# Patient Record
Sex: MALE | Race: WHITE | Employment: OTHER | URBAN - METROPOLITAN AREA
[De-identification: names, ages, dates, MRNs, and addresses within clinical notes are randomized per-mention and may not be internally consistent; named-entity substitution may affect disease eponyms.]

---

## 2017-01-06 ENCOUNTER — HOSPITAL ENCOUNTER (OUTPATIENT)
Dept: LAB | Age: 77
Discharge: HOME | End: 2017-01-06
Payer: MEDICARE

## 2017-01-06 LAB
BASOPHILS # BLD AUTO: 0 K/UL (ref 0–0.2)
BASOPHILS # BLD: 0 % (ref 0–2)
CRP SERPL-MCNC: 2.5 MG/DL (ref 0–0.9)
DIFFERENTIAL METHOD BLD: ABNORMAL
EOSINOPHIL # BLD: 0.3 K/UL (ref 0–0.8)
EOSINOPHIL NFR BLD: 2 % (ref 0.5–7.8)
ERYTHROCYTE [DISTWIDTH] IN BLOOD BY AUTOMATED COUNT: 12.9 % (ref 11.9–14.6)
ERYTHROCYTE [SEDIMENTATION RATE] IN BLOOD: 41 MM/HR (ref 0–30)
HCT VFR BLD AUTO: 41.9 % (ref 41.1–50.3)
HGB BLD-MCNC: 14 G/DL (ref 13.6–17.2)
LYMPHOCYTES # BLD AUTO: 18 % (ref 13–44)
LYMPHOCYTES # BLD: 2.6 K/UL (ref 0.5–4.6)
MCH RBC QN AUTO: 33.2 PG (ref 26.1–32.9)
MCHC RBC AUTO-ENTMCNC: 33.4 G/DL (ref 31.4–35)
MCV RBC AUTO: 99.3 FL (ref 79.6–97.8)
MONOCYTES # BLD: 1.9 K/UL (ref 0.1–1.3)
MONOCYTES NFR BLD AUTO: 13 % (ref 4–12)
NEUTS SEG # BLD: 9.6 K/UL (ref 1.7–8.2)
NEUTS SEG NFR BLD AUTO: 67 % (ref 43–78)
PLATELET # BLD AUTO: 256 K/UL (ref 150–450)
PLATELET COMMENTS,PCOM: ADEQUATE
PMV BLD AUTO: 10.1 FL (ref 10.8–14.1)
RBC # BLD AUTO: 4.22 M/UL (ref 4.23–5.67)
RBC MORPH BLD: ABNORMAL
WBC # BLD AUTO: 14.4 K/UL (ref 4.3–11.1)
WBC MORPH BLD: ABNORMAL

## 2017-01-06 PROCEDURE — 36415 COLL VENOUS BLD VENIPUNCTURE: CPT | Performed by: PHYSICIAN ASSISTANT

## 2017-01-06 PROCEDURE — 85652 RBC SED RATE AUTOMATED: CPT | Performed by: PHYSICIAN ASSISTANT

## 2017-01-06 PROCEDURE — 86140 C-REACTIVE PROTEIN: CPT | Performed by: PHYSICIAN ASSISTANT

## 2017-01-06 PROCEDURE — 85025 COMPLETE CBC W/AUTO DIFF WBC: CPT | Performed by: PHYSICIAN ASSISTANT

## 2017-01-10 ENCOUNTER — HOSPITAL ENCOUNTER (OUTPATIENT)
Dept: LAB | Age: 77
Discharge: HOME | End: 2017-01-10
Payer: MEDICARE

## 2017-01-10 LAB
ANION GAP BLD CALC-SCNC: 11 MMOL/L (ref 7–16)
BUN SERPL-MCNC: 16 MG/DL (ref 8–23)
CALCIUM SERPL-MCNC: 9.3 MG/DL (ref 8.3–10.4)
CHLORIDE SERPL-SCNC: 108 MMOL/L (ref 98–107)
CO2 SERPL-SCNC: 23 MMOL/L (ref 21–32)
CREAT SERPL-MCNC: 1.51 MG/DL (ref 0.8–1.5)
GLUCOSE SERPL-MCNC: 114 MG/DL (ref 65–100)
POTASSIUM SERPL-SCNC: 4.1 MMOL/L (ref 3.5–5.1)
SODIUM SERPL-SCNC: 142 MMOL/L (ref 136–145)

## 2017-01-10 PROCEDURE — 80048 BASIC METABOLIC PNL TOTAL CA: CPT | Performed by: PHYSICIAN ASSISTANT

## 2017-01-10 PROCEDURE — 36415 COLL VENOUS BLD VENIPUNCTURE: CPT | Performed by: PHYSICIAN ASSISTANT

## 2017-01-13 ENCOUNTER — HOSPITAL ENCOUNTER (OUTPATIENT)
Dept: INTERVENTIONAL RADIOLOGY/VASCULAR | Age: 77
Discharge: HOME | End: 2017-01-13
Payer: MEDICARE

## 2017-01-13 VITALS
TEMPERATURE: 97.7 F | RESPIRATION RATE: 18 BRPM | HEART RATE: 84 BPM | OXYGEN SATURATION: 97 % | HEIGHT: 64 IN | DIASTOLIC BLOOD PRESSURE: 76 MMHG | SYSTOLIC BLOOD PRESSURE: 165 MMHG | BODY MASS INDEX: 25.61 KG/M2 | WEIGHT: 150 LBS

## 2017-01-13 DIAGNOSIS — M46.46 DISCITIS OF LUMBAR REGION: ICD-10-CM

## 2017-01-13 DIAGNOSIS — M46.96 INFLAMMATORY SPONDYLOPATHY OF LUMBAR REGION (HCC): ICD-10-CM

## 2017-01-13 PROCEDURE — 74011000250 HC RX REV CODE- 250: Performed by: RADIOLOGY

## 2017-01-13 PROCEDURE — 87205 SMEAR GRAM STAIN: CPT | Performed by: PHYSICIAN ASSISTANT

## 2017-01-13 PROCEDURE — 77030010507 HC ADH SKN DERMBND J&J -B

## 2017-01-13 PROCEDURE — 62267 INTERDISCAL PERQ ASPIR DX: CPT

## 2017-01-13 PROCEDURE — 87176 TISSUE HOMOGENIZATION CULTR: CPT | Performed by: PHYSICIAN ASSISTANT

## 2017-01-13 PROCEDURE — 77030003669 HC NDL SPN COOK -B

## 2017-01-13 RX ORDER — LIDOCAINE HYDROCHLORIDE 20 MG/ML
20-400 INJECTION, SOLUTION INFILTRATION; PERINEURAL ONCE
Status: COMPLETED | OUTPATIENT
Start: 2017-01-13 | End: 2017-01-13

## 2017-01-13 RX ADMIN — LIDOCAINE HYDROCHLORIDE 200 MG: 20 INJECTION, SOLUTION INFILTRATION; PERINEURAL at 10:49

## 2017-01-13 RX ADMIN — SODIUM BICARBONATE 2 ML: 0.2 INJECTION, SOLUTION INTRAVENOUS at 10:49

## 2017-01-13 NOTE — DISCHARGE INSTRUCTIONS
[address]  Department of Interventional Radiology  [facility]  [phone] Office  [phone] Fax    BIOPSY DISCHARGE INSTRUCTIONS    General Instructions:     A biopsy is the removal of a small piece of tissue for microscopic examination or testing. Healthy tissue can be obtained for the purpose of tissue-type matching for transplants. Unhealthy tissues are more commonly biopsied to diagnose disease. Lung Biopsy:     A needle lung biopsy is performed when there is a mass discovered in the lung area. The most serious risk is infection, bleeding, and pneumothorax (a collapsed lung). Signs of pneumothorax include shortness of breath, rapid heart rate, and blueness of the skin. If any of these occur, call 911. Liver Biopsy: This test helps detect cancer, infections, and the cause of an enlargement of the liver or elevated liver enzymes. It also helps to diagnose a number of liver diseases. The pain associated with the procedure may be felt in the shoulder. The risks include internal bleeding, pneumothorax, and injury to the surrounding organs. Renal Biopsy: This procedure is sometimes done to evaluate a transplanted kidney. It is also used to evaluate unexplained decrease in kidney function, blood, or protein in the urine. You may see bright red blood in the urine the first 24 hours after the test. If the bleeding lasts longer, you need to call your doctor. There is a risk of infection and bleeding into the muscle, which may cause soreness. Spinal Biopsy: This test is sometimes done in conjunction with other procedures. Your back will be sore, as we are taking a small sample of bone, which is slightly more difficult to sample than tissue. General Biopsy:     A mass can grow in any area of the body, and we are taking a specimen as ordered by your doctor. The risks are the same.  They include bleeding, pain, and infection. Home Care Instructions: You may resume your regular diet and medication regimen. Do not drink alcohol, drive, or make any important legal decisions in the next 24 hours. Do not lift anything heavier than a gallon of milk until the soreness goes away. You may use over the counter acetaminophen or ibuprofen for the soreness. You may apply an ice pack to the affected area for 20-30 minutes at time for the first 24 hours. After that, you may apply a heat pack. Call If: You should call your Physician and/or the Radiology Nurse if you have any questions or concerns about the biopsy site. Call if you should have increased pain, fever, redness, drainage, or bleeding more than a small spot on the bandage. Follow-Up Instructions: Please see your ordering doctor as he/she has requested. To Reach Us: If you have any questions about your procedure, please call the Interventional Radiology department at [phone]. After business hours (5pm) and weekends, call the answering service at [phone] and ask for the Radiologist on call to be paged. Interventional Radiology General Nurse Discharge    After general anesthesia or intravenous sedation, for 24 hours or while taking prescription Narcotics:  · Limit your activities  · Do not drive and operate hazardous machinery  · Do not make important personal or business decisions  · Do  not drink alcoholic beverages  · If you have not urinated within 8 hours after discharge, please contact your surgeon on call. * Please give a list of your current medications to your Primary Care Provider. * Please update this list whenever your medications are discontinued, doses are     changed, or new medications (including over-the-counter products) are added. * Please carry medication information at all times in case of emergency situations.     These are general instructions for a healthy lifestyle:    No smoking/ No tobacco products/ Avoid exposure to second hand smoke  Surgeon General's Warning:  Quitting smoking now greatly reduces serious risk to your health. Obesity, smoking, and sedentary lifestyle greatly increases your risk for illness  A healthy diet, regular physical exercise & weight monitoring are important for maintaining a healthy lifestyle    You may be retaining fluid if you have a history of heart failure or if you experience any of the following symptoms:  Weight gain of 3 pounds or more overnight or 5 pounds in a week, increased swelling in our hands or feet or shortness of breath while lying flat in bed. Please call your doctor as soon as you notice any of these symptoms; do not wait until your next office visit. Recognize signs and symptoms of STROKE:  F-face looks uneven    A-arms unable to move or move unevenly    S-speech slurred or non-existent    T-time-call 911 as soon as signs and symptoms begin-DO NOT go       Back to bed or wait to see if you get better-TIME IS BRAIN.             Patient Signature:  Date: 1/13/2017  Discharging Nurse: Tad Enrique RN

## 2017-01-13 NOTE — PROGRESS NOTES
I have reviewed discharge instructions with the patient. The patient verbalized understanding. Patient ambulatory to waiting room. NAD upon discharge.

## 2017-01-13 NOTE — IP AVS SNAPSHOT
[address] 
[phone] Patient: Charley Suazo MRN: [id number] :1940 You are allergic to the following Allergen Reactions Morphine Nausea and Vomiting Other Medication Nausea and Vomiting Dust and pollen causes runny nose. Mollusks/clams- N/V Recent Documentation Height Weight BMI Smoking Status 1.626 m 68 kg 25.75 kg/m2 Former Smoker Emergency Contacts Name Discharge Info Relation Home Work Mobile Olegario Slaughter  Spouse [3] [phone] About your hospitalization You were admitted on:  2017 You last received care in the:  [facility] You were discharged on:  2017 Unit phone number:  [phone] Why you were hospitalized Your primary diagnosis was:  Not on File Your Primary Care Physician (PCP) Primary Care Physician Office Phone Office Fax Lester Martinez [phone] Follow-up Information None Current Discharge Medication List  
  
ASK your doctor about these medications Dose & Instructions Dispensing Information Comments Morning Noon Evening Bedtime AMBIEN 10 mg tablet Generic drug:  zolpidem Your next dose is: Today, Tomorrow Other:  _________ Dose:  10 mg Take 10 mg by mouth nightly as needed for Sleep. Indications: SLEEP-ONSET INSOMNIA Refills:  0  
     
   
   
   
  
 benazepril 10 mg tablet Commonly known as:  LOTENSIN Your next dose is: Today, Tomorrow Other:  _________ Dose:  10 mg Take 10 mg by mouth every morning. Indications: HYPERTENSION Refills:  0  
     
   
   
   
  
 CARAFATE 100 mg/mL suspension Generic drug:  sucralfate Your next dose is: Today, Tomorrow Other:  _________ Dose:  2 tsp Take 2 tsp by mouth Before breakfast, lunch, and dinner. Refills:  0  
     
   
   
   
  
 cetirizine 10 mg tablet Commonly known as:  ZYRTEC Your next dose is: Today, Tomorrow Other:  _________ Dose:  10 mg Take 10 mg by mouth nightly. Indications: ALLERGIC RHINITIS Refills:  0  
     
   
   
   
  
 FLONASE 50 mcg/actuation nasal spray Generic drug:  fluticasone Your next dose is: Today, Tomorrow Other:  _________ Dose:  2 Spray 2 Sprays by Nasal route nightly. Indications: ALLERGIC RHINITIS Refills:  0  
     
   
   
   
  
 megestrol 400 mg/10 mL (40 mg/mL) suspension Commonly known as:  MEGACE Your next dose is: Today, Tomorrow Other:  _________ Dose:  400 mg Take 400 mg by mouth as needed. Refills:  0  
     
   
   
   
  
 meloxicam 7.5 mg tablet Commonly known as:  MOBIC Your next dose is: Today, Tomorrow Other:  _________ Dose:  7.5 mg Take 7.5 mg by mouth daily. Stop 5 days prior to DOS   Indications: OSTEOARTHRITIS Refills:  0  
     
   
   
   
  
 metoclopramide HCl 5 mg tablet Commonly known as:  REGLAN Your next dose is: Today, Tomorrow Other:  _________ Dose:  5 mg Take 5 mg by mouth Before breakfast, lunch, and dinner. Take DOS per anesthesia protocol. Indications: GASTROESOPHAGEAL REFLUX Refills:  0  
     
   
   
   
  
 omeprazole 20 mg capsule Commonly known as:  PRILOSEC Your next dose is: Today, Tomorrow Other:  _________ Dose:  20 mg Take 20 mg by mouth daily. Take DOS per anesthesia protocol. Indications: GASTROESOPHAGEAL REFLUX Refills:  0  
     
   
   
   
  
 oxyCODONE-acetaminophen 5-325 mg per tablet Commonly known as:  PERCOCET Your next dose is: Today, Tomorrow Other:  _________ Dose:  1 Tab Take 1 Tab by mouth every four (4) hours as needed. Max Daily Amount: 6 Tabs. Quantity:  40 Tab Refills:  0 PRESERVISION LUTEIN PO Your next dose is: Today, Tomorrow Other:  _________ Take  by mouth daily. Refills:  0  
     
   
   
   
  
 promethazine 25 mg tablet Commonly known as:  PHENERGAN Your next dose is: Today, Tomorrow Other:  _________ Dose:  25 mg Take 1 Tab by mouth every six (6) hours as needed. Quantity:  20 Tab Refills:  0  
     
   
   
   
  
 simvastatin 40 mg tablet Commonly known as:  ZOCOR Your next dose is: Today, Tomorrow Other:  _________ Dose:  40 mg Take 40 mg by mouth nightly. Refills:  0  
     
   
   
   
  
 VITAMIN B-12 1,000 mcg/mL injection Generic drug:  cyanocobalamin Your next dose is: Today, Tomorrow Other:  _________ Dose:  1000 mcg  
1,000 mcg by IntraMUSCular route every twenty-eight (28) days. Refills:  0 Discharge Instructions [address] Department of Interventional Radiology [place] [facility] 
[phone] Office 
[phone] Fax BIOPSY DISCHARGE INSTRUCTIONS General Instructions: A biopsy is the removal of a small piece of tissue for microscopic examination or testing. Healthy tissue can be obtained for the purpose of tissue-type matching for transplants. Unhealthy tissues are more commonly biopsied to diagnose disease. Lung Biopsy: A needle lung biopsy is performed when there is a mass discovered in the lung area. The most serious risk is infection, bleeding, and pneumothorax (a collapsed lung). Signs of pneumothorax include shortness of breath, rapid heart rate, and blueness of the skin. If any of these occur, call 911. Liver Biopsy: This test helps detect cancer, infections, and the cause of an enlargement of the liver or elevated liver enzymes. It also helps to diagnose a number of liver diseases. The pain associated with the procedure may be felt in the shoulder. The risks include internal bleeding, pneumothorax, and injury to the surrounding organs. Renal Biopsy: This procedure is sometimes done to evaluate a transplanted kidney. It is also used to evaluate unexplained decrease in kidney function, blood, or protein in the urine. You may see bright red blood in the urine the first 24 hours after the test. If the bleeding lasts longer, you need to call your doctor. There is a risk of infection and bleeding into the muscle, which may cause soreness. Spinal Biopsy: This test is sometimes done in conjunction with other procedures. Your back will be sore, as we are taking a small sample of bone, which is slightly more difficult to sample than tissue. General Biopsy: 
   A mass can grow in any area of the body, and we are taking a specimen as ordered by your doctor. The risks are the same. They include bleeding, pain, and infection. Home Care Instructions: You may resume your regular diet and medication regimen. Do not drink alcohol, drive, or make any important legal decisions in the next 24 hours. Do not lift anything heavier than a gallon of milk until the soreness goes away. You may use over the counter acetaminophen or ibuprofen for the soreness. You may apply an ice pack to the affected area for 20-30 minutes at time for the first 24 hours. After that, you may apply a heat pack. Call If: You should call your Physician and/or the Radiology Nurse if you have any questions or concerns about the biopsy site. Call if you should have increased pain, fever, redness, drainage, or bleeding more than a small spot on the bandage. Follow-Up Instructions: Please see your ordering doctor as he/she has requested. To Reach Us: If you have any questions about your procedure, please call the Interventional Radiology department at [phone]. After business hours (5pm) and weekends, call the answering service at [phone] and ask for the Radiologist on call to be paged. Interventional Radiology General Nurse Discharge After general anesthesia or intravenous sedation, for 24 hours or while taking prescription Narcotics: · Limit your activities · Do not drive and operate hazardous machinery · Do not make important personal or business decisions · Do  not drink alcoholic beverages · If you have not urinated within 8 hours after discharge, please contact your surgeon on call. * Please give a list of your current medications to your Primary Care Provider. * Please update this list whenever your medications are discontinued, doses are 
   changed, or new medications (including over-the-counter products) are added. * Please carry medication information at all times in case of emergency situations. These are general instructions for a healthy lifestyle: No smoking/ No tobacco products/ Avoid exposure to second hand smoke Surgeon General's Warning:  Quitting smoking now greatly reduces serious risk to your health. Obesity, smoking, and sedentary lifestyle greatly increases your risk for illness A healthy diet, regular physical exercise & weight monitoring are important for maintaining a healthy lifestyle You may be retaining fluid if you have a history of heart failure or if you experience any of the following symptoms:  Weight gain of 3 pounds or more overnight or 5 pounds in a week, increased swelling in our hands or feet or shortness of breath while lying flat in bed. Please call your doctor as soon as you notice any of these symptoms; do not wait until your next office visit. Recognize signs and symptoms of STROKE: 
F-face looks uneven A-arms unable to move or move unevenly S-speech slurred or non-existent T-time-call 911 as soon as signs and symptoms begin-DO NOT go Back to bed or wait to see if you get better-TIME IS BRAIN. Patient Signature: 
Date: 1/13/2017 Discharging Nurse: Garry Sher RN Discharge Orders None Introducing [facility]! Mook Patel introduces [employer] patient portal. Now you can access parts of your medical record, email your doctor's office, and request medication refills online. 1. In your internet browser, go to [email or web address] 2. Click on the First Time User? Click Here link in the Sign In box. You will see the New Member Sign Up page. 3. Enter your [employer] Access Code exactly as it appears below. You will not need to use this code after youve completed the sign-up process. If you do not sign up before the expiration date, you must request a new code. · [employer] Access Code: [email or web address] Expires: 4/6/2017  1:45 PM 
 
4. Enter the last four digits of your Social Security Number (xxxx) and Date of Birth (mm/dd/yyyy) as indicated and click Submit. You will be taken to the next sign-up page. 5. Create a [employer] ID. This will be your [employer] login ID and cannot be changed, so think of one that is secure and easy to remember. 6. Create a [employer] password. You can change your password at any time. 7. Enter your Password Reset Question and Answer. This can be used at a later time if you forget your password. 8. Enter your e-mail address. You will receive e-mail notification when new information is available in [address] 9. Click Sign Up. You can now view and download portions of your medical record. 10. Click the Download Summary menu link to download a portable copy of your medical information. If you have questions, please visit the Frequently Asked Questions section of the [employer] website.  Remember, [employer] is NOT to be used for urgent needs. For medical emergencies, dial 911. Now available from your iPhone and Android! General Information Please provide this summary of care documentation to your next provider. Patient Signature:  ____________________________________________________________ Date:  ____________________________________________________________  
  
[facility] Provider Signature:  ____________________________________________________________ Date:  ____________________________________________________________

## 2017-01-13 NOTE — PROGRESS NOTES
Interventional Radiology Prep Area Handoff      Allergies   Allergen Reactions    Morphine Nausea and Vomiting    Other Medication Nausea and Vomiting     Dust and pollen causes runny nose. Mollusks/clams- N/V       IRSummary reviewed. Pt identified with two identifiers. Verified procedure with Patient and IR Provider as Disc Aspiration. Consent obtained: Yes H&P complete/updated: No MD airway complete: No    Sedation:No   NPO: Yes   Anticoagulation: No     Pt shaved: No   Antibiotics: No  Anesthesia notified: NA    Additional Notes: NA      Lines:  Peripherally Inserted Central Catheter:     Central Venous Catheter:     Venous Access Device:     Peripheral Intravenous Line:  Peripheral IV 07/23/15 Left Antecubital (Active)     Hemodialysis Catheter:     Drain(s):       Labs verified: Yes  No results found for this or any previous visit (from the past 24 hour(s)).   Patient Vitals for the past 8 hrs:   Temp Pulse Resp BP SpO2   01/13/17 0850 97.7 °F (36.5 °C) 84 18 165/76 97 %

## 2017-01-13 NOTE — IP AVS SNAPSHOT
Current Discharge Medication List  
  
ASK your doctor about these medications Dose & Instructions Dispensing Information Comments Morning Noon Evening Bedtime AMBIEN 10 mg tablet Generic drug:  zolpidem Your next dose is: Today, Tomorrow Other:  ____________ Dose:  10 mg Take 10 mg by mouth nightly as needed for Sleep. Indications: SLEEP-ONSET INSOMNIA Refills:  0  
     
   
   
   
  
 benazepril 10 mg tablet Commonly known as:  LOTENSIN Your next dose is: Today, Tomorrow Other:  ____________ Dose:  10 mg Take 10 mg by mouth every morning. Indications: HYPERTENSION Refills:  0  
     
   
   
   
  
 CARAFATE 100 mg/mL suspension Generic drug:  sucralfate Your next dose is: Today, Tomorrow Other:  ____________ Dose:  2 tsp Take 2 tsp by mouth Before breakfast, lunch, and dinner. Refills:  0  
     
   
   
   
  
 cetirizine 10 mg tablet Commonly known as:  ZYRTEC Your next dose is: Today, Tomorrow Other:  ____________ Dose:  10 mg Take 10 mg by mouth nightly. Indications: ALLERGIC RHINITIS Refills:  0  
     
   
   
   
  
 FLONASE 50 mcg/actuation nasal spray Generic drug:  fluticasone Your next dose is: Today, Tomorrow Other:  ____________ Dose:  2 Spray 2 Sprays by Nasal route nightly. Indications: ALLERGIC RHINITIS Refills:  0  
     
   
   
   
  
 megestrol 400 mg/10 mL (40 mg/mL) suspension Commonly known as:  MEGACE Your next dose is: Today, Tomorrow Other:  ____________ Dose:  400 mg Take 400 mg by mouth as needed. Refills:  0  
     
   
   
   
  
 meloxicam 7.5 mg tablet Commonly known as:  MOBIC Your next dose is: Today, Tomorrow Other:  ____________ Dose:  7.5 mg Take 7.5 mg by mouth daily. Stop 5 days prior to DOS   Indications: OSTEOARTHRITIS Refills:  0 metoclopramide HCl 5 mg tablet Commonly known as:  REGLAN Your next dose is: Today, Tomorrow Other:  ____________ Dose:  5 mg Take 5 mg by mouth Before breakfast, lunch, and dinner. Take DOS per anesthesia protocol. Indications: GASTROESOPHAGEAL REFLUX Refills:  0  
     
   
   
   
  
 omeprazole 20 mg capsule Commonly known as:  PRILOSEC Your next dose is: Today, Tomorrow Other:  ____________ Dose:  20 mg Take 20 mg by mouth daily. Take DOS per anesthesia protocol. Indications: GASTROESOPHAGEAL REFLUX Refills:  0  
     
   
   
   
  
 oxyCODONE-acetaminophen 5-325 mg per tablet Commonly known as:  PERCOCET Your next dose is: Today, Tomorrow Other:  ____________ Dose:  1 Tab Take 1 Tab by mouth every four (4) hours as needed. Max Daily Amount: 6 Tabs. Quantity:  40 Tab Refills:  0 PRESERVISION LUTEIN PO Your next dose is: Today, Tomorrow Other:  ____________ Take  by mouth daily. Refills:  0  
     
   
   
   
  
 promethazine 25 mg tablet Commonly known as:  PHENERGAN Your next dose is: Today, Tomorrow Other:  ____________ Dose:  25 mg Take 1 Tab by mouth every six (6) hours as needed. Quantity:  20 Tab Refills:  0  
     
   
   
   
  
 simvastatin 40 mg tablet Commonly known as:  ZOCOR Your next dose is: Today, Tomorrow Other:  ____________ Dose:  40 mg Take 40 mg by mouth nightly. Refills:  0  
     
   
   
   
  
 VITAMIN B-12 1,000 mcg/mL injection Generic drug:  cyanocobalamin Your next dose is: Today, Tomorrow Other:  ____________ Dose:  1000 mcg  
1,000 mcg by IntraMUSCular route every twenty-eight (28) days. Refills:  0

## 2017-01-15 LAB
BACTERIA SPEC CULT: NORMAL
GRAM STN SPEC: NORMAL
GRAM STN SPEC: NORMAL
SERVICE CMNT-IMP: NORMAL

## 2017-01-25 ENCOUNTER — HOSPITAL ENCOUNTER (OUTPATIENT)
Dept: MRI IMAGING | Age: 77
Discharge: HOME | End: 2017-01-25
Attending: FAMILY MEDICINE
Payer: MEDICARE

## 2017-01-25 DIAGNOSIS — M51.36 OTHER INTERVERTEBRAL DISC DEGENERATION, LUMBAR REGION: ICD-10-CM

## 2017-01-25 DIAGNOSIS — M46.46 DISCITIS OF LUMBAR REGION: ICD-10-CM

## 2017-01-25 DIAGNOSIS — M48.062 LUMBAR STENOSIS WITH NEUROGENIC CLAUDICATION: ICD-10-CM

## 2017-01-25 PROCEDURE — 72158 MRI LUMBAR SPINE W/O & W/DYE: CPT

## 2017-01-25 PROCEDURE — 74011250636 HC RX REV CODE- 250/636: Performed by: FAMILY MEDICINE

## 2017-01-25 PROCEDURE — A9577 INJ MULTIHANCE: HCPCS | Performed by: FAMILY MEDICINE

## 2017-01-25 RX ORDER — SODIUM CHLORIDE 0.9 % (FLUSH) 0.9 %
10 SYRINGE (ML) INJECTION
Status: COMPLETED | OUTPATIENT
Start: 2017-01-25 | End: 2017-01-25

## 2017-01-25 RX ADMIN — Medication 10 ML: at 17:34

## 2017-01-25 RX ADMIN — GADOBENATE DIMEGLUMINE 7 ML: 529 INJECTION, SOLUTION INTRAVENOUS at 17:34

## 2018-08-01 ENCOUNTER — HOSPITAL ENCOUNTER (OUTPATIENT)
Dept: NUCLEAR MEDICINE | Age: 78
Discharge: HOME | End: 2018-08-01
Attending: ORTHOPAEDIC SURGERY
Payer: MEDICARE

## 2018-08-01 DIAGNOSIS — M25.561 PAIN IN RIGHT KNEE: ICD-10-CM

## 2018-08-01 DIAGNOSIS — M16.11 UNILATERAL PRIMARY OSTEOARTHRITIS, RIGHT HIP: ICD-10-CM

## 2018-08-01 DIAGNOSIS — Z96.651 HISTORY OF TOTAL RIGHT KNEE REPLACEMENT: ICD-10-CM

## 2018-08-01 PROCEDURE — 78306 BONE IMAGING WHOLE BODY: CPT

## 2018-08-20 ENCOUNTER — HOSPITAL ENCOUNTER (OUTPATIENT)
Dept: SURGERY | Age: 78
Discharge: HOME | End: 2018-08-20
Payer: MEDICARE

## 2018-08-20 ENCOUNTER — HOSPITAL ENCOUNTER (OUTPATIENT)
Dept: PHYSICAL THERAPY | Age: 78
Discharge: HOME | End: 2018-08-20
Payer: MEDICARE

## 2018-08-20 LAB
ANION GAP SERPL CALC-SCNC: 13 MMOL/L (ref 7–16)
APPEARANCE UR: CLEAR
APTT PPP: 28.3 SEC (ref 23.2–35.3)
BACTERIA SPEC CULT: NORMAL
BACTERIA URNS QL MICRO: 0 /HPF
BASOPHILS # BLD: 0.1 K/UL
BASOPHILS NFR BLD: 1 % (ref 0–2)
BILIRUB UR QL: NEGATIVE
BUN SERPL-MCNC: 30 MG/DL (ref 8–23)
CALCIUM SERPL-MCNC: 9.2 MG/DL (ref 8.3–10.4)
CASTS URNS QL MICRO: ABNORMAL /LPF
CHLORIDE SERPL-SCNC: 106 MMOL/L (ref 98–107)
CO2 SERPL-SCNC: 21 MMOL/L (ref 21–32)
COLOR UR: YELLOW
CREAT SERPL-MCNC: 1.92 MG/DL (ref 0.8–1.5)
DIFFERENTIAL METHOD BLD: ABNORMAL
EOSINOPHIL # BLD: 0.1 K/UL
EOSINOPHIL NFR BLD: 1 % (ref 0.5–7.8)
EPI CELLS #/AREA URNS HPF: 0 /HPF
ERYTHROCYTE [DISTWIDTH] IN BLOOD BY AUTOMATED COUNT: 12.4 %
GLUCOSE SERPL-MCNC: 89 MG/DL (ref 65–100)
GLUCOSE UR STRIP.AUTO-MCNC: NEGATIVE MG/DL
HCT VFR BLD AUTO: 37.3 % (ref 41.1–50.3)
HGB BLD-MCNC: 12.1 G/DL (ref 13.6–17.2)
HGB UR QL STRIP: NEGATIVE
IMM GRANULOCYTES # BLD: 0.2 K/UL
IMM GRANULOCYTES NFR BLD AUTO: 2 % (ref 0–5)
INR PPP: 1
KETONES UR QL STRIP.AUTO: NEGATIVE MG/DL
LEUKOCYTE ESTERASE UR QL STRIP.AUTO: ABNORMAL
LYMPHOCYTES # BLD: 3.2 K/UL
LYMPHOCYTES NFR BLD: 30 % (ref 13–44)
MCH RBC QN AUTO: 33.1 PG (ref 26.1–32.9)
MCHC RBC AUTO-ENTMCNC: 32.4 G/DL (ref 31.4–35)
MCV RBC AUTO: 101.9 FL (ref 79.6–97.8)
MONOCYTES # BLD: 1.4 K/UL
MONOCYTES NFR BLD: 13 % (ref 4–12)
NEUTS SEG # BLD: 5.8 K/UL
NEUTS SEG NFR BLD: 53 % (ref 43–78)
NITRITE UR QL STRIP.AUTO: NEGATIVE
NRBC # BLD: 0 K/UL (ref 0–0.2)
PH UR STRIP: 5.5 [PH] (ref 5–9)
PLATELET # BLD AUTO: 224 K/UL (ref 150–450)
PMV BLD AUTO: 9.7 FL (ref 9.4–12.3)
POTASSIUM SERPL-SCNC: 4.3 MMOL/L (ref 3.5–5.1)
PROT UR STRIP-MCNC: NEGATIVE MG/DL
PROTHROMBIN TIME: 13.4 SEC (ref 11.5–14.5)
RBC # BLD AUTO: 3.66 M/UL (ref 4.23–5.6)
RBC #/AREA URNS HPF: 0 /HPF
SERVICE CMNT-IMP: NORMAL
SODIUM SERPL-SCNC: 140 MMOL/L (ref 136–145)
SP GR UR REFRACTOMETRY: 1.01 (ref 1–1.02)
UROBILINOGEN UR QL STRIP.AUTO: 0.2 EU/DL (ref 0.2–1)
WBC # BLD AUTO: 10.8 K/UL (ref 4.3–11.1)
WBC URNS QL MICRO: 0 /HPF

## 2018-08-20 PROCEDURE — 80048 BASIC METABOLIC PNL TOTAL CA: CPT

## 2018-08-20 PROCEDURE — 85730 THROMBOPLASTIN TIME PARTIAL: CPT

## 2018-08-20 PROCEDURE — G8980 MOBILITY D/C STATUS: HCPCS

## 2018-08-20 PROCEDURE — 36415 COLL VENOUS BLD VENIPUNCTURE: CPT

## 2018-08-20 PROCEDURE — 97161 PT EVAL LOW COMPLEX 20 MIN: CPT

## 2018-08-20 PROCEDURE — 87641 MR-STAPH DNA AMP PROBE: CPT

## 2018-08-20 PROCEDURE — 85025 COMPLETE CBC W/AUTO DIFF WBC: CPT

## 2018-08-20 PROCEDURE — 81001 URINALYSIS AUTO W/SCOPE: CPT

## 2018-08-20 PROCEDURE — 85610 PROTHROMBIN TIME: CPT

## 2018-08-20 PROCEDURE — 77030027138 HC INCENT SPIROMETER -A

## 2018-08-20 PROCEDURE — G8979 MOBILITY GOAL STATUS: HCPCS

## 2018-08-20 PROCEDURE — G8978 MOBILITY CURRENT STATUS: HCPCS

## 2018-08-20 RX ORDER — FLUCONAZOLE 100 MG/1
200 TABLET ORAL
COMMUNITY
End: 2018-09-19

## 2018-08-20 RX ORDER — SUCRALFATE 1 G/1
1 TABLET ORAL 3 TIMES DAILY
COMMUNITY
End: 2021-03-31 | Stop reason: CLARIF

## 2018-08-20 NOTE — PERIOP NOTES
Aaron Dodson MD    Your patient recently had labs drawn during a hospital appointment due to an upcoming surgery. The results are attached. If you have any questions or concerns please reach out to your patient for a follow-up in your office. Please do not respond to this message as my mailbox is not monitored. You may call [phone] with questions or concerns. Recent Results (from the past 12 hour(s))   CBC WITH AUTOMATED DIFF    Collection Time: 08/20/18 11:19 AM   Result Value Ref Range    WBC 10.8 4.3 - 11.1 K/uL    RBC 3.66 (L) 4.23 - 5.6 M/uL    HGB 12.1 (L) 13.6 - 17.2 g/dL    HCT 37.3 (L) 41.1 - 50.3 %    .9 (H) 79.6 - 97.8 FL    MCH 33.1 (H) 26.1 - 32.9 PG    MCHC 32.4 31.4 - 35.0 g/dL    RDW 12.4 %    PLATELET 360 875 - 064 K/uL    MPV 9.7 9.4 - 12.3 FL    ABSOLUTE NRBC 0.00 0.0 - 0.2 K/uL    DF AUTOMATED      NEUTROPHILS 53 43 - 78 %    LYMPHOCYTES 30 13 - 44 %    MONOCYTES 13 (H) 4.0 - 12.0 %    EOSINOPHILS 1 0.5 - 7.8 %    BASOPHILS 1 0.0 - 2.0 %    IMMATURE GRANULOCYTES 2 0.0 - 5.0 %    ABS. NEUTROPHILS 5.8 K/UL    ABS. LYMPHOCYTES 3.2 K/UL    ABS. MONOCYTES 1.4 K/UL    ABS. EOSINOPHILS 0.1 K/UL    ABS. BASOPHILS 0.1 K/UL    ABS. IMM.  GRANS. 0.2 K/UL   PROTHROMBIN TIME + INR    Collection Time: 08/20/18 11:19 AM   Result Value Ref Range    Prothrombin time 13.4 11.5 - 14.5 sec    INR 1.0     PTT    Collection Time: 08/20/18 11:19 AM   Result Value Ref Range    aPTT 28.3 23.2 - 34.6 SEC   METABOLIC PANEL, BASIC    Collection Time: 08/20/18 11:19 AM   Result Value Ref Range    Sodium 140 136 - 145 mmol/L    Potassium 4.3 3.5 - 5.1 mmol/L    Chloride 106 98 - 107 mmol/L    CO2 21 21 - 32 mmol/L    Anion gap 13 7 - 16 mmol/L    Glucose 89 65 - 100 mg/dL    BUN 30 (H) 8 - 23 MG/DL    Creatinine 1.92 (H) 0.8 - 1.5 MG/DL    GFR est AA 44 (L) >60 ml/min/1.73m2    GFR est non-AA 36 (L) >60 ml/min/1.73m2    Calcium 9.2 8.3 - 10.4 MG/DL   URINALYSIS W/ RFLX MICROSCOPIC    Collection Time: 08/20/18 11:19 AM   Result Value Ref Range    Color YELLOW      Appearance CLEAR      Specific gravity 1.012 1.001 - 1.023      pH (UA) 5.5 5.0 - 9.0      Protein NEGATIVE  NEG mg/dL    Glucose NEGATIVE  mg/dL    Ketone NEGATIVE  NEG mg/dL    Bilirubin NEGATIVE  NEG      Blood NEGATIVE  NEG      Urobilinogen 0.2 0.2 - 1.0 EU/dL    Nitrites NEGATIVE  NEG      Leukocyte Esterase TRACE (A) NEG      WBC 0 0 /hpf    RBC 0 0 /hpf    Epithelial cells 0 0 /hpf    Bacteria 0 0 /hpf    Casts 0-3 0 /lpf

## 2018-08-20 NOTE — PERIOP NOTES
Patient verified name, , and surgery as listed in [facility]. Type 3 surgery, walk in assessment complete. Labs per surgeon: CBC, BMP, U/A, PT/PTT ; results within [facility] protocols except elevated Creatinine of 1.92; [facility] to review. MRSA nasal swab pending. Labs per anesthesia protocol: included in surgeons orders. EKG: last done at pcp office. Called and received ekg dated 18. [facility] to review. EKG dated 07/23/15 printed from EMR for comparison. Received labs dated 18 from pcp office for comparison. Also printed CMP dated 17 for comparison. Called Jessica Fontenot NP due to elevated Creatinine and bilateral lower extremity edema. She came to see patient in person today. Instructed patient to increase water intake and to stop Megestrol for now due to increased weight gain. Hibiclens and instructions to return bottle on DOS given per hospital policy. Nasal Swab collected per MD order and instructions for Mupirocin nasal ointment if required. Patient provided with handouts including Guide to Surgery, Pain Management, Hand Hygiene, Blood Transfusion Education, and [place] [facility] Brochure. Patient answered medical/surgical history questions at their best of ability. All prior to admission medications documented in [facility]. Original medication prescription bottles not visualized during patient appointment. Patient instructed to hold all vitamins 7 days prior to surgery and NSAIDS 5 days prior to surgery. Medications to be held: meloxicam, vitamins. Patient instructed to continue previous medications as prescribed prior to surgery and to take the following medications the day of surgery according to anesthesia guidelines with a small sip of water: omeprazole, sucralfate. Instructed to bring: zyrtec, nasal spray, and omeprazole dos. Patient teach back successful and patient demonstrates knowledge of instruction.

## 2018-08-20 NOTE — ADVANCED PRACTICE NURSE
Total Joint Surgery Preoperative Chart Review      Patient ID:  Silas Cifuentes  [id number]  24 y.o.  1940  Surgeon: Dr. Mary Quick  Date of Surgery: 9/4/2018  Procedure: Total Right Hip Arthroplasty  Primary Care Physician: Sunshine Huynh -[phone]  Specialty Physician(s):      Subjective:   Silas Cifuentes is a 76 y.o. WHITE OR  male who presents for preoperative evaluation for Total Right Hip arthroplasty. This is a preoperative chart review note based on data collected by the nurse at the surgical Pre-Assessment visit. Past Medical History:   Diagnosis Date    Arthritis     osteo    Chronic pain     right shoulder    Former smoker     GERD (gastroesophageal reflux disease)     controlled with medication    High cholesterol     Hypertension     controlled with medication    Nausea & vomiting     post-op nausea  states from morphine    Stomach cancer ([address] 2010    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    Unspecified adverse effect of anesthesia     wife states slow to wake      Past Surgical History:   Procedure Laterality Date    HX APPENDECTOMY      HX CHOLECYSTECTOMY  2015    HX GI  2010    gastrectomy 2/8/10-has 30% stomach left    HX KNEE ARTHROSCOPY Right     HX OTHER SURGICAL      attempted tracheal dilation- stopped due to inflammation    HX SHOULDER ARTHROSCOPY Right 2015    TOTAL HIP ARTHROPLASTY Left 2004    TOTAL KNEE ARTHROPLASTY Right 2012     Family History   Problem Relation Age of Onset    Kidney Disease Mother     Dementia Father     Alcohol abuse Brother     Arthritis-osteo Brother       Social History   Substance Use Topics    Smoking status: Former Smoker     Packs/day: 0.50     Years: 5.00    Smokeless tobacco: Never Used      Comment: quit age 20    Alcohol use Yes      Comment: 3 drinks per night; wine and liquor       Prior to Admission medications    Medication Sig Start Date End Date Taking?  Authorizing Provider   sucralfate (CARAFATE) 1 gram tablet Take 1 g by mouth three (3) times daily. Take / use AM day of surgery  per anesthesia protocols. Yes Historical Provider   fluconazole (DIFLUCAN) 100 mg tablet Take 200 mg by mouth daily as needed. FDA advises cautious prescribing of oral fluconazole in pregnancy. Yes Historical Provider   meloxicam (MOBIC) 7.5 mg tablet Take 7.5 mg by mouth daily. Stop 5 days prior to DOS   Indications: OSTEOARTHRITIS   Yes Historical Provider   omeprazole (PRILOSEC) 20 mg capsule Take 20 mg by mouth Daily (before breakfast). Take DOS per anesthesia protocol. Indications: gastroesophageal reflux disease   Yes Historical Provider   zolpidem (AMBIEN) 10 mg tablet Take 10 mg by mouth nightly as needed for Sleep. Indications: SLEEP-ONSET INSOMNIA   Yes Historical Provider   cyanocobalamin (VITAMIN B-12) 1,000 mcg/mL injection 1,000 mcg by IntraMUSCular route every twenty-eight (28) days. Yes Historical Provider   VIT C/MIKHAIL AC/LUT/COPPER/ZNOX (PRESERVISION LUTEIN PO) Take 1 Cap by mouth daily. Yes Historical Provider   megestrol (MEGACE ORAL) 400 mg/10 mL (40 mg/mL) suspension Take 400 mg by mouth daily. Yes Historical Provider   simvastatin (ZOCOR) 40 mg tablet Take 40 mg by mouth nightly. 3/10/10  Yes Historical Provider   benazepril (LOTENSIN) 10 mg tablet Take 10 mg by mouth daily. Indications: hypertension 2/2/10  Yes Historical Provider   cetirizine (ZYRTEC) 10 mg tablet Take 10 mg by mouth nightly. Indications: ALLERGIC RHINITIS 2/2/10  Yes Historical Provider   fluticasone (FLONASE) 50 mcg/Actuation nasal spray 2 Sprays by Nasal route nightly. Indications: ALLERGIC RHINITIS   Yes Historical Provider     Allergies   Allergen Reactions    Morphine Nausea and Vomiting    Other Medication Nausea and Vomiting     Dust and pollen causes runny nose.   Mollusks/clams- N/V    Shellfish Derived Nausea and Vomiting          Objective:     Physical Exam:   Patient Vitals for the past 24 hrs:   Temp Pulse BP SpO2   08/20/18 1223 96.8 °F (36 °C) 65 (!) 148/93 98 %       ECG:    EKG Results     Procedure 720 Value Units Date/Time    EKG, 12 LEAD, INITIAL [[id number]]     Order Status:  Canceled           Data Review:   Labs:   Recent Results (from the past 24 hour(s))   CBC WITH AUTOMATED DIFF    Collection Time: 08/20/18 11:19 AM   Result Value Ref Range    WBC 10.8 4.3 - 11.1 K/uL    RBC 3.66 (L) 4.23 - 5.6 M/uL    HGB 12.1 (L) 13.6 - 17.2 g/dL    HCT 37.3 (L) 41.1 - 50.3 %    .9 (H) 79.6 - 97.8 FL    MCH 33.1 (H) 26.1 - 32.9 PG    MCHC 32.4 31.4 - 35.0 g/dL    RDW 12.4 %    PLATELET 517 725 - 596 K/uL    MPV 9.7 9.4 - 12.3 FL    ABSOLUTE NRBC 0.00 0.0 - 0.2 K/uL    DF AUTOMATED      NEUTROPHILS 53 43 - 78 %    LYMPHOCYTES 30 13 - 44 %    MONOCYTES 13 (H) 4.0 - 12.0 %    EOSINOPHILS 1 0.5 - 7.8 %    BASOPHILS 1 0.0 - 2.0 %    IMMATURE GRANULOCYTES 2 0.0 - 5.0 %    ABS. NEUTROPHILS 5.8 K/UL    ABS. LYMPHOCYTES 3.2 K/UL    ABS. MONOCYTES 1.4 K/UL    ABS. EOSINOPHILS 0.1 K/UL    ABS. BASOPHILS 0.1 K/UL    ABS. IMM.  GRANS. 0.2 K/UL   PROTHROMBIN TIME + INR    Collection Time: 08/20/18 11:19 AM   Result Value Ref Range    Prothrombin time 13.4 11.5 - 14.5 sec    INR 1.0     PTT    Collection Time: 08/20/18 11:19 AM   Result Value Ref Range    aPTT 28.3 23.2 - 55.3 SEC   METABOLIC PANEL, BASIC    Collection Time: 08/20/18 11:19 AM   Result Value Ref Range    Sodium 140 136 - 145 mmol/L    Potassium 4.3 3.5 - 5.1 mmol/L    Chloride 106 98 - 107 mmol/L    CO2 21 21 - 32 mmol/L    Anion gap 13 7 - 16 mmol/L    Glucose 89 65 - 100 mg/dL    BUN 30 (H) 8 - 23 MG/DL    Creatinine 1.92 (H) 0.8 - 1.5 MG/DL    GFR est AA 44 (L) >60 ml/min/1.73m2    GFR est non-AA 36 (L) >60 ml/min/1.73m2    Calcium 9.2 8.3 - 10.4 MG/DL   URINALYSIS W/ RFLX MICROSCOPIC    Collection Time: 08/20/18 11:19 AM   Result Value Ref Range    Color YELLOW      Appearance CLEAR      Specific gravity 1.012 1.001 - 1.023      pH (UA) 5.5 5.0 - 9.0 Protein NEGATIVE  NEG mg/dL    Glucose NEGATIVE  mg/dL    Ketone NEGATIVE  NEG mg/dL    Bilirubin NEGATIVE  NEG      Blood NEGATIVE  NEG      Urobilinogen 0.2 0.2 - 1.0 EU/dL    Nitrites NEGATIVE  NEG      Leukocyte Esterase TRACE (A) NEG      WBC 0 0 /hpf    RBC 0 0 /hpf    Epithelial cells 0 0 /hpf    Bacteria 0 0 /hpf    Casts 0-3 0 /lpf   MSSA/MRSA SC BY PCR, NASAL SWAB    Collection Time: 08/20/18  1:05 PM   Result Value Ref Range    Special Requests: NARES      Culture result:        SA target not detected. A MRSA NEGATIVE, SA NEGATIVE test result does not preclude MRSA or SA nasal colonization. Problem List:  )  Patient Active Problem List   Diagnosis Code    Osteoarthritis of right knee M17.11    Total knee replacement status Z96.659    Arthritis of right shoulder region M19.011    Status post shoulder replacement Z96.619    Cholecystitis, acute K81.0    S/P laparoscopic cholecystectomy Z90.49    CKD (chronic kidney disease) stage 3, GFR 30-59 ml/min N18.3       Total Joint Surgery Pre-Assessment Recommendations:        Patient currently on Megace for several years due to poor appetite related to stomach cancer. Patients weight is more than adequate at 176. He would like to get back down to 150. Patient with increased creatinine 1.9 and generalized swelling legs. This is most likely due to long term use of megace. Patient was instructed to dc megace and monitor his weight. If poor appetite then he is to restart megace. He will follow with oncology in November. Creatinine 1.9 Patient has been less mobil and drinking a lot more tea. Instructed to decrease caffeine intake or switch to water to increase kidney function. Recommend continuous saturation monitoring hours of sleep, during hospitalization. Renal protocol initiated. The patient's chart will be flagged renal risk. Renal RisK Alerts:  1.  Caution with use of muscle relaxants and sedatives with reduced renal function  2. Caution with total amount of narcotics used   3. Avoid morphine if patient has reduced renal function due to accumulations of the highly active metabolite, morphine-6-glucuronide, which can lead to sedation and respiratory depression  4. Avoid nephrotoxic drugs such as ASH inhibitors  5. Consider volume status monitoring in addition to [place]  6.  Ensure hand-off to hospitalist for appropriate perioperative IV fluid management        Signed By: Roger Giraldo NP-C    August 21, 2018

## 2018-08-20 NOTE — PROGRESS NOTES
Liana Tripp  : (51 y.o.) [facility] at [address], [address] [place] [address]  [phone]       Physical Therapy Prehab Plan of Treatment and Evaluation Summary:2018    ICD-10: Treatment Diagnosis:   · Pain in Right Hip (M25.551)  · Stiffness of Right Hip, Not elsewhere classified (M25.651)  · Difficulty in walking, Not elsewhere classified (R26.2)  Precautions/Allergies:   Morphine; Other medication; and Shellfish derived  MEDICAL/REFERRING DIAGNOSIS:  Unilateral primary osteoarthritis, right hip [M16.11]  REFERRING PHYSICIAN: Juan Daniel Parish,*  DATE OF SURGERY: 18   Assessment:   Comments:  Pt. Has had a right tka, right tsa, and left tara in the past. He plans to go home with his wife. He borrowed our RW today during prehab. He was using his wife's arm to get in the building. PROBLEM LIST (Impacting functional limitations):  Mr. Althea Henriquez presents with the following right lower extremity(s) problems:  1. Strength  2. Range of Motion  3. Home Exercise Program  4. Pain   INTERVENTIONS PLANNED:  1. Home Exercise Program  2. Educational Discussion     TREATMENT PLAN: Effective Dates: 2018 TO 2018. Frequency/Duration: Patient to continue to perform home exercise program at least twice per day up until his surgery. GOALS: (Goals have been discussed and agreed upon with patient.)  Discharge Goals: Time Frame: 1 Day  1. Patient will demonstrate independence with a home exercise program designed to increase strength, range of motion and pain control to minimize functional deficits and optimize patient for total joint replacement. Rehabilitation Potential For Stated Goals: Good  Regarding Vin Granger's therapy, I certify that the treatment plan above will be carried out by a therapist or under their direction.   Thank you for this referral,  Alex Farnsworth, PT               HISTORY:   Present Symptoms:  Pain Intensity 1:  (10)  Pain Location 1: Hip   History of Present Injury/Illness (Reason for Referral):  Medical/Referring Diagnosis: Unilateral primary osteoarthritis, right hip [M16.11]   Past Medical History/Comorbidities:   Mr. Lauri Beaver  has a past medical history of Arthritis; Chronic pain; Former smoker; GERD (gastroesophageal reflux disease); High cholesterol; Hypertension; Nausea & vomiting; Stomach cancer ([address] (2010); Total knee replacement status (2/21/2012); and Unspecified adverse effect of anesthesia. He also has no past medical history of Adverse effect of anesthesia; Aneurysm ([address] Arrhythmia; Asthma; Autoimmune disease ([address] CAD (coronary artery disease); Chronic kidney disease; Chronic obstructive pulmonary disease ([address] Coagulation defects; Coagulation disorder ([address] COPD; Diabetes ([address] Difficult intubation; Endocarditis; Heart failure ([address] Ill-defined condition; Liver disease; Malignant hyperthermia due to anesthesia; Morbid obesity ([address] Nicotine vapor product user; Non-nicotine vapor product user; Pseudocholinesterase deficiency; Psychiatric disorder; PUD (peptic ulcer disease); Rheumatic fever; Seizures ([address] Stroke [facility]); Thromboembolus ([address] Thyroid disease; or Unspecified sleep apnea. Mr. Lauri Beaver  has a past surgical history that includes hx gi (2010); pr total hip arthroplasty (Left, 2004); hx shoulder arthroscopy (Right, 2015); hx knee arthroscopy (Right); pr total knee arthroplasty (Right, 2012); hx other surgical; hx cholecystectomy (2015); and hx appendectomy.   Social History/Living Environment:   Home Environment: Private residence  # Steps to Enter: 1  Rails to Enter: No  One/Two Story Residence: One story  Living Alone: No  Support Systems: Spouse/Significant Other/Partner  Patient Expects to be Discharged to[de-identified] Private residence  Current DME Used/Available at Home: Walker, rolling, Commode, bedside  Tub or Shower Type: Shower  Work/Activity:  retired  Dominant Side:  RIGHT  Current Medications:  See Pre-assessment nursing note   Number of Personal Factors/Comorbidities that affect the Plan of Care: 3+: HIGH COMPLEXITY   EXAMINATION:   ADLs (Current Functional Status):   Ambulation:  [] Independent  [] Walk Indoors Only  [] Walk Outdoors  [x] Use Assistive Device  [] Use Wheelchair Only Dressing:  [x] Independent  Requires Assistance from Someone for:  [] Sock/Shoes  [] Pants  [] Everything   Bathing/Showering:   [] Independent  [x] Requires Assistance from Someone  [] Sponge Bath Only Household Activities:  [] Routine house and yard work  [] Light Housework Only  [x] None   Observation/Orthostatic Postural Assessment:   Exceptions to [place] shoulders  ROM/Flexibility:   Gross Assessment: Yes  AROM: Generally decreased, functional (left LE - hip limited)                       RLE Assessment  RLE Assessment (WDL): Exceptions to WDL (right hip <3/5)  RLE AROM  R Hip Flexion: 90  R Hip ABduction: 10  R Knee Extension: 5   Strength:   Gross Assessment: Yes  Strength: Generally decreased, functional (left LE)                  Functional Mobility:    Gross Assessment: Yes  Sensation: Impaired (swollen feet)    Gait Description (WDL): Exceptions to WDL  Stand to Sit: Additional time, Modified independent  Sit to Stand: Modified independent, Additional time  Distance (ft): 250 Feet (ft)  Ambulation - Level of Assistance: Modified independent; Additional time  Assistive Device: Walker, rolling  Speed/Jaz: Delayed  Step Length: Left shortened;Right shortened  Stance: Right decreased  Gait Abnormalities: Antalgic          Balance:    Sitting: Intact  Standing: With support   Body Structures Involved:  1. Bones  2. Joints  3. Muscles  4. Ligaments Body Functions Affected:  1. Movement Related Activities and Participation Affected:  1.  Mobility   Number of elements that affect the Plan of Care: 3: MODERATE COMPLEXITY   CLINICAL PRESENTATION:   Presentation: Stable and uncomplicated: LOW COMPLEXITY CLINICAL DECISION MAKING:   Outcome Measure: Tool Used: Lower Extremity Functional Scale (LEFS)  Score:  Initial: 1/80 Most Recent: X/80 (Date: -- )   Interpretation of Score: 20 questions each scored on a 5 point scale with 0 representing \"extreme difficulty or unable to perform\" and 4 representing \"no difficulty\". The lower the score, the greater the functional disability. 80/80 represents no disability. Minimal detectable change is 9 points. Score 80 79-65 64-49 48-33 32-17 16-1 0   Modifier CH CI CJ CK CL CM CN     ? Mobility - Walking and Moving Around:     - CURRENT STATUS: CM - 80%-99% impaired, limited or restricted    - GOAL STATUS: CM - 80%-99% impaired, limited or restricted    - D/C STATUS:  CM - 80%-99% impaired, limited or restricted  Medical Necessity:   · Mr. Anuel Manzo is expected to optimize his lower extremity strength and ROM in preparation for joint replacement surgery. Reason for Services/Other Comments:  · Achieve baseline assesment of musculoskeletal system, functional mobility and home environment. , educate in PT HEP in preparation for surgery, educate in hospital plan of care. Use of outcome tool(s) and clinical judgement create a POC that gives a: Clear prediction of patient's progress: LOW COMPLEXITY   TREATMENT:   Treatment/Session Assessment:  Patient was instructed in PT- HEP to increase strength and ROM in LEs. Answered all questions. · Post session pain:  Hip pain  · Compliance with Program/Exercises: Will assess as treatment progresses.   Total Treatment Duration:  PT Patient Time In/Time Out  Time In: 1045  Time Out: Dat Edward 89.

## 2018-08-21 VITALS
OXYGEN SATURATION: 98 % | HEIGHT: 66 IN | HEART RATE: 65 BPM | DIASTOLIC BLOOD PRESSURE: 93 MMHG | BODY MASS INDEX: 28.45 KG/M2 | TEMPERATURE: 96.8 F | SYSTOLIC BLOOD PRESSURE: 148 MMHG | WEIGHT: 177 LBS

## 2018-08-21 PROBLEM — N18.30 CKD (CHRONIC KIDNEY DISEASE) STAGE 3, GFR 30-59 ML/MIN (HCC): Status: ACTIVE | Noted: 2018-08-21

## 2018-08-21 NOTE — PERIOP NOTES
8/20/2018  4:52 PM - Jeevan, Lab In [employer]   Component Results   Component Value Flag Ref Range Units Status   Special Requests: NARES     Final   Culture result:      Final   SA target not detected.                                 A MRSA NEGATIVE, SA NEGATIVE test result does not preclude MRSA or SA nasal colonization.

## 2018-08-21 NOTE — PERIOP NOTES
, anesthesia, reviewed and ok'd for surgery ekg's (7/26/18, 7/23/15) and creat (8/20/19, 7/26/18, 11/1/17).

## 2018-08-21 NOTE — PROGRESS NOTES
18 1030   Oxygen Therapy   O2 Sat (%) 98 %   Pulse via Oximetry 71 beats per minute   O2 Device Room air   Pre-Treatment   Breath Sounds Bilateral Clear   Pre FEV1 (liters) 2 liters   % Predicted 82   Incentive Spirometry Treatment   Actual Volume (ml) 2500 ml     Initial respiratory Assessment completed with pt. Pt was interviewed and evaluated in [facility] prior to surgery. Patient ID:  Gio Sanchez  [id number]  89 y.o.  1940  Surgeon: Dr. Petros Moya  Date of Surgery: 2018  Procedure: Total Right Hip Arthroplasty  Primary Care Physician: Yessenia Mc -[phone]  Specialists:                                  Pt instructed in the use of Incentive Spirometry. Pt instructed to bring Incentive Spirometer back on date of surgery & to start using Is upon return to pt room.     Pt taught proper cough technique    History of smokin PACKS A WEEK FOR 3 YEARS                                                      Quit date:       48 YEARS AGO    Secondhand smoke:MOTHER      Past procedures with Oxygen desaturation:NONE    Past Medical History:   Diagnosis Date    Arthritis     osteo    Chronic pain     right shoulder    Former smoker     GERD (gastroesophageal reflux disease)     controlled with medication    High cholesterol     Hypertension     controlled with medication    Nausea & vomiting     post-op nausea  states from morphine    Stomach [facility] ([facility] 2010    Total knee replacement status 2012    Unspecified adverse effect of anesthesia     wife states slow to wake                                                                                                                                                     Respiratory history:                                 SOB  ON EXERTION                                    Respiratory meds:  NA                                       FAMILY PRESENT:            SPOUSE, PAST SLEEP STUDY:                   NO  HX OF MAHENDRA:                                       NO                                     MAHENDRA assessment:                                               SLEEPS ON              BACK                                                                PHYSICAL EXAM   Body mass index is 28.57 kg/(m^2).    Visit Vitals    BP (!) 148/93 (BP 1 Location: Right arm, BP Patient Position: At rest;Sitting)    Pulse 65    Temp 96.8 °F (36 °C)    Ht 5' 6\" (1.676 m)    Wt 80.3 kg (177 lb)    SpO2 98%    BMI 28.57 kg/m2     Neck circumference:    42.5  cm    Loud snoring:                                   DENIES    Witnessed apnea or wakening gasping or choking:,             DENIES,                                                                                                  Awakens with headaches:                                                  DENIES    Morning or daytime tiredness/ sleepiness:                    DENIES                                                                                  Dry mouth or sore throat in morning:                                                                                        DENIES    Sprague stage:  2    SACS score:5    STOP/BANG:3                              CPAP:                       NONE                                              NONE  Referrals:    Pt. Phone Number:

## 2018-08-31 NOTE — H&P
[facility]  History and Physical Exam    Patient ID:  Diana Spears  [id number]    1 y.o.  1940    Today: August 31, 2018    Vitals Signs: Reviewed as noted in medical record. Allergies: Allergies   Allergen Reactions    Morphine Nausea and Vomiting    Other Medication Nausea and Vomiting     Dust and pollen causes runny nose. Mollusks/clams- N/V    Shellfish Derived Nausea and Vomiting       CC: right hip pain    HPI:  Pt complains of right hip pain and with difficulty ambulating. Relevant PMH:   Past Medical History:   Diagnosis Date    Arthritis     osteo    Chronic pain     right shoulder    Former smoker     GERD (gastroesophageal reflux disease)     controlled with medication    High cholesterol     Hypertension     controlled with medication    Nausea & vomiting     post-op nausea  states from morphine    Stomach cancer ([facility] [address] [facility] 2010    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    Unspecified adverse effect of anesthesia     wife states slow to wake       Objective:                    HEENT: NC/AT                   Lungs:  clear                   Heart:   rrr                   Abdomen: soft                   Extremities:  Pain with rom of the right hip joint    Radiographs: reveal osteoarthritis with loss of joint space and bone spurs. Assessment: Unilateral osteoarthritis of hip, right [M16.11]    Plan:  Proceed with scheduled Procedure(s) (LRB):  RIGHT HIP ARTHROPLASTY TOTAL/DEPUY (Right) . The patient has failed conservative treatment including NSAIDS, and injections. Due to the amount of pain the patient is experiencing we will proceed with scheduled procedure.       Signed By: NANCY Vann  August 31, 2018

## 2018-09-03 ENCOUNTER — ANESTHESIA EVENT (OUTPATIENT)
Dept: SURGERY | Age: 78
DRG: 470 | End: 2018-09-03
Payer: MEDICARE

## 2018-09-04 ENCOUNTER — APPOINTMENT (OUTPATIENT)
Dept: GENERAL RADIOLOGY | Age: 78
DRG: 470 | End: 2018-09-04
Attending: PHYSICIAN ASSISTANT
Payer: MEDICARE

## 2018-09-04 ENCOUNTER — HOSPITAL ENCOUNTER (INPATIENT)
Age: 78
LOS: 2 days | Discharge: HOME HEALTH | DRG: 470 | End: 2018-09-06
Attending: ORTHOPAEDIC SURGERY | Admitting: ORTHOPAEDIC SURGERY
Payer: MEDICARE

## 2018-09-04 ENCOUNTER — ANESTHESIA (OUTPATIENT)
Dept: SURGERY | Age: 78
DRG: 470 | End: 2018-09-04
Payer: MEDICARE

## 2018-09-04 ENCOUNTER — HOME HEALTH ADMISSION (OUTPATIENT)
Dept: HOME HEALTH SERVICES | Facility: HOME HEALTH | Age: 78
End: 2018-09-04
Payer: MEDICARE

## 2018-09-04 DIAGNOSIS — M16.11 ARTHRITIS OF RIGHT HIP: Primary | ICD-10-CM

## 2018-09-04 LAB
ABO + RH BLD: NORMAL
BLOOD GROUP ANTIBODIES SERPL: NORMAL
GLUCOSE BLD STRIP.AUTO-MCNC: 108 MG/DL (ref 65–100)
HGB BLD-MCNC: 10.3 G/DL (ref 13.6–17.2)
SPECIMEN EXP DATE BLD: NORMAL

## 2018-09-04 PROCEDURE — 74011000302 HC RX REV CODE- 302: Performed by: ORTHOPAEDIC SURGERY

## 2018-09-04 PROCEDURE — 74011250636 HC RX REV CODE- 250/636: Performed by: ANESTHESIOLOGY

## 2018-09-04 PROCEDURE — 77030032490 HC SLV COMPR SCD KNE COVD -B

## 2018-09-04 PROCEDURE — 77030018836 HC SOL IRR NACL ICUM -A: Performed by: ORTHOPAEDIC SURGERY

## 2018-09-04 PROCEDURE — 74011000258 HC RX REV CODE- 258: Performed by: ORTHOPAEDIC SURGERY

## 2018-09-04 PROCEDURE — 65270000029 HC RM PRIVATE

## 2018-09-04 PROCEDURE — 74011250637 HC RX REV CODE- 250/637: Performed by: PHYSICIAN ASSISTANT

## 2018-09-04 PROCEDURE — 74011250636 HC RX REV CODE- 250/636: Performed by: PHYSICIAN ASSISTANT

## 2018-09-04 PROCEDURE — 77030002966 HC SUT PDS J&J -A: Performed by: ORTHOPAEDIC SURGERY

## 2018-09-04 PROCEDURE — 74011250637 HC RX REV CODE- 250/637: Performed by: ANESTHESIOLOGY

## 2018-09-04 PROCEDURE — 77030020782 HC GWN BAIR PAWS FLX 3M -B: Performed by: ANESTHESIOLOGY

## 2018-09-04 PROCEDURE — 0SR904A REPLACEMENT OF RIGHT HIP JOINT WITH CERAMIC ON POLYETHYLENE SYNTHETIC SUBSTITUTE, UNCEMENTED, OPEN APPROACH: ICD-10-PCS | Performed by: ORTHOPAEDIC SURGERY

## 2018-09-04 PROCEDURE — 97110 THERAPEUTIC EXERCISES: CPT

## 2018-09-04 PROCEDURE — 97165 OT EVAL LOW COMPLEX 30 MIN: CPT

## 2018-09-04 PROCEDURE — 77030020263 HC SOL INJ SOD CL0.9% LFCR 1000ML

## 2018-09-04 PROCEDURE — 77030034849: Performed by: ORTHOPAEDIC SURGERY

## 2018-09-04 PROCEDURE — 86900 BLOOD TYPING SEROLOGIC ABO: CPT

## 2018-09-04 PROCEDURE — 77030018074 HC RTVR SUT ARTH4 S&N -B: Performed by: ORTHOPAEDIC SURGERY

## 2018-09-04 PROCEDURE — 74011000250 HC RX REV CODE- 250

## 2018-09-04 PROCEDURE — 77030020269 HC MISC IMPL: Performed by: ORTHOPAEDIC SURGERY

## 2018-09-04 PROCEDURE — C1776 JOINT DEVICE (IMPLANTABLE): HCPCS | Performed by: ORTHOPAEDIC SURGERY

## 2018-09-04 PROCEDURE — 77030018547 HC SUT ETHBND1 J&J -B: Performed by: ORTHOPAEDIC SURGERY

## 2018-09-04 PROCEDURE — 74011250637 HC RX REV CODE- 250/637: Performed by: ORTHOPAEDIC SURGERY

## 2018-09-04 PROCEDURE — 76060000035 HC ANESTHESIA 2 TO 2.5 HR: Performed by: ORTHOPAEDIC SURGERY

## 2018-09-04 PROCEDURE — 77030034479 HC ADH SKN CLSR PRINEO J&J -B: Performed by: ORTHOPAEDIC SURGERY

## 2018-09-04 PROCEDURE — 36415 COLL VENOUS BLD VENIPUNCTURE: CPT

## 2018-09-04 PROCEDURE — 77030002933 HC SUT MCRYL J&J -A: Performed by: ORTHOPAEDIC SURGERY

## 2018-09-04 PROCEDURE — 77030013727 HC IRR FAN PULSVC ZIMM -B: Performed by: ORTHOPAEDIC SURGERY

## 2018-09-04 PROCEDURE — 77030019908 HC STETH ESOPH SIMS -A: Performed by: ANESTHESIOLOGY

## 2018-09-04 PROCEDURE — 74011250636 HC RX REV CODE- 250/636: Performed by: ORTHOPAEDIC SURGERY

## 2018-09-04 PROCEDURE — 86580 TB INTRADERMAL TEST: CPT | Performed by: ORTHOPAEDIC SURGERY

## 2018-09-04 PROCEDURE — 77010033678 HC OXYGEN DAILY

## 2018-09-04 PROCEDURE — 82962 GLUCOSE BLOOD TEST: CPT

## 2018-09-04 PROCEDURE — 94760 N-INVAS EAR/PLS OXIMETRY 1: CPT

## 2018-09-04 PROCEDURE — 76010000162 HC OR TIME 1.5 TO 2 HR INTENSV-TIER 1: Performed by: ORTHOPAEDIC SURGERY

## 2018-09-04 PROCEDURE — 97161 PT EVAL LOW COMPLEX 20 MIN: CPT

## 2018-09-04 PROCEDURE — 76210000006 HC OR PH I REC 0.5 TO 1 HR: Performed by: ORTHOPAEDIC SURGERY

## 2018-09-04 PROCEDURE — 74011000250 HC RX REV CODE- 250: Performed by: ORTHOPAEDIC SURGERY

## 2018-09-04 PROCEDURE — 74011250636 HC RX REV CODE- 250/636

## 2018-09-04 PROCEDURE — 85018 HEMOGLOBIN: CPT

## 2018-09-04 PROCEDURE — 72170 X-RAY EXAM OF PELVIS: CPT

## 2018-09-04 PROCEDURE — 77030007880 HC KT SPN EPDRL BBMI -B: Performed by: ANESTHESIOLOGY

## 2018-09-04 PROCEDURE — 77030006790 HC BLD SAW OSC ZIMM -B: Performed by: ORTHOPAEDIC SURGERY

## 2018-09-04 PROCEDURE — 77030003665 HC NDL SPN BBMI -A: Performed by: ANESTHESIOLOGY

## 2018-09-04 DEVICE — LINER ACET OD56MM ID36MM HIP ALTRX PINN: Type: IMPLANTABLE DEVICE | Site: HIP | Status: FUNCTIONAL

## 2018-09-04 DEVICE — HEAD FEM DIA36MM +12MM OFFSET 12/14 TAPR HIP CERAMIC BIOLOX: Type: IMPLANTABLE DEVICE | Site: HIP | Status: FUNCTIONAL

## 2018-09-04 DEVICE — STEM FEM SZ 7 HIP HI OFFSET CLLRD CEMENTLESS 12/14 TAPR: Type: IMPLANTABLE DEVICE | Site: HIP | Status: FUNCTIONAL

## 2018-09-04 DEVICE — CUP ACET DIA56MM 12 H HIP TI GRIPTION MULT H II VIP TAPR: Type: IMPLANTABLE DEVICE | Site: HIP | Status: FUNCTIONAL

## 2018-09-04 DEVICE — SCREW BNE L25MM DIA6.5MM CANC HIP S STL GRIPTION FULL THRD: Type: IMPLANTABLE DEVICE | Site: HIP | Status: FUNCTIONAL

## 2018-09-04 RX ORDER — ZOLPIDEM TARTRATE 5 MG/1
5 TABLET ORAL
Status: DISCONTINUED | OUTPATIENT
Start: 2018-09-04 | End: 2018-09-06 | Stop reason: HOSPADM

## 2018-09-04 RX ORDER — DIPHENHYDRAMINE HCL 25 MG
25 CAPSULE ORAL
Status: DISCONTINUED | OUTPATIENT
Start: 2018-09-04 | End: 2018-09-06 | Stop reason: HOSPADM

## 2018-09-04 RX ORDER — EPHEDRINE SULFATE 50 MG/ML
INJECTION, SOLUTION INTRAVENOUS AS NEEDED
Status: DISCONTINUED | OUTPATIENT
Start: 2018-09-04 | End: 2018-09-04 | Stop reason: HOSPADM

## 2018-09-04 RX ORDER — CELECOXIB 200 MG/1
200 CAPSULE ORAL EVERY 12 HOURS
Status: DISCONTINUED | OUTPATIENT
Start: 2018-09-04 | End: 2018-09-05

## 2018-09-04 RX ORDER — ROPIVACAINE HYDROCHLORIDE 2 MG/ML
INJECTION, SOLUTION EPIDURAL; INFILTRATION; PERINEURAL AS NEEDED
Status: DISCONTINUED | OUTPATIENT
Start: 2018-09-04 | End: 2018-09-04 | Stop reason: HOSPADM

## 2018-09-04 RX ORDER — ASPIRIN 81 MG/1
81 TABLET ORAL EVERY 12 HOURS
Status: DISCONTINUED | OUTPATIENT
Start: 2018-09-04 | End: 2018-09-06 | Stop reason: HOSPADM

## 2018-09-04 RX ORDER — SODIUM CHLORIDE 0.9 % (FLUSH) 0.9 %
5-10 SYRINGE (ML) INJECTION AS NEEDED
Status: DISCONTINUED | OUTPATIENT
Start: 2018-09-04 | End: 2018-09-04 | Stop reason: HOSPADM

## 2018-09-04 RX ORDER — SODIUM CHLORIDE 0.9 % (FLUSH) 0.9 %
5-10 SYRINGE (ML) INJECTION AS NEEDED
Status: DISCONTINUED | OUTPATIENT
Start: 2018-09-04 | End: 2018-09-06 | Stop reason: HOSPADM

## 2018-09-04 RX ORDER — ACETAMINOPHEN 500 MG
1000 TABLET ORAL EVERY 6 HOURS
Status: DISCONTINUED | OUTPATIENT
Start: 2018-09-05 | End: 2018-09-06 | Stop reason: HOSPADM

## 2018-09-04 RX ORDER — FENTANYL CITRATE 50 UG/ML
100 INJECTION, SOLUTION INTRAMUSCULAR; INTRAVENOUS ONCE
Status: DISCONTINUED | OUTPATIENT
Start: 2018-09-04 | End: 2018-09-04 | Stop reason: HOSPADM

## 2018-09-04 RX ORDER — MIDAZOLAM HYDROCHLORIDE 1 MG/ML
2 INJECTION, SOLUTION INTRAMUSCULAR; INTRAVENOUS
Status: DISCONTINUED | OUTPATIENT
Start: 2018-09-04 | End: 2018-09-04 | Stop reason: HOSPADM

## 2018-09-04 RX ORDER — ALBUTEROL SULFATE 0.83 MG/ML
2.5 SOLUTION RESPIRATORY (INHALATION) AS NEEDED
Status: DISCONTINUED | OUTPATIENT
Start: 2018-09-04 | End: 2018-09-04 | Stop reason: HOSPADM

## 2018-09-04 RX ORDER — SODIUM CHLORIDE 9 MG/ML
100 INJECTION, SOLUTION INTRAVENOUS CONTINUOUS
Status: DISPENSED | OUTPATIENT
Start: 2018-09-04 | End: 2018-09-05

## 2018-09-04 RX ORDER — BENAZEPRIL HYDROCHLORIDE 10 MG/1
10 TABLET ORAL DAILY
Status: DISCONTINUED | OUTPATIENT
Start: 2018-09-05 | End: 2018-09-06 | Stop reason: HOSPADM

## 2018-09-04 RX ORDER — HYDROMORPHONE HYDROCHLORIDE 2 MG/ML
0.5 INJECTION, SOLUTION INTRAMUSCULAR; INTRAVENOUS; SUBCUTANEOUS
Status: DISCONTINUED | OUTPATIENT
Start: 2018-09-04 | End: 2018-09-04 | Stop reason: HOSPADM

## 2018-09-04 RX ORDER — DEXAMETHASONE SODIUM PHOSPHATE 100 MG/10ML
10 INJECTION INTRAMUSCULAR; INTRAVENOUS ONCE
Status: COMPLETED | OUTPATIENT
Start: 2018-09-05 | End: 2018-09-05

## 2018-09-04 RX ORDER — ONDANSETRON 2 MG/ML
INJECTION INTRAMUSCULAR; INTRAVENOUS AS NEEDED
Status: DISCONTINUED | OUTPATIENT
Start: 2018-09-04 | End: 2018-09-04 | Stop reason: HOSPADM

## 2018-09-04 RX ORDER — BUPIVACAINE HYDROCHLORIDE 7.5 MG/ML
INJECTION, SOLUTION INTRASPINAL AS NEEDED
Status: DISCONTINUED | OUTPATIENT
Start: 2018-09-04 | End: 2018-09-04 | Stop reason: HOSPADM

## 2018-09-04 RX ORDER — MIDAZOLAM HYDROCHLORIDE 1 MG/ML
INJECTION, SOLUTION INTRAMUSCULAR; INTRAVENOUS AS NEEDED
Status: DISCONTINUED | OUTPATIENT
Start: 2018-09-04 | End: 2018-09-04 | Stop reason: HOSPADM

## 2018-09-04 RX ORDER — PANTOPRAZOLE SODIUM 40 MG/1
40 TABLET, DELAYED RELEASE ORAL
Status: DISCONTINUED | OUTPATIENT
Start: 2018-09-05 | End: 2018-09-06 | Stop reason: HOSPADM

## 2018-09-04 RX ORDER — SODIUM CHLORIDE, SODIUM LACTATE, POTASSIUM CHLORIDE, CALCIUM CHLORIDE 600; 310; 30; 20 MG/100ML; MG/100ML; MG/100ML; MG/100ML
50 INJECTION, SOLUTION INTRAVENOUS CONTINUOUS
Status: DISCONTINUED | OUTPATIENT
Start: 2018-09-04 | End: 2018-09-04 | Stop reason: HOSPADM

## 2018-09-04 RX ORDER — VANCOMYCIN HYDROCHLORIDE 1 G/20ML
INJECTION, POWDER, LYOPHILIZED, FOR SOLUTION INTRAVENOUS AS NEEDED
Status: DISCONTINUED | OUTPATIENT
Start: 2018-09-04 | End: 2018-09-04 | Stop reason: HOSPADM

## 2018-09-04 RX ORDER — MIDAZOLAM HYDROCHLORIDE 1 MG/ML
2 INJECTION, SOLUTION INTRAMUSCULAR; INTRAVENOUS ONCE
Status: DISCONTINUED | OUTPATIENT
Start: 2018-09-04 | End: 2018-09-04 | Stop reason: HOSPADM

## 2018-09-04 RX ORDER — CEFAZOLIN SODIUM/WATER 2 G/20 ML
2 SYRINGE (ML) INTRAVENOUS ONCE
Status: COMPLETED | OUTPATIENT
Start: 2018-09-04 | End: 2018-09-04

## 2018-09-04 RX ORDER — OXYCODONE HYDROCHLORIDE 5 MG/1
10 TABLET ORAL
Status: DISCONTINUED | OUTPATIENT
Start: 2018-09-04 | End: 2018-09-06 | Stop reason: HOSPADM

## 2018-09-04 RX ORDER — DEXAMETHASONE SODIUM PHOSPHATE 4 MG/ML
INJECTION, SOLUTION INTRA-ARTICULAR; INTRALESIONAL; INTRAMUSCULAR; INTRAVENOUS; SOFT TISSUE AS NEEDED
Status: DISCONTINUED | OUTPATIENT
Start: 2018-09-04 | End: 2018-09-04 | Stop reason: HOSPADM

## 2018-09-04 RX ORDER — OXYCODONE HYDROCHLORIDE 5 MG/1
5 TABLET ORAL
Status: DISCONTINUED | OUTPATIENT
Start: 2018-09-04 | End: 2018-09-04 | Stop reason: HOSPADM

## 2018-09-04 RX ORDER — TRANEXAMIC ACID 100 MG/ML
INJECTION, SOLUTION INTRAVENOUS AS NEEDED
Status: DISCONTINUED | OUTPATIENT
Start: 2018-09-04 | End: 2018-09-04 | Stop reason: HOSPADM

## 2018-09-04 RX ORDER — ACETAMINOPHEN 10 MG/ML
1000 INJECTION, SOLUTION INTRAVENOUS ONCE
Status: COMPLETED | OUTPATIENT
Start: 2018-09-04 | End: 2018-09-04

## 2018-09-04 RX ORDER — NALOXONE HYDROCHLORIDE 0.4 MG/ML
.2-.4 INJECTION, SOLUTION INTRAMUSCULAR; INTRAVENOUS; SUBCUTANEOUS
Status: DISCONTINUED | OUTPATIENT
Start: 2018-09-04 | End: 2018-09-06 | Stop reason: HOSPADM

## 2018-09-04 RX ORDER — LIDOCAINE HYDROCHLORIDE 10 MG/ML
0.1 INJECTION INFILTRATION; PERINEURAL AS NEEDED
Status: DISCONTINUED | OUTPATIENT
Start: 2018-09-04 | End: 2018-09-04 | Stop reason: HOSPADM

## 2018-09-04 RX ORDER — SODIUM CHLORIDE 0.9 % (FLUSH) 0.9 %
5-10 SYRINGE (ML) INJECTION EVERY 8 HOURS
Status: DISCONTINUED | OUTPATIENT
Start: 2018-09-04 | End: 2018-09-06 | Stop reason: HOSPADM

## 2018-09-04 RX ORDER — CEFAZOLIN SODIUM/WATER 2 G/20 ML
2 SYRINGE (ML) INTRAVENOUS EVERY 8 HOURS
Status: COMPLETED | OUTPATIENT
Start: 2018-09-04 | End: 2018-09-04

## 2018-09-04 RX ORDER — PROPOFOL 10 MG/ML
INJECTION, EMULSION INTRAVENOUS
Status: DISCONTINUED | OUTPATIENT
Start: 2018-09-04 | End: 2018-09-04 | Stop reason: HOSPADM

## 2018-09-04 RX ORDER — AMOXICILLIN 250 MG
2 CAPSULE ORAL DAILY
Status: DISCONTINUED | OUTPATIENT
Start: 2018-09-05 | End: 2018-09-06 | Stop reason: HOSPADM

## 2018-09-04 RX ORDER — LIDOCAINE HYDROCHLORIDE 20 MG/ML
INJECTION, SOLUTION EPIDURAL; INFILTRATION; INTRACAUDAL; PERINEURAL AS NEEDED
Status: DISCONTINUED | OUTPATIENT
Start: 2018-09-04 | End: 2018-09-04 | Stop reason: HOSPADM

## 2018-09-04 RX ORDER — SODIUM CHLORIDE 0.9 % (FLUSH) 0.9 %
5-10 SYRINGE (ML) INJECTION EVERY 8 HOURS
Status: DISCONTINUED | OUTPATIENT
Start: 2018-09-04 | End: 2018-09-04 | Stop reason: HOSPADM

## 2018-09-04 RX ORDER — HYDROMORPHONE HYDROCHLORIDE 2 MG/ML
1 INJECTION, SOLUTION INTRAMUSCULAR; INTRAVENOUS; SUBCUTANEOUS
Status: DISCONTINUED | OUTPATIENT
Start: 2018-09-04 | End: 2018-09-06 | Stop reason: HOSPADM

## 2018-09-04 RX ORDER — ONDANSETRON 8 MG/1
4 TABLET, ORALLY DISINTEGRATING ORAL
Status: DISCONTINUED | OUTPATIENT
Start: 2018-09-04 | End: 2018-09-06 | Stop reason: HOSPADM

## 2018-09-04 RX ORDER — CELECOXIB 200 MG/1
200 CAPSULE ORAL
Status: COMPLETED | OUTPATIENT
Start: 2018-09-04 | End: 2018-09-04

## 2018-09-04 RX ORDER — SODIUM CHLORIDE, SODIUM LACTATE, POTASSIUM CHLORIDE, CALCIUM CHLORIDE 600; 310; 30; 20 MG/100ML; MG/100ML; MG/100ML; MG/100ML
75 INJECTION, SOLUTION INTRAVENOUS CONTINUOUS
Status: DISCONTINUED | OUTPATIENT
Start: 2018-09-04 | End: 2018-09-04 | Stop reason: HOSPADM

## 2018-09-04 RX ADMIN — DEXAMETHASONE SODIUM PHOSPHATE 10 MG: 4 INJECTION, SOLUTION INTRA-ARTICULAR; INTRALESIONAL; INTRAMUSCULAR; INTRAVENOUS; SOFT TISSUE at 08:28

## 2018-09-04 RX ADMIN — ASPIRIN 81 MG: 81 TABLET, DELAYED RELEASE ORAL at 20:57

## 2018-09-04 RX ADMIN — SODIUM CHLORIDE, SODIUM LACTATE, POTASSIUM CHLORIDE, AND CALCIUM CHLORIDE: 600; 310; 30; 20 INJECTION, SOLUTION INTRAVENOUS at 08:24

## 2018-09-04 RX ADMIN — MIDAZOLAM HYDROCHLORIDE 1 MG: 1 INJECTION, SOLUTION INTRAMUSCULAR; INTRAVENOUS at 08:05

## 2018-09-04 RX ADMIN — MIDAZOLAM HYDROCHLORIDE 1 MG: 1 INJECTION, SOLUTION INTRAMUSCULAR; INTRAVENOUS at 08:09

## 2018-09-04 RX ADMIN — SODIUM CHLORIDE, SODIUM LACTATE, POTASSIUM CHLORIDE, AND CALCIUM CHLORIDE 75 ML/HR: 600; 310; 30; 20 INJECTION, SOLUTION INTRAVENOUS at 07:03

## 2018-09-04 RX ADMIN — Medication 3 AMPULE: at 07:04

## 2018-09-04 RX ADMIN — ACETAMINOPHEN 1000 MG: 500 TABLET, FILM COATED ORAL at 23:51

## 2018-09-04 RX ADMIN — LIDOCAINE HYDROCHLORIDE 20 MG: 20 INJECTION, SOLUTION EPIDURAL; INFILTRATION; INTRACAUDAL; PERINEURAL at 08:20

## 2018-09-04 RX ADMIN — ONDANSETRON 4 MG: 2 INJECTION INTRAMUSCULAR; INTRAVENOUS at 08:28

## 2018-09-04 RX ADMIN — OXYCODONE HYDROCHLORIDE 10 MG: 5 TABLET ORAL at 20:58

## 2018-09-04 RX ADMIN — Medication 2 G: at 07:57

## 2018-09-04 RX ADMIN — SODIUM CHLORIDE 100 ML/HR: 900 INJECTION, SOLUTION INTRAVENOUS at 12:00

## 2018-09-04 RX ADMIN — OXYCODONE HYDROCHLORIDE 10 MG: 5 TABLET ORAL at 13:08

## 2018-09-04 RX ADMIN — TRANEXAMIC ACID 1000 MG: 100 INJECTION, SOLUTION INTRAVENOUS at 08:16

## 2018-09-04 RX ADMIN — CELECOXIB 200 MG: 200 CAPSULE ORAL at 07:04

## 2018-09-04 RX ADMIN — Medication 2 G: at 23:51

## 2018-09-04 RX ADMIN — PROPOFOL 50 MCG/KG/MIN: 10 INJECTION, EMULSION INTRAVENOUS at 08:16

## 2018-09-04 RX ADMIN — SODIUM CHLORIDE 100 ML/HR: 900 INJECTION, SOLUTION INTRAVENOUS at 21:56

## 2018-09-04 RX ADMIN — OXYCODONE HYDROCHLORIDE 10 MG: 5 TABLET ORAL at 16:11

## 2018-09-04 RX ADMIN — Medication 1 AMPULE: at 20:56

## 2018-09-04 RX ADMIN — LIDOCAINE HYDROCHLORIDE 0.1 ML: 10 INJECTION, SOLUTION INFILTRATION; PERINEURAL at 07:02

## 2018-09-04 RX ADMIN — ACETAMINOPHEN 1000 MG: 10 INJECTION, SOLUTION INTRAVENOUS at 17:14

## 2018-09-04 RX ADMIN — BUPIVACAINE HYDROCHLORIDE 2 ML: 7.5 INJECTION, SOLUTION INTRASPINAL at 08:11

## 2018-09-04 RX ADMIN — EPHEDRINE SULFATE 10 MG: 50 INJECTION, SOLUTION INTRAVENOUS at 08:24

## 2018-09-04 RX ADMIN — TUBERCULIN PURIFIED PROTEIN DERIVATIVE 5 UNITS: 5 INJECTION, SOLUTION INTRADERMAL at 07:03

## 2018-09-04 RX ADMIN — Medication 5 ML: at 17:19

## 2018-09-04 RX ADMIN — Medication 2 G: at 16:11

## 2018-09-04 NOTE — PROGRESS NOTES
Care Management Interventions  Mode of Transport at Discharge: Self  Transition of Care Consult (CM Consult): [address]: Yes  Discharge Durable Medical Equipment: No  Physical Therapy Consult: Yes  Occupational Therapy Consult: Yes  Current Support Network: Lives with Spouse  Confirm Follow Up Transport: Family  Plan discussed with Pt/Family/Caregiver: Yes  Freedom of Choice Offered: Yes  Discharge Location  Discharge Placement: Home with home health  Patient is a 76y.o. year old male admitted for Right ACACIA . Patient lives with His spouse and plans to return home on discharge. Order received to arrange home health. Patient without preference towards agency. Referral sent to [facility]. Patient denies any equipment needs as he has a walker and raised toilet seat. Will follow until discharge.

## 2018-09-04 NOTE — ANESTHESIA POSTPROCEDURE EVALUATION
Post-Anesthesia Evaluation and Assessment Patient: Malcolm Hirsch MRN: [id number]  SSN: [id number] YOB: 1940  Age: 76 y.o. Sex: male Cardiovascular Function/Vital Signs Visit Vitals  /66 (BP 1 Location: Right arm, BP Patient Position: At rest)  Pulse 81  Temp 37 °C (98.6 °F)  Resp 16  
 Ht 5' 6\" (1.676 m)  Wt 80.3 kg (177 lb)  SpO2 100%  BMI 28.57 kg/m2 Patient is status post spinal anesthesia for Procedure(s): RIGHT HIP ARTHROPLASTY TOTAL/DEPUY. Nausea/Vomiting: None Postoperative hydration reviewed and adequate. Pain: 
Pain Scale 1: Visual (09/04/18 1002) Pain Intensity 1: 0 (09/04/18 1002) Managed Neurological Status:  
Neuro (WDL): Exceptions to WDL (09/04/18 1002) Neuro Neurologic State: Drowsy; Eyes open to voice (09/04/18 1002) Cognition: Follows commands (09/04/18 1002) LUE Motor Response: Purposeful (09/04/18 1002) LLE Motor Response: Pharmacologically paralyzed (09/04/18 1002) RUE Motor Response: Purposeful (09/04/18 1002) RLE Motor Response: Pharmacologically paralyzed (09/04/18 1002) Block resolving Mental Status and Level of Consciousness: Alert and oriented Pulmonary Status:  
O2 Device: Nasal cannula (09/04/18 1032) Adequate oxygenation and airway patent Complications related to anesthesia: None Post-anesthesia assessment completed. No concerns Signed By: Miguel Salazar MD   
 September 4, 2018

## 2018-09-04 NOTE — PROGRESS NOTES
Problem: Self Care Deficits Care Plan (Adult)  Goal: *Acute Goals and Plan of Care (Insert Text)  GOALS:   DISCHARGE GOALS (in preparation for going home/rehab):  3 days  1. Mr. Corey Raya will perform one lower body dressing activity with minimal assistance with adaptive equipment to demonstrate improved functional mobility and safety. 2.  Mr. Corey Raya will perform one lower body bathing activity with minimal  assistance with adaptive equipment to demonstrate improved functional mobility and safety. 3.  Mr. Corey Raya will perform toileting/toilet transfer with contact guard assistance with adaptive equipment to demonstrate improved functional mobility and safety. 4.  Mr. Corey Raya will perform shower transfer with contact guard assistance with adaptive equipment to demonstrate improved functional mobility and safety. 5.  Mr. Corey Raya will state ACACIA precautions with two verbal cues to demonstrate improved functional mobility and safety. JOINT CAMP OCCUPATIONAL THERAPY ACACIA: Initial Assessment 9/4/2018  INPATIENT: Hospital Day: 1  [place] / [place] / Product Type: Medicare /      NAME/AGE/GENDER: Dk Cronin is a 76 y.o. male   PRIMARY DIAGNOSIS:  Unilateral osteoarthritis of hip, right [M16.11]   Procedure(s) and Anesthesia Type:     * RIGHT HIP ARTHROPLASTY TOTAL/DEPUY - Spinal (Right)  ICD-10: Treatment Diagnosis:    · Pain in Right Hip (M25.551)  · Stiffness of Right Hip, Not elsewhere classified (M25.651)      ASSESSMENT:     Mr. Corey Raya is s/p right ACACIA and presents with decreased weight bearing on right LE and decreased independence with functional mobility and activities of daily living as compared to prior level of function and safety. Patient would benefit from skilled Occupational Therapy to maximize independence and safety with self-care task and functional mobility.   Pt would also benefit from education on lower body adaptive equipment and hip precautions post-surgery in preparation for going home or for recommendations for post-hospital rehab program.  Patient plans for further rehab at home with home health services and good family support. OT reviewed therapy schedule and plan of care with patient. Patient was able to transfer and perform self care skills as charted below. Patient instructed to call for assistance when needing to get up from the bed and all needs in reach. Patient verbalized understanding of call light. This section established at most recent assessment   PROBLEM LIST (Impairments causing functional limitations):  1. Decreased Strength  2. Decreased ADL/Functional Activities  3. Decreased Transfer Abilities  4. Increased Pain  5. Increased Fatigue  6. Decreased Flexibility/Joint Mobility  7. Decreased Knowledge of Precautions   INTERVENTIONS PLANNED: (Benefits and precautions of occupational therapy have been discussed with the patient.)  1. Activities of daily living training  2. Adaptive equipment training  3. Balance training  4. Clothing management  5. Donning&doffing training  6. Theraputic activity     TREATMENT PLAN: Frequency/Duration: Follow patient 1-2 times to address above goals. Rehabilitation Potential For Stated Goals: Good     RECOMMENDED REHABILITATION/EQUIPMENT: (at time of discharge pending progress): Continue Skilled Therapy and Home Health: Physical Therapy. OCCUPATIONAL PROFILE AND HISTORY:   History of Present Injury/Illness (Reason for Referral): Pt presents this date s/p (right) ACACIA. Past Medical History/Comorbidities:   Mr. Bianca Guzman  has a past medical history of Arthritis; Chronic pain; Former smoker; GERD (gastroesophageal reflux disease); High cholesterol; Hypertension; Nausea & vomiting; Stomach cancer ([facility] [address] (2010); Total knee replacement status (2/21/2012); and Unspecified adverse effect of anesthesia. He also has no past medical history of Adverse effect of anesthesia;  Aneurysm ([facility] [address] Arrhythmia; Asthma; Autoimmune disease ([facility] [address] CAD (coronary artery disease); Chronic kidney disease; Chronic obstructive pulmonary disease ([facility] [address] Coagulation defects; Coagulation disorder ([facility] [address] COPD; Diabetes ([facility] [address] Difficult intubation; Endocarditis; Heart failure ([facility] [address] Ill-defined condition; Liver disease; Malignant hyperthermia due to anesthesia; Morbid obesity ([facility] [address] Nicotine vapor product user; Non-nicotine vapor product user; Pseudocholinesterase deficiency; Psychiatric disorder; PUD (peptic ulcer disease); Rheumatic fever; Seizures ([facility] [address] Stroke [facility]); Thromboembolus ([facility] [address] Thyroid disease; or Unspecified sleep apnea. Mr. Monroe Cee  has a past surgical history that includes hx gi (2010); pr total hip arthroplasty (Left, 2004); hx shoulder arthroscopy (Right, 2015); hx knee arthroscopy (Right); pr total knee arthroplasty (Right, 2012); hx other surgical; hx cholecystectomy (2015); and hx appendectomy. Social History/Living Environment:   Home Environment: Private residence  # Steps to Enter: 1  Rails to Enter: No  One/Two Story Residence: One story  Living Alone: No  Support Systems: Family member(s), Spouse/Significant Other/Partner  Patient Expects to be Discharged to[de-identified] Private residence  Current DME Used/Available at Home: Commode, bedside, Walker, rolling  Tub or Shower Type: Shower  Prior Level of Function/Work/Activity:  Assistance with self care from wife, used a walker     Number of Personal Factors/Comorbidities that affect the Plan of Care: Brief history (0):  LOW COMPLEXITY   ASSESSMENT OF OCCUPATIONAL PERFORMANCE[de-identified]   Most Recent Physical Functioning:   Balance  Sitting: Intact; With support  Standing: Intact; With support       Gross Assessment: Yes  Gross Assessment  AROM: Generally decreased, functional (especially right lower extremity, s/p ACACIA)  Strength: Generally decreased, functional (especially right lower extremity, s/p ACACIA)                      Mental Status  Neurologic State: Alert  Orientation Level: Oriented X4  Cognition: Appropriate decision making  Perception: Appears intact  Perseveration: No perseveration noted  Safety/Judgement: Awareness of environment                Basic ADLs (From Assessment) Complex ADLs (From Assessment)   Basic ADL  Feeding: Independent  Oral Facial Hygiene/Grooming: Supervision  Bathing: Moderate assistance  Upper Body Dressing: Supervision  Lower Body Dressing: Moderate assistance  Toileting: Minimum assistance     Grooming/Bathing/Dressing Activities of Daily Living     Cognitive Retraining  Safety/Judgement: Awareness of environment                 Functional Transfers  Toilet Transfer : Moderate assistance  Shower Transfer: Moderate assistance     Bed/Mat Mobility  Supine to Sit:  (up in chair on contact)  Sit to Supine: Minimum assistance; Additional time  Sit to Stand: Minimum assistance  Scooting: Stand-by assistance         Physical Skills Involved:  1. Range of Motion  2. Balance  3. Strength Cognitive Skills Affected (resulting in the inability to perform in a timely and safe manner): 1. none Psychosocial Skills Affected:  1. Environmental Adaptation   Number of elements that affect the Plan of Care: 3-5:  MODERATE COMPLEXITY   CLINICAL DECISION MAKIN [facility] [place] [address] [address] AM-PAC 6 Clicks   Daily Activity Inpatient Short Form  How much help from another person does the patient currently need. .. Total A Lot A Little None   1. Putting on and taking off regular lower body clothing? [] 1   [x] 2   [] 3   [] 4   2. Bathing (including washing, rinsing, drying)? [] 1   [x] 2   [] 3   [] 4   3. Toileting, which includes using toilet, bedpan or urinal?   [] 1   [] 2   [x] 3   [] 4   4. Putting on and taking off regular upper body clothing? [] 1   [] 2   [] 3   [x] 4   5. Taking care of personal grooming such as brushing teeth? [] 1   [] 2   [] 3   [x] 4   6. Eating meals?    [] 1   [] 2   [] 3   [x] 4   © , Trustees of [facility], under license to [employer]. All rights reserved     Score:  Initial: 19 Most Recent: X (Date: -- )    Interpretation of Tool:  Represents activities that are increasingly more difficult (i.e. Bed mobility, Transfers, Gait). Score 24 23 22-20 19-15 14-10 9-7 6     Modifier CH CI CJ CK CL CM CN      ? Self Care:     - CURRENT STATUS: CK - 40%-59% impaired, limited or restricted    - GOAL STATUS: CJ - 20%-39% impaired, limited or restricted    - D/C STATUS:  ---------------To be determined---------------  [place] / [place] / Product Type: Medicare /      Medical Necessity:     · Patient is expected to demonstrate progress in range of motion, balance and functional technique to increase independence with self care. Reason for Services/Other Comments:  · Patient would benefit from skilled Occupational Therapy to maximize independence and safety with self-care task and functional mobility. .   Use of outcome tool(s) and clinical judgement create a POC that gives a: LOW COMPLEXITY            TREATMENT:   (In addition to Assessment/Re-Assessment sessions the following treatments were rendered)     Pre-treatment Symptoms/Complaints:  Pt without complaint of pain  Pain: Initial:   Pain Intensity 1: 0  Post Session:  0     Assessment/Reassessment only, no treatment provided today    Treatment/Session Assessment:     Response to Treatment:  Up to chair did well but stated he was exhausted and would need to get back in bed. PT put him back to bed.     Education:  [] Home Exercises  [x] Fall Precautions  [x] Hip Precautions [] Going Home Video  [] Knee/Hip Prosthesis Review  [x] Walker Management/Safety [x] Adaptive Equipment as Needed       Interdisciplinary Collaboration:   o Physical Therapist  o Occupational Therapist  o Registered Nurse    After treatment position/precautions:   o Up in chair  o Bed/Chair-wheels locked  o Call light within reach  o RN notified  o Family at bedside  o PT present     Compliance with Program/Exercises: compliant all of the time. Recommendations/Intent for next treatment session:  Treatment next visit will focus on increasing Mr. Hilda Crow independence with bed mobility, transfers, self care, functional mobility, modalities for pain, and patient education.       Total Treatment Duration:  OT Patient Time In/Time Out  Time In: 1335  Time Out: Ricardo Peñaloza [address], OT

## 2018-09-04 NOTE — PERIOP NOTES
TRANSFER - IN REPORT:    Verbal report received from [address] [place] [address] rn on Shelly Erickson  being received from 3rd floor ortho(unit) for routine progression of care      Report consisted of patients Situation, Background, Assessment and   Recommendations(SBAR). Information from the following report(s) SBAR and MAR was reviewed with the receiving nurse. Opportunity for questions and clarification was provided. Assessment completed upon patients arrival to unit and care assumed.

## 2018-09-04 NOTE — H&P
The patient has end stage arthritis of the right hip. The patient was seen and examined and there are no changes to the patient's orthopedic condition. They have tried conservative treatment for this condition; including antiinflammatories and lifestyle modifications and have failed. The necessity for the joint replacement is still present, and the H&P from the office is still current. The patient will be admitted today forProcedure(s) (LRB):  RIGHT HIP ARTHROPLASTY TOTAL/DEPUY (Right) .

## 2018-09-04 NOTE — PROGRESS NOTES
Problem: Mobility Impaired (Adult and Pediatric)  Goal: *Acute Goals and Plan of Care (Insert Text)  GOALS (1-4 days):  (1.)Mr. Sobia Flores will move from supine to sit and sit to supine  in bed with STAND BY ASSIST.    (2.)Mr. Sobia Flores will transfer from bed to chair and chair to bed with STAND BY ASSIST using the least restrictive device. (3.)Mr. Sobia Flores will ambulate with STAND BY ASSIST for 200 feet with the least restrictive device. (4.)Mr. Sobia Flores will ambulate up/down 3 steps with bilateral  railing with CONTACT GUARD ASSIST with no device. (5.)Mr. Sobia Flores will state/observe ACACIA precautions with 0 verbal cues. ________________________________________________________________________________________________      PHYSICAL THERAPY Joint camp [place]: Initial Assessment, Treatment Day: Day of Assessment, PM 9/4/2018  INPATIENT: Hospital Day: 1  [place] / Product Type: Medicare /      NAME/AGE/GENDER: Lincoln Denton is a 76 y.o. male   PRIMARY DIAGNOSIS:  Unilateral osteoarthritis of hip, right [M16.11]   Procedure(s) and Anesthesia Type:     * RIGHT HIP ARTHROPLASTY TOTAL/DEPUY - Spinal (Right)  ICD-10: Treatment Diagnosis:    · Pain in Right Hip (M25.551)  · Stiffness of Right Hip, Not elsewhere classified (M25.651)  · Difficulty in walking, Not elsewhere classified (R26.2)      ASSESSMENT:     Mr. Sobia Flores presents with decreased strength and range of motion right lower extremity and with decreased independence with functional mobility s/p right ACACIA. He will benefit from skilled PT interventions to maximize independence with functional mobility and ACACIA exercises. He was up in chair on contact. He was uncomfortable in chair so helped him lay back down. Before he got in the bed we worked on [employer] exercises with verbal cues and manual cues. Pt left in bed with needs in reach and spouse at bedside. Hope to progress in the morning.      This section established at most recent assessment   PROBLEM LIST (Impairments causing functional limitations):  1. Decreased Strength  2. Decreased ADL/Functional Activities  3. Decreased Transfer Abilities  4. Decreased Ambulation Ability/Technique  5. Increased Pain  6. Decreased Flexibility/Joint Mobility  7. Edema/Girth  8. Decreased Knowledge of Precautions  9. Decreased [place] with Home Exercise Program   INTERVENTIONS PLANNED: (Benefits and precautions of physical therapy have been discussed with the patient.)  1. Bed Mobility  2. Cold  3. Gait Training  4. Home Exercise Program (HEP)  5. Therapeutic Exercise/Strengthening  6. Transfer Training  7. Range of Motion: active/assisted/passive  8. Therapeutic Activities  9. Group Therapy     TREATMENT PLAN: Frequency/Duration: Follow patient BID for duration of hospital stay to address above goals. Rehabilitation Potential For Stated Goals: Good     RECOMMENDED REHABILITATION/EQUIPMENT: (at time of discharge pending progress): Continue Skilled Therapy and Home Health: Physical Therapy. HISTORY:   History of Present Injury/Illness (Reason for Referral):  Pt s/p right ACACIA on 9/4/18  Past Medical History/Comorbidities:   Mr. Mayelin Campos  has a past medical history of Arthritis; Chronic pain; Former smoker; GERD (gastroesophageal reflux disease); High cholesterol; Hypertension; Nausea & vomiting; Stomach cancer ([address] (2010); Total knee replacement status (2/21/2012); and Unspecified adverse effect of anesthesia. He also has no past medical history of Adverse effect of anesthesia; Aneurysm ([address] Arrhythmia; Asthma; Autoimmune disease ([address] CAD (coronary artery disease); Chronic kidney disease; Chronic obstructive pulmonary disease ([address] Coagulation defects; Coagulation disorder ([address] COPD; Diabetes ([address] Difficult intubation; Endocarditis; Heart failure ([address] Ill-defined condition; Liver disease; Malignant hyperthermia due to anesthesia; Morbid obesity ([address]  Nicotine vapor product user; Non-nicotine vapor product user; Pseudocholinesterase deficiency; Psychiatric disorder; PUD (peptic ulcer disease); Rheumatic fever; Seizures ([facility] Stroke [facility]); Thromboembolus ([facility] Thyroid disease; or Unspecified sleep apnea. Mr. Lisa Rivera  has a past surgical history that includes hx gi (2010); pr total hip arthroplasty (Left, 2004); hx shoulder arthroscopy (Right, 2015); hx knee arthroscopy (Right); pr total knee arthroplasty (Right, 2012); hx other surgical; hx cholecystectomy (2015); and hx appendectomy. Social History/Living Environment:   Home Environment: Private residence  # Steps to Enter: 1  Rails to Enter: No  One/Two Story Residence: One story  Living Alone: No  Support Systems: Family member(s), Spouse/Significant Other/Partner  Patient Expects to be Discharged to[de-identified] Private residence  Current DME Used/Available at Home: Commode, bedside, Walker, rolling  Tub or Shower Type: Shower  Prior Level of Function/Work/Activity:  Pt living at home, independent with gait with a walker, needed some assist with his ADLs   Number of Personal Factors/Comorbidities that affect the Plan of Care: 0: LOW COMPLEXITY   EXAMINATION:   Most Recent Physical Functioning:   Gross Assessment: Yes  Gross Assessment  AROM: Generally decreased, functional (especially right lower extremity, s/p ACACIA)  Strength: Generally decreased, functional (especially right lower extremity, s/p ACACIA)                     Bed Mobility  Supine to Sit:  (up in chair on contact)  Sit to Supine: Minimum assistance; Additional time  Scooting: Stand-by assistance    Transfers  Sit to Stand: Minimum assistance  Stand to Sit: Minimum assistance    Balance  Sitting: Intact  Standing: Pull to stand; With support    Posture  Posture (WDL): Exceptions to WDL  Posture Assessment: Rounded shoulders         Weight Bearing Status  Right Side Weight Bearing: As tolerated  Distance (ft): 3 Feet (ft)  Ambulation - Level of Assistance: Minimal assistance  Assistive Device: Walker, rolling  Speed/Jaz: Pace decreased (<100 feet/min); Shuffled  Step Length: Left shortened  Stance: Right decreased  Gait Abnormalities: Antalgic;Decreased step clearance; Step to gait        Braces/Orthotics: none           Body Structures Involved:  1. Joints  2. Muscles Body Functions Affected:  1. Movement Related Activities and Participation Affected:  1. Mobility  2. Self Care   Number of elements that affect the Plan of Care: 4+: HIGH COMPLEXITY   CLINICAL PRESENTATION:   Presentation: Stable and uncomplicated: LOW COMPLEXITY   CLINICAL DECISION MAKIN[address] [place] [address] AM-PAC 6 Clicks   Basic Mobility Inpatient Short Form  How much difficulty does the patient currently have. .. Unable A Lot A Little None   1. Turning over in bed (including adjusting bedclothes, sheets and blankets)? [] 1   [x] 2   [] 3   [] 4   2. Sitting down on and standing up from a chair with arms ( e.g., wheelchair, bedside commode, etc.)   [] 1   [] 2   [x] 3   [] 4   3. Moving from lying on back to sitting on the side of the bed? [] 1   [] 2   [x] 3   [] 4   How much help from another person does the patient currently need. .. Total A Lot A Little None   4. Moving to and from a bed to a chair (including a wheelchair)? [] 1   [] 2   [x] 3   [] 4   5. Need to walk in hospital room? [] 1   [] 2   [x] 3   [] 4   6. Climbing 3-5 steps with a railing? [] 1   [x] 2   [] 3   [] 4   © , Trustees of [address] [place] [address], under license to [email or web address]. All rights reserved        Score:  Initial: 16 Most Recent: X (Date: -- )    Interpretation of Tool:  Represents activities that are increasingly more difficult (i.e. Bed mobility, Transfers, Gait). Score 24 23 22-20 19-15 14-10 9-7 6     Modifier CH CI CJ CK CL CM CN      ?  Mobility - Walking and Moving Around:     - CURRENT STATUS: CK - 40%-59% impaired, limited or restricted    - GOAL STATUS: CI - 1%-19% impaired, limited or restricted    - D/C STATUS:  ---------------To be determined---------------  [place] / Product Type: Medicare /      Medical Necessity:     · Patient is expected to demonstrate progress in strength, range of motion and functional technique to decrease assistance required with functional mobility and ACACIA exercises. Reason for Services/Other Comments:  · Patient continues to require skilled intervention due to inability to complete functional mobility and ACACIA exercises independently. Use of outcome tool(s) and clinical judgement create a POC that gives a: Clear prediction of patient's progress: LOW COMPLEXITY            TREATMENT:   (In addition to Assessment/Re-Assessment sessions the following treatments were rendered)     Pre-treatment Symptoms/Complaints:  none  Pain Initial:   Pain Intensity 1: 2  Post Session:  2/10     Therapeutic Exercise: (10 Minutes):  Exercises per grid below to improve mobility and strength. Required minimal verbal and manual cues to promote proper body alignment and promote proper body posture. Progressed repetitions as indicated. Date:  9/4/18 Date:   Date:     ACTIVITY/EXERCISE AM PM AM PM AM PM   GROUP THERAPY  []  []  []  []  []  []   Ankle Pumps  10       Quad Sets  10       Gluteal Sets  10       Hip ABd/ADduction  10       Straight Leg Raises         Knee Slides  10       Short Arc Quads         Long Arc Quads         Chair Slides                  B = bilateral; AA = active assistive; A = active; P = passive      Treatment/Session Assessment:     Response to Treatment:  Tolerated fair, having some pain.     Education:  [] Home Exercises  [x] Fall Precautions  [x] Hip Precautions [] D/C Instruction Review  [] Hip Prosthesis Review  [] Walker Management/Safety [] Adaptive Equipment as Needed       Interdisciplinary Collaboration:   o Occupational Therapist  o Registered Nurse    After treatment position/precautions: o Supine in bed  o Bed/Chair-wheels locked  o Call light within reach  o Family at bedside    Compliance with Program/Exercises: compliant all of the time. Recommendations/Intent for next treatment session:  Treatment next visit will focus on increasing Mr. Swathi Calabrese independence with bed mobility, transfers, gait training, strength/ROM exercises, modalities for pain, and patient education.       Total Treatment Duration:  PT Patient Time In/Time Out  Time In: 1400  Time Out: [address], PT

## 2018-09-04 NOTE — PERIOP NOTES
TRANSFER - OUT REPORT:    Verbal report given to [facility], RN (name) on Luis A Feliciano  being transferred to room 335 (unit) for routine post - op       Report consisted of patients Situation, Background, Assessment and   Recommendations(SBAR). Information from the following report(s) SBAR, Kardex, OR Summary and Intake/Output was reviewed with the receiving nurse. Lines:   Peripheral IV 09/04/18 Left Hand (Active)   Site Assessment Clean, dry, & intact 9/4/2018 10:02 AM   Phlebitis Assessment 0 9/4/2018 10:02 AM   Infiltration Assessment 0 9/4/2018 10:02 AM   Dressing Status Clean, dry, & intact 9/4/2018 10:02 AM   Dressing Type Tape;Transparent 9/4/2018 10:02 AM   Hub Color/Line Status Infusing;Patent 9/4/2018 10:02 AM   Action Taken Blood drawn 9/4/2018  6:50 AM        Opportunity for questions and clarification was provided.       Patient transported with:   O2 @ 2 liters  Tech

## 2018-09-04 NOTE — ANESTHESIA PREPROCEDURE EVALUATION
Anesthetic History No history of anesthetic complications Review of Systems / Medical History Patient summary reviewed, nursing notes reviewed and pertinent labs reviewed Pulmonary Within defined limits Neuro/Psych Within defined limits Cardiovascular Hypertension: well controlled Exercise tolerance: >4 METS 
  
GI/Hepatic/Renal 
  
GERD: well controlled Renal disease: CRI Endo/Other Arthritis Other Findings Physical Exam 
 
Airway Mallampati: III Mouth opening: Normal 
 
 Cardiovascular Regular rate and rhythm,  S1 and S2 normal,  no murmur, click, rub, or gallop Dental 
No notable dental hx Pulmonary Breath sounds clear to auscultation Abdominal 
 
 
 
 Other Findings Anesthetic Plan ASA: 2 Anesthesia type: spinal 
 
 
 
 
Induction: Intravenous Anesthetic plan and risks discussed with: Patient and Spouse

## 2018-09-04 NOTE — OP NOTES
Total Hip Procedure Note               [facility] [id number] 1940    Pre-operative Diagnosis: Unilateral osteoarthritis of hip, right [M16.11]      Post-operative Diagnosis: Unilateral osteoarthritis of hip, right [M16.11]     Procedure: Right Total Hip Arthroplasty     BMI: Body mass index is 28.57 kg/(m^2). Prateek Leavitt Location: [address] [place] [address]      Surgeon: Imtiaz Zamora MD      Assistant: NANCY Pettit    Anesthesia: Spinal      EBL: 200 cc     Procedure: Total Hip Arthroplasty    The complexity of total joint surgery requires the use of a skilled first assistant for positioning, retraction and assistance in closure. faviola Lane was brought to the operating room and positioned on the operating table. Anesthesia was administered. A dietz catheter was placed preoperatively and IV antibiotics were administered, along with IV transexamic acid. A time out identifying the patient, procedure, operative side and surgeon was administered and charted by the OR Nurse. [facility] was positioned on right lateral decubitus position. The limb was prepped and draped in the usual sterile fashion. The Posterior approach was utilized to expose the hip. The incision was carried through the subcutaneous tissue and underlying fascia with hemostasis obtained using the bovie cauterization. A Charmley retractor was inserted. The short external rotators were divided at their insertion. The sciatic nerve was palpated and identified. Next, a T-shaped capsular incision was accomplished and femoral head was dislocated. The articular surface revealed loss of cartilage, exposed bone and bone spurring. The neck was osteotomized in the appropriate position just above the lesser trochanteric region. The neck cut was measured to be 5 mm. Acetabular retractors were placed and the appropriate capsulotomy performed. Soft tissue was removed from the acetabulum.  The acetabular surface revealed loss of cartilage with exposed bone. The acetabulum was sequentially reamed. Using trial components, the acetabulum was sized to a 56 mm Thornton acetabular cup. The acetabular component was impacted to achieve appropriate horizontal tilt, anteversion, coverage, and stability. 2 screws were used to stabilize the cup. The polyethelene liner was impacted into position. Utilizing the femoral retractor, the canal was prepared with appropriate lateralization with the starter rasp. The canal was then broached progressively. The broach was positioned with the appropriate anatomic femoral anteversion. A calcar planar was utilized. A trial reduction with a +12 Biolox neck length was utilized. This was found to be the most stable to flexion greater than 90 degrees and on internal and external rotation. Limb lengths were thought to be equilibrated and with appropriate stability as mentioned above. All trial components were removed. The cementless permanent size 7 ACTIS  was impacted into place. A trial reduction was performed and the above neck length was selected. The permanent femoral head was impacted into place. Stephanie Granger's hip was reduced and stability was as mentioned above. Copious irrigation was accomplished about the surgical site. The sciatic nerve was palpated and noted to be intact. The capsule was repaired with a #1 PDS and the external rotators were reattached with a #5 Mersilene. A lavage of diluted betadine solution of 17.5 ml Betadine in 500 of 0.9% Normal Saline was allowed to soak in the wound for 3 minutes after implanting of the prosthesis. The wound was irrigated with Saline again before closure. The joint and skin areas were sprinkled with 1 gm of vancomycin powder. Prior to the final skin closure, full strength betadine was applied to the skin surrounding the skin incision. The operative hip was injected prior to closure for post operative pain management.  A #1 PDS suture was used to re-approximate the fascia. 60 cc of transexamic acid was injected under the fascia for hemostasis. Monocryl sutures were used to approximate the subcutaneous layers. Staples were used to reapproximate the skin edges and a sterile bandage was placed. The patient was then rolled to a supine position. The sponge and needle counts were correct. The patient tolerated the procedure without difficulty and left the operating room in satisfactory condition. Implants:   Implant Name Type Inv.  Item Serial No.  Lot No. LRB No. Used   SCR ACET CANC PINN 6.5X25MM SS --  - [id number]  SCR ACET CANC PINN 6.5X25MM SS --  [id number] [facility] [id number] Right 1   LINER ACET PINN NEUT [id number] -- ALTRX - [id number]  LINER ACET PINN NEUT [id number] -- ALTRX [id number] [facility] [id number] Right 1   SCR ACET CANC PINN 6.5X25MM SS --  - [id number]  SCR ACET CANC PINN 6.5X25MM SS --  [id number] [facility] [id number] Right 1   CUP ACET MH PINN 56MM GRIPTION --  - [id number]  CUP ACET MH PINN 56MM GRIPTION --  [id number] [facility] [id number] Right 1   STEM FEM TAPR SZ7 HI OFFSET -- ACTIS - [id number]   STEM FEM TAPR SZ7 HI OFFSET -- ACTIS [id number] [facility] ORTHOPEDICS [id number] Right 1        By: Imtiaz Zamora MD

## 2018-09-04 NOTE — PROGRESS NOTES
Assessment completed. Pt alert and oriented X4. 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 Pedal pulses are palpable. No signs of distress noted. Oriented pt to room, unit, dining on call, IS, and pain management. Pt on 2 liter oxygen via nasal cannula. Lungs clear to auscultation. [place] to drainage with no loops in tubing, and stat lock in place. Yellow/straw color urine noted. IV intact with no redness, or swelling noted infusing. Incision to right hip is covered with Aquacel dressing. Ice applied and plexi-pulses on. Pt denies pain. Call light within reach and bed in low position and locked. Pt informed to call out for needs verbalizes understanding. Spouse at bedside.

## 2018-09-04 NOTE — PROGRESS NOTES
Pt assessed. Pt's SpO2 on RA 97% with clear BS. Pt instructed on proper use of IS. Pt able to demonstrate proper use. Pt achieved 2000 with good effort.

## 2018-09-04 NOTE — PROGRESS NOTES
TRANSFER - IN REPORT:    Verbal report received from [facility]) on Jose De Jesus Swain  being received from [employer]) for routine progression of care      Report consisted of patients Situation, Background, Assessment and   Recommendations(SBAR). Information from the following report(s) SBAR, Kardex, Intake/Output, MAR and Recent Results was reviewed with the receiving nurse. Opportunity for questions and clarification was provided. Assessment completed upon patients arrival to unit and care assumed.

## 2018-09-04 NOTE — PROGRESS NOTES
[address]  Face to Face Encounter    Patients Name: Malcolm Hirsch    YOB: 1940    Ordering Physician: Odalys Pate    Primary Diagnosis: Unilateral osteoarthritis of hip, right [M16.11]   S/p Right ACACIA    Date of Face to Face:   9/4/2018                                  Face to Face Encounter findings are related to primary reason for home care:   yes. 1. I certify that the patient needs intermittent care as follows: physical therapy: gait/stair training    2. I certify that this patient is homebound, that is: 1) patient requires the use of a walker device, special transportation, or assistance of another to leave the home; or 2) patient's condition makes leaving the home medically contraindicated; and 3) patient has a normal inability to leave the home and leaving the home requires considerable and taxing effort. Patient may leave the home for infrequent and short duration for medical reasons, and occasional absences for non-medical reasons. Homebound status is due to the following functional limitations: Patient's ambulation limited secondary to severe pain and requires the use of an assistive device and the assistance of a caregiver for safe completion. Patient with strength and ROM deficits limiting ambulation endurance requiring the use of an assistive device and the assistance of a caregiver. Patient deemed temporarily homebound secondary to increased risk for infection when leaving home and going out into the community. 3. I certify that this patient is under my care and that I, or a nurse practitioner or  [id number], or clinical nurse specialist, or certified nurse midwife, working with me, had a Face-to-Face Encounter that meets the physician Face-to-Face Encounter requirements.   The following are the clinical findings from the [address] [place] [address] encounter that support the need for skilled services and is a summary of the encounter: see [facility] chart        Juan Estrella, OZZIE  9/4/2018      THE FOLLOWING TO BE COMPLETED BY THE COMMUNITY PHYSICIAN:    I concur with the findings described above from the F2F encounter that this patient is homebound and in need of a skilled service.     Certifying Physician: _____________________________________      Printed Certifying Physician Name: _____________________________________    Date: _________________

## 2018-09-04 NOTE — IP AVS SNAPSHOT
[address] 
[phone] Patient: Phillip Mendez MRN: [id number] :1940 About your hospitalization You were admitted on:  2018 You last received care in the:  [facility] You were discharged on:  2018 Why you were hospitalized Your primary diagnosis was:  Not on File Your diagnoses also included: Arthritis Of Right Hip Follow-up Information Follow up With Details Comments Contact Info Freeman Slaughter MD  As needed [address] [facility] [address] 
[phone] Tanya Logan MD  As scheduled by office [address] [address] 
[phone] [address]  Will contact you within 48 hrs [address] [facility] [address] [facility] [address] 
[phone] Discharge Orders None A check brittany indicates which time of day the medication should be taken. My Medications START taking these medications Instructions Each Dose to Equal  
 Morning Noon Evening Bedtime  
 aspirin delayed-release 81 mg tablet Your next dose is: Today Take 1 Tab by mouth every twelve (12) hours every twelve (12) hours. 81 mg  
    
   
   
  
   
  
 oxyCODONE IR 10 mg Tab immediate release tablet Commonly known as:  Danne Copper Your next dose is: Today @ 12:30 pm, if needed Take 1 Tab by mouth every three (3) hours as needed. Max Daily Amount: 80 mg.  
 10 mg CONTINUE taking these medications Instructions Each Dose to Equal  
 Morning Noon Evening Bedtime AMBIEN 10 mg tablet Generic drug:  zolpidem Your next dose is: Today Take 10 mg by mouth nightly as needed for Sleep. Indications: SLEEP-ONSET INSOMNIA 10 mg  
    
   
   
   
  
  
 benazepril 10 mg tablet Commonly known as:  LOTENSIN  
 Your next dose is:  Tomorrow Take 10 mg by mouth daily. Indications: hypertension 10 mg  
    
  
   
   
   
  
 CARAFATE 1 gram tablet Generic drug:  sucralfate Your next dose is: Today Take 1 g by mouth three (3) times daily. Take / use AM day of surgery  per anesthesia protocols. 1 g  
    
   
   
  
   
  
 cetirizine 10 mg tablet Commonly known as:  ZYRTEC Your next dose is: Today Take 10 mg by mouth nightly. Indications: ALLERGIC RHINITIS 10 mg  
    
   
   
   
  
  
 FLONASE 50 mcg/actuation nasal spray Generic drug:  fluticasone Your next dose is: Today 2 Sprays by Nasal route nightly. Indications: ALLERGIC RHINITIS 2 Spray  
    
   
   
   
  
  
 fluconazole 100 mg tablet Commonly known as:  DIFLUCAN Your next dose is:  Tomorrow Take 200 mg by mouth daily as needed. FDA advises cautious prescribing of oral fluconazole in pregnancy. 200 mg  
    
  
   
   
   
  
 megestrol 400 mg/10 mL (40 mg/mL) suspension Commonly known as:  MEGACE Your next dose is:  Tomorrow Take 400 mg by mouth daily. 400 mg  
    
  
   
   
   
  
 omeprazole 20 mg capsule Commonly known as:  PRILOSEC Your next dose is:  Tomorrow Take 20 mg by mouth Daily (before breakfast). Take DOS per anesthesia protocol. Indications: gastroesophageal reflux disease 20 mg  
    
  
   
   
   
  
 simvastatin 40 mg tablet Commonly known as:  ZOCOR Your next dose is: Today Take 40 mg by mouth nightly. 40 mg  
    
   
   
   
  
  
 VITAMIN B-12 1,000 mcg/mL injection Generic drug:  cyanocobalamin Your next dose is:  As scheduled 1,000 mcg by IntraMUSCular route every twenty-eight (28) days. 1000 mcg STOP taking these medications BACTROBAN NASAL 2 % nasal ointment Generic drug:  mupirocin calcium  
   
  
 meloxicam 7.5 mg tablet Commonly known as:  MOBIC  
   
  
 PRESERVISION LUTEIN PO Where to Get Your Medications These medications were sent to [facility]/pharmacy #[id number]- [address], [address] [address]  [address], [address] [place] [address] Phone:  [phone]  
  aspirin delayed-release 81 mg tablet Information on where to get these meds will be given to you by the nurse or doctor. ! Ask your nurse or doctor about these medications  
  oxyCODONE IR 10 mg Tab immediate release tablet Opioid Education Prescription Opioids: What You Need to Know: 
 
Prescription opioids can be used to help relieve moderate-to-severe pain and are often prescribed following a surgery or injury, or for certain health conditions. These medications can be an important part of treatment but also come with serious risks. Opioids are strong pain medicines. Examples include hydrocodone, oxycodone, fentanyl, and morphine. Heroin is an example of an illegal opioid. It is important to work with your health care provider to make sure you are getting the safest, most effective care. WHAT ARE THE RISKS AND SIDE EFFECTS OF OPIOID USE? Prescription opioids carry serious risks of addiction and overdose, especially with prolonged use. An opioid overdose, often marked by slow breathing, can cause sudden death. The use of prescription opioids can have a number of side effects as well, even when taken as directed. · Tolerance-meaning you might need to take more of a medication for the same pain relief · Physical dependence-meaning you have symptoms of withdrawal when the medication is stopped. Withdrawal symptoms can include nausea, sweating, chills, diarrhea, stomach cramps, and muscle aches. Withdrawal can last up to several weeks, depending on which drug you took and how long you took it. · Increased sensitivity to pain · Constipation · Nausea, vomiting, and dry mouth · Sleepiness and dizziness · Confusion · Depression · Low levels of testosterone that can result in lower sex drive, energy, and strength · Itching and sweating RISKS ARE GREATER WITH:      
· History of drug misuse, substance use disorder, or overdose · Mental health conditions (such as depression or anxiety) · Sleep apnea · Older age (72 years or older) · Pregnancy Avoid alcohol while taking prescription opioids. Also, unless specifically advised by your health care provider, medications to avoid include: · Benzodiazepines (such as Xanax or Valium) · Muscle relaxants (such as Soma or Flexeril) · Hypnotics (such as Ambien or Lunesta) · Other prescription opioids KNOW YOUR OPTIONS Talk to your health care provider about ways to manage your pain that don't involve prescription opioids. Some of these options may actually work better and have fewer risks and side effects. Options may include: 
· Pain relievers such as acetaminophen, ibuprofen, and naproxen · Some medications that are also used for depression or seizures · Physical therapy and exercise · Counseling to help patients learn how to cope better with triggers of pain and stress. · Application of heat or cold compress · Massage therapy · Relaxation techniques Be Informed Make sure you know the name of your medication, how much and how often to take it, and its potential risks & side effects. IF YOU ARE PRESCRIBED OPIOIDS FOR PAIN: 
· Never take opioids in greater amounts or more often than prescribed. Remember the goal is not to be pain-free but to manage your pain at a tolerable level. · Follow up with your primary care provider to: · Work together to create a plan on how to manage your pain. · Talk about ways to help manage your pain that don't involve prescription opioids. · Talk about any and all concerns and side effects. · Help prevent misuse and abuse. · Never sell or share prescription opioids · Help prevent misuse and abuse. · Store prescription opioids in a secure place and out of reach of others (this may include visitors, children, friends, and family). · Safely dispose of unused/unwanted prescription opioids: Find your community drug take-back program or your pharmacy mail-back program, or flush them down the toilet, following guidance from the Food and Drug Administration (www.fda.gov/Drugs/ResourcesForYou). · Visit www.cdc.gov/drugoverdose to learn about the risks of opioid abuse and overdose. · If you believe you may be struggling with addiction, tell your health care provider and ask for guidance or call [address] [place] [address] at [phone]. Discharge Instructions [facility] [employer] Patient Discharge Instructions  Cindyrubi / [id number] : 1940 Admitted 2018 Discharged: 2018 IF YOU HAVE ANY PROBLEMS ONCE YOU ARE AT HOME CALL THE FOLLOWING NUMBERS:  
Main office number: [phone] Take Home Medications · It is important that you take the medication exactly as they are prescribed. · Keep your medication in the bottles provided by the pharmacist and keep a list of the medication names, dosages, and times to be taken in your wallet. · Do not take other medications without consulting your doctor. What to do at [facility] Resume your prehospital diet. If you have excessive nausea or vomitting call your doctor's office Home Physical Therapy is arranged. Use rolling walker when walking. Patients who have had a joint replacement should not drive until you are seen for your follow up appointment by Dr. Jose Manuel Us. When to Call - Call if you have a temperature greater then 101 
- Unable to keep food down - Loose control of your bladder or bowel function - Are unable to bear any weight  
- Need a pain medication refill DISCHARGE SUMMARY from Nurse The following personal items collected during your admission are returned to you:  
Dental Appliance: Dental Appliances: None Vision: Visual Aid: Glasses Hearing Aid:   na 
Jewelry: Jewelry: None Clothing:   self Other Valuables: Other Valuables: None (pt denied valuables) Valuables sent to safe:   na 
 
PATIENT INSTRUCTIONS: 
 
After general anesthesia or intravenous sedation, for 24 hours or while taking prescription Narcotics: · Limit your activities · Do not drive and operate hazardous machinery · Do not make important personal or business decisions · Do  not drink alcoholic beverages · If you have not urinated within 8 hours after discharge, please contact your surgeon on call. Report the following to your surgeon: 
· Excessive pain, swelling, redness or odor of or around the surgical area · Temperature over 101 · Nausea and vomiting lasting longer than 4 hours or if unable to take medications · Any signs of decreased circulation or nerve impairment to extremity: change in color, persistent  numbness, tingling, coldness or increase pain · Follow hip precautions @ all times! · Any questions, call office @ [phone] Keep scheduled follow up appointment. If need to change, call office @ [phone]. *  Please give a list of your current medications to your Primary Care Provider. *  Please update this list whenever your medications are discontinued, doses are 
    changed, or new medications (including over-the-counter products) are added. *  Please carry medication information at all times in case of emergency situations. Hip Replacement Surgery (Posterior): What to Expect at [facility] Your Recovery Hip replacement surgery replaces the worn parts of your hip joint. When you leave the hospital, you will probably be walking with crutches or a walker.  You may be able to climb a few stairs and get in and out of bed and chairs. But you will need someone to help you at home for the next few weeks or until you have more energy and can move around better. If there is no one to help you at home, you may go to a rehabilitation center or long-term care center. You will go home with a bandage and stitches, staples, tissue glue, or tape strips. You can remove the bandage when your doctor tells you to. If you have stitches or staples, your doctor will remove them 10 days to 3 weeks after your surgery. Glue or tape strips will fall off on their own over time. You may still have some mild pain, and the area may be swollen for 3 to 4 months after surgery. Your doctor will give you medicine for the pain. You will continue the rehabilitation program (rehab) you started in the hospital. The better you do with your rehab exercises, the sooner you will get your strength and movement back. Most people are able to return to work 4 weeks to 4 months after surgery. This care sheet gives you a general idea about how long it will take for you to recover. But each person recovers at a different pace. Follow the steps below to get better as quickly as possible. How can you care for yourself at home? Activity 
  · Your doctor may not want your affected leg to cross the center of your body toward the other leg. If so, your therapist may suggest these ideas: ¨ Do not cross your legs. ¨ Be very careful as you get in or out of bed or a car, so your leg does not cross that imaginary line in the middle of your body.  
  · Rest when you feel tired. You may take a nap, but do not stay in bed all day.  
  · Work with your physical therapist to learn the best way to exercise. You may be able to take frequent, short walks using crutches or a walker.  You will probably have to use crutches or a walker for at least 4 to 6 weeks.  
  · Your doctor may advise you to stay away from activities that put stress on the joint. This includes sports such as tennis, football, and jogging.  
  · Try not to sit for too long at one time. You will feel less stiff if you take a short walk about every hour. When you sit, use chairs with arms, and do not sit in low chairs.  
  · Do not bend over more than 90 degrees (like the angle in a letter \"L\").  
  · Sleep on your back with your legs slightly apart or on your side with a pillow between your knees for about 6 weeks or as your doctor tells you. Do not sleep on your stomach or affected leg.  
  · Ask your doctor when you can drive again.  
  · Most people are able to return to work 4 weeks to 4 months after surgery.  
  · Ask your doctor when it is okay for you to have sex. Diet 
  · By the time you leave the hospital, you will probably be eating your normal diet. If your stomach is upset, try bland, low-fat foods like plain rice, broiled chicken, toast, and yogurt. Your doctor may recommend that you take iron and vitamin supplements.  
  · Drink plenty of fluids (unless your doctor tells you not to).   · Eat healthy foods, and watch your portion sizes. Try to stay at your ideal weight. Too much weight puts more stress on your new hip joint.  
  · You may notice that your bowel movements are not regular right after your surgery. This is common. Try to avoid constipation and straining with bowel movements. You may want to take a fiber supplement every day. If you have not had a bowel movement after a couple of days, ask your doctor about taking a mild laxative. Medicines 
  · Your doctor will tell you if and when you can restart your medicines. He or she will also give you instructions about taking any new medicines.  
  · If you take blood thinners, such as warfarin (Coumadin), clopidogrel (Plavix), or aspirin, be sure to talk to your doctor. He or she will tell you if and when to start taking those medicines again.  Make sure that you understand exactly what your doctor wants you to do.  
  · Your doctor may give you a blood-thinning medicine to prevent blood clots. If you take a blood thinner, be sure you get instructions about how to take your medicine safely. Blood thinners can cause serious bleeding problems. This medicine could be in pill form or as a shot (injection). If a shot is necessary, your doctor will tell you how to do this.  
  · Be safe with medicines. Take pain medicines exactly as directed. ¨ If the doctor gave you a prescription medicine for pain, take it as prescribed. ¨ If you are not taking a prescription pain medicine, ask your doctor if you can take an over-the-counter medicine.  
  · If you think your pain medicine is making you sick to your stomach: 
¨ Take your medicine after meals (unless your doctor has told you not to). ¨ Ask your doctor for a different pain medicine.  
  · If your doctor prescribed antibiotics, take them as directed. Do not stop taking them just because you feel better. You need to take the full course of antibiotics. Incision care 
  · If your doctor told you how to care for your cut (incision), follow your doctor's instructions. You will have a dressing over the cut. A dressing helps the incision heal and protects it. Your doctor will tell you how to take care of this.  
  · If you did not get instructions, follow this general advice: ¨ If you have strips of tape on the cut the doctor made, leave the tape on for a week or until it falls off. ¨ If you have stitches or staples, your doctor will tell you when to come back to have them removed. ¨ If you have skin adhesive on the cut, leave it on until it falls off. Skin adhesive is also called glue or liquid stitches. ¨ Change the bandage every day. ¨ Wash the area daily with warm water, and pat it dry. Don't use hydrogen peroxide or alcohol. They can slow healing.  
¨ You may cover the area with a gauze bandage if it oozes fluid or rubs against clothing. ¨ You may shower 24 to 48 hours after surgery. Pat the incision dry. Don't swim or take a bath for the first 2 weeks, or until your doctor tells you it is okay. Exercise 
  · Your rehab program will include a number of exercises to do. Always do them as your therapist tells you. Ice and elevation 
  · For pain, put ice or a cold pack on the area for 10 to 20 minutes at a time. Put a thin cloth between the ice and your skin.  
  · Your ankle may swell for about 3 months. Prop up your ankle when you ice it or anytime you sit or lie down. Try to keep it above the level of your heart. This will help reduce swelling. Other instructions 
 Continue to wear your support stockings as your doctor says. These help to prevent blood clots. The length of time that you will have to wear them depends on your activity level and the amount of swelling you have. Most people wear these stockings for 4 to 6 weeks after surgery. 
 Preventing falls is also very important. To prevent falls: 
  · Arrange furniture so that you will not trip on it.  
  · Get rid of throw rugs, and move electrical cords out of the way.  
  · Walk only in areas with plenty of light.  
  · Put grab bars in showers and bathtubs.  
  · Avoid icy or snowy sidewalks.  
  · Wear shoes with sturdy, flat soles. Follow-up care is a key part of your treatment and safety. Be sure to make and go to all appointments, and call your doctor if you are having problems. It's also a good idea to know your test results and keep a list of the medicines you take. When should you call for help? Call 911 anytime you think you may need emergency care. For example, call if: 
  · You passed out (lost consciousness).  
  · You have severe trouble breathing.  
  · You have sudden chest pain and shortness of breath, or you cough up blood.  
 Call your doctor now or seek immediate medical care if: 
  · You have signs that your hip may be dislocated, including: ¨ Severe pain and not being able to stand. ¨ A crooked leg that looks like your hip is out of position. ¨ Not being able to bend or straighten your leg.  
  · Your leg or foot is cool or pale or changes color.  
  · You cannot feel or move your leg.  
  · You have signs of a blood clot, such as: 
¨ Pain in your calf, back of the knee, thigh, or groin. ¨ Redness and swelling in your leg or groin.  
  · Your incision comes open and begins to bleed, or the bleeding increases.  
  · You feel like your heart is racing or beating irregularly.  
  · You have signs of infection, such as: 
¨ Increased pain, swelling, warmth, or redness. ¨ Red streaks leading from the incision. ¨ Pus draining from the incision. ¨ A fever.  
 Watch closely for changes in your health, and be sure to contact your doctor if: 
  · You do not have a bowel movement after taking a laxative.  
  · You do not get better as expected. Where can you learn more? Go to http://cris-soledad.info/. Enter E217 in the search box to learn more about \"Hip Replacement Surgery (Posterior): What to Expect at Home. \" Current as of: November 29, 2017 Content Version: 11.7 © 7104-7634 [employer]. Care instructions adapted under license by [employer] (which disclaims liability or warranty for this information). If you have questions about a medical condition or this instruction, always ask your healthcare professional. [place] [address] any warranty or liability for your use of this information. These are general instructions for a healthy lifestyle: No smoking/ No tobacco products/ Avoid exposure to second hand smoke Surgeon General's Warning:  Quitting smoking now greatly reduces serious risk to your health. Obesity, smoking, and sedentary lifestyle greatly increases your risk for illness A healthy diet, regular physical exercise & weight monitoring are important for maintaining a healthy lifestyle You may be retaining fluid if you have a history of heart failure or if you experience any of the following symptoms:  Weight gain of 3 pounds or more overnight or 5 pounds in a week, increased swelling in our hands or feet or shortness of breath while lying flat in bed. Please call your doctor as soon as you notice any of these symptoms; do not wait until your next office visit. Recognize signs and symptoms of STROKE: 
 
F-face looks uneven A-arms unable to move or move even S-speech slurred or non-existent T-time-call 911 as soon as signs and symptoms begin-DO NOT go Back to bed or wait to see if you get better-TIME IS BRAIN. The discharge information has been reviewed with the patient. The patient verbalized understanding. Information obtained by : 
I understand that if any problems occur once I am at home I am to contact my physician. I understand and acknowledge receipt of the instructions indicated above. Physician's or R.N.'s Signature                                                                  Date/Time Patient or Representative Signature                                                          Date/Time Introducing [facility] & HEALTH SERVICES! [employer] introduces [employer] patient portal. Now you can access parts of your medical record, email your doctor's office, and request medication refills online. 1. In your internet browser, go to [email or web address] 2. Click on the First Time User? Click Here link in the Sign In box. You will see the New Member Sign Up page. 3. Enter your [employer] Access Code exactly as it appears below. You will not need to use this code after youve completed the sign-up process. If you do not sign up before the expiration date, you must request a new code. · [employer] Access Code: [email or web address] Expires: 10/24/2018  6:53 AM 
 
4. Enter the last four digits of your Social Security Number (xxxx) and Date of Birth (mm/dd/yyyy) as indicated and click Submit. You will be taken to the next sign-up page. 5. Create a Open [employer] ID. This will be your [employer] login ID and cannot be changed, so think of one that is secure and easy to remember. 6. Create a [employer] password. You can change your password at any time. 7. Enter your Password Reset Question and Answer. This can be used at a later time if you forget your password. 8. Enter your e-mail address. You will receive e-mail notification when new information is available in [address] 9. Click Sign Up. You can now view and download portions of your medical record. 10. Click the Download Summary menu link to download a portable copy of your medical information. If you have questions, please visit the Frequently Asked Questions section of the [employer] website. Remember, [employer] is NOT to be used for urgent needs. For medical emergencies, dial 911. Now available from your iPhone and Android! Introducing Deo Gustafson As a [facility] patient, I wanted to make you aware of our electronic visit tool called Deo Gustafson. [facility] 24/7 allows you to connect within minutes with a medical provider 24 hours a day, seven days a week via a mobile device or tablet or logging into a secure website from your computer. You can access Deo Gustafson from anywhere in the [place].  
 
A virtual visit might be right for you when you have a simple condition and feel like you just dont want to get out of bed, or cant get away from work for an appointment, when your regular [facility] provider is not available (evenings, weekends or holidays), or when youre out of town and need minor care. Electronic visits cost only $49 and if the [facility] 24/7 provider determines a prescription is needed to treat your condition, one can be electronically transmitted to a nearby pharmacy*. Please take a moment to enroll today if you have not already done so. The enrollment process is free and takes just a few minutes. To enroll, please download the [employer] 24/7 mesha to your tablet or phone, or visit [email or web address] to enroll on your computer. And, as an [address] [place] [address] patient with a [employer] account, the results of your visits will be scanned into your electronic medical record and your primary care provider will be able to view the scanned results. We urge you to continue to see your regular [facility] provider for your ongoing medical care. And while your primary care provider may not be the one available when you seek a [facility] virtual visit, the peace of mind you get from getting a real diagnosis real time can be priceless. For more information on [email or web address], view our Frequently Asked Questions (FAQs) at [email or web address]. Sincerely, 
 
Doris Coker MD 
Chief Medical Officer Narendra Momin *:  certain medications cannot be prescribed via [facility] Providers Seen During Your Hospitalization Provider Specialty Primary office phone Frandy De MD Orthopedic Surgery [phone] Immunizations Administered for This Admission Name Date  
 TB Skin Test (PPD) Intradermal 9/4/2018 Your Primary Care Physician (PCP) Primary Care Physician Office Phone Office Fax Kristin Luna [phone] You are allergic to the following Allergen Reactions Morphine Nausea and Vomiting Other Medication Nausea and Vomiting Dust and pollen causes runny nose. Mollusks/clams- N/V Shellfish Derived Nausea and Vomiting Recent Documentation Height Weight BMI Smoking Status 1.676 m 80.3 kg 28.57 kg/m2 Former Smoker Emergency Contacts Name Discharge Info Relation Home Work Mobile Erica Granger DISCHARGE CAREGIVER [3] Spouse [3] [phone] Patient Belongings The following personal items are in your possession at time of discharge: 
  Dental Appliances: None  Visual Aid: Glasses      Home Medications: None   Jewelry: None       Other Valuables: None (pt denied valuables) Please provide this summary of care documentation to your next provider. Signatures-by signing, you are acknowledging that this After Visit Summary has been reviewed with you and you have received a copy. Patient Signature:  ____________________________________________________________ Date:  ____________________________________________________________  
  
Robbie Carmichael Provider Signature:  ____________________________________________________________ Date:  ____________________________________________________________

## 2018-09-04 NOTE — PERIOP NOTES
TRANSFER - OUT REPORT:    Verbal report given to Brent Godfrey RN on Ese Castellon  being transferred to preop holding for routine progression of care       Report consisted of patients Situation, Background, Assessment and   Recommendations(SBAR). Information from the following report(s) SBAR, MAR and Recent Results was reviewed with the receiving nurse. Lines:   Peripheral IV 09/04/18 Left Hand (Active)   Site Assessment Clean, dry, & intact 9/4/2018  6:50 AM   Phlebitis Assessment 0 9/4/2018  6:50 AM   Dressing Status Clean, dry, & intact 9/4/2018  6:50 AM   Dressing Type Tape;Transparent 9/4/2018  6:50 AM   Hub Color/Line Status Pink; Infusing 9/4/2018  6:50 AM   Action Taken Blood drawn 9/4/2018  6:50 AM        Opportunity for questions and clarification was provided.       Patient transported with:   [employer]

## 2018-09-04 NOTE — PROGRESS NOTES
Spiritual Care Visit, initial visit. Visited with patient at bedside. Prayed for patient's healing and health. Visit by Ko Caldwell, Staff .  Everardo, Riky.B., B.A.

## 2018-09-04 NOTE — ANESTHESIA PROCEDURE NOTES
Spinal Block Start time: 9/4/2018 8:06 AM 
End time: 9/4/2018 8:11 AM 
Performed by: Ramya Ruiz Authorized by: Ramya Ruiz  
 
Pre-procedure: Indications: primary anesthetic  Preanesthetic Checklist: patient identified, risks and benefits discussed, anesthesia consent, patient being monitored and timeout performed Timeout Time: 08:06 Spinal Block:  
Patient Position:  Seated Prep Region:  Lumbar Prep: chlorhexidine and patient draped Location:  L3-4 Technique:  Single shot Local:  Lidocaine 1% Local Dose (mL):  3 Needle:  
Needle Type:  Pencan Needle Gauge:  25 G Attempts:  1 Events: CSF confirmed, no blood with aspiration and no paresthesia Assessment: 
Insertion:  Uncomplicated Patient tolerance:  Patient tolerated the procedure well with no immediate complications

## 2018-09-05 LAB — HGB BLD-MCNC: 10.3 G/DL (ref 13.6–17.2)

## 2018-09-05 PROCEDURE — 97535 SELF CARE MNGMENT TRAINING: CPT

## 2018-09-05 PROCEDURE — 85018 HEMOGLOBIN: CPT

## 2018-09-05 PROCEDURE — 74011250637 HC RX REV CODE- 250/637: Performed by: ORTHOPAEDIC SURGERY

## 2018-09-05 PROCEDURE — 97150 GROUP THERAPEUTIC PROCEDURES: CPT

## 2018-09-05 PROCEDURE — 65270000029 HC RM PRIVATE

## 2018-09-05 PROCEDURE — 36415 COLL VENOUS BLD VENIPUNCTURE: CPT

## 2018-09-05 PROCEDURE — 97110 THERAPEUTIC EXERCISES: CPT

## 2018-09-05 PROCEDURE — 74011250636 HC RX REV CODE- 250/636: Performed by: PHYSICIAN ASSISTANT

## 2018-09-05 PROCEDURE — 97116 GAIT TRAINING THERAPY: CPT

## 2018-09-05 PROCEDURE — 74011250637 HC RX REV CODE- 250/637: Performed by: PHYSICIAN ASSISTANT

## 2018-09-05 RX ORDER — ASPIRIN 81 MG/1
81 TABLET ORAL EVERY 12 HOURS
Qty: 120 TAB | Refills: 0 | Status: SHIPPED | OUTPATIENT
Start: 2018-09-05 | End: 2021-03-05

## 2018-09-05 RX ORDER — OXYCODONE HYDROCHLORIDE 10 MG/1
10 TABLET ORAL
Qty: 50 TAB | Refills: 0 | Status: SHIPPED | OUTPATIENT
Start: 2018-09-05 | End: 2018-09-19

## 2018-09-05 RX ADMIN — Medication 10 ML: at 06:01

## 2018-09-05 RX ADMIN — Medication 1 AMPULE: at 08:52

## 2018-09-05 RX ADMIN — PANTOPRAZOLE SODIUM 40 MG: 40 TABLET, DELAYED RELEASE ORAL at 06:00

## 2018-09-05 RX ADMIN — ACETAMINOPHEN 1000 MG: 500 TABLET, FILM COATED ORAL at 06:00

## 2018-09-05 RX ADMIN — ASPIRIN 81 MG: 81 TABLET, DELAYED RELEASE ORAL at 20:31

## 2018-09-05 RX ADMIN — DEXAMETHASONE SODIUM PHOSPHATE 10 MG: 10 INJECTION INTRAMUSCULAR; INTRAVENOUS at 12:32

## 2018-09-05 RX ADMIN — ONDANSETRON 4 MG: 8 TABLET, ORALLY DISINTEGRATING ORAL at 11:33

## 2018-09-05 RX ADMIN — Medication 1 AMPULE: at 20:31

## 2018-09-05 RX ADMIN — OXYCODONE HYDROCHLORIDE 10 MG: 5 TABLET ORAL at 09:18

## 2018-09-05 RX ADMIN — BENAZEPRIL HYDROCHLORIDE 10 MG: 10 TABLET ORAL at 08:52

## 2018-09-05 RX ADMIN — Medication 5 ML: at 20:33

## 2018-09-05 RX ADMIN — ASPIRIN 81 MG: 81 TABLET, DELAYED RELEASE ORAL at 08:52

## 2018-09-05 RX ADMIN — SENNOSIDES AND DOCUSATE SODIUM 2 TABLET: 8.6; 5 TABLET ORAL at 08:52

## 2018-09-05 NOTE — PROGRESS NOTES
2018         Post Op day: 1 Day Post-Op   Admit Diagnosis: Unilateral osteoarthritis of hip, right [M16.11]  LAB:    Recent Results (from the past 24 hour(s))   HEMOGLOBIN    Collection Time: 18  8:07 PM   Result Value Ref Range    HGB 10.3 (L) 13.6 - 17.2 g/dL   HEMOGLOBIN    Collection Time: 18  4:54 AM   Result Value Ref Range    HGB 10.3 (L) 13.6 - 17.2 g/dL     Vital Signs:    Patient Vitals for the past 8 hrs:   BP Temp Pulse Resp SpO2   18 0737 119/73 98.3 °F (36.8 °C) 78 20 98 %   18 0341 154/88 98.5 °F (36.9 °C) 89 16 91 %     Temp (24hrs), Av.3 °F (36.8 °C), Min:97.9 °F (36.6 °C), Max:98.6 °F (37 °C)    Pain Control:   Pain Assessment  Pain Scale 1: Numeric (0 - 10)  Pain Intensity 1: 0  Pain Onset 1: months ago  Pain Location 1: Hip  Pain Orientation 1: Right  Pain Description 1: Aching, Constant, Dull, Sharp  Pain Intervention(s) 1: Medication (see MAR)  Subjective: Doing well, pain is fairly well controlled,  no complaints     Objective:  No Acute Distress, Alert and Oriented, right hip dressing is C/D/I. Posterior thigh and calves are soft, NT. 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 ASA/SCDs for DVT prophylaxis. Plan to d/c to home with home health today vs tomorrow.   Pt seen by Dr. Zhang Situ this AM.   Signed By: NANCY Noyola

## 2018-09-05 NOTE — PROGRESS NOTES
Neurovascular and peripheral vascular checks are WDL to BLE. Instructed not to ambulate without assistance from staff. Pt verbalized understanding. Call light in reach. Ambulated to restroom with walker with one assist. Pt is slightly unsteady.

## 2018-09-05 NOTE — PROGRESS NOTES
Problem: Self Care Deficits Care Plan (Adult)  Goal: *Acute Goals and Plan of Care (Insert Text)  GOALS:   DISCHARGE GOALS (in preparation for going home/rehab):  3 days  1. Mr. Glory Roger will perform one lower body dressing activity with minimal assistance with adaptive equipment to demonstrate improved functional mobility and safety. GOAL MET 9/5/2018    2. Mr. Glory Roger will perform one lower body bathing activity with minimal  assistance with adaptive equipment to demonstrate improved functional mobility and safety. GOAL MET 9/5/2018    3. Mr. Glory Roger will perform toileting/toilet transfer with contact guard assistance with adaptive equipment to demonstrate improved functional mobility and safety. GOAL MET 9/5/2018    4. Mr. Glory Roger will perform shower transfer with contact guard assistance with adaptive equipment to demonstrate improved functional mobility and safety. GOAL MET 9/5/2018    5. Mr. Glory Roger will state ACACIA precautions with two verbal cues to demonstrate improved functional mobility and safety. GOAL MET 9/5/2018    Additional goals 9/5/18  6. Mr. Glory Roger will perform lower body dressing activity with SBA  with adaptive equipment to demonstrate improved functional mobility and safety. 7.  Mr. Glory Roger will perform lower body bathing activity with SBA with adaptive equipment to demonstrate improved functional mobility and safety. 8.  Mr. Glory Roger will perform toileting/toilet transfer with SBA with adaptive equipment to demonstrate improved functional mobility and safety. 9.  Mr. Glory Roger will perform shower transfer with SBA with adaptive equipment to demonstrate improved functional mobility and safety.          JOINT CAMP OCCUPATIONAL THERAPY ACACIA: Daily Note and AM 9/5/2018  INPATIENT: Hospital Day: 2  [place] / [place] / Product Type: Medicare /      NAME/AGE/GENDER: Michelle Arzate is a 76 y.o. male   PRIMARY DIAGNOSIS:  Unilateral osteoarthritis of hip, right [M16.11]   Procedure(s) and Anesthesia Type:     * RIGHT HIP ARTHROPLASTY TOTAL/DEPUY - Spinal (Right)  ICD-10: Treatment Diagnosis:    · Pain in Right Hip (M25.551)  · Stiffness of Right Hip, Not elsewhere classified (M25.651)      ASSESSMENT:      Mr. Swathi German is s/p right ACACIA and presents with decreased weight bearing on right LE and decreased independence with functional mobility and activities of daily living. Patient completed shower and dressing as charter below in ADL grid and is ambulating with rolling walker and CGA assist.  Patient has met 5/5 goals and plans to return home with good family support. Additional goals added. OT to see tomorrow for ADL session to address the new goals. OT reviewed hip precautions throughout session and issued long handled sponge for home use. Patient instructed to call for assistance when needing to get up from recliner and all needs in reach. Patient verbalized understanding of call light. This section established at most recent assessment   PROBLEM LIST (Impairments causing functional limitations):  1. Decreased Strength  2. Decreased ADL/Functional Activities  3. Decreased Transfer Abilities  4. Increased Pain  5. Increased Fatigue  6. Decreased Flexibility/Joint Mobility  7. Decreased Knowledge of Precautions   INTERVENTIONS PLANNED: (Benefits and precautions of occupational therapy have been discussed with the patient.)  1. Activities of daily living training  2. Adaptive equipment training  3. Balance training  4. Clothing management  5. Donning&doffing training  6. Theraputic activity     TREATMENT PLAN: Frequency/Duration: Follow patient 1-2 times to address above goals. Rehabilitation Potential For Stated Goals: Good     RECOMMENDED REHABILITATION/EQUIPMENT: (at time of discharge pending progress): Continue Skilled Therapy and Home Health: Physical Therapy.               OCCUPATIONAL PROFILE AND HISTORY:   History of Present Injury/Illness (Reason for Referral): Pt presents this date s/p (right) ACACIA. Past Medical History/Comorbidities:   Mr. 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 He also has no past medical history of Adverse effect of anesthesia; Aneurysm ([address] Arrhythmia; Asthma; Autoimmune disease ([address] CAD (coronary artery disease); Chronic kidney disease; Chronic obstructive pulmonary disease ([address] Coagulation defects; Coagulation disorder ([address] COPD; Diabetes ([address] Difficult intubation; Endocarditis; Heart failure ([address] Ill-defined condition; Liver disease; Malignant hyperthermia due to anesthesia; Morbid obesity ([address] Nicotine vapor product user; Non-nicotine vapor product user; Pseudocholinesterase deficiency; Psychiatric disorder; PUD (peptic ulcer disease); Rheumatic fever; Seizures ([address] Stroke [facility]); Thromboembolus ([address] Thyroid disease; or Unspecified sleep apnea. Mr. Gayla Britt  has a past surgical history that includes hx gi (2010); pr total hip arthroplasty (Left, 2004); hx shoulder arthroscopy (Right, 2015); hx knee arthroscopy (Right); pr total knee arthroplasty (Right, 2012); hx other surgical; hx cholecystectomy (2015); and hx appendectomy.   Social History/Living Environment:   Home Environment: Private residence  # Steps to Enter: 1  Rails to Enter: No  One/Two Story Residence: One story  Living Alone: No  Support Systems: Family member(s), Spouse/Significant Other/Partner  Patient Expects to be Discharged to[de-identified] Private residence  Current DME Used/Available at Home: Commode, bedside, Walker, rolling  Tub or Shower Type: Shower  Prior Level of Function/Work/Activity:  Assistance with self care from wife, used a walker     Number of Personal Factors/Comorbidities that affect the Plan of Care: Brief history (0):  LOW COMPLEXITY   ASSESSMENT OF OCCUPATIONAL PERFORMANCE[de-identified]   Most Recent Physical Functioning:                                  Mental Status  Neurologic State: Alert; Appropriate for age  Orientation Level: Appropriate for age  Cognition: Appropriate decision making; Appropriate for age attention/concentration; Appropriate safety awareness; Follows commands  Perception: Appears intact  Perseveration: No perseveration noted  Safety/Judgement: Awareness of environment; Fall prevention                Basic ADLs (From Assessment) Complex ADLs (From Assessment)   Basic ADL  Feeding: Independent  Oral Facial Hygiene/Grooming: Supervision  Bathing: Moderate assistance  Type of Bath: Chlorhexidine (CHG), Shower  Upper Body Dressing: Supervision  Lower Body Dressing: Moderate assistance  Toileting: Minimum assistance     Grooming/Bathing/Dressing Activities of Daily Living   Grooming  Grooming Assistance: Stand-by assistance  Washing Face: Stand-by assistance  Washing Hands: Stand-by assistance Cognitive Retraining  Safety/Judgement: Awareness of environment; Fall prevention   Upper Body Bathing  Bathing Assistance: Stand-by assistance  Position Performed: Standing  Adaptive Equipment: Grab bar     Lower Body Bathing  Bathing Assistance: Minimum assistance  Perineal  : Stand-by assistance  Position Performed: Standing  Lower Body : Minimum assistance (dry distal LE)  Position Performed: Seated in chair;Standing  Adaptive Equipment: Grab bar; Shower chair     Upper Body Dressing Assistance  Dressing Assistance: Supervision/set-up; Stand-by assistance  Hospital Gown: Stand-by assistance  Pullover Shirt: Stand-by assistance Functional Transfers  Bathroom Mobility: Contact guard assistance  Toilet Transfer : Contact guard assistance  Shower Transfer: Contact guard assistance   Lower Body Dressing Assistance  Dressing Assistance: Contact guard assistance  Underpants: (none)  Pants With Elastic Waist: Contact guard assistance  Socks: Contact guard assistance  Adaptive Equipment Used: Grab bar;Long handled shoe horn;Reacher;Sock aid; Walker Bed/Mat Mobility  Sit to Supine: Minimum assistance  Sit to Stand: Minimum assistance         Physical Skills Involved:  1. Range of Motion  2. Balance  3. Strength Cognitive Skills Affected (resulting in the inability to perform in a timely and safe manner): 1. none Psychosocial Skills Affected:  1. Environmental Adaptation   Number of elements that affect the Plan of Care: 3-5:  MODERATE COMPLEXITY   CLINICAL DECISION MAKING:   [facility] AM-PAC 6 Clicks   Daily Activity Inpatient Short Form  How much help from another person does the patient currently need. .. Total A Lot A Little None   1. Putting on and taking off regular lower body clothing? [] 1   [x] 2   [] 3   [] 4   2. Bathing (including washing, rinsing, drying)? [] 1   [x] 2   [] 3   [] 4   3. Toileting, which includes using toilet, bedpan or urinal?   [] 1   [] 2   [x] 3   [] 4   4. Putting on and taking off regular upper body clothing? [] 1   [] 2   [] 3   [x] 4   5. Taking care of personal grooming such as brushing teeth? [] 1   [] 2   [] 3   [x] 4   6. Eating meals? [] 1   [] 2   [] 3   [x] 4   © 2007, Trustees of [facility], under license to [employer]. All rights reserved     Score:  Initial: 19 Most Recent: X (Date: -- )    Interpretation of Tool:  Represents activities that are increasingly more difficult (i.e. Bed mobility, Transfers, Gait). Score 24 23 22-20 19-15 14-10 9-7 6     Modifier CH CI CJ CK CL CM CN      ?  Self Care:     - CURRENT STATUS: CK - 40%-59% impaired, limited or restricted    - GOAL STATUS: CJ - 20%-39% impaired, limited or restricted    - D/C STATUS:  ---------------To be determined---------------  [place] / Product Type: Medicare /      Medical Necessity: · Patient is expected to demonstrate progress in range of motion, balance and functional technique to increase independence with self care. Reason for Services/Other Comments:  · Patient would benefit from skilled Occupational Therapy to maximize independence and safety with self-care task and functional mobility. .   Use of outcome tool(s) and clinical judgement create a POC that gives a: LOW COMPLEXITY            TREATMENT:   (In addition to Assessment/Re-Assessment sessions the following treatments were rendered)     Pre-treatment Symptoms/Complaints:  Pt without complaint of pain  Pain: Initial:   Pain Intensity 1: 0  Post Session:  2/10 at rest,nurse present giving pain meds     Self Care: (30): Procedure(s) (per grid) utilized to improve and/or restore self-care/home management as related to dressing, bathing, toileting and grooming. Required minimal visual, verbal and tactile cueing to facilitate activities of daily living skills, compensatory activities and hip kit training. Treatment/Session Assessment:     Response to Treatment:   Tolerated fair, asked for pain meds once mobile. Plans to go home tomorrow    Education:  [] Home Exercises  [x] Fall Precautions  [x] Hip Precautions [] Going Home Video  [] Knee/Hip Prosthesis Review  [x] Walker Management/Safety [x] Adaptive Equipment as Needed       Interdisciplinary Collaboration:   o Occupational Therapist  o Registered Nurse    After treatment position/precautions:   o Up in chair  o Bed/Chair-wheels locked  o Bed in low position  o Call light within reach  o RN notified     Compliance with Program/Exercises: compliant all of the time. Recommendations/Intent for next treatment session:  Treatment next visit will focus on increasing Mr. Taryn Sanchez independence with bed mobility, transfers, self care, functional mobility, modalities for pain, and patient education.       Total Treatment Duration:30  OT Patient Time In/Time Out  Time In: 8560  Time Out: 114 Emilee Paula, OT

## 2018-09-05 NOTE — PROGRESS NOTES
Problem: Mobility Impaired (Adult and Pediatric)  Goal: *Acute Goals and Plan of Care (Insert Text)  GOALS (1-4 days):  (1.)Mr. Betty Royal will move from supine to sit and sit to supine  in bed with STAND BY ASSIST.    (2.)Mr. Betty Royal will transfer from bed to chair and chair to bed with STAND BY ASSIST using the least restrictive device. (3.)Mr. Betty Royal will ambulate with STAND BY ASSIST for 200 feet with the least restrictive device. (4.)Mr. Betty Royal will ambulate up/down 3 steps with bilateral  railing with CONTACT GUARD ASSIST with no device. (5.)Mr. Betty oRyal will state/observe BRADLEY precautions with 0 verbal cues. ________________________________________________________________________________________________      PHYSICAL THERAPY Joint camp [place]: Daily Note, PM 9/5/2018  INPATIENT: Hospital Day: 2  [place] / Product Type: Medicare /      NAME/AGE/GENDER: Hillary Gonzalez is a 76 y.o. male   PRIMARY DIAGNOSIS:  Unilateral osteoarthritis of hip, right [M16.11]   Procedure(s) and Anesthesia Type:     * RIGHT HIP ARTHROPLASTY TOTAL/DEPUY - Spinal (Right)  ICD-10: Treatment Diagnosis:    · Pain in Right Hip (M25.551)  · Stiffness of Right Hip, Not elsewhere classified (M25.651)  · Difficulty in walking, Not elsewhere classified (R26.2)      ASSESSMENT:     Mr. Betty Royal presents with decreased strength and range of motion right lower extremity and with decreased independence with functional mobility s/p right BRADLEY. He will benefit from skilled PT interventions to maximize independence with functional mobility and BRADLEY exercises. He was up in chair on contact. He was uncomfortable in chair so helped him lay back down. Before he got in the bed we worked on [employer] exercises with verbal cues and manual cues. Pt left in bed with needs in reach and spouse at bedside. Hope to progress in the morning.    He walk to the gym and back using RW with CGA, while in the gym he performs exercises without any problems. This section established at most recent assessment   PROBLEM LIST (Impairments causing functional limitations):  1. Decreased Strength  2. Decreased ADL/Functional Activities  3. Decreased Transfer Abilities  4. Decreased Ambulation Ability/Technique  5. Increased Pain  6. Decreased Flexibility/Joint Mobility  7. Edema/Girth  8. Decreased Knowledge of Precautions  9. Decreased [place] with Home Exercise Program   INTERVENTIONS PLANNED: (Benefits and precautions of physical therapy have been discussed with the patient.)  1. Bed Mobility  2. Cold  3. Gait Training  4. Home Exercise Program (HEP)  5. Therapeutic Exercise/Strengthening  6. Transfer Training  7. Range of Motion: active/assisted/passive  8. Therapeutic Activities  9. Group Therapy     TREATMENT PLAN: Frequency/Duration: Follow patient BID for duration of hospital stay to address above goals. Rehabilitation Potential For Stated Goals: Good     RECOMMENDED REHABILITATION/EQUIPMENT: (at time of discharge pending progress): Continue Skilled Therapy and Home Health: Physical Therapy. HISTORY:   History of Present Injury/Illness (Reason for Referral):  Pt s/p right ACACIA on 9/4/18  Past Medical History/Comorbidities:   Mr. 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 He also has no past medical history of Adverse effect of anesthesia; Aneurysm ([address] Arrhythmia; Asthma; Autoimmune disease ([address] CAD (coronary artery disease); Chronic kidney disease; Chronic obstructive pulmonary disease ([address] Coagulation defects; Coagulation disorder ([address] COPD; Diabetes ([address] Difficult intubation; Endocarditis; Heart failure ([address]  Ill-defined condition; Liver disease; Malignant hyperthermia due to anesthesia; Morbid obesity ([facility] Nicotine vapor product user; Non-nicotine vapor product user; Pseudocholinesterase deficiency; Psychiatric disorder; PUD (peptic ulcer disease); Rheumatic fever; Seizures ([facility] Stroke [facility]); Thromboembolus ([facility] Thyroid disease; or Unspecified sleep apnea. Mr. Bernadette Retana  has a past surgical history that includes hx gi (2010); pr total hip arthroplasty (Left, 2004); hx shoulder arthroscopy (Right, 2015); hx knee arthroscopy (Right); pr total knee arthroplasty (Right, 2012); hx other surgical; hx cholecystectomy (2015); and hx appendectomy. Social History/Living Environment:   Home Environment: Private residence  # Steps to Enter: 1  Rails to Enter: No  One/Two Story Residence: One story  Living Alone: No  Support Systems: Family member(s), Spouse/Significant Other/Partner  Patient Expects to be Discharged to[de-identified] Private residence  Current DME Used/Available at Home: Commode, bedside, Walker, rolling  Tub or Shower Type: Shower  Prior Level of Function/Work/Activity:  Pt living at home, independent with gait with a walker, needed some assist with his ADLs   Number of Personal Factors/Comorbidities that affect the Plan of Care: 0: LOW COMPLEXITY   EXAMINATION:   Most Recent Physical Functioning:                            Bed Mobility  Sit to Supine: Minimum assistance    Transfers  Sit to Stand: Contact guard assistance  Stand to Sit: Contact guard assistance                   Weight Bearing Status  Right Side Weight Bearing: As tolerated  Distance (ft): 164 Feet (ft)  Ambulation - Level of Assistance: Contact guard assistance  Assistive Device: Walker, rolling  Speed/Jaz: Pace decreased (<100 feet/min)  Step Length: Left shortened  Stance: Right decreased  Gait Abnormalities: Antalgic;Decreased step clearance        Braces/Orthotics: none    Right Hip Cold  Type: Cold/ice packs      Body Structures Involved:  1. Joints  2.  Muscles Body Functions Affected:  1. Movement Related Activities and Participation Affected:  1. Mobility  2. Self Care   Number of elements that affect the Plan of Care: 4+: HIGH COMPLEXITY   CLINICAL PRESENTATION:   Presentation: Stable and uncomplicated: LOW COMPLEXITY   CLINICAL DECISION MAKIN [facility] [address] [address] AM-PAC 6 Clicks   Basic Mobility Inpatient Short Form  How much difficulty does the patient currently have. .. Unable A Lot A Little None   1. Turning over in bed (including adjusting bedclothes, sheets and blankets)? [] 1   [x] 2   [] 3   [] 4   2. Sitting down on and standing up from a chair with arms ( e.g., wheelchair, bedside commode, etc.)   [] 1   [] 2   [x] 3   [] 4   3. Moving from lying on back to sitting on the side of the bed? [] 1   [] 2   [x] 3   [] 4   How much help from another person does the patient currently need. .. Total A Lot A Little None   4. Moving to and from a bed to a chair (including a wheelchair)? [] 1   [] 2   [x] 3   [] 4   5. Need to walk in hospital room? [] 1   [] 2   [x] 3   [] 4   6. Climbing 3-5 steps with a railing? [] 1   [x] 2   [] 3   [] 4   © , Trustees of [address] [facility] [address] [address], under license to [employer]. All rights reserved        Score:  Initial: 16 Most Recent: X (Date: -- )    Interpretation of Tool:  Represents activities that are increasingly more difficult (i.e. Bed mobility, Transfers, Gait). Score 24 23 22-20 19-15 14-10 9-7 6     Modifier CH CI CJ CK CL CM CN      ?  Mobility - Walking and Moving Around:     - CURRENT STATUS: CK - 40%-59% impaired, limited or restricted    - GOAL STATUS: CI - 1%-19% impaired, limited or restricted    - D/C STATUS:  ---------------To be determined---------------  [place] / Product Type: Medicare /      Medical Necessity:     · Patient is expected to demonstrate progress in strength, range of motion and functional technique to decrease assistance required with functional mobility and ACACIA exercises. Reason for Services/Other Comments:  · Patient continues to require skilled intervention due to inability to complete functional mobility and ACACIA exercises independently. Use of outcome tool(s) and clinical judgement create a POC that gives a: Clear prediction of patient's progress: LOW COMPLEXITY            TREATMENT:   (In addition to Assessment/Re-Assessment sessions the following treatments were rendered)     Pre-treatment Symptoms/Complaints:  none  Pain Initial:   Pain Intensity 1: 0  Post Session:       Therapeutic Exercise: (45 Minutes (group therapy)):  Exercises per grid below to improve mobility and strength. Required minimal verbal and manual cues to promote proper body alignment and promote proper body posture. Progressed repetitions as indicated. Gait Training (15 Minutes):  Gait training to improve and/or restore physical functioning as related to mobility. Ambulated 164 Feet (ft) with Contact guard assistance using a Walker, rolling and minimal   related to their hip position and motion to promote proper body alignment. Date:  9/4/18 Date:  9/5   Date:     ACTIVITY/EXERCISE AM PM AM PM AM PM   GROUP THERAPY  []  []  []  [x]  []  []   Ankle Pumps  10 15 15     Quad Sets  10 15 15     Gluteal Sets  10 15 15     Hip ABd/ADduction  10 15 15     Straight Leg Raises         Knee Slides  10 15 15     Short Arc Quads   15 15     Long Arc Quads    15     Chair Slides                  B = bilateral; AA = active assistive; A = active; P = passive      Treatment/Session Assessment:     Response to Treatment:  Tolerated group therapy well.      Education:  [] Home Exercises  [x] Fall Precautions  [x] Hip Precautions [] D/C Instruction Review  [] Hip Prosthesis Review  [] Walker Management/Safety [] Adaptive Equipment as Needed       Interdisciplinary Collaboration:   o Registered Nurse    After treatment position/precautions:   o Supine in bed  o Bed/Chair-wheels locked  o Call light within reach  o Family at bedside    Compliance with Program/Exercises: compliant all of the time. Recommendations/Intent for next treatment session:  Treatment next visit will focus on increasing Mr. Vaishnavi Bland independence with bed mobility, transfers, gait training, strength/ROM exercises, modalities for pain, and patient education.       Total Treatment Duration:  PT Patient Time In/Time Out  Time In: 1300  Time Out: 920 Rianna Fierro, PTA

## 2018-09-05 NOTE — PROGRESS NOTES
Problem: Mobility Impaired (Adult and Pediatric)  Goal: *Acute Goals and Plan of Care (Insert Text)  GOALS (1-4 days):  (1.)Mr. Caridad Garza will move from supine to sit and sit to supine  in bed with STAND BY ASSIST.    (2.)Mr. Caridad Garza will transfer from bed to chair and chair to bed with STAND BY ASSIST using the least restrictive device. (3.)Mr. Caridad Garza will ambulate with STAND BY ASSIST for 200 feet with the least restrictive device. (4.)Mr. Caridad Garza will ambulate up/down 3 steps with bilateral  railing with CONTACT GUARD ASSIST with no device. (5.)Mr. Caridad Garza will state/observe ACACIA precautions with 0 verbal cues. ________________________________________________________________________________________________      PHYSICAL THERAPY Joint camp [place]: Daily Note, AM 9/5/2018  INPATIENT: Hospital Day: 2  [place]: [place] / Product Type: Medicare /      NAME/AGE/GENDER: Ese Castellon is a 76 y.o. male   PRIMARY DIAGNOSIS:  Unilateral osteoarthritis of hip, right [M16.11]   Procedure(s) and Anesthesia Type:     * RIGHT HIP ARTHROPLASTY TOTAL/DEPUY - Spinal (Right)  ICD-10: Treatment Diagnosis:    · Pain in Right Hip (M25.551)  · Stiffness of Right Hip, Not elsewhere classified (M25.651)  · Difficulty in walking, Not elsewhere classified (R26.2)      ASSESSMENT:     Mr. Caridad Garza presents with decreased strength and range of motion right lower extremity and with decreased independence with functional mobility s/p right ACACIA. He will benefit from skilled PT interventions to maximize independence with functional mobility and ACACIA exercises. He was up in chair on contact. He was uncomfortable in chair so helped him lay back down. Before he got in the bed we worked on [employer] exercises with verbal cues and manual cues. Pt left in bed with needs in reach and spouse at bedside. Hope to progress in the morning.    He is supine upon arrival.  He performs exercises in the bed without any problems. He increase his distance using RW with Min A and no LOB. He will sit up for awhile and then come to group this afternoon. This section established at most recent assessment   PROBLEM LIST (Impairments causing functional limitations):  1. Decreased Strength  2. Decreased ADL/Functional Activities  3. Decreased Transfer Abilities  4. Decreased Ambulation Ability/Technique  5. Increased Pain  6. Decreased Flexibility/Joint Mobility  7. Edema/Girth  8. Decreased Knowledge of Precautions  9. Decreased [place] with Home Exercise Program   INTERVENTIONS PLANNED: (Benefits and precautions of physical therapy have been discussed with the patient.)  1. Bed Mobility  2. Cold  3. Gait Training  4. Home Exercise Program (HEP)  5. Therapeutic Exercise/Strengthening  6. Transfer Training  7. Range of Motion: active/assisted/passive  8. Therapeutic Activities  9. Group Therapy     TREATMENT PLAN: Frequency/Duration: Follow patient BID for duration of hospital stay to address above goals. Rehabilitation Potential For Stated Goals: Good     RECOMMENDED REHABILITATION/EQUIPMENT: (at time of discharge pending progress): Continue Skilled Therapy and Home Health: Physical Therapy. HISTORY:   History of Present Injury/Illness (Reason for Referral):  Pt s/p right ACACIA on 9/4/18  Past Medical History/Comorbidities:   Mr. 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 He also has no past medical history of Adverse effect of anesthesia; Aneurysm ([address] Arrhythmia; Asthma; Autoimmune disease ([address] CAD (coronary artery disease); Chronic kidney disease; Chronic obstructive pulmonary disease ([address]  Coagulation defects; Coagulation disorder ([address] COPD; Diabetes ([address] Difficult intubation; Endocarditis; Heart failure ([facility] Ill-defined condition; Liver disease; Malignant hyperthermia due to anesthesia; Morbid obesity ([facility] Nicotine vapor product user; Non-nicotine vapor product user; Pseudocholinesterase deficiency; Psychiatric disorder; PUD (peptic ulcer disease); Rheumatic fever; Seizures ([facility] Stroke [facility]); Thromboembolus ([facility] Thyroid disease; or Unspecified sleep apnea. Mr. Corey Raya  has a past surgical history that includes hx gi (2010); pr total hip arthroplasty (Left, 2004); hx shoulder arthroscopy (Right, 2015); hx knee arthroscopy (Right); pr total knee arthroplasty (Right, 2012); hx other surgical; hx cholecystectomy (2015); and hx appendectomy.   Social History/Living Environment:   Home Environment: Private residence  # Steps to Enter: 1  Rails to Enter: No  One/Two Story Residence: One story  Living Alone: No  Support Systems: Family member(s), Spouse/Significant Other/Partner  Patient Expects to be Discharged to[de-identified] Private residence  Current DME Used/Available at Home: Commode, bedside, Walker, rolling  Tub or Shower Type: Shower  Prior Level of Function/Work/Activity:  Pt living at home, independent with gait with a walker, needed some assist with his ADLs   Number of Personal Factors/Comorbidities that affect the Plan of Care: 0: LOW COMPLEXITY   EXAMINATION:   Most Recent Physical Functioning:                            Bed Mobility  Sit to Supine: Minimum assistance    Transfers  Sit to Stand: Minimum assistance  Stand to Sit: Minimum assistance                   Weight Bearing Status  Right Side Weight Bearing: As tolerated  Distance (ft): 40 Feet (ft)  Ambulation - Level of Assistance: Minimal assistance  Assistive Device: Walker, rolling  Speed/Jaz: Pace decreased (<100 feet/min)  Step Length: Left shortened  Stance: Right decreased  Gait Abnormalities: Antalgic;Decreased step clearance        Braces/Orthotics: none    Right Hip Cold  Type: Cold/ice packs      Body Structures Involved:  1. Joints  2. Muscles Body Functions Affected:  1. Movement Related Activities and Participation Affected:  1. Mobility  2. Self Care   Number of elements that affect the Plan of Care: 4+: HIGH COMPLEXITY   CLINICAL PRESENTATION:   Presentation: Stable and uncomplicated: LOW COMPLEXITY   CLINICAL DECISION MAKIN[address] [place] [address] AM-PAC 6 Clicks   Basic Mobility Inpatient Short Form  How much difficulty does the patient currently have. .. Unable A Lot A Little None   1. Turning over in bed (including adjusting bedclothes, sheets and blankets)? [] 1   [x] 2   [] 3   [] 4   2. Sitting down on and standing up from a chair with arms ( e.g., wheelchair, bedside commode, etc.)   [] 1   [] 2   [x] 3   [] 4   3. Moving from lying on back to sitting on the side of the bed? [] 1   [] 2   [x] 3   [] 4   How much help from another person does the patient currently need. .. Total A Lot A Little None   4. Moving to and from a bed to a chair (including a wheelchair)? [] 1   [] 2   [x] 3   [] 4   5. Need to walk in hospital room? [] 1   [] 2   [x] 3   [] 4   6. Climbing 3-5 steps with a railing? [] 1   [x] 2   [] 3   [] 4   © , Trustees of [address] [place] [address], under license to [employer]. All rights reserved        Score:  Initial: 16 Most Recent: X (Date: -- )    Interpretation of Tool:  Represents activities that are increasingly more difficult (i.e. Bed mobility, Transfers, Gait). Score 24 23 22-20 19-15 14-10 9-7 6     Modifier CH CI CJ CK CL CM CN      ?  Mobility - Walking and Moving Around:     - CURRENT STATUS: CK - 40%-59% impaired, limited or restricted    - GOAL STATUS: CI - 1%-19% impaired, limited or restricted    - D/C STATUS:  ---------------To be determined---------------  [place] / Product Type: Medicare /      Medical Necessity:     · Patient is expected to demonstrate progress in strength, range of motion and functional technique to decrease assistance required with functional mobility and ACACIA exercises. Reason for Services/Other Comments:  · Patient continues to require skilled intervention due to inability to complete functional mobility and ACACIA exercises independently. Use of outcome tool(s) and clinical judgement create a POC that gives a: Clear prediction of patient's progress: LOW COMPLEXITY            TREATMENT:   (In addition to Assessment/Re-Assessment sessions the following treatments were rendered)     Pre-treatment Symptoms/Complaints:  none  Pain Initial:   Pain Intensity 1: 0 (0/10 after therapy)  Post Session:       Therapeutic Exercise: (15 Minutes):  Exercises per grid below to improve mobility and strength. Required minimal verbal and manual cues to promote proper body alignment and promote proper body posture. Progressed repetitions as indicated. Gait Training (15 Minutes):  Gait training to improve and/or restore physical functioning as related to mobility. Ambulated 40 Feet (ft) with Minimal assistance using a Walker, rolling and minimal   related to their hip position and motion to promote proper body alignment.      Date:  9/4/18 Date:  9/5   Date:     ACTIVITY/EXERCISE AM PM AM PM AM PM   GROUP THERAPY  []  []  []  []  []  []   Ankle Pumps  10 15      Quad Sets  10 15      Gluteal Sets  10 15      Hip ABd/ADduction  10 15      Straight Leg Raises         Knee Slides  10 15      Short Arc Quads   15      Long Arc Quads         Chair Slides                  B = bilateral; AA = active assistive; A = active; P = passive      Treatment/Session Assessment:     Response to Treatment:  Tolerated session well    Education:  [] Home Exercises  [x] Fall Precautions  [x] Hip Precautions [] D/C Instruction Review  [] Hip Prosthesis Review  [] Walker Management/Safety [] Adaptive Equipment as Needed       Interdisciplinary Collaboration:   o Registered Nurse    After treatment position/precautions:   o Supine in bed  o Bed/Chair-wheels locked  o Call light within reach  o Family at bedside    Compliance with Program/Exercises: compliant all of the time. Recommendations/Intent for next treatment session:  Treatment next visit will focus on increasing Mr. Marilee Mix independence with bed mobility, transfers, gait training, strength/ROM exercises, modalities for pain, and patient education.       Total Treatment Duration:  PT Patient Time In/Time Out  Time In: 0815  Time Out: 0845    Aleksandra Fierro, PTA

## 2018-09-05 NOTE — PROGRESS NOTES
Pt is sitting in bed watching tv alert and oriented x3. No NV deficits noted, pt is able to dorsi/plantar flex bilaterally and has +2 pedal pulses and sensation in RLE and LLE. Dressing to right hip is clean, dry, and intact. Pain is controlled at this time. Bed is low and locked, call light in reach, IS at bedside and pt demonstrated use.  No needs stated at this time

## 2018-09-06 VITALS
OXYGEN SATURATION: 95 % | SYSTOLIC BLOOD PRESSURE: 125 MMHG | HEIGHT: 66 IN | WEIGHT: 177.03 LBS | RESPIRATION RATE: 18 BRPM | DIASTOLIC BLOOD PRESSURE: 68 MMHG | BODY MASS INDEX: 28.45 KG/M2 | HEART RATE: 81 BPM | TEMPERATURE: 98.7 F

## 2018-09-06 PROCEDURE — 74011250637 HC RX REV CODE- 250/637: Performed by: ORTHOPAEDIC SURGERY

## 2018-09-06 PROCEDURE — 97535 SELF CARE MNGMENT TRAINING: CPT

## 2018-09-06 PROCEDURE — 97116 GAIT TRAINING THERAPY: CPT

## 2018-09-06 PROCEDURE — 97150 GROUP THERAPEUTIC PROCEDURES: CPT

## 2018-09-06 PROCEDURE — 74011250637 HC RX REV CODE- 250/637: Performed by: PHYSICIAN ASSISTANT

## 2018-09-06 RX ADMIN — ACETAMINOPHEN 1000 MG: 500 TABLET, FILM COATED ORAL at 06:22

## 2018-09-06 RX ADMIN — ASPIRIN 81 MG: 81 TABLET, DELAYED RELEASE ORAL at 08:08

## 2018-09-06 RX ADMIN — BENAZEPRIL HYDROCHLORIDE 10 MG: 10 TABLET ORAL at 08:08

## 2018-09-06 RX ADMIN — PANTOPRAZOLE SODIUM 40 MG: 40 TABLET, DELAYED RELEASE ORAL at 06:23

## 2018-09-06 RX ADMIN — SENNOSIDES AND DOCUSATE SODIUM 2 TABLET: 8.6; 5 TABLET ORAL at 08:08

## 2018-09-06 RX ADMIN — Medication 1 AMPULE: at 08:08

## 2018-09-06 NOTE — DISCHARGE INSTRUCTIONS
[facility]   Patient Discharge Instructions    Ida [place] / [id number] : 1940    Admitted 2018 Discharged: 2018     IF YOU HAVE ANY PROBLEMS ONCE YOU ARE AT HOME CALL THE FOLLOWING NUMBERS:   Main office number: [phone]    Take Home Medications     · It is important that you take the medication exactly as they are prescribed. · Keep your medication in the bottles provided by the pharmacist and keep a list of the medication names, dosages, and times to be taken in your wallet. · Do not take other medications without consulting your doctor. What to do at [address] your prehospital diet. If you have excessive nausea or vomitting call your doctor's office     Home Physical Therapy is arranged. Use rolling walker when walking. Patients who have had a joint replacement should not drive until you are seen for your follow up appointment by Dr. Yanet Guerrier. When to Call    - Call if you have a temperature greater then 101  - Unable to keep food down  - Loose control of your bladder or bowel function  - Are unable to bear any weight   - Need a pain medication refill       DISCHARGE SUMMARY from Nurse    The following personal items collected during your admission are returned to you:   Dental Appliance: Dental Appliances: None  Vision: Visual Aid: Glasses  Hearing Aid:   na  Jewelry: Jewelry: None  Clothing:   self  Other Valuables: Other Valuables: None (pt denied valuables)  Valuables sent to safe:   na    PATIENT INSTRUCTIONS:    After general anesthesia or intravenous sedation, for 24 hours or while taking prescription Narcotics:  · Limit your activities  · Do not drive and operate hazardous machinery  · Do not make important personal or business decisions  · Do  not drink alcoholic beverages  · If you have not urinated within 8 hours after discharge, please contact your surgeon on call.     Report the following to your surgeon:  · Excessive pain, swelling, redness or odor of or around the surgical area  · Temperature over 101  · Nausea and vomiting lasting longer than 4 hours or if unable to take medications  · Any signs of decreased circulation or nerve impairment to extremity: change in color, persistent  numbness, tingling, coldness or increase pain  · Follow hip precautions @ all times! · Any questions, call office @ [phone]      Keep scheduled follow up appointment. If need to change, call office @ [phone]. *  Please give a list of your current medications to your Primary Care Provider. *  Please update this list whenever your medications are discontinued, doses are      changed, or new medications (including over-the-counter products) are added. *  Please carry medication information at all times in case of emergency situations. Hip Replacement Surgery (Posterior): What to Expect at Home  Your Recovery  Hip replacement surgery replaces the worn parts of your hip joint. When you leave the hospital, you will probably be walking with crutches or a walker. You may be able to climb a few stairs and get in and out of bed and chairs. But you will need someone to help you at home for the next few weeks or until you have more energy and can move around better. If there is no one to help you at home, you may go to a rehabilitation center or long-term care center. You will go home with a bandage and stitches, staples, tissue glue, or tape strips. You can remove the bandage when your doctor tells you to. If you have stitches or staples, your doctor will remove them 10 days to 3 weeks after your surgery. Glue or tape strips will fall off on their own over time. You may still have some mild pain, and the area may be swollen for 3 to 4 months after surgery. Your doctor will give you medicine for the pain.   You will continue the rehabilitation program (rehab) you started in the hospital. The better you do with your rehab exercises, the sooner you will get your strength and movement back. Most people are able to return to work 4 weeks to 4 months after surgery. This care sheet gives you a general idea about how long it will take for you to recover. But each person recovers at a different pace. Follow the steps below to get better as quickly as possible. How can you care for yourself at home? Activity    · Your doctor may not want your affected leg to cross the center of your body toward the other leg. If so, your therapist may suggest these ideas:  ¨ Do not cross your legs. ¨ Be very careful as you get in or out of bed or a car, so your leg does not cross that imaginary line in the middle of your body.     · Rest when you feel tired. You may take a nap, but do not stay in bed all day.     · Work with your physical therapist to learn the best way to exercise. You may be able to take frequent, short walks using crutches or a walker. You will probably have to use crutches or a walker for at least 4 to 6 weeks.     · Your doctor may advise you to stay away from activities that put stress on the joint. This includes sports such as tennis, football, and jogging.     · Try not to sit for too long at one time. You will feel less stiff if you take a short walk about every hour. When you sit, use chairs with arms, and do not sit in low chairs.     · Do not bend over more than 90 degrees (like the angle in a letter \"L\").     · Sleep on your back with your legs slightly apart or on your side with a pillow between your knees for about 6 weeks or as your doctor tells you. Do not sleep on your stomach or affected leg.     · Ask your doctor when you can drive again.     · Most people are able to return to work 4 weeks to 4 months after surgery.     · Ask your doctor when it is okay for you to have sex. Diet    · By the time you leave the hospital, you will probably be eating your normal diet.  If your stomach is upset, try bland, low-fat foods like plain rice, broiled chicken, toast, and yogurt. Your doctor may recommend that you take iron and vitamin supplements.     · Drink plenty of fluids (unless your doctor tells you not to).   · Eat healthy foods, and watch your portion sizes. Try to stay at your ideal weight. Too much weight puts more stress on your new hip joint.     · You may notice that your bowel movements are not regular right after your surgery. This is common. Try to avoid constipation and straining with bowel movements. You may want to take a fiber supplement every day. If you have not had a bowel movement after a couple of days, ask your doctor about taking a mild laxative. Medicines    · Your doctor will tell you if and when you can restart your medicines. He or she will also give you instructions about taking any new medicines.     · If you take blood thinners, such as warfarin (Coumadin), clopidogrel (Plavix), or aspirin, be sure to talk to your doctor. He or she will tell you if and when to start taking those medicines again. Make sure that you understand exactly what your doctor wants you to do.     · Your doctor may give you a blood-thinning medicine to prevent blood clots. If you take a blood thinner, be sure you get instructions about how to take your medicine safely. Blood thinners can cause serious bleeding problems. This medicine could be in pill form or as a shot (injection). If a shot is necessary, your doctor will tell you how to do this.     · Be safe with medicines. Take pain medicines exactly as directed. ¨ If the doctor gave you a prescription medicine for pain, take it as prescribed. ¨ If you are not taking a prescription pain medicine, ask your doctor if you can take an over-the-counter medicine.     · If you think your pain medicine is making you sick to your stomach:  ¨ Take your medicine after meals (unless your doctor has told you not to).   ¨ Ask your doctor for a different pain medicine.     · If your doctor prescribed antibiotics, take them as directed. Do not stop taking them just because you feel better. You need to take the full course of antibiotics. Incision care    · If your doctor told you how to care for your cut (incision), follow your doctor's instructions. You will have a dressing over the cut. A dressing helps the incision heal and protects it. Your doctor will tell you how to take care of this.     · If you did not get instructions, follow this general advice:  ¨ If you have strips of tape on the cut the doctor made, leave the tape on for a week or until it falls off. ¨ If you have stitches or staples, your doctor will tell you when to come back to have them removed. ¨ If you have skin adhesive on the cut, leave it on until it falls off. Skin adhesive is also called glue or liquid stitches. ¨ Change the bandage every day. ¨ Wash the area daily with warm water, and pat it dry. Don't use hydrogen peroxide or alcohol. They can slow healing. ¨ You may cover the area with a gauze bandage if it oozes fluid or rubs against clothing. ¨ You may shower 24 to 48 hours after surgery. Pat the incision dry. Don't swim or take a bath for the first 2 weeks, or until your doctor tells you it is okay. Exercise    · Your rehab program will include a number of exercises to do. Always do them as your therapist tells you. Ice and elevation    · For pain, put ice or a cold pack on the area for 10 to 20 minutes at a time. Put a thin cloth between the ice and your skin.     · Your ankle may swell for about 3 months. Prop up your ankle when you ice it or anytime you sit or lie down. Try to keep it above the level of your heart. This will help reduce swelling. Other instructions   Continue to wear your support stockings as your doctor says. These help to prevent blood clots. The length of time that you will have to wear them depends on your activity level and the amount of swelling you have.  Most people wear these stockings for 4 to 6 weeks after surgery.   Preventing falls is also very important. To prevent falls:    · Arrange furniture so that you will not trip on it.     · Get rid of throw rugs, and move electrical cords out of the way.     · Walk only in areas with plenty of light.     · Put grab bars in showers and bathtubs.     · Avoid icy or snowy sidewalks.     · Wear shoes with sturdy, flat soles. Follow-up care is a key part of your treatment and safety. Be sure to make and go to all appointments, and call your doctor if you are having problems. It's also a good idea to know your test results and keep a list of the medicines you take. When should you call for help? Call 911 anytime you think you may need emergency care. For example, call if:    · You passed out (lost consciousness).     · You have severe trouble breathing.     · You have sudden chest pain and shortness of breath, or you cough up blood.    Call your doctor now or seek immediate medical care if:    · You have signs that your hip may be dislocated, including:  ¨ Severe pain and not being able to stand. ¨ A crooked leg that looks like your hip is out of position. ¨ Not being able to bend or straighten your leg.     · Your leg or foot is cool or pale or changes color.     · You cannot feel or move your leg.     · You have signs of a blood clot, such as:  ¨ Pain in your calf, back of the knee, thigh, or groin. ¨ Redness and swelling in your leg or groin.     · Your incision comes open and begins to bleed, or the bleeding increases.     · You feel like your heart is racing or beating irregularly.     · You have signs of infection, such as:  ¨ Increased pain, swelling, warmth, or redness. ¨ Red streaks leading from the incision. ¨ Pus draining from the incision. ¨ A fever.    Watch closely for changes in your health, and be sure to contact your doctor if:    · You do not have a bowel movement after taking a laxative.     · You do not get better as expected.    Where can you learn more?  Go to http://cris-soledad.info/. Enter Z644 in the search box to learn more about \"Hip Replacement Surgery (Posterior): What to Expect at Home. \"  Current as of: November 29, 2017  Content Version: 11.7  © 6517-3618 [employer]. Care instructions adapted under license by [employer] (which disclaims liability or warranty for this information). If you have questions about a medical condition or this instruction, always ask your healthcare professional. [address] any warranty or liability for your use of this information. These are general instructions for a healthy lifestyle:    No smoking/ No tobacco products/ Avoid exposure to second hand smoke    Surgeon General's Warning:  Quitting smoking now greatly reduces serious risk to your health. Obesity, smoking, and sedentary lifestyle greatly increases your risk for illness    A healthy diet, regular physical exercise & weight monitoring are important for maintaining a healthy lifestyle    You may be retaining fluid if you have a history of heart failure or if you experience any of the following symptoms:  Weight gain of 3 pounds or more overnight or 5 pounds in a week, increased swelling in our hands or feet or shortness of breath while lying flat in bed. Please call your doctor as soon as you notice any of these symptoms; do not wait until your next office visit. Recognize signs and symptoms of STROKE:    F-face looks uneven    A-arms unable to move or move even    S-speech slurred or non-existent    T-time-call 911 as soon as signs and symptoms begin-DO NOT go       Back to bed or wait to see if you get better-TIME IS BRAIN. The discharge information has been reviewed with the patient. The patient verbalized understanding. Information obtained by :  I understand that if any problems occur once I am at home I am to contact my physician.     I understand and acknowledge receipt of the instructions indicated above.                                                                                                                                            Physician's or R.N.'s Signature                                                                  Date/Time                                                                                                                                              Patient or Representative Signature                                                          Date/Time

## 2018-09-06 NOTE — PROGRESS NOTES
Alert and Oriented x3. Denies pain. No NV deficits noted. Surgical bandage clean, dry and intact. Patient in the chair eating breakfast. Call light within reach.

## 2018-09-06 NOTE — PROGRESS NOTES
Problem: Self Care Deficits Care Plan (Adult)  Goal: *Acute Goals and Plan of Care (Insert Text)  GOALS:   DISCHARGE GOALS (in preparation for going home/rehab):  3 days  1. Mr. Sonja Zarate will perform one lower body dressing activity with minimal assistance with adaptive equipment to demonstrate improved functional mobility and safety. GOAL MET 9/6/2018    2. Mr. Sonja Zarate will perform one lower body bathing activity with minimal  assistance with adaptive equipment to demonstrate improved functional mobility and safety. GOAL MET 9/6/2018    3. Mr. Sonja Zarate will perform toileting/toilet transfer with contact guard assistance with adaptive equipment to demonstrate improved functional mobility and safety. GOAL MET 9/6/2018    4. Mr. Sonja Zarate will perform shower transfer with contact guard assistance with adaptive equipment to demonstrate improved functional mobility and safety. GOAL MET 9/6/2018    5. Mr. Sonja Zarate will state ACACIA precautions with two verbal cues to demonstrate improved functional mobility and safety. GOAL MET 9/6/2018    Additional goals 9/5/18  6. Mr. Sonja Zarate will perform lower body dressing activity with SBA  with adaptive equipment to demonstrate improved functional mobility and safety. progressing    7. Mr. Sonja Zarate will perform lower body bathing activity with SBA with adaptive equipment to demonstrate improved functional mobility and safety. 8.  Mr. Sonja Zarate will perform toileting/toilet transfer with SBA with adaptive equipment to demonstrate improved functional mobility and safety. 9.  Mr. Sonja Zarate will perform shower transfer with SBA with adaptive equipment to demonstrate improved functional mobility and safety.          [facility]AMP OCCUPATIONAL THERAPY ACACIA: Daily Note and AM 9/6/2018  INPATIENT: Hospital Day: 3  [place] / Product Type: Medicare /      NAME/AGE/GENDER: Cruz Wilson is a 76 y.o. male   PRIMARY DIAGNOSIS: Unilateral osteoarthritis of hip, right [M16.11]   Procedure(s) and Anesthesia Type:     * RIGHT HIP ARTHROPLASTY TOTAL/DEPUY - Spinal (Right)  ICD-10: Treatment Diagnosis:    · Pain in Right Hip (M25.551)  · Stiffness of Right Hip, Not elsewhere classified (M25.651)      ASSESSMENT:      Mr. Cecilio Cox is s/p right ACACIA and presents with decreased weight bearing on right LE and decreased independence with functional mobility and activities of daily living. Patient completed shower and dressing as charter below in ADL grid and is ambulating with rolling walker and CGA assist.  Patient has met 5/5 goals and plans to return home with good family support. Additional goals added. OT to see tomorrow for ADL session to address the new goals. OT reviewed hip precautions throughout session and issued long handled sponge for home use. Patient instructed to call for assistance when needing to get up from recliner and all needs in reach. Patient verbalized understanding of call light. 9/6/18 Patient up in recliner, dressed. He declined shower. He was trained in use of his own sock aide and ambulated in the hallway with SBA. He ambulated to the bathroom and brushed his teeth at the sink with Sup/SBA then returned to recliner. He expressed no concerns for discharge. He plans to go home today with wife to assist. Advised patient to call for assistance when getting up from recliner. This section established at most recent assessment   PROBLEM LIST (Impairments causing functional limitations):  1. Decreased Strength  2. Decreased ADL/Functional Activities  3. Decreased Transfer Abilities  4. Increased Pain  5. Increased Fatigue  6. Decreased Flexibility/Joint Mobility  7. Decreased Knowledge of Precautions   INTERVENTIONS PLANNED: (Benefits and precautions of occupational therapy have been discussed with the patient.)  1. Activities of daily living training  2. Adaptive equipment training  3.  Balance training  4. Clothing management  5. Donning&doffing training  6. Theraputic activity     TREATMENT PLAN: Frequency/Duration: Follow patient 1-2 times to address above goals. Rehabilitation Potential For Stated Goals: Good     RECOMMENDED REHABILITATION/EQUIPMENT: (at time of discharge pending progress): Continue Skilled Therapy and Home Health: Physical Therapy. OCCUPATIONAL PROFILE AND HISTORY:   History of Present Injury/Illness (Reason for Referral): Pt presents this date s/p (right) ACACIA. Past Medical History/Comorbidities:   Mr. 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 He also has no past medical history of Adverse effect of anesthesia; Aneurysm ([address] Arrhythmia; Asthma; Autoimmune disease ([address] CAD (coronary artery disease); Chronic kidney disease; Chronic obstructive pulmonary disease ([address] Coagulation defects; Coagulation disorder ([address] COPD; Diabetes ([address] Difficult intubation; Endocarditis; Heart failure ([address] Ill-defined condition; Liver disease; Malignant hyperthermia due to anesthesia; Morbid obesity ([address] Nicotine vapor product user; Non-nicotine vapor product user; Pseudocholinesterase deficiency; Psychiatric disorder; PUD (peptic ulcer disease); Rheumatic fever; Seizures ([address] Stroke [facility]); Thromboembolus ([address] Thyroid disease; or Unspecified sleep apnea. Mr. Todd Brown  has a past surgical history that includes hx gi (2010); pr total hip arthroplasty (Left, 2004); hx shoulder arthroscopy (Right, 2015); hx knee arthroscopy (Right); pr total knee arthroplasty (Right, 2012); hx other surgical; hx cholecystectomy (2015); and hx appendectomy.   Social History/Living Environment:   Home Environment: Private residence  # Steps to Enter: 1  Rails to Enter: No  One/Two Story Residence: One story  Living Alone: No  Support Systems: Family member(s)  Patient Expects to be Discharged to[de-identified] Private residence  Current DME Used/Available at Home: Commode, bedside, Walker, rolling  Tub or Shower Type: Shower  Prior Level of Function/Work/Activity:  Assistance with self care from wife, used a walker     Number of Personal Factors/Comorbidities that affect the Plan of Care: Brief history (0):  LOW COMPLEXITY   ASSESSMENT OF OCCUPATIONAL PERFORMANCE[de-identified]   Most Recent Physical Functioning:   Balance  Sitting: Intact  Standing: Without support                              Mental Status  Neurologic State: Alert; Appropriate for age  Orientation Level: Appropriate for age  Cognition: Appropriate decision making; Appropriate for age attention/concentration; Appropriate safety awareness; Follows commands  Perception: Appears intact  Perseveration: No perseveration noted  Safety/Judgement: Awareness of environment; Fall prevention                Basic ADLs (From Assessment) Complex ADLs (From Assessment)   Basic ADL  Feeding: Independent  Oral Facial Hygiene/Grooming: Supervision  Bathing: Moderate assistance  Type of Bath: Patient refused  Upper Body Dressing: Supervision  Lower Body Dressing: Moderate assistance  Toileting: Minimum assistance     Grooming/Bathing/Dressing Activities of Daily Living   Grooming  Grooming Assistance: Supervision/set up;Stand-by assistance  Brushing Teeth: Supervision/set-up; Stand-by assistance Cognitive Retraining  Safety/Judgement: Awareness of environment; Fall prevention                 Functional Transfers  Bathroom Mobility: Supervision/set up;Stand-by assistance   Lower Body Dressing Assistance  Socks: Stand-by assistance  Adaptive Equipment Used: Sock aid Bed/Mat Mobility  Sit to Stand: Supervision;Stand-by assistance         Physical Skills Involved:  1. Range of Motion  2. Balance  3.  Strength Cognitive Skills Affected (resulting in the inability to perform in a timely and safe manner): 1. none Psychosocial Skills Affected:  1. Environmental Adaptation   Number of elements that affect the Plan of Care: 3-5:  MODERATE COMPLEXITY   CLINICAL DECISION MAKING:   [employer] AM-PAC 6 Clicks   Daily Activity Inpatient Short Form  How much help from another person does the patient currently need. .. Total A Lot A Little None   1. Putting on and taking off regular lower body clothing? [] 1   [x] 2   [] 3   [] 4   2. Bathing (including washing, rinsing, drying)? [] 1   [x] 2   [] 3   [] 4   3. Toileting, which includes using toilet, bedpan or urinal?   [] 1   [] 2   [x] 3   [] 4   4. Putting on and taking off regular upper body clothing? [] 1   [] 2   [] 3   [x] 4   5. Taking care of personal grooming such as brushing teeth? [] 1   [] 2   [] 3   [x] 4   6. Eating meals? [] 1   [] 2   [] 3   [x] 4   © 2007, Trustees of [facility] [employer], under license to [employer]. All rights reserved     Score:  Initial: 19 Most Recent: X (Date: -- )    Interpretation of Tool:  Represents activities that are increasingly more difficult (i.e. Bed mobility, Transfers, Gait). Score 24 23 22-20 19-15 14-10 9-7 6     Modifier CH CI CJ CK CL CM CN      ? Self Care:     - CURRENT STATUS: CK - 40%-59% impaired, limited or restricted    - GOAL STATUS: CJ - 20%-39% impaired, limited or restricted    - D/C STATUS:  ---------------To be determined---------------  [place] / Product Type: Medicare /      Medical Necessity:     · Patient is expected to demonstrate progress in range of motion, balance and functional technique to increase independence with self care. Reason for Services/Other Comments:  · Patient would benefit from skilled Occupational Therapy to maximize independence and safety with self-care task and functional mobility. .   Use of outcome tool(s) and clinical judgement create a POC that gives a: LOW COMPLEXITY            TREATMENT:   (In addition to Assessment/Re-Assessment sessions the following treatments were rendered)     Pre-treatment Symptoms/Complaints:  Pt without complaint of pain  Pain: Initial:   Pain Intensity 1: 2  Pain Location 1: Hip  Pain Orientation 1: Right  Pain Intervention(s) 1: Ambulation/Increased Activity  Post Session:  2/10 at rest,     Self Care: (13): Procedure(s) (per grid) utilized to improve and/or restore self-care/home management as related to dressing and grooming. Required minimal visual and verbal cueing to facilitate activities of daily living skills, compensatory activities and sock aide training. Treatment/Session Assessment:     Response to Treatment:   Tolerated well, declined shower, ambulated better in the hallway, going home today. Education:  [] Home Exercises  [x] Fall Precautions  [x] Hip Precautions [] Going Home Video  [] Knee/Hip Prosthesis Review  [x] Walker Management/Safety [x] Adaptive Equipment as Needed       Interdisciplinary Collaboration:   o Occupational Therapist  o Registered Nurse    After treatment position/precautions:   o Up in chair  o Bed/Chair-wheels locked  o Bed in low position  o Call light within reach  o RN notified     Compliance with Program/Exercises: compliant all of the time. Recommendations/Intent for next treatment session:  Treatment next visit will focus on increasing Mr. Trujillo  independence with bed mobility, transfers, self care, functional mobility, modalities for pain, and patient education.       Total Treatment [id number]  OT Patient Time In/Time Out  Time In: 0846  Time Out: [address], OT

## 2018-09-06 NOTE — PROGRESS NOTES
2018         Post Op day: 2 Days Post-Op   Admit Diagnosis: Unilateral osteoarthritis of hip, right [M16.11]  LAB:    No results found for this or any previous visit (from the past 24 hour(s)). Vital Signs:    Patient Vitals for the past 8 hrs:   BP Temp Pulse Resp SpO2 Weight   18 0421 148/77 98.2 °F (36.8 °C) 99 18 - -   18 0025 144/77 98.5 °F (36.9 °C) 95 18 95 % -   18 2356 - - - - - 80.3 kg (177 lb 0.5 oz)     Temp (24hrs), Av.2 °F (36.8 °C), Min:97.7 °F (36.5 °C), Max:98.5 °F (36.9 °C)    Pain Control:   Pain Assessment  Pain Scale 1: Numeric (0 - 10)  Pain Intensity 1: 0  Pain Onset 1:  at rest  Pain Location 1: Hip  Pain Orientation 1: Right  Pain Description 1: Aching, Constant, Dull, Sharp  Pain Intervention(s) 1: Medication (see MAR)  Subjective: Doing well, pain is well controlled, no complaints     Objective:  No Acute Distress, Alert and Oriented, right hip dressing is C/D/I. Posterior thigh and calves are soft, NT. 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 ASA/SCDs for DVT prophylaxis. Plan to d/c to home with home health today.    Signed By: NANCY Salazar

## 2018-09-06 NOTE — PROGRESS NOTES
Problem: Mobility Impaired (Adult and Pediatric)  Goal: *Acute Goals and Plan of Care (Insert Text)  GOALS (1-4 days):  (1.)Mr. Althea Henriquez will move from supine to sit and sit to supine  in bed with STAND BY ASSIST.    (2.)Mr. Althea Henriquez will transfer from bed to chair and chair to bed with STAND BY ASSIST using the least restrictive device. (3.)Mr. Althea Henriquez will ambulate with STAND BY ASSIST for 200 feet with the least restrictive device. (4.)Mr. Althea Henriquez will ambulate up/down 3 steps with bilateral  railing with CONTACT GUARD ASSIST with no device. (5.)Mr. Althea Henriquez will state/observe BRADLEY precautions with 0 verbal cues. ________________________________________________________________________________________________      PHYSICAL THERAPY [facility] [place]: Daily Note, AM 9/6/2018  INPATIENT: Hospital Day: 3  [place] / Product Type: Medicare /      NAME/AGE/GENDER: Liana Tripp is a 76 y.o. male   PRIMARY DIAGNOSIS:  Unilateral osteoarthritis of hip, right [M16.11]   Procedure(s) and Anesthesia Type:     * RIGHT HIP ARTHROPLASTY TOTAL/DEPUY - Spinal (Right)  ICD-10: Treatment Diagnosis:    · Pain in Right Hip (M25.551)  · Stiffness of Right Hip, Not elsewhere classified (M25.651)  · Difficulty in walking, Not elsewhere classified (R26.2)      ASSESSMENT:     Mr. Althea Henriquez up in chair on contact and agreeable to therapy. Worked on gait training in the [place]jada. In gym worked on [employer] exercises with verbal cues progressing with repetitions. Reviewed HEP, frequency and use of ice. Pt verbalizes understanding. He walked back to his room with ice in place and needs in reach. He plans to go home today with home health PT to follow up. This section established at most recent assessment   PROBLEM LIST (Impairments causing functional limitations):  1. Decreased Strength  2. Decreased ADL/Functional Activities  3. Decreased Transfer Abilities  4.  Decreased Ambulation Ability/Technique  5. Increased Pain  6. Decreased Flexibility/Joint Mobility  7. Edema/Girth  8. Decreased Knowledge of Precautions  9. Decreased [place] with Home Exercise Program   INTERVENTIONS PLANNED: (Benefits and precautions of physical therapy have been discussed with the patient.)  1. Bed Mobility  2. Cold  3. Gait Training  4. Home Exercise Program (HEP)  5. Therapeutic Exercise/Strengthening  6. Transfer Training  7. Range of Motion: active/assisted/passive  8. Therapeutic Activities  9. Group Therapy     TREATMENT PLAN: Frequency/Duration: Follow patient BID for duration of hospital stay to address above goals. Rehabilitation Potential For Stated Goals: Good     RECOMMENDED REHABILITATION/EQUIPMENT: (at time of discharge pending progress): Continue Skilled Therapy and Home Health: Physical Therapy. HISTORY:   History of Present Injury/Illness (Reason for Referral):  Pt s/p right ACACIA on 9/4/18  Past Medical History/Comorbidities:   Mr. 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 He also has no past medical history of Adverse effect of anesthesia; Aneurysm ([address] Arrhythmia; Asthma; Autoimmune disease ([address] CAD (coronary artery disease); Chronic kidney disease; Chronic obstructive pulmonary disease ([address] Coagulation defects; Coagulation disorder ([address] COPD; Diabetes ([address] Difficult intubation; Endocarditis; Heart failure ([address] Ill-defined condition; Liver disease; Malignant hyperthermia due to anesthesia; Morbid obesity ([address] Nicotine vapor product user; Non-nicotine vapor product user; Pseudocholinesterase deficiency; Psychiatric disorder; PUD (peptic ulcer disease); Rheumatic fever; Seizures ([address] Stroke [facility]);  Thromboembolus ([address] Thyroid disease; or Unspecified sleep apnea. Mr. Alexis Burk  has a past surgical history that includes hx gi (2010); pr total hip arthroplasty (Left, 2004); hx shoulder arthroscopy (Right, 2015); hx knee arthroscopy (Right); pr total knee arthroplasty (Right, 2012); hx other surgical; hx cholecystectomy (2015); and hx appendectomy. Social History/Living Environment:   Home Environment: Private residence  # Steps to Enter: 1  Rails to Enter: No  One/Two Story Residence: One story  Living Alone: No  Support Systems: Family member(s)  Patient Expects to be Discharged to[de-identified] Private residence  Current DME Used/Available at Home: Commode, bedside, Walker, rolling  Tub or Shower Type: Shower  Prior Level of Function/Work/Activity:  Pt living at home, independent with gait with a walker, needed some assist with his ADLs   Number of Personal Factors/Comorbidities that affect the Plan of Care: 0: LOW COMPLEXITY   EXAMINATION:   Most Recent Physical Functioning:                                 Transfers  Sit to Stand: Supervision;Stand-by assistance  Stand to Sit: Supervision;Stand-by assistance    Balance  Sitting: Intact  Standing: Intact; With support         Gait Training: Yes    Weight Bearing Status  Right Side Weight Bearing: As tolerated  Distance (ft): 164 Feet (ft)  Ambulation - Level of Assistance: Supervision  Assistive Device: Walker, rolling  Speed/Jaz: Pace decreased (<100 feet/min)  Step Length: Left shortened  Stance: Right decreased  Gait Abnormalities: Antalgic  Interventions: Verbal cues     Braces/Orthotics: none    Right Hip Cold  Type: Cold/ice packs      Body Structures Involved:  1. Joints  2. Muscles Body Functions Affected:  1. Movement Related Activities and Participation Affected:  1. Mobility  2.  Self Care   Number of elements that affect the Plan of Care: 4+: HIGH COMPLEXITY   CLINICAL PRESENTATION:   Presentation: Stable and uncomplicated: LOW COMPLEXITY   CLINICAL DECISION MAKING:   [employer] AM-PAC 6 Clicks   Basic Mobility Inpatient Short Form  How much difficulty does the patient currently have. .. Unable A Lot A Little None   1. Turning over in bed (including adjusting bedclothes, sheets and blankets)? [] 1   [x] 2   [] 3   [] 4   2. Sitting down on and standing up from a chair with arms ( e.g., wheelchair, bedside commode, etc.)   [] 1   [] 2   [x] 3   [] 4   3. Moving from lying on back to sitting on the side of the bed? [] 1   [] 2   [x] 3   [] 4   How much help from another person does the patient currently need. .. Total A Lot A Little None   4. Moving to and from a bed to a chair (including a wheelchair)? [] 1   [] 2   [x] 3   [] 4   5. Need to walk in hospital room? [] 1   [] 2   [x] 3   [] 4   6. Climbing 3-5 steps with a railing? [] 1   [x] 2   [] 3   [] 4   © 2007, Trustees of [facility] [employer], under license to [employer]. All rights reserved        Score:  Initial: 16 Most Recent: X (Date: -- )    Interpretation of Tool:  Represents activities that are increasingly more difficult (i.e. Bed mobility, Transfers, Gait). Score 24 23 22-20 19-15 14-10 9-7 6     Modifier CH CI CJ CK CL CM CN      ? Mobility - Walking and Moving Around:     - CURRENT STATUS: CK - 40%-59% impaired, limited or restricted    - GOAL STATUS: CI - 1%-19% impaired, limited or restricted    - D/C STATUS:  ---------------To be determined---------------  [place] / Product Type: Medicare /      Medical Necessity:     · Patient is expected to demonstrate progress in strength, range of motion and functional technique to decrease assistance required with functional mobility and ACACIA exercises. Reason for Services/Other Comments:  · Patient continues to require skilled intervention due to inability to complete functional mobility and ACACIA exercises independently.    Use of outcome tool(s) and clinical judgement create a POC that gives a: Clear prediction of patient's progress: LOW COMPLEXITY            TREATMENT:   (In addition to Assessment/Re-Assessment sessions the following treatments were rendered)     Pre-treatment Symptoms/Complaints:  none  Pain Initial:   Pain Intensity 1: 2  Post Session:  No reports of pain     Therapeutic Exercise: (15 Minutes (group)):  Exercises per grid below to improve mobility and strength. Required minimal verbal and manual cues to promote proper body alignment and promote proper body posture. Progressed repetitions as indicated. Gait Training (15 Minutes):  Gait training to improve and/or restore physical functioning as related to mobility. Ambulated 164 Feet (ft) with Supervision using a Walker, rolling and minimal Verbal cues related to their hip position and motion to promote proper body alignment. Date:  9/4/18 Date:  9/5   Date:  9/6/18   ACTIVITY/EXERCISE AM PM AM PM AM PM   GROUP THERAPY  []  []  []  [x]  [x]  []   Ankle Pumps  10 15 15 20    Quad Sets  10 15 15 20    Gluteal Sets  10 15 15 20    Hip ABd/ADduction  10 15 15 20    Straight Leg Raises         Knee Slides  10 15 15 20    Short Arc Quads   15 15 20    Long Arc Quads    15 20    Chair Slides                  B = bilateral; AA = active assistive; A = active; P = passive      Treatment/Session Assessment:     Response to Treatment:  [pt doing well states he is ready to go home    Education:  [x] Home Exercises  [x] Fall Precautions  [x] Hip Precautions [x] D/C Instruction Review  [] Hip Prosthesis Review  [x] Walker Management/Safety [] Adaptive Equipment as Needed       Interdisciplinary Collaboration:   o Registered Nurse  o Rehabilitation Attendant    After treatment position/precautions:   o Up in chair  o Bed/Chair-wheels locked  o Call light within reach    Compliance with Program/Exercises: compliant all of the time.     Recommendations/Intent for next treatment session:  Treatment next visit will focus on increasing Mr. Akhil Shearer independence with bed mobility, transfers, gait training, strength/ROM exercises, modalities for pain, and patient education.       Total Treatment Duration:  PT Patient Time In/Time Out  Time In: 1030  Time Out: RICHARD Jackson

## 2018-09-06 NOTE — PROGRESS NOTES
Patient resting in bed watching tv, alert and oriented, no distress noted. Right hip dressing c/d/i, voiding clear yellow urine, ambulates with walker. Neurovascular and peripheral vascular checks WNL. Bed low and locked position. Fresh ice placed on hip. Call light within reach. Patient instructed to call for assistance, verbalizes understanding. Nursing assessment complete.

## 2018-09-07 ENCOUNTER — HOME CARE VISIT (OUTPATIENT)
Dept: SCHEDULING | Facility: HOME HEALTH | Age: 78
End: 2018-09-07
Payer: MEDICARE

## 2018-09-07 VITALS
DIASTOLIC BLOOD PRESSURE: 80 MMHG | RESPIRATION RATE: 20 BRPM | SYSTOLIC BLOOD PRESSURE: 140 MMHG | HEART RATE: 64 BPM | TEMPERATURE: 98 F

## 2018-09-07 PROCEDURE — 3331090002 HH PPS REVENUE DEBIT

## 2018-09-07 PROCEDURE — 400013 HH SOC

## 2018-09-07 PROCEDURE — 3331090001 HH PPS REVENUE CREDIT

## 2018-09-07 PROCEDURE — G0151 HHCP-SERV OF PT,EA 15 MIN: HCPCS

## 2018-09-08 PROCEDURE — 3331090001 HH PPS REVENUE CREDIT

## 2018-09-08 PROCEDURE — 3331090002 HH PPS REVENUE DEBIT

## 2018-09-09 PROCEDURE — 3331090002 HH PPS REVENUE DEBIT

## 2018-09-09 PROCEDURE — 3331090001 HH PPS REVENUE CREDIT

## 2018-09-10 PROCEDURE — 3331090002 HH PPS REVENUE DEBIT

## 2018-09-10 PROCEDURE — 3331090001 HH PPS REVENUE CREDIT

## 2018-09-11 ENCOUNTER — HOME CARE VISIT (OUTPATIENT)
Dept: SCHEDULING | Facility: HOME HEALTH | Age: 78
End: 2018-09-11
Payer: MEDICARE

## 2018-09-11 VITALS — DIASTOLIC BLOOD PRESSURE: 58 MMHG | SYSTOLIC BLOOD PRESSURE: 82 MMHG | HEART RATE: 93 BPM | TEMPERATURE: 96 F

## 2018-09-11 PROCEDURE — 3331090002 HH PPS REVENUE DEBIT

## 2018-09-11 PROCEDURE — 3331090001 HH PPS REVENUE CREDIT

## 2018-09-11 PROCEDURE — G0157 HHC PT ASSISTANT EA 15: HCPCS

## 2018-09-12 ENCOUNTER — HOME CARE VISIT (OUTPATIENT)
Dept: HOME HEALTH SERVICES | Facility: HOME HEALTH | Age: 78
End: 2018-09-12
Payer: MEDICARE

## 2018-09-12 ENCOUNTER — HOME CARE VISIT (OUTPATIENT)
Dept: SCHEDULING | Facility: HOME HEALTH | Age: 78
End: 2018-09-12
Payer: MEDICARE

## 2018-09-12 VITALS
HEART RATE: 83 BPM | TEMPERATURE: 96.6 F | DIASTOLIC BLOOD PRESSURE: 68 MMHG | RESPIRATION RATE: 16 BRPM | SYSTOLIC BLOOD PRESSURE: 92 MMHG

## 2018-09-12 PROCEDURE — G0151 HHCP-SERV OF PT,EA 15 MIN: HCPCS

## 2018-09-12 PROCEDURE — 3331090002 HH PPS REVENUE DEBIT

## 2018-09-12 PROCEDURE — 3331090001 HH PPS REVENUE CREDIT

## 2018-09-13 PROCEDURE — 3331090001 HH PPS REVENUE CREDIT

## 2018-09-13 PROCEDURE — 3331090002 HH PPS REVENUE DEBIT

## 2018-09-14 ENCOUNTER — HOME CARE VISIT (OUTPATIENT)
Dept: SCHEDULING | Facility: HOME HEALTH | Age: 78
End: 2018-09-14
Payer: MEDICARE

## 2018-09-14 VITALS
DIASTOLIC BLOOD PRESSURE: 80 MMHG | SYSTOLIC BLOOD PRESSURE: 125 MMHG | TEMPERATURE: 97 F | HEART RATE: 89 BPM | RESPIRATION RATE: 16 BRPM

## 2018-09-14 PROCEDURE — 3331090002 HH PPS REVENUE DEBIT

## 2018-09-14 PROCEDURE — 3331090001 HH PPS REVENUE CREDIT

## 2018-09-14 PROCEDURE — G0151 HHCP-SERV OF PT,EA 15 MIN: HCPCS

## 2018-09-14 PROCEDURE — A4649 SURGICAL SUPPLIES: HCPCS

## 2018-09-15 PROCEDURE — 3331090002 HH PPS REVENUE DEBIT

## 2018-09-15 PROCEDURE — 3331090001 HH PPS REVENUE CREDIT

## 2018-09-16 PROCEDURE — 3331090001 HH PPS REVENUE CREDIT

## 2018-09-16 PROCEDURE — 3331090002 HH PPS REVENUE DEBIT

## 2018-09-17 ENCOUNTER — HOME CARE VISIT (OUTPATIENT)
Dept: HOME HEALTH SERVICES | Facility: HOME HEALTH | Age: 78
End: 2018-09-17
Payer: MEDICARE

## 2018-09-17 PROCEDURE — 3331090001 HH PPS REVENUE CREDIT

## 2018-09-17 PROCEDURE — 3331090002 HH PPS REVENUE DEBIT

## 2018-09-18 ENCOUNTER — HOME CARE VISIT (OUTPATIENT)
Dept: HOME HEALTH SERVICES | Facility: HOME HEALTH | Age: 78
End: 2018-09-18
Payer: MEDICARE

## 2018-09-18 ENCOUNTER — APPOINTMENT (OUTPATIENT)
Dept: CT IMAGING | Age: 78
DRG: 175 | End: 2018-09-18
Payer: MEDICARE

## 2018-09-18 ENCOUNTER — APPOINTMENT (OUTPATIENT)
Dept: ULTRASOUND IMAGING | Age: 78
DRG: 175 | End: 2018-09-18
Attending: HOSPITALIST
Payer: MEDICARE

## 2018-09-18 ENCOUNTER — HOME CARE VISIT (OUTPATIENT)
Dept: SCHEDULING | Facility: HOME HEALTH | Age: 78
End: 2018-09-18
Payer: MEDICARE

## 2018-09-18 ENCOUNTER — APPOINTMENT (OUTPATIENT)
Dept: GENERAL RADIOLOGY | Age: 78
DRG: 175 | End: 2018-09-18
Payer: MEDICARE

## 2018-09-18 ENCOUNTER — HOSPITAL ENCOUNTER (INPATIENT)
Age: 78
LOS: 1 days | Discharge: HOME HEALTH | DRG: 175 | End: 2018-09-19
Admitting: HOSPITALIST
Payer: MEDICARE

## 2018-09-18 DIAGNOSIS — M16.11 ARTHRITIS OF RIGHT HIP: ICD-10-CM

## 2018-09-18 DIAGNOSIS — I26.99 OTHER ACUTE PULMONARY EMBOLISM WITHOUT ACUTE COR PULMONALE (HCC): Primary | ICD-10-CM

## 2018-09-18 PROBLEM — J96.01 ACUTE RESPIRATORY FAILURE WITH HYPOXEMIA (HCC): Status: ACTIVE | Noted: 2018-09-18

## 2018-09-18 LAB
ALBUMIN SERPL-MCNC: 2.9 G/DL (ref 3.2–4.6)
ALBUMIN/GLOB SERPL: 0.7 {RATIO}
ALP SERPL-CCNC: 129 U/L (ref 50–136)
ALT SERPL-CCNC: 27 U/L (ref 12–65)
ANION GAP SERPL CALC-SCNC: 14 MMOL/L
AST SERPL-CCNC: 32 U/L (ref 15–37)
BASOPHILS # BLD: 0.1 K/UL (ref 0–0.2)
BASOPHILS NFR BLD: 1 % (ref 0–2)
BILIRUB SERPL-MCNC: 0.5 MG/DL (ref 0.2–1.1)
BNP SERPL-MCNC: 71 PG/ML
BUN SERPL-MCNC: 13 MG/DL (ref 8–23)
CALCIUM SERPL-MCNC: 9.2 MG/DL (ref 8.3–10.4)
CHLORIDE SERPL-SCNC: 106 MMOL/L (ref 98–107)
CO2 SERPL-SCNC: 22 MMOL/L (ref 21–32)
CREAT SERPL-MCNC: 1.57 MG/DL (ref 0.8–1.5)
D DIMER PPP FEU-MCNC: 5.95 UG/ML(FEU)
DIFFERENTIAL METHOD BLD: ABNORMAL
EOSINOPHIL # BLD: 0.2 K/UL (ref 0–0.8)
EOSINOPHIL NFR BLD: 2 % (ref 0.5–7.8)
ERYTHROCYTE [DISTWIDTH] IN BLOOD BY AUTOMATED COUNT: 12.5 %
GLOBULIN SER CALC-MCNC: 4.1 G/DL (ref 2.3–3.5)
GLUCOSE SERPL-MCNC: 90 MG/DL (ref 65–100)
HCT VFR BLD AUTO: 33 % (ref 41.1–50.3)
HGB BLD-MCNC: 10.6 G/DL (ref 13.6–17.2)
IMM GRANULOCYTES # BLD: 0.1 K/UL (ref 0–0.5)
IMM GRANULOCYTES NFR BLD AUTO: 1 % (ref 0–5)
LYMPHOCYTES # BLD: 2.7 K/UL (ref 0.5–4.6)
LYMPHOCYTES NFR BLD: 23 % (ref 13–44)
MAGNESIUM SERPL-MCNC: 1.6 MG/DL (ref 1.8–2.4)
MCH RBC QN AUTO: 32.1 PG (ref 26.1–32.9)
MCHC RBC AUTO-ENTMCNC: 32.1 G/DL (ref 31.4–35)
MCV RBC AUTO: 100 FL (ref 79.6–97.8)
MONOCYTES # BLD: 1.8 K/UL (ref 0.1–1.3)
MONOCYTES NFR BLD: 16 % (ref 4–12)
NEUTS SEG # BLD: 6.7 K/UL (ref 1.7–8.2)
NEUTS SEG NFR BLD: 58 % (ref 43–78)
NRBC # BLD: 0 K/UL (ref 0–0.2)
PLATELET # BLD AUTO: 425 K/UL (ref 150–450)
PMV BLD AUTO: 9.1 FL (ref 9.4–12.3)
POTASSIUM SERPL-SCNC: 4.1 MMOL/L (ref 3.5–5.1)
PROT SERPL-MCNC: 7 G/DL
RBC # BLD AUTO: 3.3 M/UL (ref 4.23–5.6)
SODIUM SERPL-SCNC: 142 MMOL/L (ref 136–145)
WBC # BLD AUTO: 11.5 K/UL (ref 4.3–11.1)

## 2018-09-18 PROCEDURE — G0157 HHC PT ASSISTANT EA 15: HCPCS

## 2018-09-18 PROCEDURE — 85025 COMPLETE CBC W/AUTO DIFF WBC: CPT

## 2018-09-18 PROCEDURE — 83735 ASSAY OF MAGNESIUM: CPT

## 2018-09-18 PROCEDURE — 80053 COMPREHEN METABOLIC PANEL: CPT

## 2018-09-18 PROCEDURE — 74011000258 HC RX REV CODE- 258

## 2018-09-18 PROCEDURE — 74011636320 HC RX REV CODE- 636/320

## 2018-09-18 PROCEDURE — 74011250637 HC RX REV CODE- 250/637: Performed by: HOSPITALIST

## 2018-09-18 PROCEDURE — 99283 EMERGENCY DEPT VISIT LOW MDM: CPT

## 2018-09-18 PROCEDURE — 65270000029 HC RM PRIVATE

## 2018-09-18 PROCEDURE — 71046 X-RAY EXAM CHEST 2 VIEWS: CPT

## 2018-09-18 PROCEDURE — 71260 CT THORAX DX C+: CPT

## 2018-09-18 PROCEDURE — 3331090001 HH PPS REVENUE CREDIT

## 2018-09-18 PROCEDURE — 93970 EXTREMITY STUDY: CPT

## 2018-09-18 PROCEDURE — 3331090002 HH PPS REVENUE DEBIT

## 2018-09-18 PROCEDURE — 74011250636 HC RX REV CODE- 250/636: Performed by: HOSPITALIST

## 2018-09-18 PROCEDURE — 83880 ASSAY OF NATRIURETIC PEPTIDE: CPT

## 2018-09-18 PROCEDURE — 94760 N-INVAS EAR/PLS OXIMETRY 1: CPT

## 2018-09-18 PROCEDURE — 77030020263 HC SOL INJ SOD CL0.9% LFCR 1000ML

## 2018-09-18 PROCEDURE — 85379 FIBRIN DEGRADATION QUANT: CPT

## 2018-09-18 PROCEDURE — 74011250636 HC RX REV CODE- 250/636

## 2018-09-18 RX ORDER — SUCRALFATE 1 G/1
1 TABLET ORAL 3 TIMES DAILY
Status: DISCONTINUED | OUTPATIENT
Start: 2018-09-18 | End: 2018-09-19 | Stop reason: HOSPADM

## 2018-09-18 RX ORDER — ZOLPIDEM TARTRATE 5 MG/1
5 TABLET ORAL
Status: DISCONTINUED | OUTPATIENT
Start: 2018-09-18 | End: 2018-09-19 | Stop reason: HOSPADM

## 2018-09-18 RX ORDER — SODIUM CHLORIDE 0.9 % (FLUSH) 0.9 %
10 SYRINGE (ML) INJECTION
Status: COMPLETED | OUTPATIENT
Start: 2018-09-18 | End: 2018-09-18

## 2018-09-18 RX ORDER — AMOXICILLIN AND CLAVULANATE POTASSIUM 875; 125 MG/1; MG/1
1 TABLET, FILM COATED ORAL EVERY 12 HOURS
Status: DISCONTINUED | OUTPATIENT
Start: 2018-09-18 | End: 2018-09-18 | Stop reason: SDUPTHER

## 2018-09-18 RX ORDER — MAGNESIUM SULFATE HEPTAHYDRATE 40 MG/ML
2 INJECTION, SOLUTION INTRAVENOUS ONCE
Status: COMPLETED | OUTPATIENT
Start: 2018-09-18 | End: 2018-09-19

## 2018-09-18 RX ORDER — ONDANSETRON 2 MG/ML
4 INJECTION INTRAMUSCULAR; INTRAVENOUS
Status: DISCONTINUED | OUTPATIENT
Start: 2018-09-18 | End: 2018-09-19 | Stop reason: HOSPADM

## 2018-09-18 RX ORDER — SODIUM CHLORIDE 9 MG/ML
125 INJECTION, SOLUTION INTRAVENOUS CONTINUOUS
Status: DISCONTINUED | OUTPATIENT
Start: 2018-09-18 | End: 2018-09-19 | Stop reason: HOSPADM

## 2018-09-18 RX ORDER — FLUTICASONE PROPIONATE 50 MCG
2 SPRAY, SUSPENSION (ML) NASAL
Status: DISCONTINUED | OUTPATIENT
Start: 2018-09-18 | End: 2018-09-19 | Stop reason: HOSPADM

## 2018-09-18 RX ORDER — NALOXONE HYDROCHLORIDE 0.4 MG/ML
0.4 INJECTION, SOLUTION INTRAMUSCULAR; INTRAVENOUS; SUBCUTANEOUS AS NEEDED
Status: DISCONTINUED | OUTPATIENT
Start: 2018-09-18 | End: 2018-09-19 | Stop reason: HOSPADM

## 2018-09-18 RX ORDER — MAGNESIUM SULFATE 1 G/100ML
1 INJECTION INTRAVENOUS ONCE
Status: COMPLETED | OUTPATIENT
Start: 2018-09-18 | End: 2018-09-19

## 2018-09-18 RX ORDER — TRAMADOL HYDROCHLORIDE 50 MG/1
50 TABLET ORAL
Status: DISCONTINUED | OUTPATIENT
Start: 2018-09-18 | End: 2018-09-19 | Stop reason: HOSPADM

## 2018-09-18 RX ORDER — LEVOFLOXACIN 5 MG/ML
750 INJECTION, SOLUTION INTRAVENOUS
Status: COMPLETED | OUTPATIENT
Start: 2018-09-18 | End: 2018-09-19

## 2018-09-18 RX ORDER — SODIUM CHLORIDE 0.9 % (FLUSH) 0.9 %
5-10 SYRINGE (ML) INJECTION EVERY 8 HOURS
Status: DISCONTINUED | OUTPATIENT
Start: 2018-09-18 | End: 2018-09-19 | Stop reason: HOSPADM

## 2018-09-18 RX ORDER — AMOXICILLIN AND CLAVULANATE POTASSIUM 875; 125 MG/1; MG/1
1 TABLET, FILM COATED ORAL EVERY 12 HOURS
Status: DISCONTINUED | OUTPATIENT
Start: 2018-09-18 | End: 2018-09-19 | Stop reason: HOSPADM

## 2018-09-18 RX ORDER — SIMVASTATIN 40 MG/1
40 TABLET, FILM COATED ORAL
Status: DISCONTINUED | OUTPATIENT
Start: 2018-09-18 | End: 2018-09-19 | Stop reason: HOSPADM

## 2018-09-18 RX ORDER — SODIUM CHLORIDE 0.9 % (FLUSH) 0.9 %
5-10 SYRINGE (ML) INJECTION AS NEEDED
Status: DISCONTINUED | OUTPATIENT
Start: 2018-09-18 | End: 2018-09-19 | Stop reason: HOSPADM

## 2018-09-18 RX ORDER — ACETAMINOPHEN 325 MG/1
650 TABLET ORAL
Status: DISCONTINUED | OUTPATIENT
Start: 2018-09-18 | End: 2018-09-19 | Stop reason: HOSPADM

## 2018-09-18 RX ORDER — ASPIRIN 81 MG/1
81 TABLET ORAL EVERY 12 HOURS
Status: DISCONTINUED | OUTPATIENT
Start: 2018-09-18 | End: 2018-09-19 | Stop reason: HOSPADM

## 2018-09-18 RX ORDER — ADHESIVE BANDAGE
30 BANDAGE TOPICAL DAILY PRN
Status: DISCONTINUED | OUTPATIENT
Start: 2018-09-18 | End: 2018-09-19 | Stop reason: HOSPADM

## 2018-09-18 RX ORDER — OXYCODONE HYDROCHLORIDE 5 MG/1
10 TABLET ORAL
Status: DISCONTINUED | OUTPATIENT
Start: 2018-09-18 | End: 2018-09-19 | Stop reason: HOSPADM

## 2018-09-18 RX ADMIN — SODIUM CHLORIDE 125 ML/HR: 900 INJECTION, SOLUTION INTRAVENOUS at 17:01

## 2018-09-18 RX ADMIN — AMOXICILLIN AND CLAVULANATE POTASSIUM 1 TABLET: 875; 125 TABLET, FILM COATED ORAL at 17:03

## 2018-09-18 RX ADMIN — MAGNESIUM SULFATE HEPTAHYDRATE 1 G: 1 INJECTION, SOLUTION INTRAVENOUS at 17:57

## 2018-09-18 RX ADMIN — SODIUM CHLORIDE 100 ML: 900 INJECTION, SOLUTION INTRAVENOUS at 14:05

## 2018-09-18 RX ADMIN — LEVOFLOXACIN 750 MG: 5 INJECTION, SOLUTION INTRAVENOUS at 17:03

## 2018-09-18 RX ADMIN — RIVAROXABAN 15 MG: 15 TABLET, FILM COATED ORAL at 17:03

## 2018-09-18 RX ADMIN — SIMVASTATIN 40 MG: 40 TABLET, FILM COATED ORAL at 21:52

## 2018-09-18 RX ADMIN — IOPAMIDOL 100 ML: 755 INJECTION, SOLUTION INTRAVENOUS at 14:05

## 2018-09-18 RX ADMIN — ASPIRIN 81 MG: 81 TABLET, COATED ORAL at 21:00

## 2018-09-18 RX ADMIN — FLUTICASONE PROPIONATE 2 SPRAY: 50 SPRAY, METERED NASAL at 22:00

## 2018-09-18 RX ADMIN — AMOXICILLIN AND CLAVULANATE POTASSIUM 1 TABLET: 875; 125 TABLET, FILM COATED ORAL at 21:52

## 2018-09-18 RX ADMIN — Medication 10 ML: at 14:05

## 2018-09-18 RX ADMIN — MAGNESIUM SULFATE IN WATER 2 G: 40 INJECTION, SOLUTION INTRAVENOUS at 18:10

## 2018-09-18 RX ADMIN — Medication 10 ML: at 18:02

## 2018-09-18 RX ADMIN — SUCRALFATE 1 G: 1 TABLET ORAL at 21:52

## 2018-09-18 NOTE — ED NOTES
Attempted to call report to ATTIL[facility] Resendiz, placed on hold again and then phone disconnected from floor's end. Will call again in a bit.

## 2018-09-18 NOTE — IP AVS SNAPSHOT
Sheila Sandoval 
 
 
 [address] 
[phone] Patient: Gio Sanchez MRN: [id number] :1940 About your hospitalization You were admitted on:  2018 You last received care in the:  [address] [place] [address] You were discharged on:  2018 Why you were hospitalized Your primary diagnosis was:  Not on File Your diagnoses also included:  Acute Pulmonary Embolism (Hcc), Acute Respiratory Failure With Hypoxemia (Hcc) Follow-up Information Follow up With Details Comments Contact Info Yessenia Mc MD On 2018 4:00pm [address] [facility] [address] [phone] Discharge Orders None A check brittany indicates which time of day the medication should be taken. My Medications START taking these medications Instructions Each Dose to Equal  
 Morning Noon Evening Bedtime  
 amoxicillin-clavulanate 875-125 mg per tablet Commonly known as:  AUGMENTIN Your last dose was:  908 am  Your next dose is: Tonight Take 1 Tab by mouth every twelve (12) hours for 5 days. 1 Tab * rivaroxaban 15 mg Tab tablet Commonly known as:  Tonye Waldron Your last dose was:  900 am  Your next dose is: This evening Take 1 Tab by mouth two (2) times daily (with meals) for 20 days. 15 mg  
    
  
   
   
  
   
  
 * rivaroxaban 20 mg Tab tablet Commonly known as:  Sharlette Waldron Start taking on:  10/10/2018 Your next dose is:  10/10/2018 Take 1 Tab by mouth daily for 30 days. 20 mg  
    
  
   
   
   
  
 * Notice: This list has 2 medication(s) that are the same as other medications prescribed for you. Read the directions carefully, and ask your doctor or other care provider to review them with you. CHANGE how you take these medications  Instructions Each Dose to Equal  
 Morning Noon Evening Bedtime  
 oxyCODONE IR 10 mg Tab immediate release tablet Commonly known as:  Umang Oropeza What changed:   
- when to take this 
- reasons to take this Your next dose is:  As needed Take 1 Tab by mouth every six (6) hours as needed for Pain (severe pain 8-10). Max Daily Amount: 40 mg.  
 10 mg CONTINUE taking these medications Instructions Each Dose to Equal  
 Morning Noon Evening Bedtime AMBIEN 10 mg tablet Generic drug:  zolpidem Take 10 mg by mouth nightly as needed for Sleep. Indications: SLEEP-ONSET INSOMNIA 10 mg  
    
   
   
   
  
 aspirin delayed-release 81 mg tablet Your last dose was:  9pm 9/18 Your next dose is:  9 pm tonight Take 1 Tab by mouth every twelve (12) hours every twelve (12) hours. 81 mg  
    
  
   
   
  
   
  
 CARAFATE 1 gram tablet Generic drug:  sucralfate Your last dose was:  9 am 9/19 Take 1 g by mouth three (3) times daily. Take / use AM day of surgery  per anesthesia protocols. 1 g  
    
  
   
  
   
  
   
  
 cetirizine 10 mg tablet Commonly known as:  ZYRTEC Your next dose is:  Resume home schedule Take 10 mg by mouth nightly. Indications: ALLERGIC RHINITIS 10 mg  
    
   
   
   
  
 FLONASE 50 mcg/actuation nasal spray Generic drug:  fluticasone Your last dose was: Last night 2 Sprays by Nasal route nightly. Indications: ALLERGIC RHINITIS 2 Spray  
    
   
   
   
  
 omeprazole 20 mg capsule Commonly known as:  PRILOSEC Take 20 mg by mouth Daily (before breakfast). Take DOS per anesthesia protocol. Indications: gastroesophageal reflux disease 20 mg  
    
   
   
   
  
 simvastatin 40 mg tablet Commonly known as:  ZOCOR Your last dose was: Last night Your next dose is: Tonight Take 40 mg by mouth nightly. 40 mg  
    
   
   
  
   
  
 traMADol 50 mg tablet Commonly known as:  Awaramona Robles  
 Your next dose is:  As needed Take 50 mg by mouth every six (6) hours as needed for Pain. 50 mg  
    
   
   
   
  
 VITAMIN B-12 1,000 mcg/mL injection Generic drug:  cyanocobalamin  
   
 1,000 mcg by IntraMUSCular route every twenty-eight (28) days. 1000 mcg STOP taking these medications   
 fluconazole 100 mg tablet Commonly known as:  DIFLUCAN  
   
  
 ondansetron 4 mg disintegrating tablet Commonly known as:  ZOFRAN ODT Where to Get Your Medications Information on where to get these meds will be given to you by the nurse or doctor. ! Ask your nurse or doctor about these medications  
  amoxicillin-clavulanate 875-125 mg per tablet  
 oxyCODONE IR 10 mg Tab immediate release tablet  
 rivaroxaban 15 mg Tab tablet  
 rivaroxaban 20 mg Tab tablet Opioid Education Prescription Opioids: What You Need to Know: 
 
 
After general anesthesia or intravenous sedation, for 24 hours or while taking prescription Narcotics: · Limit your activities · Do not drive and operate hazardous machinery · Do not make important personal or business decisions · Do  not drink alcoholic beverages · If you have not urinated within 8 hours after discharge, please contact your surgeon on call. Report the following to your surgeon: 
· Excessive pain, swelling, redness or odor of or around the surgical area · Temperature over 100.5 · Nausea and vomiting lasting longer than 4 hours or if unable to take medications · Any signs of decreased circulation or nerve impairment to extremity: change in color, persistent  numbness, tingling, coldness or increase pain · Any questions What to do at Home: 
Recommended activity: Activity as tolerated, resume home PT per ortho, follow up with Dr. Shannon Macdonald in 1 week, Dr. Courtney Dick in 2 weeks If you experience any of the following symptoms increase in shortness of breath or chest pain, swelling/redness/warmth/pain in leg, any other worrisome symptoms please follow up with PCP or ER. *  Please give a list of your current medications to your Primary Care Provider. *  Please update this list whenever your medications are discontinued, doses are 
    changed, or new medications (including over-the-counter products) are added. *  Please carry medication information at all times in case of emergency situations. These are general instructions for a healthy lifestyle: No smoking/ No tobacco products/ Avoid exposure to second hand smoke Surgeon General's Warning:  Quitting smoking now greatly reduces serious risk to your health. Obesity, smoking, and sedentary lifestyle greatly increases your risk for illness A healthy diet, regular physical exercise & weight monitoring are important for maintaining a healthy lifestyle You may be retaining fluid if you have a history of heart failure or if you experience any of the following symptoms:  Weight gain of 3 pounds or more overnight or 5 pounds in a week, increased swelling in our hands or feet or shortness of breath while lying flat in bed. Please call your doctor as soon as you notice any of these symptoms; do not wait until your next office visit. Recognize signs and symptoms of STROKE: 
 
F-face looks uneven A-arms unable to move or move unevenly S-speech slurred or non-existent T-time-call 911 as soon as signs and symptoms begin-DO NOT go Back to bed or wait to see if you get better-TIME IS BRAIN. Warning Signs of HEART ATTACK Call 911 if you have these symptoms: 
? Chest discomfort.  Most heart attacks involve discomfort in the center of the chest that lasts more than a few minutes, or that goes away and comes back. It can feel like uncomfortable pressure, squeezing, fullness, or pain. ? Discomfort in other areas of the upper body. Symptoms can include pain or discomfort in one or both arms, the back, neck, jaw, or stomach. ? Shortness of breath with or without chest discomfort. ? Other signs may include breaking out in a cold sweat, nausea, or lightheadedness. Don't wait more than five minutes to call [address]! Fast action can save your life. Calling 911 is almost always the fastest way to get lifesaving treatment. Emergency Medical Services staff can begin treatment when they arrive  up to an hour sooner than if someone gets to the hospital by car. The discharge information has been reviewed with the patient. The patient verbalized understanding. Discharge medications reviewed with the patient and appropriate educational materials and side effects teaching were provided. ___________________________________________________________________________________________________________________________________ Pulmonary Embolism: Care Instructions Your Care Instructions Pulmonary embolism is the sudden blockage of an artery in the lung. Blood clots in the deep veins of the leg or pelvis (deep vein thrombosis, or DVT) are the most common cause. These blood clots can travel to the lungs. Pulmonary embolism can be very serious. Because you have had one pulmonary embolism, you are at greater risk for having another one. But you can take steps to prevent another pulmonary embolism by following your doctor's instructions. You will probably take a prescription blood-thinning medicine to prevent blood clots. A blood thinner can stop a blood clot from growing larger and prevent new clots from forming. Follow-up care is a key part of your treatment and safety.  Be sure to make and go to all appointments, and call your doctor if you are having problems. It's also a good idea to know your test results and keep a list of the medicines you take. How can you care for yourself at home? · Take your medicines exactly as prescribed. Call your doctor if you think you are having a problem with your medicine. You will get more details on the specific medicines your doctor prescribes. · If you are taking a blood thinner, be sure you get instructions about how to take your medicine safely. Blood thinners can cause serious bleeding problems. Preventing future pulmonary embolisms · Exercise. Keep blood moving in your legs to keep clots from forming. If you are traveling by car, stop every hour or so. Get out and walk around for a few minutes. If you are traveling by bus, train, or plane, get out of your seat and walk up and down the aisles every hour or so. You also can do leg exercises while you are seated. Pump your feet up and down by pulling your toes up toward your knees then pointing them down. · Get up out of bed as soon as possible after an illness or surgery. · Do not smoke. If you need help quitting, talk to your doctor about stop-smoking programs and medicines. These can increase your chances of quitting for good. · Check with your doctor before taking hormone or birth control pills. These may increase your risk of blot clots. · Ask your doctor about wearing compression stockings to help prevent blood clots in your legs. There are different types of stockings, and they need to fit right. So your doctor will recommend what you need. When should you call for help? Call 911 anytime you think you may need emergency care. For example, call if: 
  · You have shortness of breath.  
  · You have chest pain.  
  · You passed out (lost consciousness).  
  · You cough up blood.  
 Call your doctor now or seek immediate medical care if:   · You have new or worsening pain or swelling in your leg.  
 Watch closely for changes in your health, and be sure to contact your doctor if: 
  · You do not get better as expected. Where can you learn more? Go to http://cris-soledad.info/. Enter Q776 in the search box to learn more about \"Pulmonary Embolism: Care Instructions. \" Current as of: November 21, 2017 Content Version: 11.7 © 8893-8573 [employer]. Care instructions adapted under license by [employer] (which disclaims liability or warranty for this information). If you have questions about a medical condition or this instruction, always ask your healthcare professional. [address] any warranty or liability for your use of this information. Rivaroxaban (By mouth) Rivaroxaban (egg-j-OHT-a-ban) Treats and prevents blood clots, which lowers the risk of stroke, deep vein thrombosis (DVT), pulmonary embolism (PE), and similar conditions. This medicine is a blood thinner. Brand Name(s): Xarelto, Xarelto Starter Pack There may be other brand names for this medicine. When This Medicine Should Not Be Used: This medicine is not right for everyone. Do not use it if you had an allergic reaction to rivaroxaban, or you have severe bleeding. How to Use This Medicine:  
Tablet · Take this medicine as directed, and take it at the same time each day. · 10-milligram (mg) tablet: Take with or without food. · 15-mg or 20-mg tablet: Take with food. · If you cannot swallow the tablets, you may crush the tablet and mix it with applesauce. Eat some food after you swallow the mixture. · Tube feeding: You may crush the tablet and mix the medicine in 50 milliliters (mL) of water before giving it via the tube. This must be followed by a feeding. · This medicine should come with a Medication Guide. Ask your pharmacist for a copy if you do not have one. · Missed dose: ¨ Ask your doctor or pharmacist if you are not sure what to do if you miss a dose. ¨ Once-daily dose: If you miss a dose or forget to use your medicine, use it as soon as you can on the same day. Do not use extra medicine to make up for a missed dose. ¨ Twice-daily dose to treat a blood clot (15-mg tablet): If you miss a dose or forget to use your medicine, use it as soon as you can on the same day. You may take 2 doses at the same time to make up for the missed dose. This is only for people who take a total of 30 mg per day. · Store the medicine in a closed container at room temperature, away from heat, moisture, and direct light. Drugs and Foods to Avoid: Ask your doctor or pharmacist before using any other medicine, including over-the-counter medicines, vitamins, and herbal products. · Some foods and medicines can affect how rivaroxaban works. Tell your doctor if you are using any of the following: ¨ NSAID medicine (including aspirin, celecoxib, diclofenac, ibuprofen, naproxen) ¨ Ketoconazole, itraconazole, lopinavir, ritonavir, indinavir, conivaptan, carbamazepine, phenytoin, rifampin, Butch's wort ¨ Another blood thinner (including clopidogrel, enoxaparin, heparin, warfarin) Warnings While Using This Medicine: · Tell your doctor if you are pregnant or breastfeeding, or if you have kidney disease, liver disease, bleeding problems, or an artificial heart valve. · This medicine may increase your risk of bleeding. Be careful to avoid injuries that could cause bleeding. Stay away from rough sports or other situations where you could be bruised, cut, or hurt. Brush and floss your teeth gently. Be careful when using sharp objects, including razors and fingernail clippers. Avoid picking your nose. If you need to blow your nose, blow it gently. · This medicine may cause nerve damage if you have a medical procedure done to your back, including anesthesia or a spinal puncture.  This is more likely to happen if you have a history of back injury, back surgery, problems with your spine, or procedures or punctures to your back. Tell your doctor if you are also taking another blood thinner, because this also increases the risk. · Do not stop using this medicine suddenly without asking your doctor. You might have a higher risk of stroke for a short time after you stop using this medicine. · Tell any doctor or dentist who treats you that you are using this medicine. · Your doctor will do lab tests at regular visits to check on the effects of this medicine. Keep all appointments. · Keep all medicine out of the reach of children. Never share your medicine with anyone. Possible Side Effects While Using This Medicine:  
Call your doctor right away if you notice any of these side effects: · Allergic reaction: Itching or hives, swelling in your face or hands, swelling or tingling in your mouth or throat, chest tightness, trouble breathing · Blistering, peeling, or red skin rash · Decrease in how much or how often you urinate · Heavy menstrual bleeding, or vaginal bleeding · Red or brown urine, bloody or black, tarry stools · Unusual bleeding or bruising, including frequent nosebleeds · Vomiting blood or material that looks like coffee grounds If you notice other side effects that you think are caused by this medicine, tell your doctor. Call your doctor for medical advice about side effects. You may report side effects to FDA at 1-228-FDA-9847 © 2017 [facility] Information is for End User's use only and may not be sold, redistributed or otherwise used for commercial purposes. The above information is an  only. It is not intended as medical advice for individual conditions or treatments. Talk to your doctor, nurse or pharmacist before following any medical regimen to see if it is safe and effective for you. MyChart Announcement We are excited to announce that we are making your provider's discharge notes available to you in HRsoft. You will see these notes when they are completed and signed by the physician that discharged you from your recent hospital stay. If you have any questions or concerns about any information you see in HRsoft, please call the Health Information Department where you were seen or reach out to your Primary Care Provider for more information about your plan of care. Introducing [facility]! Jackie Berkowitz introduces HRsoft patient portal. Now you can access parts of your medical record, email your doctor's office, and request medication refills online. 1. In your internet browser, go to [email or web address] 2. Click on the First Time User? Click Here link in the Sign In box. You will see the New Member Sign Up page. 3. Enter your HRsoft Access Code exactly as it appears below. You will not need to use this code after youve completed the sign-up process. If you do not sign up before the expiration date, you must request a new code. · HRsoft Access Code: [email or web address] Expires: 10/24/2018  6:53 AM 
 
4. Enter the last four digits of your Social Security Number (xxxx) and Date of Birth (mm/dd/yyyy) as indicated and click Submit. You will be taken to the next sign-up page. 5. Create a HRsoft ID. This will be your HRsoft login ID and cannot be changed, so think of one that is secure and easy to remember. 6. Create a HRsoft password. You can change your password at any time. 7. Enter your Password Reset Question and Answer. This can be used at a later time if you forget your password. 8. Enter your e-mail address. You will receive e-mail notification when new information is available in [address] 9. Click Sign Up. You can now view and download portions of your medical record.  
10. Click the Download Summary menu link to download a portable copy of your medical information. If you have questions, please visit the Frequently Asked Questions section of the Daily Aislehart website. Remember, [employer] Run[employer] is NOT to be used for urgent needs. For medical emergencies, dial 911. Now available from your iPhone and Android! Introducing Deo Gustafson As a [facility] patient, I wanted to make you aware of our electronic visit tool called Deo BrunsonBadSeed. [facility] 24/7 allows you to connect within minutes with a medical provider 24 hours a day, seven days a week via a mobile device or tablet or logging into a secure website from your computer. You can access Deo Gustafson from anywhere in the [place]. A virtual visit might be right for you when you have a simple condition and feel like you just dont want to get out of bed, or cant get away from work for an appointment, when your regular [facility] provider is not available (evenings, weekends or holidays), or when youre out of town and need minor care. Electronic visits cost only $49 and if the [facility]perto [facility] 24/7 provider determines a prescription is needed to treat your condition, one can be electronically transmitted to a nearby pharmacy*. Please take a moment to enroll today if you have not already done so. The enrollment process is free and takes just a few minutes. To enroll, please download the Peter Single 24/7 mesha to your tablet or phone, or visit [email or web address] to enroll on your computer. And, as an [address] [place] [address] patient with a [employer] account, the results of your visits will be scanned into your electronic medical record and your primary care provider will be able to view the scanned results. We urge you to continue to see your regular [facility]perto Bridget provider for your ongoing medical care.   And while your primary care provider may not be the one available when you seek a Deo Gustafson virtual visit, the peace of mind you get from getting a real diagnosis real time can be priceless. For more information on Deo Vogeljorge, view our Frequently Asked Questions (FAQs) at [email or web address]. Sincerely, 
 
Beatriz Anand MD 
Chief Medical Officer [address] *:  certain medications cannot be prescribed via Deo Gustafson Providers Seen During Your Hospitalization Provider Specialty Primary office phone Destiny Rios MD Emergency Medicine [phone] Yoli Matute MD Internal Medicine [phone] Immunizations Administered for This Admission Name Date  
 TB Skin Test (PPD) Intradermal  Deferred () Your Primary Care Physician (PCP) Primary Care Physician Office Phone Office Fax Johnathon Diamond [phone] You are allergic to the following Allergen Reactions Morphine Nausea and Vomiting Other Medication Nausea and Vomiting Dust and pollen causes runny nose. Mollusks/clams- N/V Shellfish Derived Nausea and Vomiting Recent Documentation Height Weight BMI Smoking Status 1.676 m 72.6 kg 25.82 kg/m2 Former Smoker Emergency Contacts Name Discharge Info Relation Home Work Mobile Erica Granger DISCHARGE CAREGIVER [3] Spouse [3] [phone] Patient Belongings The following personal items are in your possession at time of discharge: 
  Dental Appliances: None  Visual Aid: Glasses      Home Medications: None   Jewelry: Watch  Clothing: At bedside    Other Valuables: None Please provide this summary of care documentation to your next provider. Signatures-by signing, you are acknowledging that this After Visit Summary has been reviewed with you and you have received a copy. Patient Signature:  ____________________________________________________________ Date:  ____________________________________________________________ `    
    
 Provider Signature:  ____________________________________________________________ Date:  ____________________________________________________________

## 2018-09-18 NOTE — IP AVS SNAPSHOT
Summary of Care Report The Summary of Care report has been created to help improve care coordination. Users with access to [employer] or [facility] (Web-based application) may access additional patient information including the Discharge Summary. If you are not currently a [address] user and need more information, please call the number listed below in the [place] [address] section and ask to be connected with Medical Records. Facility Information Name Address Phone [address] [place] [address] [address] [place] [id number] [phone] Patient Information Patient Name Sex SOLO Pimentel ([id number]) Male 1940 Discharge Information Admitting Provider Service Area Unit Bryan Daly MD / [place] [id number] Med Surg / [phone] Discharge Provider Discharge Date/Time Discharge Disposition Destination (none) 2018 (Pending) [facility] (none) Patient Language Language ENGLISH [13] Hospital Problems as of 2018  Reviewed: 2018  7:33 AM by Ricardo Soria MD  
  
  
  
 Class Noted - Resolved Last Modified POA Active Problems Acute pulmonary embolism ([address]  2018 - Present 2018 by Bryan Daly MD Unknown Entered by Bryan Daly MD  
  Acute respiratory failure with hypoxemia ([address]  2018 - Present 2018 by Bryan Daly MD Unknown Entered by Bryan Daly MD  
  
Non-Hospital Problems as of 2018  Reviewed: 2018  7:33 AM by Ricardo Soria MD  
  
  
  
 Class Noted - Resolved Last Modified Active Problems Osteoarthritis of right knee  2012 - Present 2012 Entered by Ilir Serra MD  
  Total knee replacement status  2012 - Present 2012   Entered by Ilir Serra MD  
 Arthritis of right shoulder region  4/6/2015 - Present 4/6/2015 by Ezequiel Astorga MD  
  Entered by Ezequiel Astorga MD  
  Status post shoulder replacement  4/6/2015 - Present 4/6/2015 by Ezequiel Astorga MD  
  Entered by Ezequiel Astorga MD  
  Cholecystitis, acute  7/23/2015 - Present 0/93/0495 by Felicia Grady MD  
  Entered by Felicia Grady MD  
  S/P laparoscopic cholecystectomy  8/3/2015 - Present 2/4/1182 by Felicia Grady MD  
  Entered by Felicia Grady MD  
  CKD (chronic kidney disease) stage 3, GFR 30-59 ml/min  8/21/2018 - Present 8/21/2018 by Shayla Malik NP Entered by Shayla Malik NP Arthritis of right hip  9/4/2018 - Present 9/4/2018 by Frandy De MD  
  Entered by Frandy De MD  
  
You are allergic to the following Allergen Reactions Morphine Nausea and Vomiting Other Medication Nausea and Vomiting Dust and pollen causes runny nose. Mollusks/clams- N/V Shellfish Derived Nausea and Vomiting Current Discharge Medication List  
  
START taking these medications Dose & Instructions Dispensing Information Comments  
 amoxicillin-clavulanate 875-125 mg per tablet Commonly known as:  AUGMENTIN Dose:  1 Tab Take 1 Tab by mouth every twelve (12) hours for 5 days. Quantity:  10 Tab Refills:  0  
   
 * rivaroxaban 15 mg Tab tablet Commonly known as:  Sera Cameron Dose:  15 mg Take 1 Tab by mouth two (2) times daily (with meals) for 20 days. Quantity:  40 Tab Refills:  0  
   
 * rivaroxaban 20 mg Tab tablet Commonly known as:  Sera Cameron Start taking on:  10/10/2018 Dose:  20 mg Take 1 Tab by mouth daily for 30 days. Quantity:  30 Tab Refills:  1  
   
 * Notice: This list has 2 medication(s) that are the same as other medications prescribed for you. Read the directions carefully, and ask your doctor or other care provider to review them with you. CONTINUE these medications which have CHANGED Dose & Instructions Dispensing Information Comments  
 oxyCODONE IR 10 mg Tab immediate release tablet Commonly known as:  Helen Diaz What changed:   
- when to take this 
- reasons to take this Dose:  10 mg Take 1 Tab by mouth every six (6) hours as needed for Pain (severe pain 8-10). Max Daily Amount: 40 mg.  
 Quantity:  20 Tab Refills:  0 CONTINUE these medications which have NOT CHANGED Dose & Instructions Dispensing Information Comments AMBIEN 10 mg tablet Generic drug:  zolpidem Dose:  10 mg Take 10 mg by mouth nightly as needed for Sleep. Indications: SLEEP-ONSET INSOMNIA Refills:  0  
   
 aspirin delayed-release 81 mg tablet Dose:  81 mg Take 1 Tab by mouth every twelve (12) hours every twelve (12) hours. Quantity:  120 Tab Refills:  0  
   
 CARAFATE 1 gram tablet Generic drug:  sucralfate Dose:  1 g Take 1 g by mouth three (3) times daily. Take / use AM day of surgery  per anesthesia protocols. Refills:  0  
   
 cetirizine 10 mg tablet Commonly known as:  ZYRTEC Dose:  10 mg Take 10 mg by mouth nightly. Indications: ALLERGIC RHINITIS Refills:  0  
   
 FLONASE 50 mcg/actuation nasal spray Generic drug:  fluticasone Dose:  2 Spray 2 Sprays by Nasal route nightly. Indications: ALLERGIC RHINITIS Refills:  0  
   
 omeprazole 20 mg capsule Commonly known as:  PRILOSEC Dose:  20 mg Take 20 mg by mouth Daily (before breakfast). Take DOS per anesthesia protocol. Indications: gastroesophageal reflux disease Refills:  0  
   
 simvastatin 40 mg tablet Commonly known as:  ZOCOR Dose:  40 mg Take 40 mg by mouth nightly. Refills:  0  
   
 traMADol 50 mg tablet Commonly known as:  ULTRAM  
 Dose:  50 mg Take 50 mg by mouth every six (6) hours as needed for Pain. Refills:  0  
   
 VITAMIN B-12 1,000 mcg/mL injection Generic drug:  cyanocobalamin Dose:  1000 mcg  
1,000 mcg by IntraMUSCular route every twenty-eight (28) days. Refills:  0 STOP taking these medications Comments  
 fluconazole 100 mg tablet Commonly known as:  DIFLUCAN  
   
   
 ondansetron 4 mg disintegrating tablet Commonly known as:  ZOFRAN ODT Current Immunizations Name Date  
 TB Skin Test (PPD) Intradermal  Deferred (), 9/4/2018 Follow-up Information Follow up With Details Comments Contact Info Octavio Romero MD On 9/27/2018 4:00pm [address] [facility] [address] [address] 
[phone] Discharge Instructions DISCHARGE SUMMARY from Nurse PATIENT INSTRUCTIONS: 
 
After general anesthesia or intravenous sedation, for 24 hours or while taking prescription Narcotics: · Limit your activities · Do not drive and operate hazardous machinery · Do not make important personal or business decisions · Do  not drink alcoholic beverages · If you have not urinated within 8 hours after discharge, please contact your surgeon on call. Report the following to your surgeon: 
· Excessive pain, swelling, redness or odor of or around the surgical area · Temperature over 100.5 · Nausea and vomiting lasting longer than 4 hours or if unable to take medications · Any signs of decreased circulation or nerve impairment to extremity: change in color, persistent  numbness, tingling, coldness or increase pain · Any questions What to do at Home: 
Recommended activity: Activity as tolerated, resume home PT per ortho, follow up with Dr. Iris Paul in 1 week, Dr. Vicente Amor in 2 weeks If you experience any of the following symptoms increase in shortness of breath or chest pain, swelling/redness/warmth/pain in leg, any other worrisome symptoms please follow up with PCP or ER. *  Please give a list of your current medications to your Primary Care Provider. *  Please update this list whenever your medications are discontinued, doses are 
    changed, or new medications (including over-the-counter products) are added. *  Please carry medication information at all times in case of emergency situations. These are general instructions for a healthy lifestyle: No smoking/ No tobacco products/ Avoid exposure to second hand smoke Surgeon General's Warning:  Quitting smoking now greatly reduces serious risk to your health. Obesity, smoking, and sedentary lifestyle greatly increases your risk for illness A healthy diet, regular physical exercise & weight monitoring are important for maintaining a healthy lifestyle You may be retaining fluid if you have a history of heart failure or if you experience any of the following symptoms:  Weight gain of 3 pounds or more overnight or 5 pounds in a week, increased swelling in our hands or feet or shortness of breath while lying flat in bed. Please call your doctor as soon as you notice any of these symptoms; do not wait until your next office visit. Recognize signs and symptoms of STROKE: 
 
F-face looks uneven A-arms unable to move or move unevenly S-speech slurred or non-existent T-time-call 911 as soon as signs and symptoms begin-DO NOT go Back to bed or wait to see if you get better-TIME IS BRAIN. Warning Signs of HEART ATTACK Call 911 if you have these symptoms: 
? Chest discomfort. Most heart attacks involve discomfort in the center of the chest that lasts more than a few minutes, or that goes away and comes back. It can feel like uncomfortable pressure, squeezing, fullness, or pain. ? Discomfort in other areas of the upper body. Symptoms can include pain or discomfort in one or both arms, the back, neck, jaw, or stomach. ? Shortness of breath with or without chest discomfort. ? Other signs may include breaking out in a cold sweat, nausea, or lightheadedness. Don't wait more than five minutes to call [address]! Fast action can save your life. Calling 911 is almost always the fastest way to get lifesaving treatment. Emergency Medical Services staff can begin treatment when they arrive  up to an hour sooner than if someone gets to the hospital by car. The discharge information has been reviewed with the patient. The patient verbalized understanding. Discharge medications reviewed with the patient and appropriate educational materials and side effects teaching were provided. ___________________________________________________________________________________________________________________________________ Pulmonary Embolism: Care Instructions Your Care Instructions Pulmonary embolism is the sudden blockage of an artery in the lung. Blood clots in the deep veins of the leg or pelvis (deep vein thrombosis, or DVT) are the most common cause. These blood clots can travel to the lungs. Pulmonary embolism can be very serious. Because you have had one pulmonary embolism, you are at greater risk for having another one. But you can take steps to prevent another pulmonary embolism by following your doctor's instructions. You will probably take a prescription blood-thinning medicine to prevent blood clots. A blood thinner can stop a blood clot from growing larger and prevent new clots from forming. Follow-up care is a key part of your treatment and safety. Be sure to make and go to all appointments, and call your doctor if you are having problems. It's also a good idea to know your test results and keep a list of the medicines you take. How can you care for yourself at home? · Take your medicines exactly as prescribed. Call your doctor if you think you are having a problem with your medicine. You will get more details on the specific medicines your doctor prescribes.  
· If you are taking a blood thinner, be sure you get instructions about how to take your medicine safely. Blood thinners can cause serious bleeding problems. Preventing future pulmonary embolisms · Exercise. Keep blood moving in your legs to keep clots from forming. If you are traveling by car, stop every hour or so. Get out and walk around for a few minutes. If you are traveling by bus, train, or plane, get out of your seat and walk up and down the aisles every hour or so. You also can do leg exercises while you are seated. Pump your feet up and down by pulling your toes up toward your knees then pointing them down. · Get up out of bed as soon as possible after an illness or surgery. · Do not smoke. If you need help quitting, talk to your doctor about stop-smoking programs and medicines. These can increase your chances of quitting for good. · Check with your doctor before taking hormone or birth control pills. These may increase your risk of blot clots. · Ask your doctor about wearing compression stockings to help prevent blood clots in your legs. There are different types of stockings, and they need to fit right. So your doctor will recommend what you need. When should you call for help? Call 911 anytime you think you may need emergency care. For example, call if: 
  · You have shortness of breath.  
  · You have chest pain.  
  · You passed out (lost consciousness).  
  · You cough up blood.  
 Call your doctor now or seek immediate medical care if: 
  · You have new or worsening pain or swelling in your leg.  
 Watch closely for changes in your health, and be sure to contact your doctor if: 
  · You do not get better as expected. Where can you learn more? Go to http://cris-soledad.info/. Enter J864 in the search box to learn more about \"Pulmonary Embolism: Care Instructions. \" Current as of: November 21, 2017 Content Version: 11.7 © 4115-5989 [employer].  Care instructions adapted under license by [address] (which disclaims liability or warranty for this information). If you have questions about a medical condition or this instruction, always ask your healthcare professional. [address] any warranty or liability for your use of this information. Rivaroxaban (By mouth) Rivaroxaban (kae-l-VIG-a-ban) Treats and prevents blood clots, which lowers the risk of stroke, deep vein thrombosis (DVT), pulmonary embolism (PE), and similar conditions. This medicine is a blood thinner. Brand Name(s): Xarelto, Xarelto Starter Pack There may be other brand names for this medicine. When This Medicine Should Not Be Used: This medicine is not right for everyone. Do not use it if you had an allergic reaction to rivaroxaban, or you have severe bleeding. How to Use This Medicine:  
Tablet · Take this medicine as directed, and take it at the same time each day. · 10-milligram (mg) tablet: Take with or without food. · 15-mg or 20-mg tablet: Take with food. · If you cannot swallow the tablets, you may crush the tablet and mix it with applesauce. Eat some food after you swallow the mixture. · Tube feeding: You may crush the tablet and mix the medicine in 50 milliliters (mL) of water before giving it via the tube. This must be followed by a feeding. · This medicine should come with a Medication Guide. Ask your pharmacist for a copy if you do not have one. · Missed dose: ¨ Ask your doctor or pharmacist if you are not sure what to do if you miss a dose. ¨ Once-daily dose: If you miss a dose or forget to use your medicine, use it as soon as you can on the same day. Do not use extra medicine to make up for a missed dose. ¨ Twice-daily dose to treat a blood clot (15-mg tablet): If you miss a dose or forget to use your medicine, use it as soon as you can on the same day. You may take 2 doses at the same time to make up for the missed dose. This is only for people who take a total of 30 mg per day. · Store the medicine in a closed container at room temperature, away from heat, moisture, and direct light. Drugs and Foods to Avoid: Ask your doctor or pharmacist before using any other medicine, including over-the-counter medicines, vitamins, and herbal products. · Some foods and medicines can affect how rivaroxaban works. Tell your doctor if you are using any of the following: ¨ NSAID medicine (including aspirin, celecoxib, diclofenac, ibuprofen, naproxen) ¨ Ketoconazole, itraconazole, lopinavir, ritonavir, indinavir, conivaptan, carbamazepine, phenytoin, rifampin, Butch's wort ¨ Another blood thinner (including clopidogrel, enoxaparin, heparin, warfarin) Warnings While Using This Medicine: · Tell your doctor if you are pregnant or breastfeeding, or if you have kidney disease, liver disease, bleeding problems, or an artificial heart valve. · This medicine may increase your risk of bleeding. Be careful to avoid injuries that could cause bleeding. Stay away from rough sports or other situations where you could be bruised, cut, or hurt. Brush and floss your teeth gently. Be careful when using sharp objects, including razors and fingernail clippers. Avoid picking your nose. If you need to blow your nose, blow it gently. · This medicine may cause nerve damage if you have a medical procedure done to your back, including anesthesia or a spinal puncture. This is more likely to happen if you have a history of back injury, back surgery, problems with your spine, or procedures or punctures to your back. Tell your doctor if you are also taking another blood thinner, because this also increases the risk. · Do not stop using this medicine suddenly without asking your doctor. You might have a higher risk of stroke for a short time after you stop using this medicine. · Tell any doctor or dentist who treats you that you are using this medicine. · Your doctor will do lab tests at regular visits to check on the effects of this medicine. Keep all appointments. · Keep all medicine out of the reach of children. Never share your medicine with anyone. Possible Side Effects While Using This Medicine:  
Call your doctor right away if you notice any of these side effects: · Allergic reaction: Itching or hives, swelling in your face or hands, swelling or tingling in your mouth or throat, chest tightness, trouble breathing · Blistering, peeling, or red skin rash · Decrease in how much or how often you urinate · Heavy menstrual bleeding, or vaginal bleeding · Red or brown urine, bloody or black, tarry stools · Unusual bleeding or bruising, including frequent nosebleeds · Vomiting blood or material that looks like coffee grounds If you notice other side effects that you think are caused by this medicine, tell your doctor. Call your doctor for medical advice about side effects. You may report side effects to FDA at 8-439-YBH-7614 © 2017 [facility] Information is for End User's use only and may not be sold, redistributed or otherwise used for commercial purposes. The above information is an  only. It is not intended as medical advice for individual conditions or treatments. Talk to your doctor, nurse or pharmacist before following any medical regimen to see if it is safe and effective for you. Chart Review Routing History Recipient Method Report Sent By Arianna Hand Fax: [phone] [facility] Conner Oropeza [[id number]] 7/28/2015  3:30 PM 7/23/2015 Imtiaz Zamora MD  
Fax: [phone] Phone: [phone] Fax Notes Report Jackeline Duarte. Radha Cui [[id number]] 9/4/2018 12:11 PM 9/4/2018

## 2018-09-18 NOTE — PROGRESS NOTES
Visited with patient. Received A&OX4 resting in bed. Wife, Eugenie ann, at bedside. Demographics on face sheet verified. Patient lives at home with his wife and prior to this episode was completely independent in all ADLs and still drove. Patient states he continues to use the walker and receive PT at home with [facility] after his hip replacement 2 weeks ago. States he is able to bathe and dress independently but is very 'frustrated' with his progress however has hopes that he will be able to walk again without a walker or cane. Advised patient that case management would be here to assist him with any discharge needs he has during his stay.

## 2018-09-18 NOTE — ED PROVIDER NOTES
HPI Comments: 58-year-old male with complaint of shortness of breath. Patient states since his surgery 2 weeks ago he's been short of breath on exertion. Shortness of breath has been increasing today. Patient's legs are swollen with wife September swollen before surgery. Patient is a 76 y.o. male presenting with shortness of breath. The history is provided by the patient. Shortness of Breath This is a new problem. The problem occurs continuously. The current episode started more than 1 week ago. The problem has not changed since onset. Associated symptoms include cough, chest pain and leg swelling. Pertinent negatives include no fever, no headaches, no coryza and no rhinorrhea. It is unknown what precipitated the problem. The treatment provided no relief. He has had prior hospitalizations. Associated medical issues include heart failure. Past Medical History:  
Diagnosis Date  Arthritis   
 osteo  Chronic pain   
 right shoulder  Former smoker  GERD (gastroesophageal reflux disease)   
 controlled with medication  High cholesterol  Hypertension   
 controlled with medication  Nausea & vomiting   
 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   
 wife states slow to wake Past Surgical History:  
Procedure Laterality Date  HX APPENDECTOMY  HX CHOLECYSTECTOMY  2015  HX GI  2010  
 gastrectomy 2/8/10-has 30% stomach left  HX KNEE ARTHROSCOPY Right  HX OTHER SURGICAL    
 attempted tracheal dilation- stopped due to inflammation  HX SHOULDER ARTHROSCOPY Right 2015  TOTAL HIP ARTHROPLASTY Left 2004 [facility] ARTHROPLASTY Right 2012 Family History:  
Problem Relation Age of Onset  Kidney Disease Mother  Dementia Father  Alcohol abuse Brother  Arthritis-osteo Brother Social History Social History  Marital status:  Spouse name: N/A  
 Number of children: N/A  
 Years of education: N/A Occupational History  Not on file. Social History Main Topics  Smoking status: Former Smoker Packs/day: 0.50 Years: 5.00  Smokeless tobacco: Never Used Comment: quit age 20  Alcohol use Yes Comment: 3 drinks per night; wine and liquor  Drug use: No  
 Sexual activity: Not on file Other Topics Concern  Not on file Social History Narrative ALLERGIES: Morphine; Other medication; and Shellfish derived Review of Systems Constitutional: Negative. Negative for activity change and fever. HENT: Negative. Negative for rhinorrhea. Eyes: Negative. Respiratory: Positive for cough and shortness of breath. Cardiovascular: Positive for chest pain and leg swelling. Gastrointestinal: Negative. Genitourinary: Negative. Musculoskeletal: Negative. Skin: Negative. Neurological: Negative. Negative for headaches. Psychiatric/Behavioral: Negative. All other systems reviewed and are negative. Vitals:  
 09/18/18 1038 BP: 123/78 Pulse: (!) 102 Resp: 18 Temp: 97.3 °F (36.3 °C) SpO2: 98% Weight: 72.6 kg (160 lb) Height: 5' 6\" (1.676 m) Physical Exam  
Constitutional: He is oriented to person, place, and time. He appears well-developed and well-nourished. No distress. HENT:  
Head: Normocephalic and atraumatic. Right Ear: External ear normal.  
Left Ear: External ear normal.  
Nose: Nose normal.  
Mouth/Throat: Oropharynx is clear and moist. No oropharyngeal exudate. Eyes: Conjunctivae and EOM are normal. Pupils are equal, round, and reactive to light. Right eye exhibits no discharge. Left eye exhibits no discharge. No scleral icterus. Neck: Normal range of motion. Neck supple. No JVD present. No tracheal deviation present. Cardiovascular: Normal rate, regular rhythm and intact distal pulses. Pulmonary/Chest: Effort normal. No accessory muscle usage or stridor. No tachypnea. No respiratory distress. He has decreased breath sounds in the right lower field and the left lower field. He has no wheezes. He exhibits no tenderness. Abdominal: Soft. Bowel sounds are normal. He exhibits no distension and no mass. There is no tenderness. Musculoskeletal: Normal range of motion. He exhibits no edema or tenderness. Neurological: He is alert and oriented to person, place, and time. No cranial nerve deficit. Skin: Skin is warm and dry. No rash noted. He is not diaphoretic. No erythema. No pallor. Psychiatric: He has a normal mood and affect. His behavior is normal. Thought content normal.  
Nursing note and vitals reviewed. MDM Number of Diagnoses or Management Options Other acute pulmonary embolism without acute cor pulmonale [facility]):  
Diagnosis management comments: While lying in the bed patient is not short of breath and sats are 98% on room air. However with any exertion patient gets short of breath. Patient has positive CAT scan showing PE in the left upper lobe. Also has a possible infiltrate in the left lower lobe. Amount and/or Complexity of Data Reviewed Clinical lab tests: reviewed and ordered Tests in the radiology section of CPT®: ordered and reviewed Tests in the medicine section of CPT®: reviewed and ordered ED Course Procedures

## 2018-09-18 NOTE — H&P
HOSPITALIST H&P/CONSULT 
NAME:  Jacky Mixon Age:  76 y.o. 
:   1940 MRN:   [id number] PCP: Brian Sandoval MD 
Consulting MD: Treatment Team: Attending Provider: Amanda Shine MD; Primary Nurse: Tyler Abreu RN 
HPI:  
Patient is a 76years old male with pmhx of osteoarthritis of right hips s/p ACACIA (2 weeks ago), stomach ca s/p surgical resection, GERD, dyslipidemia presented with 1 day hx of worsening shortness of breath. Pt reported that he had right hip replacement 2 weeks ago done by Dr. Katie Mejia. He was discharged home with HHA/PT. He was fine at that time but soon after, he started noticing mild shortness of breath. It wasn't interfering with his ADL's until few days ago. He denies chest pain, nausea, vomiting, dizziness/syncope/lightheadedness, headache, weakness/numbness/tingling in extremities, diaphoresis. He reports some leg swelling but no pain. In ER, CT chest showed acute left upper lobe PE. Pt was tachycardic with HR>100 with mild leukocytosis of 11.5. Creatinine 1.57 (baseline), Mg 1.6. Complete ROS done and is as stated in HPI or otherwise negative Past Medical History:  
Diagnosis Date  Acute pulmonary embolism ([address] 2018  Arthritis   
 osteo  Chronic pain   
 right shoulder  Former smoker  GERD (gastroesophageal reflux disease)   
 controlled with medication  High cholesterol  Hypertension   
 controlled with medication  Nausea & vomiting   
 post-op nausea  states from morphine  Stomach cancer ([address] 2010  Total knee replacement status 2012  Unspecified adverse effect of anesthesia   
 wife states slow to wake Past Surgical History:  
Procedure Laterality Date  HX APPENDECTOMY  HX CHOLECYSTECTOMY  2015  HX GI  2010  
 gastrectomy 2/8/10-has 30% stomach left  HX KNEE ARTHROSCOPY Right  HX OTHER SURGICAL    
 attempted tracheal dilation- stopped due to inflammation  HX SHOULDER ARTHROSCOPY Right 2015  TOTAL HIP ARTHROPLASTY Left 2004 [facility] ARTHROPLASTY Right 2012 Prior to Admission Medications Prescriptions Last Dose Informant Patient Reported? Taking?  
aspirin delayed-release 81 mg tablet   No No  
Sig: Take 1 Tab by mouth every twelve (12) hours every twelve (12) hours. cetirizine (ZYRTEC) 10 mg tablet   Yes No  
Sig: Take 10 mg by mouth nightly. Indications: ALLERGIC RHINITIS  
cyanocobalamin (VITAMIN B-12) 1,000 mcg/mL injection   Yes No  
Si,000 mcg by IntraMUSCular route every twenty-eight (28) days. fluconazole (DIFLUCAN) 100 mg tablet   Yes No  
Sig: Take 200 mg by mouth daily as needed. FDA advises cautious prescribing of oral fluconazole in pregnancy. fluticasone (FLONASE) 50 mcg/Actuation nasal spray   Yes No  
Si Sprays by Nasal route nightly. Indications: ALLERGIC RHINITIS  
omeprazole (PRILOSEC) 20 mg capsule   Yes No  
Sig: Take 20 mg by mouth Daily (before breakfast). Take DOS per anesthesia protocol. Indications: gastroesophageal reflux disease  
ondansetron (ZOFRAN ODT) 4 mg disintegrating tablet   Yes No  
Sig: Take 4 mg by mouth every eight (8) hours as needed for Nausea. oxyCODONE IR (ROXICODONE) 10 mg tab immediate release tablet   No No  
Sig: Take 1 Tab by mouth every three (3) hours as needed. Max Daily Amount: 80 mg. Patient taking differently: Take 1 Tab by mouth every three (3) hours as needed (pain). simvastatin (ZOCOR) 40 mg tablet   Yes No  
Sig: Take 40 mg by mouth nightly. sucralfate (CARAFATE) 1 gram tablet   Yes No  
Sig: Take 1 g by mouth three (3) times daily. Take / use AM day of surgery  per anesthesia protocols. traMADol (ULTRAM) 50 mg tablet   Yes No  
Sig: Take 50 mg by mouth every six (6) hours as needed for Pain.  
zolpidem (AMBIEN) 10 mg tablet   Yes No  
Sig: Take 10 mg by mouth nightly as needed for Sleep.  Indications: SLEEP-ONSET INSOMNIA  
  
 Facility-Administered Medications: None Allergies Allergen Reactions  Morphine Nausea and Vomiting  Other Medication Nausea and Vomiting Dust and pollen causes runny nose. Mollusks/clams- N/V  Shellfish Derived Nausea and Vomiting Social History Substance Use Topics  Smoking status: Former Smoker Packs/day: 0.50 Years: 5.00  Smokeless tobacco: Never Used Comment: quit age 20  Alcohol use Yes Comment: 3 drinks per night; wine and liquor Family History Problem Relation Age of Onset  Kidney Disease Mother  Dementia Father  Alcohol abuse Brother  Arthritis-osteo Brother Objective:  
 
Visit Vitals  /78 (BP 1 Location: Left arm, BP Patient Position: At rest)  Pulse (!) 102  Temp 97.3 °F (36.3 °C)  Resp 18  Ht 5' 6\" (1.676 m)  Wt 72.6 kg (160 lb)  SpO2 98%  BMI 25.82 kg/m2 Temp (24hrs), Av.3 °F (36.3 °C), Min:97.3 °F (36.3 °C), Max:97.3 °F (36.3 °C) Oxygen Therapy O2 Sat (%): 98 % (18 1038) O2 Device: Room air (18 1038) Physical Exam: 
General:    Alert, cooperative, no distress, appears stated age. Head:   Normocephalic, without obvious abnormality, atraumatic. Nose:  Nares normal. No drainage or sinus tenderness. Lungs:   Clear to auscultation bilaterally. No Wheezing or Rhonchi. No rales. Heart:   Regular rate and rhythm,  no murmur, rub or gallop. Abdomen:   Soft, non-tender. Not distended. Bowel sounds normal.  
Extremities: No cyanosis. No edema. No clubbing Skin:     Texture, turgor normal. No rashes or lesions. Not Jaundiced Neurologic: GCS 15, no motor or sensory deficits, CN 2-12 intact Psych:             AO x3, mood and affect appropriate Data Review:  
Recent Results (from the past 24 hour(s)) CBC WITH AUTOMATED DIFF Collection Time: 18 12:35 PM  
Result Value Ref Range WBC 11.5 (H) 4.3 - 11.1 K/uL  
 RBC 3.30 (L) 4.23 - 5.6 M/uL HGB 10.6 (L) 13.6 - 17.2 g/dL HCT 33.0 (L) 41.1 - 50.3 % .0 (H) 79.6 - 97.8 FL  
 MCH 32.1 26.1 - 32.9 PG  
 MCHC 32.1 31.4 - 35.0 g/dL  
 RDW 12.5 % PLATELET 681 988 - 111 K/uL MPV 9.1 (L) 9.4 - 12.3 FL ABSOLUTE NRBC 0.00 0.0 - 0.2 K/uL  
 DF AUTOMATED NEUTROPHILS 58 43 - 78 % LYMPHOCYTES 23 13 - 44 % MONOCYTES 16 (H) 4.0 - 12.0 % EOSINOPHILS 2 0.5 - 7.8 % BASOPHILS 1 0.0 - 2.0 % IMMATURE GRANULOCYTES 1 0.0 - 5.0 %  
 ABS. NEUTROPHILS 6.7 1.7 - 8.2 K/UL  
 ABS. LYMPHOCYTES 2.7 0.5 - 4.6 K/UL  
 ABS. MONOCYTES 1.8 (H) 0.1 - 1.3 K/UL  
 ABS. EOSINOPHILS 0.2 0.0 - 0.8 K/UL  
 ABS. BASOPHILS 0.1 0.0 - 0.2 K/UL  
 ABS. IMM. GRANS. 0.1 0.0 - 0.5 K/UL METABOLIC PANEL, COMPREHENSIVE Collection Time: 09/18/18 12:35 PM  
Result Value Ref Range Sodium 142 136 - 145 mmol/L Potassium 4.1 3.5 - 5.1 mmol/L Chloride 106 98 - 107 mmol/L  
 CO2 22 21 - 32 mmol/L Anion gap 14 mmol/L Glucose 90 65 - 100 mg/dL BUN 13 8 - 23 MG/DL Creatinine 1.57 (H) 0.8 - 1.5 MG/DL  
 GFR est AA 55 (L) >60 ml/min/1.73m2 GFR est non-AA 46 ml/min/1.73m2 Calcium 9.2 8.3 - 10.4 MG/DL Bilirubin, total 0.5 0.2 - 1.1 MG/DL  
 ALT (SGPT) 27 12 - 65 U/L  
 AST (SGOT) 32 15 - 37 U/L Alk. phosphatase 129 50 - 136 U/L Protein, total 7.0 g/dL Albumin 2.9 (L) 3.2 - 4.6 g/dL Globulin 4.1 (H) 2.3 - 3.5 g/dL A-G Ratio 0.7 MAGNESIUM Collection Time: 09/18/18 12:35 PM  
Result Value Ref Range Magnesium 1.6 (L) 1.8 - 2.4 mg/dL D DIMER Collection Time: 09/18/18 12:35 PM  
Result Value Ref Range D DIMER 5.95 (HH) <0.56 ug/ml(FEU) BNP Collection Time: 09/18/18 12:35 PM  
Result Value Ref Range BNP 71 pg/mL Imaging [facility] Countess Mckenzie Wei All diagnostic imaging personally reviewed by me. CT chest w contrast; 
IMPRESSION: 
1. Pulmonary embolism within the left upper lobe. 2. Mild left lower lobe atelectasis/infiltrate. 3. Trace of pleural effusion. Assessment and Plan: Active Hospital Problems Diagnosis Date Noted  Acute pulmonary embolism ([facility] [address] 09/18/2018  Acute respiratory failure with hypoxemia ([facility] [address] 09/18/2018 PLAN 
· Admit as inpatient for acute PE and respiratory distress · F/u with venous doppler of B/L LE 
· Start on Xarelto, 15  Mg BID for 21 days, followed by 20 mg every day for minimum of 3-6 months. · CT chest not very impressive of basilar infiltrates, more of atelectasis. Will recommend incentive spirometry · Will keep on augmentin for 5-7 days · Magnesium replaced, recheck in AM 
· IV fluids · Resume home meds as reconciled in [facility] 
· Pt/ot eval 
· CKD-2: stable Code Status: full DVT prophylaxis; on Xarelto Anticipated discharge: 1-2 days Signed By: Severo Dark, MD   
 September 18, 2018

## 2018-09-18 NOTE — ED NOTES
TRANSFER - OUT REPORT: 
 
Verbal report given to ATTILA Sims(name) on Cyndia Sheets  being transferred to 366(unit) for routine progression of care Report consisted of patients Situation, Background, Assessment and  
Recommendations(SBAR). Information from the following report(s) SBAR, ED Summary, [facility] and Recent Results was reviewed with the receiving nurse. Lines:  
Peripheral IV 09/18/18 Right Antecubital (Active) Site Assessment Clean, dry, & intact 9/18/2018 12:37 PM  
Phlebitis Assessment 0 9/18/2018 12:37 PM  
Infiltration Assessment 0 9/18/2018 12:37 PM  
Dressing Status Clean, dry, & intact 9/18/2018 12:37 PM  
   
Peripheral IV 09/18/18 Right Antecubital (Active) Site Assessment Clean, dry, & intact 9/18/2018  1:57 PM  
Phlebitis Assessment 0 9/18/2018  1:57 PM  
Infiltration Assessment 0 9/18/2018  1:57 PM  
Dressing Status Clean, dry, & intact 9/18/2018  1:57 PM  
Hub Color/Line Status Patent; Flushed 9/18/2018  1:57 PM  
  
 
Opportunity for questions and clarification was provided. Patient transported with: 
 [facility] Pt transported from [address] to room without coming back to ER and without this RN knowing.

## 2018-09-18 NOTE — IP AVS SNAPSHOT
[address] 
[phone] Patient: Tami Vinson MRN: [id number] :1940 A check brittany indicates which time of day the medication should be taken. My Medications START taking these medications Instructions Each Dose to Equal  
 Morning Noon Evening Bedtime  
 amoxicillin-clavulanate 875-125 mg per tablet Commonly known as:  AUGMENTIN Your last dose was:  908 am  Your next dose is: Tonight Take 1 Tab by mouth every twelve (12) hours for 5 days. 1 Tab * rivaroxaban 15 mg Tab tablet Commonly known as:  Rodney Panda Your last dose was:  900 am  Your next dose is: This evening Take 1 Tab by mouth two (2) times daily (with meals) for 20 days. 15 mg  
    
  
   
   
  
   
  
 * rivaroxaban 20 mg Tab tablet Commonly known as:  Rodney Panda Start taking on:  10/10/2018 Your next dose is:  10/10/2018 Take 1 Tab by mouth daily for 30 days. 20 mg  
    
  
   
   
   
  
 * Notice: This list has 2 medication(s) that are the same as other medications prescribed for you. Read the directions carefully, and ask your doctor or other care provider to review them with you. CHANGE how you take these medications Instructions Each Dose to Equal  
 Morning Noon Evening Bedtime  
 oxyCODONE IR 10 mg Tab immediate release tablet Commonly known as:  Alsion Snow What changed:   
- when to take this 
- reasons to take this Your next dose is:  As needed Take 1 Tab by mouth every six (6) hours as needed for Pain (severe pain 8-10). Max Daily Amount: 40 mg.  
 10 mg CONTINUE taking these medications Instructions Each Dose to Equal  
 Morning Noon Evening Bedtime AMBIEN 10 mg tablet Generic drug:  zolpidem Take 10 mg by mouth nightly as needed for Sleep.  Indications: SLEEP-ONSET INSOMNIA 10 mg  
    
   
   
   
  
 aspirin delayed-release 81 mg tablet Your last dose was:  9pm 9/18 Your next dose is:  9 pm tonight Take 1 Tab by mouth every twelve (12) hours every twelve (12) hours. 81 mg  
    
  
   
   
  
   
  
 CARAFATE 1 gram tablet Generic drug:  sucralfate Your last dose was:  9 am 9/19 Take 1 g by mouth three (3) times daily. Take / use AM day of surgery  per anesthesia protocols. 1 g  
    
  
   
  
   
  
   
  
 cetirizine 10 mg tablet Commonly known as:  ZYRTEC Your next dose is:  Resume home schedule Take 10 mg by mouth nightly. Indications: ALLERGIC RHINITIS 10 mg  
    
   
   
   
  
 FLONASE 50 mcg/actuation nasal spray Generic drug:  fluticasone Your last dose was: Last night 2 Sprays by Nasal route nightly. Indications: ALLERGIC RHINITIS 2 Spray  
    
   
   
   
  
 omeprazole 20 mg capsule Commonly known as:  PRILOSEC Take 20 mg by mouth Daily (before breakfast). Take DOS per anesthesia protocol. Indications: gastroesophageal reflux disease 20 mg  
    
   
   
   
  
 simvastatin 40 mg tablet Commonly known as:  ZOCOR Your last dose was: Last night Your next dose is: Tonight Take 40 mg by mouth nightly. 40 mg  
    
   
   
  
   
  
 traMADol 50 mg tablet Commonly known as:  ULTRAM  
Your next dose is:  As needed Take 50 mg by mouth every six (6) hours as needed for Pain. 50 mg  
    
   
   
   
  
 VITAMIN B-12 1,000 mcg/mL injection Generic drug:  cyanocobalamin  
   
 1,000 mcg by IntraMUSCular route every twenty-eight (28) days. 1000 mcg STOP taking these medications   
 fluconazole 100 mg tablet Commonly known as:  DIFLUCAN  
   
  
 ondansetron 4 mg disintegrating tablet Commonly known as:  ZOFRAN ODT Where to Get Your Medications Information on where to get these meds will be given to you by the nurse or doctor. ! Ask your nurse or doctor about these medications  
  amoxicillin-clavulanate 875-125 mg per tablet  
 oxyCODONE IR 10 mg Tab immediate release tablet  
 rivaroxaban 15 mg Tab tablet  
 rivaroxaban 20 mg Tab tablet

## 2018-09-19 VITALS
OXYGEN SATURATION: 94 % | HEIGHT: 66 IN | SYSTOLIC BLOOD PRESSURE: 146 MMHG | WEIGHT: 160 LBS | DIASTOLIC BLOOD PRESSURE: 77 MMHG | RESPIRATION RATE: 20 BRPM | BODY MASS INDEX: 25.71 KG/M2 | HEART RATE: 91 BPM | TEMPERATURE: 97.6 F

## 2018-09-19 VITALS
SYSTOLIC BLOOD PRESSURE: 117 MMHG | DIASTOLIC BLOOD PRESSURE: 67 MMHG | TEMPERATURE: 94.6 F | HEART RATE: 89 BPM | RESPIRATION RATE: 18 BRPM

## 2018-09-19 LAB
ANION GAP SERPL CALC-SCNC: 13 MMOL/L
BUN SERPL-MCNC: 10 MG/DL (ref 8–23)
CALCIUM SERPL-MCNC: 8.5 MG/DL (ref 8.3–10.4)
CHLORIDE SERPL-SCNC: 109 MMOL/L (ref 98–107)
CO2 SERPL-SCNC: 20 MMOL/L (ref 21–32)
CREAT SERPL-MCNC: 1.38 MG/DL (ref 0.8–1.5)
ERYTHROCYTE [DISTWIDTH] IN BLOOD BY AUTOMATED COUNT: 12.4 %
GLUCOSE SERPL-MCNC: 80 MG/DL (ref 65–100)
HCT VFR BLD AUTO: 28.8 % (ref 41.1–50.3)
HGB BLD-MCNC: 9.6 G/DL (ref 13.6–17.2)
MCH RBC QN AUTO: 32 PG (ref 26.1–32.9)
MCHC RBC AUTO-ENTMCNC: 33.3 G/DL (ref 31.4–35)
MCV RBC AUTO: 96 FL (ref 79.6–97.8)
NRBC # BLD: 0 K/UL (ref 0–0.2)
PLATELET # BLD AUTO: 417 K/UL (ref 150–450)
PMV BLD AUTO: 9.7 FL (ref 9.4–12.3)
POTASSIUM SERPL-SCNC: 3.6 MMOL/L (ref 3.5–5.1)
RBC # BLD AUTO: 3 M/UL (ref 4.23–5.6)
SODIUM SERPL-SCNC: 142 MMOL/L (ref 136–145)
WBC # BLD AUTO: 8.6 K/UL (ref 4.3–11.1)

## 2018-09-19 PROCEDURE — 97165 OT EVAL LOW COMPLEX 30 MIN: CPT

## 2018-09-19 PROCEDURE — 80048 BASIC METABOLIC PNL TOTAL CA: CPT

## 2018-09-19 PROCEDURE — 97110 THERAPEUTIC EXERCISES: CPT

## 2018-09-19 PROCEDURE — 74011250637 HC RX REV CODE- 250/637: Performed by: HOSPITALIST

## 2018-09-19 PROCEDURE — 85027 COMPLETE CBC AUTOMATED: CPT

## 2018-09-19 PROCEDURE — 3331090002 HH PPS REVENUE DEBIT

## 2018-09-19 PROCEDURE — 97161 PT EVAL LOW COMPLEX 20 MIN: CPT

## 2018-09-19 PROCEDURE — 3331090001 HH PPS REVENUE CREDIT

## 2018-09-19 PROCEDURE — 97535 SELF CARE MNGMENT TRAINING: CPT

## 2018-09-19 PROCEDURE — 36415 COLL VENOUS BLD VENIPUNCTURE: CPT

## 2018-09-19 RX ORDER — TRAMADOL HYDROCHLORIDE 50 MG/1
50 TABLET ORAL
Qty: 40 TAB | Refills: 1 | Status: SHIPPED | OUTPATIENT
Start: 2018-09-19 | End: 2021-03-05

## 2018-09-19 RX ORDER — TRAMADOL HYDROCHLORIDE 50 MG/1
50 TABLET ORAL
Qty: 40 TAB | Refills: 1 | Status: SHIPPED | OUTPATIENT
Start: 2018-09-19 | End: 2018-09-19

## 2018-09-19 RX ORDER — OXYCODONE HYDROCHLORIDE 10 MG/1
10 TABLET ORAL
Qty: 20 TAB | Refills: 0 | Status: SHIPPED | OUTPATIENT
Start: 2018-09-19 | End: 2018-09-19

## 2018-09-19 RX ORDER — AMOXICILLIN AND CLAVULANATE POTASSIUM 875; 125 MG/1; MG/1
1 TABLET, FILM COATED ORAL EVERY 12 HOURS
Qty: 10 TAB | Refills: 0 | Status: SHIPPED | OUTPATIENT
Start: 2018-09-19 | End: 2018-09-24

## 2018-09-19 RX ADMIN — RIVAROXABAN 15 MG: 15 TABLET, FILM COATED ORAL at 09:08

## 2018-09-19 RX ADMIN — SUCRALFATE 1 G: 1 TABLET ORAL at 09:08

## 2018-09-19 RX ADMIN — AMOXICILLIN AND CLAVULANATE POTASSIUM 1 TABLET: 875; 125 TABLET, FILM COATED ORAL at 09:08

## 2018-09-19 NOTE — PROGRESS NOTES
Problem: Self Care Deficits Care Plan (Adult) Goal: *Acute Goals and Plan of Care (Insert Text) 1. Patient will perform grooming with supervision. 2. Patient will perform Upper body dressing with supervision 3. Patient will perform lower body dressing with supervision 4. Patient will perform upper and lower body bathing with supervision. 5. Patient will perform toilet transfers with CGA. 6. Patient will perform shower transfer with CGA. 7. Patient will participate in 30 + minutes of ADL/ therapeutic exercise/therapeutic activity with min rest breaks to increase activity tolerance for self care. 8. Patient will perform ADL functional mobility in room with CGA. Goals to be achieved in 7 days. OCCUPATIONAL THERAPY: Initial Assessment, Daily Note and AM 9/19/2018 INPATIENT: Hospital Day: 2 [place] / [place] / Product Type: Medicare /  
  
NAME/AGE/GENDER: Jacky Mixon is a 76 y.o. male PRIMARY DIAGNOSIS:  Acute pulmonary embolism ([address] Acute respiratory failure with hypoxemia (HCC) <principal problem not specified> <principal problem not specified> 
  
  
ICD-10: Treatment Diagnosis:  
 · Generalized Muscle Weakness (M62.81) Precautions/Allergies: Morphine; Other medication; and Shellfish derived ASSESSMENT:  
 
Mr. Allyson Alexandra admitted with above diagnosis. Pt s/p right ACACIA on 9/4. Pt presents with decreased self care, functional mobility and strength. Pt would benefit from skilled OT to increase independence. This section established at most recent assessment PROBLEM LIST (Impairments causing functional limitations): 1. Decreased Strength 2. Decreased ADL/Functional Activities 3. Decreased Transfer Abilities 4. Decreased Ambulation Ability/Technique 5. Decreased Balance 6. Decreased Activity Tolerance INTERVENTIONS PLANNED: (Benefits and precautions of occupational therapy have been discussed with the patient.) 1. Activities of daily living training 2. Adaptive equipment training 3. Balance training 4. Clothing management 5. Therapeutic activity 6. Therapeutic exercise TREATMENT PLAN: Frequency/Duration: Follow patient 3x/week to address above goals. Rehabilitation Potential For Stated Goals: Good RECOMMENDED REHABILITATION/EQUIPMENT: (at time of discharge pending progress): Due to the probability of continued deficits (see above) this patient will likely need continued skilled occupational therapy after discharge. Equipment:  
? None at this time OCCUPATIONAL PROFILE AND HISTORY:  
History of Present Injury/Illness (Reason for Referral): Acute pulmonary embolism Past Medical History/Comorbidities:  
Mr. Meagan Spence  has a past medical history of Acute pulmonary embolism ([address] (9/18/2018); Arthritis; Chronic pain; Former smoker; GERD (gastroesophageal reflux disease); High cholesterol; Hypertension; Nausea & vomiting; Stomach cancer ([address] (2010); Total knee replacement status (2/21/2012); and Unspecified adverse effect of anesthesia. He also has no past medical history of Adverse effect of anesthesia; Aneurysm ([address] Arrhythmia; Asthma; Autoimmune disease ([address] CAD (coronary artery disease); Chronic kidney disease; Chronic obstructive pulmonary disease ([address] Coagulation defects; Coagulation disorder ([address] COPD; Diabetes ([address] Difficult intubation; Endocarditis; Heart failure ([address] Ill-defined condition; Liver disease; Malignant hyperthermia due to anesthesia; Morbid obesity ([address] Nicotine vapor product user; Non-nicotine vapor product user; Pseudocholinesterase deficiency; Psychiatric disorder; PUD (peptic ulcer disease); Rheumatic fever; Seizures ([address] Stroke [facility]); Thyroid disease; or Unspecified sleep apnea.   Mr. Meagan Spence  has a past surgical history that includes hx gi (2010); pr total hip arthroplasty (Left, 2004); hx shoulder arthroscopy (Right, 2015); hx knee arthroscopy (Right); pr total knee arthroplasty (Right, 2012); hx other surgical; hx cholecystectomy (2015); and hx appendectomy. Social History/Living Environment:  
Home Environment: Private residence # Steps to Enter: 1 Rails to Enter: No 
One/Two Story Residence: Two story, live on 1st floor Living Alone: No 
Support Systems: Spouse/Significant Other/Partner Patient Expects to be Discharged to[de-identified] Private residence Current DME Used/Available at Home: Cane, straight, Walker, rolling Prior Level of Function/Work/Activity: 
Assist with ADLs and ambulating with walker Number of Personal Factors/Comorbidities that affect the Plan of Care: Brief history (0):  LOW COMPLEXITY ASSESSMENT OF OCCUPATIONAL PERFORMANCE[de-identified]  
Activities of Daily Living:  
Basic ADLs (From Assessment) Complex ADLs (From Assessment) Feeding: Setup Oral Facial Hygiene/Grooming: Setup Bathing: Minimum assistance Upper Body Dressing: Setup Lower Body Dressing: Minimum assistance Toileting: Setup Grooming/Bathing/Dressing Activities of Daily Living Cognitive Retraining Safety/Judgement: Awareness of environment; Fall prevention Functional Transfers Toilet Transfer : Contact guard assistance Shower Transfer: Contact guard assistance Bed/Mat Mobility Supine to Sit: Stand-by assistance Sit to Supine: Stand-by assistance Sit to Stand: Contact guard assistance Bed to Chair: Contact guard assistance Scooting: Stand-by assistance Most Recent Physical Functioning:  
Gross Assessment: 
AROM: Generally decreased, functional (BUE) Strength: Generally decreased, functional (BUE) Coordination: Generally decreased, functional (BUE) Tone: Normal 
Sensation: Intact Posture: 
  
Balance: 
Sitting: Intact; Without support Standing: Impaired; With support Bed Mobility: 
Supine to Sit: Stand-by assistance Sit to Supine: Stand-by assistance Scooting: Stand-by assistance Wheelchair Mobility: 
  
Transfers: 
Sit to Stand: Contact guard assistance Stand to Sit: Contact guard assistance Bed to Chair: Contact guard assistance Patient Vitals for the past 6 hrs: 
 BP BP Patient Position SpO2 Pulse 18 0833 146/77 At rest 94 % 91 Mental Status Neurologic State: Alert Orientation Level: Oriented X4 Cognition: Follows commands Perception: Appears intact Perseveration: No perseveration noted Safety/Judgement: Awareness of environment, Fall prevention LLE Assessment LLE Assessment (WDL): Within defined limits RLE Assessment RLE Assessment (WDL): Exceptions to [facility] Physical Skills Involved: 
1. Balance 2. Strength 3. Activity Tolerance Cognitive Skills Affected (resulting in the inability to perform in a timely and safe manner): 1. none Psychosocial Skills Affected: 1. none Number of elements that affect the Plan of Care: 1-3:  LOW COMPLEXITY CLINICAL DECISION MAKIN[address] [place] [id number] AM-PAC 6 Clicks Daily Activity Inpatient Short Form How much help from another person does the patient currently need. .. Total A Lot A Little None 1. Putting on and taking off regular lower body clothing? [] 1   [] 2   [x] 3   [] 4  
2. Bathing (including washing, rinsing, drying)? [] 1   [] 2   [x] 3   [] 4  
3. Toileting, which includes using toilet, bedpan or urinal?   [] 1   [] 2   [x] 3   [] 4  
4. Putting on and taking off regular upper body clothing? [] 1   [] 2   [] 3   [x] 4  
5. Taking care of personal grooming such as brushing teeth? [] 1   [] 2   [] 3   [x] 4  
6. Eating meals? [] 1   [] 2   [] 3   [x] 4  
© , Trustees of [address] [place] [address] [address] [address], under license to [employer]. All rights reserved Score:  Initial: 21 Most Recent: X (Date: -- ) Interpretation of Tool:  Represents activities that are increasingly more difficult (i.e. Bed mobility, Transfers, Gait).  
 Score 24 23 22-20 19-15 14-10 9-7 6   
 Modifier CH CI CJ CK CL CM CN   
 
? Self Care:  
  - CURRENT STATUS: CJ - 20%-39% impaired, limited or restricted  - GOAL STATUS: CI - 1%-19% impaired, limited or restricted  - D/C STATUS:  ---------------To be determined--------------- [place] / [place] / Product Type: Medicare /   
 
Medical Necessity:    
· Patient is expected to demonstrate progress in strength, balance, coordination and functional technique to increase independence with self care and functional mobility. Reason for Services/Other Comments: 
· Patient continues to require skilled intervention due to decrease self care and functional mobility. Use of outcome tool(s) and clinical judgement create a POC that gives a: LOW COMPLEXITY  
 
 
 
TREATMENT:  
(In addition to Assessment/Re-Assessment sessions the following treatments were rendered) Pre-treatment Symptoms/Complaints:   
Pain: Initial:  
Pain Intensity 1: 0  Post Session:  0 Self Care: (10): Procedure(s) (per grid) utilized to improve and/or restore self-care/home management as related to toileting and grooming. Required minimal verbal and   cueing to facilitate activities of daily living skills and compensatory activities. Assessment/Reassessment (10) Braces/Orthotics/Lines/Etc:  
· IV 
· O2 Device: Room air Treatment/Session Assessment:   
· Response to Treatment:  Tolerated well · Interdisciplinary Collaboration:  
o Physical Therapist 
o Registered Nurse · After treatment position/precautions:  
o Up in chair 
o Bed/Chair-wheels locked 
o Call light within reach 
o RN notified · Compliance with Program/Exercises: compliant all of the time. · Recommendations/Intent for next treatment session: \"Next visit will focus on advancements to more challenging activities and reduction in assistance provided\". Total Treatment Duration: OT Patient Time In/Time Out Time In: 0730 Time Out: 1464 Toby Landry OT

## 2018-09-19 NOTE — PHYSICIAN ADVISORY
Letter of Determination:  Outpatient status receiving Observation Services This patient was originally hospitalized as Inpatient Status on 9/18/2018 for pulmonary embolism. At this time this patient does not appear to meet the medical necessity requirements in CMS regulation Section 43 .3 to support an inpatient level of care. It is our recommendation that this patient's hospitalization status should be changed from INPATIENT to [place] receiving OBSERVATION services via Condition Code 44.  
  
This may change due to the medical condition of the patient and new clinical evidence as the patients care progreses. The final decision regarding the patient's hospitalization status depends on the attending physician's judgement. Arben Tello MD, LAYO, Physician Advisor [id number].

## 2018-09-19 NOTE — PROGRESS NOTES
Visited with pt regarding plans for discharge,plans to return home with wife. Has been using walker due to recent Rt hip replacement by Dr. Monserrat Urias. Pt is current with [facility] and plans to continue upon d/c. Has no needs at this time but will continue to follow as needed.

## 2018-09-19 NOTE — PROGRESS NOTES
Spiritual Care initial visit made by Zenaida MataFanergies. Prayer and support given. Card left. RAFAEL Rose Div / Bereavement Coordinator

## 2018-09-19 NOTE — PROGRESS NOTES
Resting comfortably,steristrips intact right hip,incision healing well. NV status WDL. Denies needs at present. Assisted to stand at bedside to void

## 2018-09-19 NOTE — PROGRESS NOTES
Virtual Utilization Review RN  has determined this patient meets the Condition Code 44 criteria under the Medicare guidelines for change from Inpatient to observation status. Admission status has been changed in the computer system. A copy of the Utilization Review RN documentation and the REYES letter explaining the outpatient/observation services was given to patient/family representative with verbal explanation and verbal understanding was received about information given. Letter signed by patient with date and time. Signed copy placed in the patient's chart for scanning by HIS and copy left at bedside for patient information.   
 
DCR

## 2018-09-19 NOTE — PROGRESS NOTES
Shift assessment completed. Pt alert and oriented x4. Lungs CTA with even and unlabored respirations. Heart sounds regular. Pt on telemetry. Abdomen soft and non-tender. Skin warm and dry with right hip incisions c/d/i with steri-strips. RAC and LAC IV patent, clean, dry, intact. Pt c/o aching in knees after ambulating but requested no pain medication. No other needs at this time

## 2018-09-19 NOTE — DISCHARGE SUMMARY
Hospitalist Discharge Summary     Patient ID:  Vincent Cohen  [id number]  39 y.o.  1940  Admit date: 9/18/2018 11:44 AM  Discharge date and time: 9/19/2018  Attending: Eder Latham MD  PCP:  Patricio Wolfe MD  Treatment Team: Attending Provider: Eder Latham MD; Utilization Review: Miriam Patino RN    Principal Diagnosis:    Acute left pulmonary embolism  Acute hypoxic respiratory failure; resolved  Osteoarthritis of right hip s/p ACACIA( 2 weeks ago)    Active Problems:    Acute pulmonary embolism ([address] (9/18/2018)      Acute respiratory failure with hypoxemia ([address] (9/18/2018)             Hospital Course:  Please refer to the admission H&P for details of presentation. In summary, the patient is 76years old male with pmhx of osteoarthritis of right hips s/p ACACIA (2 weeks ago), stomach ca s/p surgical resection, GERD, dyslipidemia admitted on 9/18 for acute pulmonary embolism. Pt reported that he had right hip replacement 2 weeks ago done by Dr. Pool Rico. He was discharged home with HHA/PT. He was fine at that time but soon after, he started noticing mild shortness of breath. It wasn't interfering with his ADL's until few days ago. He denied chest pain, nausea, vomiting, dizziness/syncope/lightheadedness, headache, weakness/numbness/tingling in extremities, diaphoresis. He reports some leg swelling but no pain. In ER, CT chest showed acute left upper lobe PE and LE venous ultrasound was negative for DVT. Pt was started on Xarelto 15 mg po BID for 21 days, followed by 20 mg once daily for minimum of 3-6 months. Pt improved clinically within 24 hours and will be discharged today with HHA/PT. Pt is on room air, with no chest pain. CT chest not very impressive for basilar infiltrates, more of atelectasis. I will complete 5 days of augmentin as outpatient. CM has assisted in arranging 1 month supply of Xarelto prior to discharge. Rest of the hospital course was uneventful.     Significant Diagnostic Studies:   Bilateral lower extremity venous Doppler evaluation 09/18/2018     HISTORY: Pain and swelling x6 weeks. Right hip surgery 09/04/2018.     Grayscale, color-flow and Doppler evaluation the major veins of the lower  extremities was performed.      Comparison: None     FINDINGS: There is normal compression and augmentation of the common femoral,  superficial femoral and popliteal veins bilaterally. No grayscale or color-flow  findings within the vessels or within the distal greater saphenous or profunda  femoral veins were noted to suggest deep venous thrombosis. Interrogated  portions of peroneal and posterior tibial veins were also unremarkable  bilaterally. No popliteal cyst was identified on either side.     IMPRESSION  IMPRESSION: Negative evaluation for deep venous thrombosis within either lower  Extremity. CT chest:  IMPRESSION:  1. Pulmonary embolism within the left upper lobe. 2. Mild left lower lobe atelectasis/infiltrate. 3. Trace of pleural effusion. Labs: Results:       Chemistry Recent Labs      09/19/18   0537 09/18/18   1235   GLU  80  90   NA  142  142   K  3.6  4.1   CL  109*  106   CO2  20*  22   BUN  10  13   CREA  1.38  1.57*   CA  8.5  9.2   AGAP  13  14   AP   --   129   TP   --   7.0   ALB   --   2.9*   GLOB   --   4.1*   AGRAT   --   0.7      CBC w/Diff Recent Labs      09/19/18   0537  09/18/18   1235   WBC  8.6  11.5*   RBC  3.00*  3.30*   HGB  9.6*  10.6*   HCT  28.8*  33.0*   PLT  417  425   GRANS   --   58   LYMPH   --   23   EOS   --   2      Cardiac Enzymes No results for input(s): CPK, CKND1, KRISTYN in the last 72 hours. No lab exists for component: CKRMB, TROIP   Coagulation No results for input(s): PTP, INR, APTT in the last 72 hours.     No lab exists for component: INREXT, INREXT    Lipid Panel No results found for: CHOL, CHOLPOCT, CHOLX, CHLST, CHOLV, 875887, HDL, LDL, LDLC, DLDLP, 394795, VLDLC, VLDL, TGLX, TRIGL, TRIGP, TGLPOCT, CHHD, CHHDX   BNP No results for input(s): BNPP in the last 72 hours. Liver Enzymes Recent Labs      09/18/18   1235   TP  7.0   ALB  2.9*   AP  129   SGOT  32      Thyroid Studies No results found for: T4, T3U, TSH, TSHEXT, TSHEXT         Discharge Exam:  Visit Vitals    /77 (BP Patient Position: At rest)    Pulse 91    Temp 97.6 °F (36.4 °C)    Resp 20    Ht 5' 6\" (1.676 m)    Wt 72.6 kg (160 lb)    SpO2 94%    BMI 25.82 kg/m2     General:                    Alert, cooperative, no distress, appears stated age. Head:                                   Normocephalic, without obvious abnormality, atraumatic. Nose:                                   Nares normal. No drainage or sinus tenderness. Lungs:                       Clear to auscultation bilaterally. No Wheezing or Rhonchi. No rales. Heart:                                  Regular rate and rhythm,  no murmur, rub or gallop. Abdomen:                  Soft, non-tender. Not distended. Bowel sounds normal.   Extremities:               No cyanosis. No edema. No clubbing  Skin:                                    Texture, turgor normal. No rashes or lesions. Not Jaundiced  Neurologic:                GCS 15, no motor or sensory deficits, CN 2-12 intact  Psych:             AO x3, mood and affect appropriate       Disposition: home with A  Discharge Condition: stable  Patient Instructions:   Current Discharge Medication List      START taking these medications    Details   amoxicillin-clavulanate (AUGMENTIN) 875-125 mg per tablet Take 1 Tab by mouth every twelve (12) hours for 5 days. Qty: 10 Tab, Refills: 0      !! rivaroxaban (XARELTO) 15 mg tab tablet Take 1 Tab by mouth two (2) times daily (with meals) for 20 days. Qty: 40 Tab, Refills: 0      !! rivaroxaban (XARELTO) 20 mg tab tablet Take 1 Tab by mouth daily for 30 days. Qty: 30 Tab, Refills: 1       !! - Potential duplicate medications found. Please discuss with provider.       CONTINUE these medications which have CHANGED    Details   !! traMADol (ULTRAM) 50 mg tablet Take 1 Tab by mouth every eight (8) hours as needed. Max Daily Amount: 150 mg.  Qty: 40 Tab, Refills: 1    Associated Diagnoses: Arthritis of right hip       !! - Potential duplicate medications found. Please discuss with provider. CONTINUE these medications which have NOT CHANGED    Details   !! traMADol (ULTRAM) 50 mg tablet Take 50 mg by mouth every six (6) hours as needed for Pain. aspirin delayed-release 81 mg tablet Take 1 Tab by mouth every twelve (12) hours every twelve (12) hours. Qty: 120 Tab, Refills: 0      sucralfate (CARAFATE) 1 gram tablet Take 1 g by mouth three (3) times daily. Take / use AM day of surgery  per anesthesia protocols. omeprazole (PRILOSEC) 20 mg capsule Take 20 mg by mouth Daily (before breakfast). Take DOS per anesthesia protocol. Indications: gastroesophageal reflux disease      zolpidem (AMBIEN) 10 mg tablet Take 10 mg by mouth nightly as needed for Sleep. Indications: SLEEP-ONSET INSOMNIA      cyanocobalamin (VITAMIN B-12) 1,000 mcg/mL injection 1,000 mcg by IntraMUSCular route every twenty-eight (28) days. simvastatin (ZOCOR) 40 mg tablet Take 40 mg by mouth nightly. cetirizine (ZYRTEC) 10 mg tablet Take 10 mg by mouth nightly. Indications: ALLERGIC RHINITIS      fluticasone (FLONASE) 50 mcg/Actuation nasal spray 2 Sprays by Nasal route nightly. Indications: ALLERGIC RHINITIS       ! ! - Potential duplicate medications found. Please discuss with provider.       STOP taking these medications       ondansetron (ZOFRAN ODT) 4 mg disintegrating tablet Comments:   Reason for Stopping:         oxyCODONE IR (ROXICODONE) 10 mg tab immediate release tablet Comments:   Reason for Stopping:         fluconazole (DIFLUCAN) 100 mg tablet Comments:   Reason for Stopping:               Activity: PT/OT per Home Health  Diet: Regular Diet  Wound Care: None needed    Follow-up  ·   Follow up with PCP in 1 week.  · Follow up with Ortho in 2 weeks as scheduled.   Time spent to discharge patient 35 minutes  Signed:  Amanda Shine MD  9/19/2018  10:26 AM

## 2018-09-19 NOTE — PROGRESS NOTES
Discharge instructions reviewed with patient and his wife. Opportunity for questions given. Patient verbalized understanding of all discharge and follow up instructions. PIVs removed. Prescriptions provided. Pt to get dressed and will call when ready to be wheeled out.

## 2018-09-19 NOTE — DISCHARGE INSTRUCTIONS
DISCHARGE SUMMARY from Nurse    PATIENT INSTRUCTIONS:    After general anesthesia or intravenous sedation, for 24 hours or while taking prescription Narcotics:  · Limit your activities  · Do not drive and operate hazardous machinery  · Do not make important personal or business decisions  · Do  not drink alcoholic beverages  · If you have not urinated within 8 hours after discharge, please contact your surgeon on call. Report the following to your surgeon:  · Excessive pain, swelling, redness or odor of or around the surgical area  · Temperature over 100.5  · Nausea and vomiting lasting longer than 4 hours or if unable to take medications  · Any signs of decreased circulation or nerve impairment to extremity: change in color, persistent  numbness, tingling, coldness or increase pain  · Any questions    What to do at Home:  Recommended activity: Activity as tolerated, resume home PT per ortho, follow up with Dr. Ethel Syed in 1 week, Dr. Mac Guido in 2 weeks    If you experience any of the following symptoms increase in shortness of breath or chest pain, swelling/redness/warmth/pain in leg, any other worrisome symptoms please follow up with PCP or ER. *  Please give a list of your current medications to your Primary Care Provider. *  Please update this list whenever your medications are discontinued, doses are      changed, or new medications (including over-the-counter products) are added. *  Please carry medication information at all times in case of emergency situations. These are general instructions for a healthy lifestyle:    No smoking/ No tobacco products/ Avoid exposure to second hand smoke  Surgeon General's Warning:  Quitting smoking now greatly reduces serious risk to your health.     Obesity, smoking, and sedentary lifestyle greatly increases your risk for illness    A healthy diet, regular physical exercise & weight monitoring are important for maintaining a healthy lifestyle    You may be retaining fluid if you have a history of heart failure or if you experience any of the following symptoms:  Weight gain of 3 pounds or more overnight or 5 pounds in a week, increased swelling in our hands or feet or shortness of breath while lying flat in bed. Please call your doctor as soon as you notice any of these symptoms; do not wait until your next office visit. Recognize signs and symptoms of STROKE:    F-face looks uneven    A-arms unable to move or move unevenly    S-speech slurred or non-existent    T-time-call 911 as soon as signs and symptoms begin-DO NOT go       Back to bed or wait to see if you get better-TIME IS BRAIN. Warning Signs of HEART ATTACK     Call 911 if you have these symptoms:   Chest discomfort. Most heart attacks involve discomfort in the center of the chest that lasts more than a few minutes, or that goes away and comes back. It can feel like uncomfortable pressure, squeezing, fullness, or pain.  Discomfort in other areas of the upper body. Symptoms can include pain or discomfort in one or both arms, the back, neck, jaw, or stomach.  Shortness of breath with or without chest discomfort.  Other signs may include breaking out in a cold sweat, nausea, or lightheadedness. Don't wait more than five minutes to call 911 - MINUTES MATTER! Fast action can save your life. Calling 911 is almost always the fastest way to get lifesaving treatment. Emergency Medical Services staff can begin treatment when they arrive -- up to an hour sooner than if someone gets to the hospital by car. The discharge information has been reviewed with the patient. The patient verbalized understanding. Discharge medications reviewed with the patient and appropriate educational materials and side effects teaching were provided.   ___________________________________________________________________________________________________________________________________         Pulmonary Embolism: Care Instructions  Your Care Instructions    Pulmonary embolism is the sudden blockage of an artery in the lung. Blood clots in the deep veins of the leg or pelvis (deep vein thrombosis, or DVT) are the most common cause. These blood clots can travel to the lungs. Pulmonary embolism can be very serious. Because you have had one pulmonary embolism, you are at greater risk for having another one. But you can take steps to prevent another pulmonary embolism by following your doctor's instructions. You will probably take a prescription blood-thinning medicine to prevent blood clots. A blood thinner can stop a blood clot from growing larger and prevent new clots from forming. Follow-up care is a key part of your treatment and safety. Be sure to make and go to all appointments, and call your doctor if you are having problems. It's also a good idea to know your test results and keep a list of the medicines you take. How can you care for yourself at home? · Take your medicines exactly as prescribed. Call your doctor if you think you are having a problem with your medicine. You will get more details on the specific medicines your doctor prescribes. · If you are taking a blood thinner, be sure you get instructions about how to take your medicine safely. Blood thinners can cause serious bleeding problems. Preventing future pulmonary embolisms  · Exercise. Keep blood moving in your legs to keep clots from forming. If you are traveling by car, stop every hour or so. Get out and walk around for a few minutes. If you are traveling by bus, train, or plane, get out of your seat and walk up and down the aisles every hour or so. You also can do leg exercises while you are seated. Pump your feet up and down by pulling your toes up toward your knees then pointing them down. · Get up out of bed as soon as possible after an illness or surgery. · Do not smoke.  If you need help quitting, talk to your doctor about stop-smoking programs and medicines. These can increase your chances of quitting for good. · Check with your doctor before taking hormone or birth control pills. These may increase your risk of blot clots. · Ask your doctor about wearing compression stockings to help prevent blood clots in your legs. There are different types of stockings, and they need to fit right. So your doctor will recommend what you need. When should you call for help? Call 911 anytime you think you may need emergency care. For example, call if:    · You have shortness of breath.     · You have chest pain.     · You passed out (lost consciousness).     · You cough up blood.    Call your doctor now or seek immediate medical care if:    · You have new or worsening pain or swelling in your leg.    Watch closely for changes in your health, and be sure to contact your doctor if:    · You do not get better as expected. Where can you learn more? Go to http://cris-soledad.info/. Enter I522 in the search box to learn more about \"Pulmonary Embolism: Care Instructions. \"  Current as of: November 21, 2017  Content Version: 11.7  © 0569-0045 [employer]. Care instructions adapted under license by [employer] (which disclaims liability or warranty for this information). If you have questions about a medical condition or this instruction, always ask your healthcare professional. [address] any warranty or liability for your use of this information. Rivaroxaban (By mouth)   Rivaroxaban (ual-t-VLM-a-ban)  Treats and prevents blood clots, which lowers the risk of stroke, deep vein thrombosis (DVT), pulmonary embolism (PE), and similar conditions. This medicine is a blood thinner. Brand Name(s): Xarelto, Xarelto Starter Pack   There may be other brand names for this medicine. When This Medicine Should Not Be Used: This medicine is not right for everyone.  Do not use it if you had an allergic reaction to rivaroxaban, or you have severe bleeding. How to Use This Medicine:   Tablet  · Take this medicine as directed, and take it at the same time each day. · 10-milligram (mg) tablet: Take with or without food. · 15-mg or 20-mg tablet: Take with food. · If you cannot swallow the tablets, you may crush the tablet and mix it with applesauce. Eat some food after you swallow the mixture. · Tube feeding: You may crush the tablet and mix the medicine in 50 milliliters (mL) of water before giving it via the tube. This must be followed by a feeding. · This medicine should come with a Medication Guide. Ask your pharmacist for a copy if you do not have one. · Missed dose:   ¨ Ask your doctor or pharmacist if you are not sure what to do if you miss a dose. ¨ Once-daily dose: If you miss a dose or forget to use your medicine, use it as soon as you can on the same day. Do not use extra medicine to make up for a missed dose. ¨ Twice-daily dose to treat a blood clot (15-mg tablet): If you miss a dose or forget to use your medicine, use it as soon as you can on the same day. You may take 2 doses at the same time to make up for the missed dose. This is only for people who take a total of 30 mg per day. · Store the medicine in a closed container at room temperature, away from heat, moisture, and direct light. Drugs and Foods to Avoid:   Ask your doctor or pharmacist before using any other medicine, including over-the-counter medicines, vitamins, and herbal products. · Some foods and medicines can affect how rivaroxaban works.  Tell your doctor if you are using any of the following:  ¨ NSAID medicine (including aspirin, celecoxib, diclofenac, ibuprofen, naproxen)  ¨ Ketoconazole, itraconazole, lopinavir, ritonavir, indinavir, conivaptan, carbamazepine, phenytoin, rifampin, Butch's wort  ¨ Another blood thinner (including clopidogrel, enoxaparin, heparin, warfarin)  Warnings While Using This Medicine:   · Tell your doctor if you are pregnant or breastfeeding, or if you have kidney disease, liver disease, bleeding problems, or an artificial heart valve. · This medicine may increase your risk of bleeding. Be careful to avoid injuries that could cause bleeding. Stay away from rough sports or other situations where you could be bruised, cut, or hurt. Brush and floss your teeth gently. Be careful when using sharp objects, including razors and fingernail clippers. Avoid picking your nose. If you need to blow your nose, blow it gently. · This medicine may cause nerve damage if you have a medical procedure done to your back, including anesthesia or a spinal puncture. This is more likely to happen if you have a history of back injury, back surgery, problems with your spine, or procedures or punctures to your back. Tell your doctor if you are also taking another blood thinner, because this also increases the risk. · Do not stop using this medicine suddenly without asking your doctor. You might have a higher risk of stroke for a short time after you stop using this medicine. · Tell any doctor or dentist who treats you that you are using this medicine. · Your doctor will do lab tests at regular visits to check on the effects of this medicine. Keep all appointments. · Keep all medicine out of the reach of children. Never share your medicine with anyone.   Possible Side Effects While Using This Medicine:   Call your doctor right away if you notice any of these side effects:  · Allergic reaction: Itching or hives, swelling in your face or hands, swelling or tingling in your mouth or throat, chest tightness, trouble breathing  · Blistering, peeling, or red skin rash  · Decrease in how much or how often you urinate  · Heavy menstrual bleeding, or vaginal bleeding  · Red or brown urine, bloody or black, tarry stools  · Unusual bleeding or bruising, including frequent nosebleeds  · Vomiting blood or material that looks like coffee grounds  If you notice other side effects that you think are caused by this medicine, tell your doctor. Call your doctor for medical advice about side effects. You may report side effects to FDA at 1-500-BQK-6360  © 2017 [address] Information is for End User's use only and may not be sold, redistributed or otherwise used for commercial purposes. The above information is an  only. It is not intended as medical advice for individual conditions or treatments. Talk to your doctor, nurse or pharmacist before following any medical regimen to see if it is safe and effective for you.

## 2018-09-19 NOTE — PROGRESS NOTES
Problem: Mobility Impaired (Adult and Pediatric) Goal: *Acute Goals and Plan of Care (Insert Text) DISCHARGE GOALS : 
(1.)Mr. Mayelin Campos will move from supine to sit and sit to supine  with MODIFIED INDEPENDENCE. (2.)Mr. Mayelin Campos will transfer from bed to chair and chair to bed with STAND BY ASSIST using straight cane on left. (3.)Mr. Mayelin Campos will ambulate with STAND BY ASSIST for 300 feet with a straight cane on the left. 4) pt ambulating up & down 1 step with cane & SBA. 
5) pt independent with HEP 3 x a day 25 reps each. PHYSICAL THERAPY: Initial Assessment, Treatment Day: Day of Assessment, AM 9/19/2018 INPATIENT: Hospital Day: 2 [place] / [place] / Product Type: Medicare /  
  
NAME/AGE/GENDER: Romulo Andino is a 76 y.o. male PRIMARY DIAGNOSIS: Acute pulmonary embolism ([address] Acute respiratory failure with hypoxemia (HCC) <principal problem not specified> <principal problem not specified> 
  
  
ICD-10: Treatment Diagnosis:  
 · Generalized Muscle Weakness (M62.81) · Other lack of cordination (R27.8) · Difficulty in walking, Not elsewhere classified (R26.2) · Other abnormalities of gait and mobility (R26.89) Precaution/Allergies: Morphine; Other medication; and Shellfish derived ASSESSMENT:  
 
Mr. Mayelin Campos presents with mild unsteadiness s/p ACACIA ~ 2 weeks ago & recent onset of PE. Pt was just beginning to transition to a cane from a walker with HHPT when he experienced this set back. This pt will benefit from follow up therapy to help progress independence while in the hospital so further ground is not lost. 
 
This section established at most recent assessment PROBLEM LIST (Impairments causing functional limitations): 1. Decreased Strength 2. Decreased ADL/Functional Activities 3. Decreased Transfer Abilities 4. Decreased Ambulation Ability/Technique 5. Decreased Balance 6. Increased Pain 7. Decreased Activity Tolerance 8. Decreased Knowledge of Precautions 9. Decreased [place] with Home Exercise Program 
 INTERVENTIONS PLANNED: (Benefits and precautions of physical therapy have been discussed with the patient.) 1. Balance Exercise 2. Bed Mobility 3. Gait Training 4. Home Exercise Program (HEP) 5. Therapeutic Activites 6. Therapeutic Exercise/Strengthening 7. Transfer Training TREATMENT PLAN: Frequency/Duration: daily for duration of hospital stay Rehabilitation Potential For Stated Goals: Good RECOMMENDED REHABILITATION/EQUIPMENT: (at time of discharge pending progress): Due to the probability of continued deficits (see above) this patient will likely need continued skilled physical therapy after discharge. Equipment:  
? to be determined HISTORY:  
History of Present Injury/Illness (Reason for Referral): 
Patient is a 76years old male with pmhx of osteoarthritis of right hips s/p ACACIA (2 weeks ago), stomach ca s/p surgical resection, GERD, dyslipidemia presented with 1 day hx of worsening shortness of breath. Pt reported that he had right hip replacement 2 weeks ago done by Dr. Pool Rico. He was discharged home with HHA/PT. He was fine at that time but soon after, he started noticing mild shortness of breath. It wasn't interfering with his ADL's until few days ago. He denies chest pain, nausea, vomiting, dizziness/syncope/lightheadedness, headache, weakness/numbness/tingling in extremities, diaphoresis. Past Medical History/Comorbidities:  
Mr. 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 He also has no past medical history of Adverse effect of anesthesia;  Aneurysm ([address] Arrhythmia; Asthma; Autoimmune disease ([facility] CAD (coronary artery disease); Chronic kidney disease; Chronic obstructive pulmonary disease ([facility] Coagulation defects; Coagulation disorder ([facility] COPD; Diabetes ([facility] Difficult intubation; Endocarditis; Heart failure ([facility] Ill-defined condition; Liver disease; Malignant hyperthermia due to anesthesia; Morbid obesity ([facility] Nicotine vapor product user; Non-nicotine vapor product user; Pseudocholinesterase deficiency; Psychiatric disorder; PUD (peptic ulcer disease); Rheumatic fever; Seizures ([facility] Stroke [facility]); Thyroid disease; or Unspecified sleep apnea. Mr. Anuel Manzo  has a past surgical history that includes hx gi (2010); pr total hip arthroplasty (Left, 2004); hx shoulder arthroscopy (Right, 2015); hx knee arthroscopy (Right); pr total knee arthroplasty (Right, 2012); hx other surgical; hx cholecystectomy (2015); and hx appendectomy. Social History/Living Environment:  
Home Environment: Private residence # Steps to Enter: 1 Rails to Enter: No 
One/Two Story Residence: Two story, live on 1st floor Living Alone: No 
Support Systems: Spouse/Significant Other/Partner Patient Expects to be Discharged to[de-identified] Private residence Current DME Used/Available at Home: Cane, straight, Walker, rolling Prior Level of Function/Work/Activity: Pt was functioning in the home with a rolling walker & supervision prior to this admission. Personal Factors: Other factors that influence how disability is experienced by the patient:  Current & PMH Number of Personal Factors/Comorbidities that affect the Plan of Care: 3+: HIGH COMPLEXITY EXAMINATION:  
Most Recent Physical Functioning:  
Gross Assessment: 
AROM: Generally decreased, functional (left LE) Strength: Generally decreased, functional (left LE) Coordination: Generally decreased, functional (left LE) Posture: 
  
Balance: 
Sitting: Intact; Without support Standing: Impaired; With support (cane) Bed Mobility: 
Supine to Sit: Stand-by assistance Sit to Supine: Stand-by assistance Scooting: Stand-by assistance Wheelchair Mobility: 
  
Transfers: 
Sit to Stand: Contact guard assistance Stand to Sit: Contact guard assistance Bed to Chair: Contact guard assistance (with cane, pt was supervised with rolling walker) Gait: 
Right Side Weight Bearing: As tolerated Speed/Jaz: Delayed Step Length: Left shortened Stance: Right decreased Gait Abnormalities: Antalgic;Decreased step clearance Distance (ft): 80 Feet (ft) Assistive Device: Cane, straight (on left, pt was supervised with rolling walker) Ambulation - Level of Assistance: Contact guard assistance Functional Mobility:  
      Gait/Ambulation:  cga Transfers:  cga Bed Mobility:  sba Body Structures Involved: 1. Joints 2. Muscles Body Functions Affected: 1. Sensory/Pain 2. Movement Related Activities and Participation Affected: 1. General Tasks and Demands 2. Mobility Number of elements that affect the Plan of Care: 4+: HIGH COMPLEXITY CLINICAL PRESENTATION:  
Presentation: Stable and uncomplicated: LOW COMPLEXITY CLINICAL DECISION [address]IN[address] [place] [address] [facility] 6 Clicks Basic Mobility Inpatient Short Form How much difficulty does the patient currently have. .. Unable A Lot A Little None 1. Turning over in bed (including adjusting bedclothes, sheets and blankets)? [] 1   [] 2   [x] 3   [] 4  
2. Sitting down on and standing up from a chair with arms ( e.g., wheelchair, bedside commode, etc.)   [] 1   [] 2   [x] 3   [] 4  
3. Moving from lying on back to sitting on the side of the bed? [] 1   [] 2   [x] 3   [] 4 How much help from another person does the patient currently need. .. Total A Lot A Little None 4. Moving to and from a bed to a chair (including a wheelchair)?    [] 1   [] 2   [x] 3   [] 4  
 5.  Need to walk in hospital room? [] 1   [] 2   [x] 3   [] 4  
6. Climbing 3-5 steps with a railing? [] 1   [] 2   [x] 3   [] 4  
© 2007, Trustees of [address] [place] [address] [address] [address], under license to [employer]. All rights reserved Score:  Initial: 18 Most Recent: X (Date: -- ) Interpretation of Tool:  Represents activities that are increasingly more difficult (i.e. Bed mobility, Transfers, Gait). Score 24 23 22-20 19-15 14-10 9-7 6 Modifier CH CI CJ CK CL CM CN   
 
? Mobility - Walking and Moving Around:  
  - CURRENT STATUS: CK - 40%-59% impaired, limited or restricted  - GOAL STATUS: CJ - 20%-39% impaired, limited or restricted  - D/C STATUS:  ---------------To be determined--------------- [place] / [place] / Product Type: Medicare /   
 
Medical Necessity:    
· Patient is expected to demonstrate progress in strength, balance, coordination and functional technique to decrease assistance required with bed mobility, transfers & gait. Reason for Services/Other Comments: 
· Patient continues to require skilled intervention due to pt not independent with functional mobility. Use of outcome tool(s) and clinical judgement create a POC that gives a: Clear prediction of patient's progress: LOW COMPLEXITY  
  
 
 
 
TREATMENT:  
(In addition to Assessment/Re-Assessment sessions the following treatments were rendered) Pre-treatment Symptoms/Complaints:  Right knee referred pain 
Pain: Initial: visual scale Pain Intensity 1: 3 Pain Location 1: Knee Pain Orientation 1: Right Pain Intervention(s) 1: Ambulation/Increased Activity  Post Session:  3/10 Therapeutic Exercise: (14 Minutes):  Exercises : reviewed HEP with written guidelines provided - ankle pumps, quad sets & gluteal sets, heel slides, hip ABD-ADD, SAQ's & LAQ's, overhead reach, UE push-pull, bilateral shoulder horizontal ABD-ADD to be done 25 x each 3 x a day to improve mobility, strength, balance, coordination and dynamic movement of arm - bilateral and leg - bilateral to improve functional endurance. Required minimal verbal cues to promote proper body alignment and promote proper body mechanics. Assessment/ 13 min Braces/Orthotics/Lines/Etc:  
· IV Treatment/Session Assessment:   
· Response to Treatment:  Tolerated well · Interdisciplinary Collaboration:  
o Registered Nurse 
o Physician 
o  · After treatment position/precautions:  
o Up in chair 
o Bed/Chair-wheels locked 
o Call light within reach 
o RN notified · Compliance with Program/Exercises: Will assess as treatment progresses. · Recommendations/Intent for next treatment session: \"Next visit will focus on advancements to more challenging activities and reduction in assistance provided\". Total Treatment Duration: PT Patient Time In/Time Out Time In: 2891 Time Out: 0900 Bryan Burk PT

## 2018-09-19 NOTE — DISCHARGE SUMMARY
Hospitalist Discharge Summary Patient ID: 
Maribel Whalen [id number] 
74 y.o. 
1940 Admit date: 9/18/2018 11:44 AM 
Discharge date and time: 9/19/2018 Attending: Rossana Wright MD 
PCP:  Priyank Mcdonald MD 
Treatment Team: Attending Provider: Rossana Wright MD; Utilization Review: Minnie Schmidt RN 
 
Principal Diagnosis: 
 
Acute left pulmonary embolism Acute hypoxic respiratory failure; resolved Osteoarthritis of right hip s/p ACACIA( 2 weeks ago) Active Problems: 
  Acute pulmonary embolism ([address] (9/18/2018) Acute respiratory failure with hypoxemia ([facility] [address] (9/18/2018) Hospital Course: 
Please refer to the admission H&P for details of presentation. In summary, the patient is 76years old male with pmhx of osteoarthritis of right hips s/p ACACIA (2 weeks ago), stomach ca s/p surgical resection, GERD, dyslipidemia admitted on 9/18 for acute pulmonary embolism. Pt reported that he had right hip replacement 2 weeks ago done by Dr. Violet Huynh. He was discharged home with HHA/PT. He was fine at that time but soon after, he started noticing mild shortness of breath. It wasn't interfering with his ADL's until few days ago. He denied chest pain, nausea, vomiting, dizziness/syncope/lightheadedness, headache, weakness/numbness/tingling in extremities, diaphoresis. He reports some leg swelling but no pain. In ER, CT chest showed acute left upper lobe PE and LE venous ultrasound was negative for DVT. Pt was started on Xarelto 15 mg po BID for 21 days, followed by 20 mg once daily for minimum of 3-6 months. Pt improved clinically within 24 hours and will be discharged today with HHA/PT. Pt is on room air, with no chest pain. CT chest not very impressive for basilar infiltrates, more of atelectasis. I will complete 5 days of augmentin as outpatient. CM has assisted in arranging 1 month supply of Xarelto prior to discharge. Rest of the hospital course was uneventful. Significant Diagnostic Studies:  
Bilateral lower extremity venous Doppler evaluation 09/18/2018 
  
HISTORY: Pain and swelling x6 weeks. Right hip surgery 09/04/2018. 
  
Grayscale, color-flow and Doppler evaluation the major veins of the lower 
extremities was performed.  
  
Comparison: None 
  
FINDINGS: There is normal compression and augmentation of the common femoral, 
superficial femoral and popliteal veins bilaterally. No grayscale or color-flow 
findings within the vessels or within the distal greater saphenous or profunda 
femoral veins were noted to suggest deep venous thrombosis. Interrogated 
portions of peroneal and posterior tibial veins were also unremarkable 
bilaterally. No popliteal cyst was identified on either side. 
  
IMPRESSION IMPRESSION: Negative evaluation for deep venous thrombosis within either lower Extremity. CT chest: 
IMPRESSION: 
1. Pulmonary embolism within the left upper lobe. 2. Mild left lower lobe atelectasis/infiltrate. 3. Trace of pleural effusion. Labs: Results:  
   
Chemistry Recent Labs  
   09/19/18 
 0537  09/18/18 
 1235 GLU  80  90 NA  142  142  
K  3.6  4.1 CL  109*  106 CO2  20*  22 BUN  10  13 CREA  1.38  1.57* CA  8.5  9.2 AGAP  13  14 AP   --   129 TP   --   7.0 ALB   --   2.9*  
GLOB   --   4.1* AGRAT   --   0.7 CBC w/Diff Recent Labs  
   09/19/18 
 0537  09/18/18 
 1235 WBC  8.6  11.5*  
RBC  3.00*  3.30* HGB  9.6*  10.6* HCT  28.8*  33.0*  
PLT  417  425 GRANS   --   58  
LYMPH   --   23 EOS   --   2 Cardiac Enzymes No results for input(s): CPK, CKND1, KRISTYN in the last 72 hours. No lab exists for component: Shane Salm Coagulation No results for input(s): PTP, INR, APTT in the last 72 hours. No lab exists for component: INREXT Lipid Panel No results found for: CHOL, CHOLPOCT, CHOLX, CHLST, CHOLV, 641040, HDL, LDL, LDLC, DLDLP, 207002, VLDLC, VLDL, TGLX, TRIGL, TRIGP, TGLPOCT, CHHD, CHHDX BNP No results for input(s): BNPP in the last 72 hours. Liver Enzymes Recent Labs  
   09/18/18 
 1235 TP  7.0 ALB  2.9*  
AP  129 SGOT  32 Thyroid Studies No results found for: T4, T3U, TSH, TSHEXT Discharge Exam: 
Visit Vitals  /77 (BP Patient Position: At rest)  Pulse 91  Temp 97.6 °F (36.4 °C)  Resp 20  
 Ht 5' 6\" (1.676 m)  Wt 72.6 kg (160 lb)  SpO2 94%  BMI 25.82 kg/m2 General:                    Alert, cooperative, no distress, appears stated age. Head:                                   Normocephalic, without obvious abnormality, atraumatic. Nose:                                   Nares normal. No drainage or sinus tenderness. Lungs:                       Clear to auscultation bilaterally. No Wheezing or Rhonchi. No rales. Heart:                                  Regular rate and rhythm,  no murmur, rub or gallop. Abdomen:                  Soft, non-tender. Not distended. Bowel sounds normal.  
Extremities:               No cyanosis. No edema. No clubbing Skin:                                    Texture, turgor normal. No rashes or lesions. Not Jaundiced Neurologic:                GCS 15, no motor or sensory deficits, CN 2-12 intact Psych:             AO x3, mood and affect appropriate Disposition: home with A Discharge Condition: stable Patient Instructions:  
Current Discharge Medication List  
  
START taking these medications Details  
amoxicillin-clavulanate (AUGMENTIN) 875-125 mg per tablet Take 1 Tab by mouth every twelve (12) hours for 5 days. Qty: 10 Tab, Refills: 0  
  
!! rivaroxaban (XARELTO) 15 mg tab tablet Take 1 Tab by mouth two (2) times daily (with meals) for 20 days. Qty: 40 Tab, Refills: 0  
  
!! rivaroxaban (XARELTO) 20 mg tab tablet Take 1 Tab by mouth daily for 30 days. Qty: 30 Tab, Refills: 1  
  
 !! - Potential duplicate medications found. Please discuss with provider. CONTINUE these medications which have CHANGED Details  
oxyCODONE IR (ROXICODONE) 10 mg tab immediate release tablet Take 1 Tab by mouth every six (6) hours as needed for Pain (severe pain 8-10). Max Daily Amount: 40 mg. 
Qty: 20 Tab, Refills: 0 Associated Diagnoses: Arthritis of right hip CONTINUE these medications which have NOT CHANGED Details  
traMADol (ULTRAM) 50 mg tablet Take 50 mg by mouth every six (6) hours as needed for Pain. aspirin delayed-release 81 mg tablet Take 1 Tab by mouth every twelve (12) hours every twelve (12) hours. Qty: 120 Tab, Refills: 0  
  
sucralfate (CARAFATE) 1 gram tablet Take 1 g by mouth three (3) times daily. Take / use AM day of surgery  per anesthesia protocols. omeprazole (PRILOSEC) 20 mg capsule Take 20 mg by mouth Daily (before breakfast). Take DOS per anesthesia protocol. Indications: gastroesophageal reflux disease  
  
zolpidem (AMBIEN) 10 mg tablet Take 10 mg by mouth nightly as needed for Sleep. Indications: SLEEP-ONSET INSOMNIA  
  
cyanocobalamin (VITAMIN B-12) 1,000 mcg/mL injection 1,000 mcg by IntraMUSCular route every twenty-eight (28) days. simvastatin (ZOCOR) 40 mg tablet Take 40 mg by mouth nightly. cetirizine (ZYRTEC) 10 mg tablet Take 10 mg by mouth nightly. Indications: ALLERGIC RHINITIS  
  
fluticasone (FLONASE) 50 mcg/Actuation nasal spray 2 Sprays by Nasal route nightly. Indications: ALLERGIC RHINITIS  
  
  
STOP taking these medications  
  
 ondansetron (ZOFRAN ODT) 4 mg disintegrating tablet Comments:  
Reason for Stopping:   
   
 fluconazole (DIFLUCAN) 100 mg tablet Comments:  
Reason for Stopping:   
   
  
 
 
Activity: PT/OT per Home Health Diet: Regular Diet Wound Care: None needed Follow-up ·   Follow up with PCP in 1 week. · Follow up with Ortho in 2 weeks as scheduled. Time spent to discharge patient 35 minutes Signed: 
Jennifer Ortez MD 
9/19/2018 10:26 AM

## 2018-09-19 NOTE — PROGRESS NOTES
Pt reports severe nausea with Oxycodone, requests prescription changed to Ultram. Dr Kevin Valles notified and to bring new script to pt. Oxycodone prescription shredded.

## 2018-09-20 ENCOUNTER — PATIENT OUTREACH (OUTPATIENT)
Dept: CASE MANAGEMENT | Age: 78
End: 2018-09-20

## 2018-09-20 PROCEDURE — 3331090002 HH PPS REVENUE DEBIT

## 2018-09-20 PROCEDURE — 3331090001 HH PPS REVENUE CREDIT

## 2018-09-20 NOTE — PROGRESS NOTES
Transition of Care Discharge Follow-up Questionnaire Date/Time of STACY Outreach: 9/20/18 4:09 PM  
What was the patient hospitalized for? Patient was hospitalized for Acute Pulmonary Embolism Does the patient understand his/her diagnosis and/or treatment and what happened during the hospitalization? CM Assessed Risk for Readmission: 
 
 
 
Patient stated Risk for Readmission: 
 
 Spoke to Wife who consented to HUMAIRA MEDINA outreach, and verbalized understanding of treatment and diagnosis. Risk for Readmission completed and assessed and Care Coordinator assessed risk would be due to diagnosis. Wife states she is confident he will not readmit. Did the patient receive discharge instructions? Wife states d/c instructions received. Review any discharge instructions (see notes in [facility]). Ask patient if they understand these. Do they have any questions? Wife discussed discharge plan and instruction as Wife understood it to be with Care Coordinator. Which I have documented in the pertinent areas throughout this progress note. Were home services ordered (nursing, PT, OT, ST, etc.)? [facility] ordered at d/c for PT/OT. If so, has the first visit occurred? If not, why? (Assist with coordination of services if necessary.) Already engaged per wife and states they are scheduled to come out tomorrow. Was any DME ordered? No DME ordered at d/c. If so, has it been received? If not, why?  (Assist with coordination of arranging DME orders if necessary.) N/A Complete a review of all medications (new, continued and discontinued meds per the D/C instructions and medication tab in [facility]). Review of all meds completed with Wife and Care Coordinator. Augmentin and Xarelto, prescribed at d/c. Were all new prescriptions filled? If not, why?  (Assist with obtainment of medications if necessary.) Yes.     
Does the patient understand the purpose and dosing instructions for all medications? (If patient has questions, provide explanation and education.) Indicated by Wife to Care Coordinator, the purpose and dosing instructions for all medications were understood. Does the patient have any problems in performing ADLs? (If patient is unable to perform ADLs  what is the limiting factor(s)? Do they have a support system that can assist? If no support system is present, discuss possible assistance that they may be able to obtain.) Wife states patient is independent with all ADLs. Does the patient have all follow-up appointments scheduled? 7 day f/up with PCP? 
 
7-14 day f/up with specialist? 
 
If f/up has not been made  what actions has the care coordinator made to accomplish this? Has transportation been arranged? [facility] Pulmonary follow-up should be within 7 days of discharge; all others should have PCP follow-up within 7 days of discharge; follow-ups with other specialists as appropriate or ordered.) PCP f/u appt. 9/27. Ortho appt. 10/3. Wife to transport. Schedule next appointment with HUMAIRA Forde or refer to RN Case Manager/  per the workflow guidelines. Care Coordinator will f/u 9/28. Any other questions or concerns expressed by the patient? Gratitude stated and no further questions asked or needs identified. STACY Call Completed By:  
Misty Torres LPN/ Care Coordinator FWIFKO:[phone] [place] [address] [place] [address] [facility] [place], [address] [place] [address] 
[email or web address]

## 2018-09-21 PROCEDURE — 3331090002 HH PPS REVENUE DEBIT

## 2018-09-21 PROCEDURE — 3331090001 HH PPS REVENUE CREDIT

## 2018-09-22 PROCEDURE — 3331090002 HH PPS REVENUE DEBIT

## 2018-09-22 PROCEDURE — 3331090001 HH PPS REVENUE CREDIT

## 2018-09-23 PROCEDURE — 3331090001 HH PPS REVENUE CREDIT

## 2018-09-23 PROCEDURE — 3331090002 HH PPS REVENUE DEBIT

## 2018-09-24 ENCOUNTER — HOME CARE VISIT (OUTPATIENT)
Dept: SCHEDULING | Facility: HOME HEALTH | Age: 78
End: 2018-09-24
Payer: MEDICARE

## 2018-09-24 VITALS
HEART RATE: 62 BPM | DIASTOLIC BLOOD PRESSURE: 63 MMHG | TEMPERATURE: 97.2 F | RESPIRATION RATE: 17 BRPM | SYSTOLIC BLOOD PRESSURE: 98 MMHG

## 2018-09-24 PROCEDURE — 3331090002 HH PPS REVENUE DEBIT

## 2018-09-24 PROCEDURE — 3331090001 HH PPS REVENUE CREDIT

## 2018-09-24 PROCEDURE — G0157 HHC PT ASSISTANT EA 15: HCPCS

## 2018-09-25 PROCEDURE — 3331090001 HH PPS REVENUE CREDIT

## 2018-09-25 PROCEDURE — 3331090002 HH PPS REVENUE DEBIT

## 2018-09-26 PROCEDURE — 3331090002 HH PPS REVENUE DEBIT

## 2018-09-26 PROCEDURE — 3331090001 HH PPS REVENUE CREDIT

## 2018-09-27 PROCEDURE — 3331090001 HH PPS REVENUE CREDIT

## 2018-09-27 PROCEDURE — 3331090002 HH PPS REVENUE DEBIT

## 2018-09-28 ENCOUNTER — HOME CARE VISIT (OUTPATIENT)
Dept: SCHEDULING | Facility: HOME HEALTH | Age: 78
End: 2018-09-28
Payer: MEDICARE

## 2018-09-28 VITALS
HEART RATE: 100 BPM | TEMPERATURE: 96 F | SYSTOLIC BLOOD PRESSURE: 100 MMHG | DIASTOLIC BLOOD PRESSURE: 60 MMHG | RESPIRATION RATE: 19 BRPM

## 2018-09-28 PROCEDURE — 3331090002 HH PPS REVENUE DEBIT

## 2018-09-28 PROCEDURE — 3331090001 HH PPS REVENUE CREDIT

## 2018-09-28 PROCEDURE — G0157 HHC PT ASSISTANT EA 15: HCPCS

## 2018-09-29 PROCEDURE — 3331090002 HH PPS REVENUE DEBIT

## 2018-09-29 PROCEDURE — 3331090001 HH PPS REVENUE CREDIT

## 2018-09-30 PROCEDURE — 3331090002 HH PPS REVENUE DEBIT

## 2018-09-30 PROCEDURE — 3331090001 HH PPS REVENUE CREDIT

## 2018-10-01 ENCOUNTER — HOME CARE VISIT (OUTPATIENT)
Dept: SCHEDULING | Facility: HOME HEALTH | Age: 78
End: 2018-10-01
Payer: MEDICARE

## 2018-10-01 VITALS
HEART RATE: 88 BPM | DIASTOLIC BLOOD PRESSURE: 66 MMHG | SYSTOLIC BLOOD PRESSURE: 100 MMHG | RESPIRATION RATE: 17 BRPM | TEMPERATURE: 95.5 F

## 2018-10-01 PROCEDURE — 3331090002 HH PPS REVENUE DEBIT

## 2018-10-01 PROCEDURE — 3331090001 HH PPS REVENUE CREDIT

## 2018-10-01 PROCEDURE — G0157 HHC PT ASSISTANT EA 15: HCPCS

## 2018-10-02 ENCOUNTER — HOME CARE VISIT (OUTPATIENT)
Dept: HOME HEALTH SERVICES | Facility: HOME HEALTH | Age: 78
End: 2018-10-02
Payer: MEDICARE

## 2018-10-02 PROCEDURE — 3331090001 HH PPS REVENUE CREDIT

## 2018-10-02 PROCEDURE — 3331090002 HH PPS REVENUE DEBIT

## 2018-10-03 PROCEDURE — 3331090002 HH PPS REVENUE DEBIT

## 2018-10-03 PROCEDURE — 3331090001 HH PPS REVENUE CREDIT

## 2018-10-04 PROCEDURE — 3331090001 HH PPS REVENUE CREDIT

## 2018-10-04 PROCEDURE — 3331090002 HH PPS REVENUE DEBIT

## 2018-10-05 ENCOUNTER — HOME CARE VISIT (OUTPATIENT)
Dept: SCHEDULING | Facility: HOME HEALTH | Age: 78
End: 2018-10-05
Payer: MEDICARE

## 2018-10-05 VITALS
RESPIRATION RATE: 16 BRPM | HEART RATE: 86 BPM | SYSTOLIC BLOOD PRESSURE: 105 MMHG | DIASTOLIC BLOOD PRESSURE: 76 MMHG | TEMPERATURE: 96.7 F

## 2018-10-05 PROCEDURE — G0151 HHCP-SERV OF PT,EA 15 MIN: HCPCS

## 2018-10-05 PROCEDURE — 3331090002 HH PPS REVENUE DEBIT

## 2018-10-05 PROCEDURE — 3331090001 HH PPS REVENUE CREDIT

## 2018-10-06 PROCEDURE — 3331090001 HH PPS REVENUE CREDIT

## 2018-10-06 PROCEDURE — 3331090002 HH PPS REVENUE DEBIT

## 2018-10-07 PROCEDURE — 3331090001 HH PPS REVENUE CREDIT

## 2018-10-07 PROCEDURE — 3331090002 HH PPS REVENUE DEBIT

## 2018-10-23 ENCOUNTER — HOSPITAL ENCOUNTER (OUTPATIENT)
Dept: PHYSICAL THERAPY | Age: 78
Discharge: HOME | End: 2018-10-23
Payer: MEDICARE

## 2018-10-23 PROCEDURE — 97161 PT EVAL LOW COMPLEX 20 MIN: CPT

## 2018-10-23 PROCEDURE — 97140 MANUAL THERAPY 1/> REGIONS: CPT

## 2018-10-23 PROCEDURE — 97110 THERAPEUTIC EXERCISES: CPT

## 2018-10-23 PROCEDURE — G8978 MOBILITY CURRENT STATUS: HCPCS

## 2018-10-23 PROCEDURE — G8979 MOBILITY GOAL STATUS: HCPCS

## 2018-10-23 NOTE — THERAPY EVALUATION
Ren Swanson : 1940 [place] [facility] [address] [facility] [address] [phone]   [phone] OUTPATIENT PHYSICAL THERAPY:Initial Assessment and Daily Note 10/23/2018 ICD-10: Treatment Diagnosis: Pain in Joint, R hip [M25.551]; Muscle weakness, generalized [M62.81]; Difficulty walking, not elsewhere classified [R26.2] Precautions/Allergies: Morphine; Other medication; and Shellfish derived Fall Risk Score: 2 (? 5 = High Risk) MD Orders: Evaluate and Treat MEDICAL/REFERRING DIAGNOSIS: 
Hip pain [M25.559] DATE OF ONSET: 18 REFERRING PHYSICIAN: Breann Cervantes MD 
RETURN PHYSICIAN APPOINTMENT: TBD INITIAL ASSESSMENT:  Mr. Wm Keys presents s/p R total hip replacement on 18. Pt. Demonstrates altered gait mechanics with use of a single point cane and reduced muscular strength in the R hip abductors, R hip extensors, R hip flexors, and R hip external rotators. Pt. Demonstrates moderate difficulty with ADL's along with standing and walking secondary to weakness. Pt. Is also managing a blood clot resulting in fatigue and labored breathing upon exertion. Pt. Will benefit from skilled PT to return to previous level of ADL function. PROBLEM LIST (Impacting functional limitations): 1. Decreased Strength 2. Decreased ADL/Functional Activities 3. Increased Pain 4. Decreased Activity Tolerance 5. Increased Shortness of Breath 6. Decreased Flexibility/Joint Mobility 7. Decreased [place] with Home Exercise Program INTERVENTIONS PLANNED: 
1. Gait Training 2. Home Exercise Program (HEP) 3. Manual Therapy 4. Range of Motion (ROM) 5. Therapeutic Exercise/Strengthening TREATMENT PLAN: 
Effective Dates: 10/23/2018 TO 2019 (90 days). Frequency/Duration: 2 times a week for 90 Day(s) GOALS: (Goals have been discussed and agreed upon with patient.) Discharge Goals: Time Frame: 8 weeks 1. Pt. Will report <3/10 R hip pain with standing for 5 minutes 2. Pt. Will demonstrate 4+/5 MMT strength in the R hip abductors and flexors to improve ADL performance. 3. Pt. Will ambulate for 10 minutes without single point cane and absence of trendelenburg gait to improve walking. 4. Pt. Will score >50 on the LEFS to demonstrate improved pain and function. Rehabilitation Potential For Stated Goals: Good The information in this section was collected on 10/23/18 (except where otherwise noted). HISTORY:  
History of Present Injury/Illness (Reason for Referral): 
Pt. Attends PT s/p R total hip replacement on 9/4/18. Recovery from surgery has been complicated from a pulmonary embolism. Pt. Notes he has not performed prescribed HEP secondary to fatigue and difficulty breathing. The pulmonary embolism is currently being managed and patient would like to resume exercise program to re-establish prior level of function. Pt. Notes he initially experienced R knee pain that was later attributed to severe hip OA leading to surgery. Pt. Notes he is having difficulty with prolonged standing, lifting the R LE, and walking without a cane. Past Medical History/Comorbidities:  
Mr. Héctor Andujar  has a past medical history of Acute pulmonary embolism ([address] Arthritis, Chronic pain, Former smoker, GERD (gastroesophageal reflux disease), High cholesterol, Hypertension, Nausea & vomiting, Stomach cancer ([address] Total knee replacement status, and Unspecified adverse effect of anesthesia.   Mr. Héctor Andujar  has a past surgical history that includes hx gi (2010); pr total hip arthroplasty (Left, 2004); hx shoulder arthroscopy (Right, 2015); hx knee arthroscopy (Right); pr total knee arthroplasty (Right, 2012); hx other surgical; hx cholecystectomy (2015); hx appendectomy; RIGHT HIP ARTHROPLASTY TOTAL/DEPUY (Right, 9/4/2018); CHOLECYSTECTOMY LAPAROSCOPIC (N/A, 7/24/2015); RIGHT SHOULDER ARTHROPLASTY TOTAL REVERSE   (Right, 4/6/2015); KNEE ARTHROPLASTY TOTAL (Right, 2/21/2012); INFUSAPORT INSERTION (Left, 3/11/2010); and GASTRECTOMY LAPAROSCOPIC (N/A, 2/8/2010). Social History/Living Environment:  
  lives with wife in two story home. Prior Level of Function/Work/Activity: 
Functioned independently without significant limitations. Current Medications:   
  
Current Outpatient Medications:  
  tamsulosin (FLOMAX) 0.4 mg capsule, Take 0.4 mg by mouth daily. , Disp: , Rfl:  
  rivaroxaban (XARELTO) 20 mg tab tablet, Take 1 Tab by mouth daily for 30 days. , Disp: 30 Tab, Rfl: 1 
  traMADol (ULTRAM) 50 mg tablet, Take 1 Tab by mouth every eight (8) hours as needed. Max Daily Amount: 150 mg., Disp: 40 Tab, Rfl: 1 
  traMADol (ULTRAM) 50 mg tablet, Take 50 mg by mouth every six (6) hours as needed for Pain., Disp: , Rfl:  
  aspirin delayed-release 81 mg tablet, Take 1 Tab by mouth every twelve (12) hours every twelve (12) hours. , Disp: 120 Tab, Rfl: 0 
  sucralfate (CARAFATE) 1 gram tablet, Take 1 g by mouth three (3) times daily. Take / use AM day of surgery  per anesthesia protocols. , Disp: , Rfl:  
  omeprazole (PRILOSEC) 20 mg capsule, Take 20 mg by mouth Daily (before breakfast). Take DOS per anesthesia protocol. Indications: gastroesophageal reflux disease, Disp: , Rfl:  
  zolpidem (AMBIEN) 10 mg tablet, Take 10 mg by mouth nightly as needed for Sleep. Indications: SLEEP-ONSET INSOMNIA, Disp: , Rfl:  
  cyanocobalamin (VITAMIN B-12) 1,000 mcg/mL injection, 1,000 mcg by IntraMUSCular route every twenty-eight (28) days. , Disp: , Rfl:  
  simvastatin (ZOCOR) 40 mg tablet, Take 40 mg by mouth nightly., Disp: , Rfl:  
  cetirizine (ZYRTEC) 10 mg tablet, Take 10 mg by mouth nightly.  Indications: ALLERGIC RHINITIS, Disp: , Rfl:  
  fluticasone (FLONASE) 50 mcg/Actuation nasal spray, 2 Sprays by Nasal route nightly. Indications: ALLERGIC RHINITIS, Disp: , Rfl:   
Date Last Reviewed:  10/23/2018 Number of Personal Factors/Comorbidities that affect the Plan of Care: 3+: HIGH COMPLEXITY EXAMINATION:  
Observation/Orthostatic Postural Assessment:   
      Incision well healed in the R gluteal region; Swelling present in B gaiter region, pt. Ambulates with a single point cane. Mild trendelenburg present during gait without use of single point cane. Palpation:   
      Tender to palpation of the R gluteal, hip external rotators, quadriceps, and hip flexors ROM:   
      R hip ROM WFL, B knee extension limited. Strength:   
       
 Right Left Hip extension 3+/5 4+/5 Hip abduction 3+/5 4+/5 Hip external rotation 3/5 4+/5 Hip flexion 3/5 4+/5 Body Structures Involved: 1. Joints 2. Muscles Body Functions Affected: 1. Neuromusculoskeletal 
2. Movement Related Activities and Participation Affected: 1. Mobility 2. Self Care Number of elements (examined above) that affect the Plan of Care: 1-2: LOW COMPLEXITY CLINICAL PRESENTATION:  
Presentation: Stable and uncomplicated: LOW COMPLEXITY CLINICAL DECISION MAKING:  
Outcome Measure: Tool Used: Lower Extremity Functional Scale (LEFS) Score:  Initial: 33/80 Most Recent: X/80 (Date: -- ) Interpretation of Score: 20 questions each scored on a 5 point scale with 0 representing \"extreme difficulty or unable to perform\" and 4 representing \"no difficulty\". The lower the score, the greater the functional disability. 80/80 represents no disability. Minimal detectable change is 9 points. Score 80 79-63 62-48 47-32 31-16 15-1 0 Modifier CH CI CJ CK CL CM CN  
 
? Mobility - Walking and Moving Around:  
  - CURRENT STATUS: CK - 40%-59% impaired, limited or restricted  - GOAL STATUS: CI - 1%-19% impaired, limited or restricted  - D/C STATUS:  ---------------To be determined--------------- Medical Necessity: · Patient is expected to demonstrate progress in strength and range of motion to increase independence with standing, walking, and ADL's. Reason for Services/Other Comments: 
· Patient will benefit from skilled PT to address impairments identified through evaluation and return to previous ADL and recreational capacity. Use of outcome tool(s) and clinical judgement create a POC that gives a: Clear prediction of patient's progress: LOW COMPLEXITY  
  
 
 
 
TREATMENT:  
(In addition to Assessment/Re-Assessment sessions the following treatments were rendered) Pre-treatment Symptoms/Complaints:  R hip weakness, difficulty with standing/walking Pain: Initial:  
Pain Intensity 1: 3 /10 Post Session:  3/10 Therapeutic Exercise: (15 Minutes):  Exercises per grid below to improve mobility and strength. Required minimal verbal and manual cues to promote proper body alignment and promote proper body posture. Progressed range and repetitions as indicated. Date: 
10/23/2018 Activity/Exercise Parameters Clams (gravity assisted and against gravity) 3 x 10 Quadriceps setting 3 minutes Marching in supine 4 minutes Gluteal squeezes 4 minutes Pfeifer assisted SLR in sidelying 3 x 10 Manual Therapy (    Soft Tissue Mobilization Duration Duration: 10 Minutes): Manual techniques to facilitate improved motion and decreased pain. (Used abbreviations: MET - muscle energy technique; PNF - proprioceptive neuromuscular facilitation; NMR - neuromuscular re-education; a/p - anterior to posterior; p/a - posterior to anterior) · Soft tissue mobilization: R quadriceps, gluteals, and iliopsoas [employer] Portal 
Treatment/Session Assessment:   
· Response to Treatment:  Pt. Tolerates todays treatment without complaints and increased fatigue. . 
· Compliance with Program/Exercises: Will assess as treatment progresses. · Recommendations/Intent for next treatment session:  \"Next visit will focus on advancements to more challenging activities\". Total Treatment Duration: Evaluation 35 minutes, therapeutic exercise 15 minutes, manual therapy 10 minutes PT Patient Time In/Time Out Time In: 1500 Time Out: 1600 Kristie Cope PT

## 2018-10-25 ENCOUNTER — HOSPITAL ENCOUNTER (OUTPATIENT)
Dept: PHYSICAL THERAPY | Age: 78
Discharge: HOME | End: 2018-10-25
Payer: MEDICARE

## 2018-10-25 PROCEDURE — 97140 MANUAL THERAPY 1/> REGIONS: CPT

## 2018-10-25 PROCEDURE — 97110 THERAPEUTIC EXERCISES: CPT

## 2018-10-25 NOTE — PROGRESS NOTES
Marlan Siemens  : 1940  [place]  Secondary: [address]  [phone]   [phone]        OUTPATIENT PHYSICAL THERAPY:Daily Note 10/25/2018   ICD-10: Treatment Diagnosis: Pain in Joint, R hip [M25.551]; Muscle weakness, generalized [M62.81]; Difficulty walking, not elsewhere classified [R26.2]  Precautions/Allergies:   Morphine; Other medication; and Shellfish derived   Fall Risk Score: 2 (? 5 = High Risk)  MD Orders: Evaluate and Treat MEDICAL/REFERRING DIAGNOSIS:  Hip pain [M25.559]   DATE OF ONSET: 18  REFERRING PHYSICIAN: Chris Hill MD  RETURN PHYSICIAN APPOINTMENT: TBD     INITIAL ASSESSMENT:  Mr. Claudia Olson presents s/p R total hip replacement on 18. Pt. Demonstrates altered gait mechanics with use of a single point cane and reduced muscular strength in the R hip abductors, R hip extensors, R hip flexors, and R hip external rotators. Pt. Demonstrates moderate difficulty with ADL's along with standing and walking secondary to weakness. Pt. Is also managing a blood clot resulting in fatigue and labored breathing upon exertion. Pt. Will benefit from skilled PT to return to previous level of ADL function. PROBLEM LIST (Impacting functional limitations):  1. Decreased Strength  2. Decreased ADL/Functional Activities  3. Increased Pain  4. Decreased Activity Tolerance  5. Increased Shortness of Breath  6. Decreased Flexibility/Joint Mobility  7. Decreased [place] with Home Exercise Program INTERVENTIONS PLANNED:  1. Gait Training  2. Home Exercise Program (HEP)  3. Manual Therapy  4. Range of Motion (ROM)  5. Therapeutic Exercise/Strengthening   TREATMENT PLAN:  Effective Dates: 10/23/2018 TO 2019 (90 days).   Frequency/Duration: 2 times a week for 90 Day(s)  GOALS: (Goals have been discussed and agreed upon with patient.)  Discharge Goals: Time Frame: 8 weeks  1. Pt. Will report <3/10 R hip pain with standing for 5 minutes  2. Pt. Will demonstrate 4+/5 MMT strength in the R hip abductors and flexors to improve ADL performance. 3. Pt. Will ambulate for 10 minutes without single point cane and absence of trendelenburg gait to improve walking. 4. Pt. Will score >50 on the LEFS to demonstrate improved pain and function. Rehabilitation Potential For Stated Goals: Good              The information in this section was collected on 10/23/18 (except where otherwise noted). HISTORY:   History of Present Injury/Illness (Reason for Referral):  Pt. Attends PT s/p R total hip replacement on 9/4/18. Recovery from surgery has been complicated from a pulmonary embolism. Pt. Notes he has not performed prescribed HEP secondary to fatigue and difficulty breathing. The pulmonary embolism is currently being managed and patient would like to resume exercise program to re-establish prior level of function. Pt. Notes he initially experienced R knee pain that was later attributed to severe hip OA leading to surgery. Pt. Notes he is having difficulty with prolonged standing, lifting the R LE, and walking without a cane. Past Medical History/Comorbidities:   Mr. Lora Zuniga  has a past medical history of Acute pulmonary embolism ([facility] [address] Arthritis, Chronic pain, Former smoker, GERD (gastroesophageal reflux disease), High cholesterol, Hypertension, Nausea & vomiting, Stomach cancer ([address] Total knee replacement status, and Unspecified adverse effect of anesthesia. Mr. Lora Zuniga  has a past surgical history that includes hx gi (2010); pr total hip arthroplasty (Left, 2004); hx shoulder arthroscopy (Right, 2015); hx knee arthroscopy (Right); pr total knee arthroplasty (Right, 2012); hx other surgical; hx cholecystectomy (2015); hx appendectomy; RIGHT HIP ARTHROPLASTY TOTAL/DEPUY (Right, 9/4/2018);  CHOLECYSTECTOMY LAPAROSCOPIC (N/A, 7/24/2015); RIGHT SHOULDER ARTHROPLASTY TOTAL REVERSE   (Right, 4/6/2015); KNEE ARTHROPLASTY TOTAL (Right, 2/21/2012); INFUSAPORT INSERTION (Left, 3/11/2010); and GASTRECTOMY LAPAROSCOPIC (N/A, 2/8/2010). Social History/Living Environment:     lives with wife in two story home. Prior Level of Function/Work/Activity:  Functioned independently without significant limitations. Current Medications:       Current Outpatient Medications:     tamsulosin (FLOMAX) 0.4 mg capsule, Take 0.4 mg by mouth daily. , Disp: , Rfl:     rivaroxaban (XARELTO) 20 mg tab tablet, Take 1 Tab by mouth daily for 30 days. , Disp: 30 Tab, Rfl: 1    traMADol (ULTRAM) 50 mg tablet, Take 1 Tab by mouth every eight (8) hours as needed. Max Daily Amount: 150 mg., Disp: 40 Tab, Rfl: 1    traMADol (ULTRAM) 50 mg tablet, Take 50 mg by mouth every six (6) hours as needed for Pain., Disp: , Rfl:     aspirin delayed-release 81 mg tablet, Take 1 Tab by mouth every twelve (12) hours every twelve (12) hours. , Disp: 120 Tab, Rfl: 0    sucralfate (CARAFATE) 1 gram tablet, Take 1 g by mouth three (3) times daily. Take / use AM day of surgery  per anesthesia protocols. , Disp: , Rfl:     omeprazole (PRILOSEC) 20 mg capsule, Take 20 mg by mouth Daily (before breakfast). Take DOS per anesthesia protocol. Indications: gastroesophageal reflux disease, Disp: , Rfl:     zolpidem (AMBIEN) 10 mg tablet, Take 10 mg by mouth nightly as needed for Sleep. Indications: SLEEP-ONSET INSOMNIA, Disp: , Rfl:     cyanocobalamin (VITAMIN B-12) 1,000 mcg/mL injection, 1,000 mcg by IntraMUSCular route every twenty-eight (28) days. , Disp: , Rfl:     simvastatin (ZOCOR) 40 mg tablet, Take 40 mg by mouth nightly., Disp: , Rfl:     cetirizine (ZYRTEC) 10 mg tablet, Take 10 mg by mouth nightly. Indications: ALLERGIC RHINITIS, Disp: , Rfl:     fluticasone (FLONASE) 50 mcg/Actuation nasal spray, 2 Sprays by Nasal route nightly.  Indications: ALLERGIC RHINITIS, Disp: , Rfl:    Date Last Reviewed:  10/25/2018   Number of Personal Factors/Comorbidities that affect the Plan of Care: 3+: HIGH COMPLEXITY   EXAMINATION:   Observation/Orthostatic Postural Assessment:          Incision well healed in the R gluteal region; Swelling present in B gaiter region, pt. Ambulates with a single point cane. Mild trendelenburg present during gait without use of single point cane. Palpation:          Tender to palpation of the R gluteal, hip external rotators, quadriceps, and hip flexors  ROM:          R hip ROM WFL, B knee extension limited. Strength:             Right Left   Hip extension 3+/5 4+/5   Hip abduction 3+/5 4+/5   Hip external rotation 3/5 4+/5   Hip flexion 3/5 4+/5        Body Structures Involved:  1. Joints  2. Muscles Body Functions Affected:  1. Neuromusculoskeletal  2. Movement Related Activities and Participation Affected:  1. Mobility  2. Self Care   Number of elements (examined above) that affect the Plan of Care: 1-2: LOW COMPLEXITY   CLINICAL PRESENTATION:   Presentation: Stable and uncomplicated: LOW COMPLEXITY   CLINICAL DECISION MAKING:   Outcome Measure: Tool Used: Lower Extremity Functional Scale (LEFS)  Score:  Initial: 33/80 Most Recent: X/80 (Date: -- )   Interpretation of Score: 20 questions each scored on a 5 point scale with 0 representing \"extreme difficulty or unable to perform\" and 4 representing \"no difficulty\". The lower the score, the greater the functional disability. 80/80 represents no disability. Minimal detectable change is 9 points. Score 80 79-63 62-48 47-32 31-16 15-1 0   Modifier CH CI CJ CK CL CM CN     ?  Mobility - Walking and Moving Around:     - CURRENT STATUS: CK - 40%-59% impaired, limited or restricted    - GOAL STATUS: CI - 1%-19% impaired, limited or restricted    - D/C STATUS:  ---------------To be determined---------------    Medical Necessity:   · Patient is expected to demonstrate progress in strength and range of motion to increase independence with standing, walking, and ADL's. Reason for Services/Other Comments:  · Patient will benefit from skilled PT to address impairments identified through evaluation and return to previous ADL and recreational capacity. Use of outcome tool(s) and clinical judgement create a POC that gives a: Clear prediction of patient's progress: LOW COMPLEXITY            TREATMENT:   (In addition to Assessment/Re-Assessment sessions the following treatments were rendered)  Pre-treatment Symptoms/Complaints:  Pt. Notes good compliance with initial HEP. Pain: Initial:   Pain Intensity 1: 3 /10 Post Session:  3/10     Therapeutic Exercise: (45 Minutes):  Exercises per grid below to improve mobility and strength. Required minimal verbal and manual cues to promote proper body alignment and promote proper body posture. Progressed range and repetitions as indicated. Date:  10/25/2018   Activity/Exercise Parameters   Clams (gravity assisted and against gravity) 5 x 10   Quadriceps setting 3 minutes   Marching in supine 4 minutes   Gluteal squeezes 4 minutes   [place] assisted SLR in sidelying 3 x 10   Modified sit to stand 3 x 10   Hip extension in standing 3 x 10   Calf stretch 3 minutes   Lateral walking 4 minutes   Single leg balance 2 minutes   Calf raise 3 x 10   Standing hip abduction 3 x 10   Manual Therapy (    Soft Tissue Mobilization Duration  Duration: 10 Minutes): Manual techniques to facilitate improved motion and decreased pain. (Used abbreviations: MET - muscle energy technique; PNF - proprioceptive neuromuscular facilitation; NMR - neuromuscular re-education; a/p - anterior to posterior; p/a - posterior to anterior)   · Soft tissue mobilization: R quadriceps, gluteals, and iliopsoas        MedBridge Portal  Treatment/Session Assessment:    · Response to Treatment:  Pt. Continues to demonstrate difficulty complete full SLR against gravity secondary to weakness.   Pt. Requires frequent rest breaks due to limited cardiorespiratory capacity from blood clot. · Compliance with Program/Exercises: Will assess as treatment progresses. · Recommendations/Intent for next treatment session: \"Next visit will focus on advancements to more challenging activities\".   Total Treatment Duration: therapeutic exercise 45 minutes, manual therapy 10 minutes, total time 55 minutes  PT Patient Time In/Time Out  Time In: 1500  Time Out: Charles Conti [address] Lora Solis, PT

## 2018-10-30 ENCOUNTER — HOSPITAL ENCOUNTER (OUTPATIENT)
Dept: PHYSICAL THERAPY | Age: 78
Discharge: HOME | End: 2018-10-30
Payer: MEDICARE

## 2018-10-30 PROCEDURE — 97110 THERAPEUTIC EXERCISES: CPT

## 2018-10-30 PROCEDURE — 97140 MANUAL THERAPY 1/> REGIONS: CPT

## 2018-10-30 NOTE — PROGRESS NOTES
Ani Simpler  : 1940  [place]  Secondary: [address] [facility] [address]  [phone]   [phone]        OUTPATIENT PHYSICAL THERAPY:Daily Note 10/30/2018   ICD-10: Treatment Diagnosis: Pain in Joint, R hip [M25.551]; Muscle weakness, generalized [M62.81]; Difficulty walking, not elsewhere classified [R26.2]  Precautions/Allergies:   Morphine; Other medication; and Shellfish derived   Fall Risk Score: 2 (? 5 = High Risk)  MD Orders: Evaluate and Treat MEDICAL/REFERRING DIAGNOSIS:  Hip pain [M25.559]   DATE OF ONSET: 18  REFERRING PHYSICIAN: Neftali Downey MD  RETURN PHYSICIAN APPOINTMENT: TBD     INITIAL ASSESSMENT:  Mr. Roberta Horton presents s/p R total hip replacement on 18. Pt. Demonstrates altered gait mechanics with use of a single point cane and reduced muscular strength in the R hip abductors, R hip extensors, R hip flexors, and R hip external rotators. Pt. Demonstrates moderate difficulty with ADL's along with standing and walking secondary to weakness. Pt. Is also managing a blood clot resulting in fatigue and labored breathing upon exertion. Pt. Will benefit from skilled PT to return to previous level of ADL function. PROBLEM LIST (Impacting functional limitations):  1. Decreased Strength  2. Decreased ADL/Functional Activities  3. Increased Pain  4. Decreased Activity Tolerance  5. Increased Shortness of Breath  6. Decreased Flexibility/Joint Mobility  7. Decreased [place] with Home Exercise Program INTERVENTIONS PLANNED:  1. Gait Training  2. Home Exercise Program (HEP)  3. Manual Therapy  4. Range of Motion (ROM)  5. Therapeutic Exercise/Strengthening   TREATMENT PLAN:  Effective Dates: 10/23/2018 TO 2019 (90 days).   Frequency/Duration: 2 times a week for 90 Day(s)  GOALS: (Goals have been discussed and agreed upon with patient.)  Discharge Goals: Time Frame: 8 weeks  1. Pt. Will report <3/10 R hip pain with standing for 5 minutes  2. Pt. Will demonstrate 4+/5 MMT strength in the R hip abductors and flexors to improve ADL performance. 3. Pt. Will ambulate for 10 minutes without single point cane and absence of trendelenburg gait to improve walking. 4. Pt. Will score >50 on the LEFS to demonstrate improved pain and function. Rehabilitation Potential For Stated Goals: Good              The information in this section was collected on 10/23/18 (except where otherwise noted). HISTORY:   History of Present Injury/Illness (Reason for Referral):  Pt. Attends PT s/p R total hip replacement on 9/4/18. Recovery from surgery has been complicated from a pulmonary embolism. Pt. Notes he has not performed prescribed HEP secondary to fatigue and difficulty breathing. The pulmonary embolism is currently being managed and patient would like to resume exercise program to re-establish prior level of function. Pt. Notes he initially experienced R knee pain that was later attributed to severe hip OA leading to surgery. Pt. Notes he is having difficulty with prolonged standing, lifting the R LE, and walking without a cane. Past Medical History/Comorbidities:   Mr. Tarik Strange  has a past medical history of Acute pulmonary embolism ([facility] [address] Arthritis, Chronic pain, Former smoker, GERD (gastroesophageal reflux disease), High cholesterol, Hypertension, Nausea & vomiting, Stomach cancer ([address] Total knee replacement status, and Unspecified adverse effect of anesthesia. Mr. Tarik Strange  has a past surgical history that includes hx gi (2010); pr total hip arthroplasty (Left, 2004); hx shoulder arthroscopy (Right, 2015); hx knee arthroscopy (Right); pr total knee arthroplasty (Right, 2012); hx other surgical; hx cholecystectomy (2015); hx appendectomy; RIGHT HIP ARTHROPLASTY TOTAL/DEPUY (Right, 9/4/2018);  CHOLECYSTECTOMY LAPAROSCOPIC (N/A, 7/24/2015); RIGHT SHOULDER ARTHROPLASTY TOTAL REVERSE   (Right, 4/6/2015); KNEE ARTHROPLASTY TOTAL (Right, 2/21/2012); INFUSAPORT INSERTION (Left, 3/11/2010); and GASTRECTOMY LAPAROSCOPIC (N/A, 2/8/2010). Social History/Living Environment:     lives with wife in two story home. Prior Level of Function/Work/Activity:  Functioned independently without significant limitations. Current Medications:       Current Outpatient Medications:     tamsulosin (FLOMAX) 0.4 mg capsule, Take 0.4 mg by mouth daily. , Disp: , Rfl:     rivaroxaban (XARELTO) 20 mg tab tablet, Take 1 Tab by mouth daily for 30 days. , Disp: 30 Tab, Rfl: 1    traMADol (ULTRAM) 50 mg tablet, Take 1 Tab by mouth every eight (8) hours as needed. Max Daily Amount: 150 mg., Disp: 40 Tab, Rfl: 1    traMADol (ULTRAM) 50 mg tablet, Take 50 mg by mouth every six (6) hours as needed for Pain., Disp: , Rfl:     aspirin delayed-release 81 mg tablet, Take 1 Tab by mouth every twelve (12) hours every twelve (12) hours. , Disp: 120 Tab, Rfl: 0    sucralfate (CARAFATE) 1 gram tablet, Take 1 g by mouth three (3) times daily. Take / use AM day of surgery  per anesthesia protocols. , Disp: , Rfl:     omeprazole (PRILOSEC) 20 mg capsule, Take 20 mg by mouth Daily (before breakfast). Take DOS per anesthesia protocol. Indications: gastroesophageal reflux disease, Disp: , Rfl:     zolpidem (AMBIEN) 10 mg tablet, Take 10 mg by mouth nightly as needed for Sleep. Indications: SLEEP-ONSET INSOMNIA, Disp: , Rfl:     cyanocobalamin (VITAMIN B-12) 1,000 mcg/mL injection, 1,000 mcg by IntraMUSCular route every twenty-eight (28) days. , Disp: , Rfl:     simvastatin (ZOCOR) 40 mg tablet, Take 40 mg by mouth nightly., Disp: , Rfl:     cetirizine (ZYRTEC) 10 mg tablet, Take 10 mg by mouth nightly. Indications: ALLERGIC RHINITIS, Disp: , Rfl:     fluticasone (FLONASE) 50 mcg/Actuation nasal spray, 2 Sprays by Nasal route nightly.  Indications: ALLERGIC RHINITIS, Disp: , Rfl:    Date Last Reviewed:  10/30/2018   Number of Personal Factors/Comorbidities that affect the Plan of Care: 3+: HIGH COMPLEXITY   EXAMINATION:   Observation/Orthostatic Postural Assessment:          Incision well healed in the R gluteal region; Swelling present in B gaiter region, pt. Ambulates with a single point cane. Mild trendelenburg present during gait without use of single point cane. Palpation:          Tender to palpation of the R gluteal, hip external rotators, quadriceps, and hip flexors  ROM:          R hip ROM WFL, B knee extension limited. Strength:             Right Left   Hip extension 3+/5 4+/5   Hip abduction 3+/5 4+/5   Hip external rotation 3/5 4+/5   Hip flexion 3/5 4+/5        Body Structures Involved:  1. Joints  2. Muscles Body Functions Affected:  1. Neuromusculoskeletal  2. Movement Related Activities and Participation Affected:  1. Mobility  2. Self Care   Number of elements (examined above) that affect the Plan of Care: 1-2: LOW COMPLEXITY   CLINICAL PRESENTATION:   Presentation: Stable and uncomplicated: LOW COMPLEXITY   CLINICAL DECISION MAKING:   Outcome Measure: Tool Used: Lower Extremity Functional Scale (LEFS)  Score:  Initial: 33/80 Most Recent: X/80 (Date: -- )   Interpretation of Score: 20 questions each scored on a 5 point scale with 0 representing \"extreme difficulty or unable to perform\" and 4 representing \"no difficulty\". The lower the score, the greater the functional disability. 80/80 represents no disability. Minimal detectable change is 9 points. Score 80 79-63 62-48 47-32 31-16 15-1 0   Modifier CH CI CJ CK CL CM CN     ?  Mobility - Walking and Moving Around:     - CURRENT STATUS: CK - 40%-59% impaired, limited or restricted    - GOAL STATUS: CI - 1%-19% impaired, limited or restricted    - D/C STATUS:  ---------------To be determined---------------    Medical Necessity:   · Patient is expected to demonstrate progress in strength and range of motion to increase independence with standing, walking, and ADL's. Reason for Services/Other Comments:  · Patient will benefit from skilled PT to address impairments identified through evaluation and return to previous ADL and recreational capacity. Use of outcome tool(s) and clinical judgement create a POC that gives a: Clear prediction of patient's progress: LOW COMPLEXITY            TREATMENT:   (In addition to Assessment/Re-Assessment sessions the following treatments were rendered)  Pre-treatment Symptoms/Complaints:  Pt. 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    Pain: Initial:   Pain Intensity 1: 3 /10 Post Session:  3/10     Therapeutic Exercise: (40 Minutes):  Exercises per grid below to improve mobility and strength. Required minimal verbal and manual cues to promote proper body alignment and promote proper body posture. Progressed range and repetitions as indicated. Date:  10/30/2018   Activity/Exercise Parameters   Clams (gravity assisted and against gravity) --   Quadriceps setting (with [employer] | 3pay e-stim) 10 minutes   Marching in supine 4 minutes   Gluteal squeezes 4 minutes   [place] assisted SLR in sidelying --   Modified sit to stand --   Hip extension in standing 3 x 10   Calf stretch 3 minutes   Lateral walking --   Single leg balance --   Calf raise 3 x 10   Standing hip abduction --   Recumbent Bike (1 minute durations) 5 minutes   Quadriceps/hip flexor stretch 4 minutes   Manual Therapy (    Soft Tissue Mobilization Duration  Duration: 20 Minutes): Manual techniques to facilitate improved motion and decreased pain.  (Used abbreviations: MET - muscle energy technique; PNF - proprioceptive neuromuscular facilitation; NMR - neuromuscular re-education; a/p - anterior to posterior; p/a - posterior to anterior)   · Soft tissue mobilization: R quadriceps, gluteals, and iliopsoas  · Joint mobilization: long axis traction R hip grade II-III        MedBridge Portal  Treatment/Session Assessment:    · Response to Treatment:  Pt. Is re-education on hip precautions for a posterior hip replacement approach after squatting activities performed at home today. Pt. Continues to demonstrate difficulty with hip flexion and abduction exercises against gravity. Pt. Is able to complete 1-1:30 minutes of recumbent bicycle durations prior to O2 saturation dropping under 90. · Compliance with Program/Exercises: good compliance. · Recommendations/Intent for next treatment session: \"Next visit will focus on advancements to more challenging activities\".   Total Treatment Duration: therapeutic exercise 40 minutes, manual therapy 20 minutes, total time 60 minutes  PT Patient Time In/Time Out  Time In: 1330  Time Out: [address] [facility]

## 2018-11-01 ENCOUNTER — APPOINTMENT (OUTPATIENT)
Dept: PHYSICAL THERAPY | Age: 78
End: 2018-11-01
Payer: MEDICARE

## 2018-11-05 ENCOUNTER — APPOINTMENT (OUTPATIENT)
Dept: PHYSICAL THERAPY | Age: 78
End: 2018-11-05
Payer: MEDICARE

## 2018-11-07 ENCOUNTER — APPOINTMENT (OUTPATIENT)
Dept: PHYSICAL THERAPY | Age: 78
End: 2018-11-07
Payer: MEDICARE

## 2018-11-07 ENCOUNTER — HOSPITAL ENCOUNTER (OUTPATIENT)
Dept: PHYSICAL THERAPY | Age: 78
Discharge: HOME | End: 2018-11-07
Payer: MEDICARE

## 2018-11-07 PROCEDURE — 97140 MANUAL THERAPY 1/> REGIONS: CPT

## 2018-11-07 PROCEDURE — 97110 THERAPEUTIC EXERCISES: CPT

## 2018-11-07 NOTE — PROGRESS NOTES
Shan Edge  : 1940  [place]  Secondary: [address]  [phone]   [phone]        OUTPATIENT PHYSICAL THERAPY:Daily Note 2018   ICD-10: Treatment Diagnosis: Pain in Joint, R hip [M25.551]; Muscle weakness, generalized [M62.81]; Difficulty walking, not elsewhere classified [R26.2]  Precautions/Allergies:   Morphine; Other medication; and Shellfish derived   Fall Risk Score: 2 (? 5 = High Risk)  MD Orders: Evaluate and Treat MEDICAL/REFERRING DIAGNOSIS:  Hip pain [M25.559]   DATE OF ONSET: 18  REFERRING PHYSICIAN: Chary Dyer MD  RETURN PHYSICIAN APPOINTMENT: TBD     INITIAL ASSESSMENT:  Mr. Bernadette Varma presents s/p R total hip replacement on 18. Pt. Demonstrates altered gait mechanics with use of a single point cane and reduced muscular strength in the R hip abductors, R hip extensors, R hip flexors, and R hip external rotators. Pt. Demonstrates moderate difficulty with ADL's along with standing and walking secondary to weakness. Pt. Is also managing a blood clot resulting in fatigue and labored breathing upon exertion. Pt. Will benefit from skilled PT to return to previous level of ADL function. PROBLEM LIST (Impacting functional limitations):  1. Decreased Strength  2. Decreased ADL/Functional Activities  3. Increased Pain  4. Decreased Activity Tolerance  5. Increased Shortness of Breath  6. Decreased Flexibility/Joint Mobility  7. Decreased [place] with Home Exercise Program INTERVENTIONS PLANNED:  1. Gait Training  2. Home Exercise Program (HEP)  3. Manual Therapy  4. Range of Motion (ROM)  5. Therapeutic Exercise/Strengthening   TREATMENT PLAN:  Effective Dates: 10/23/2018 TO 2019 (90 days).   Frequency/Duration: 2 times a week for 90 Day(s)  GOALS: (Goals have been discussed and agreed upon with patient.)  Discharge Goals: Time Frame: 8 weeks  1. Pt. Will report <3/10 R hip pain with standing for 5 minutes  2. Pt. Will demonstrate 4+/5 MMT strength in the R hip abductors and flexors to improve ADL performance. 3. Pt. Will ambulate for 10 minutes without single point cane and absence of trendelenburg gait to improve walking. 4. Pt. Will score >50 on the LEFS to demonstrate improved pain and function. Rehabilitation Potential For Stated Goals: Good              The information in this section was collected on 10/23/18 (except where otherwise noted). HISTORY:   History of Present Injury/Illness (Reason for Referral):  Pt. Attends PT s/p R total hip replacement on 9/4/18. Recovery from surgery has been complicated from a pulmonary embolism. Pt. Notes he has not performed prescribed HEP secondary to fatigue and difficulty breathing. The pulmonary embolism is currently being managed and patient would like to resume exercise program to re-establish prior level of function. Pt. Notes he initially experienced R knee pain that was later attributed to severe hip OA leading to surgery. Pt. Notes he is having difficulty with prolonged standing, lifting the R LE, and walking without a cane. Past Medical History/Comorbidities:   Mr. Patrick Boyce  has a past medical history of Acute pulmonary embolism ([address] Arthritis, Chronic pain, Former smoker, GERD (gastroesophageal reflux disease), High cholesterol, Hypertension, Nausea & vomiting, Stomach cancer ([address] Total knee replacement status, and Unspecified adverse effect of anesthesia. Mr. Patrick Boyce  has a past surgical history that includes hx gi (2010); pr total hip arthroplasty (Left, 2004); hx shoulder arthroscopy (Right, 2015); hx knee arthroscopy (Right); pr total knee arthroplasty (Right, 2012); hx other surgical; hx cholecystectomy (2015); hx appendectomy; RIGHT HIP ARTHROPLASTY TOTAL/DEPUY (Right, 9/4/2018);  CHOLECYSTECTOMY LAPAROSCOPIC (N/A, 7/24/2015); RIGHT SHOULDER ARTHROPLASTY TOTAL REVERSE   (Right, 4/6/2015); KNEE ARTHROPLASTY TOTAL (Right, 2/21/2012); INFUSAPORT INSERTION (Left, 3/11/2010); and GASTRECTOMY LAPAROSCOPIC (N/A, 2/8/2010). Social History/Living Environment:     lives with wife in two story home. Prior Level of Function/Work/Activity:  Functioned independently without significant limitations. Current Medications:       Current Outpatient Medications:     tamsulosin (FLOMAX) 0.4 mg capsule, Take 0.4 mg by mouth daily. , Disp: , Rfl:     rivaroxaban (XARELTO) 20 mg tab tablet, Take 1 Tab by mouth daily for 30 days. , Disp: 30 Tab, Rfl: 1    traMADol (ULTRAM) 50 mg tablet, Take 1 Tab by mouth every eight (8) hours as needed. Max Daily Amount: 150 mg., Disp: 40 Tab, Rfl: 1    traMADol (ULTRAM) 50 mg tablet, Take 50 mg by mouth every six (6) hours as needed for Pain., Disp: , Rfl:     aspirin delayed-release 81 mg tablet, Take 1 Tab by mouth every twelve (12) hours every twelve (12) hours. , Disp: 120 Tab, Rfl: 0    sucralfate (CARAFATE) 1 gram tablet, Take 1 g by mouth three (3) times daily. Take / use AM day of surgery  per anesthesia protocols. , Disp: , Rfl:     omeprazole (PRILOSEC) 20 mg capsule, Take 20 mg by mouth Daily (before breakfast). Take DOS per anesthesia protocol. Indications: gastroesophageal reflux disease, Disp: , Rfl:     zolpidem (AMBIEN) 10 mg tablet, Take 10 mg by mouth nightly as needed for Sleep. Indications: SLEEP-ONSET INSOMNIA, Disp: , Rfl:     cyanocobalamin (VITAMIN B-12) 1,000 mcg/mL injection, 1,000 mcg by IntraMUSCular route every twenty-eight (28) days. , Disp: , Rfl:     simvastatin (ZOCOR) 40 mg tablet, Take 40 mg by mouth nightly., Disp: , Rfl:     cetirizine (ZYRTEC) 10 mg tablet, Take 10 mg by mouth nightly. Indications: ALLERGIC RHINITIS, Disp: , Rfl:     fluticasone (FLONASE) 50 mcg/Actuation nasal spray, 2 Sprays by Nasal route nightly.  Indications: ALLERGIC RHINITIS, Disp: , Rfl:    Date Last Reviewed:  11/7/2018   Number of Personal Factors/Comorbidities that affect the Plan of Care: 3+: HIGH COMPLEXITY   EXAMINATION:   Observation/Orthostatic Postural Assessment:          Incision well healed in the R gluteal region; Swelling present in B gaiter region, pt. Ambulates with a single point cane. Mild trendelenburg present during gait without use of single point cane. Palpation:          Tender to palpation of the R gluteal, hip external rotators, quadriceps, and hip flexors  ROM:          R hip ROM WFL, B knee extension limited. Strength:             Right Left   Hip extension 3+/5 4+/5   Hip abduction 3+/5 4+/5   Hip external rotation 3/5 4+/5   Hip flexion 3/5 4+/5        Body Structures Involved:  1. Joints  2. Muscles Body Functions Affected:  1. Neuromusculoskeletal  2. Movement Related Activities and Participation Affected:  1. Mobility  2. Self Care   Number of elements (examined above) that affect the Plan of Care: 1-2: LOW COMPLEXITY   CLINICAL PRESENTATION:   Presentation: Stable and uncomplicated: LOW COMPLEXITY   CLINICAL DECISION MAKING:   Outcome Measure: Tool Used: Lower Extremity Functional Scale (LEFS)  Score:  Initial: 33/80 Most Recent: X/80 (Date: -- )   Interpretation of Score: 20 questions each scored on a 5 point scale with 0 representing \"extreme difficulty or unable to perform\" and 4 representing \"no difficulty\". The lower the score, the greater the functional disability. 80/80 represents no disability. Minimal detectable change is 9 points. Score 80 79-63 62-48 47-32 31-16 15-1 0   Modifier CH CI CJ CK CL CM CN     ?  Mobility - Walking and Moving Around:     - CURRENT STATUS: CK - 40%-59% impaired, limited or restricted    - GOAL STATUS: CI - 1%-19% impaired, limited or restricted    - D/C STATUS:  ---------------To be determined---------------    Medical Necessity:   · Patient is expected to demonstrate progress in strength and range of motion to increase independence with standing, walking, and ADL's. Reason for Services/Other Comments:  · Patient will benefit from skilled PT to address impairments identified through evaluation and return to previous ADL and recreational capacity. Use of outcome tool(s) and clinical judgement create a POC that gives a: Clear prediction of patient's progress: LOW COMPLEXITY            TREATMENT:   (In addition to Assessment/Re-Assessment sessions the following treatments were rendered)  Pre-treatment Symptoms/Complaints:  Patient says he's been keeping up with his exercises at home but remains frustrated by lack of cardiovascular endurance. Pain: Initial:   Pain Intensity 1: 3 /10 Post Session:  3/10     Therapeutic Exercise: (45 Minutes):  Exercises per grid below to improve mobility and strength. Required minimal verbal and manual cues to promote proper body alignment and promote proper body posture. Progressed range and repetitions as indicated. Date:  11/7/2018   Activity/Exercise Parameters   Clams (gravity assisted and against gravity) 3 x 10, supine   Quadriceps setting (with russian e-stim) 5 x 20   Marching in supine 4 minutes   Gluteal squeezes 5 x 20   [place] assisted SLR in sidelying --   Modified sit to stand 3 x 10   Hip extension in standing 3 x 10   Calf stretch 3 minutes   Lateral walking 10 ft x 10   Single leg balance --   Calf raise 3 x 10   Standing hip abduction --   Recumbent Bike (3 minute durations) 6 minutes   Quadriceps/hip flexor stretch 4 minutes   Manual Therapy (    Soft Tissue Mobilization Duration  Duration: 15 Minutes): Manual techniques to facilitate improved motion and decreased pain.  (Used abbreviations: MET - muscle energy technique; PNF - proprioceptive neuromuscular facilitation; NMR - neuromuscular re-education; a/p - anterior to posterior; p/a - posterior to anterior)   · Soft tissue mobilization: R quadriceps, gluteals, and iliopsoas  · Joint mobilization: long axis traction R hip grade II-III        [facility] Portal  Treatment/Session Assessment:    · Response to Treatment:  Patient has good tolerance to all supine and standing activities today but continues to require frequent and prolonged rest breaks throughout session due to SOB. · Compliance with Program/Exercises: good compliance. · Recommendations/Intent for next treatment session: \"Next visit will focus on advancements to more challenging activities\". Total Treatment Duration: therapeutic exercise 45 minutes, manual therapy 15 minutes, total time 60 minutes  PT Patient Time In/Time Out  Time In: 1000  Time Out: 1200 [facility].  [facility]

## 2018-11-12 ENCOUNTER — HOSPITAL ENCOUNTER (OUTPATIENT)
Dept: PHYSICAL THERAPY | Age: 78
Discharge: HOME | End: 2018-11-12
Payer: MEDICARE

## 2018-11-12 PROCEDURE — 97140 MANUAL THERAPY 1/> REGIONS: CPT

## 2018-11-12 PROCEDURE — 97110 THERAPEUTIC EXERCISES: CPT

## 2018-11-12 NOTE — PROGRESS NOTES
Tim Weinstein  : 1940  [place]  [facility]: [address] [facility] [place] [id number] [address], [address]  [phone]   [phone]        OUTPATIENT PHYSICAL THERAPY:Daily Note 2018   ICD-10: Treatment Diagnosis: Pain in Joint, R hip [M25.551]; Muscle weakness, generalized [M62.81]; Difficulty walking, not elsewhere classified [R26.2]  Precautions/Allergies:   Morphine; Other medication; and Shellfish derived   Fall Risk Score: 2 (? 5 = High Risk)  MD Orders: Evaluate and Treat MEDICAL/REFERRING DIAGNOSIS:  Hip pain [M25.559]   DATE OF ONSET: 18  REFERRING PHYSICIAN: Alaina Serrano MD  RETURN PHYSICIAN APPOINTMENT: TBD     INITIAL ASSESSMENT:  Mr. Severiano Mohr presents s/p R total hip replacement on 18. Pt. Demonstrates altered gait mechanics with use of a single point cane and reduced muscular strength in the R hip abductors, R hip extensors, R hip flexors, and R hip external rotators. Pt. Demonstrates moderate difficulty with ADL's along with standing and walking secondary to weakness. Pt. Is also managing a blood clot resulting in fatigue and labored breathing upon exertion. Pt. Will benefit from skilled PT to return to previous level of ADL function. PROBLEM LIST (Impacting functional limitations):  1. Decreased Strength  2. Decreased ADL/Functional Activities  3. Increased Pain  4. Decreased Activity Tolerance  5. Increased Shortness of Breath  6. Decreased Flexibility/Joint Mobility  7. Decreased [place] with Home Exercise Program INTERVENTIONS PLANNED:  1. Gait Training  2. Home Exercise Program (HEP)  3. Manual Therapy  4. Range of Motion (ROM)  5. Therapeutic Exercise/Strengthening   TREATMENT PLAN:  Effective Dates: 10/23/2018 TO 2019 (90 days).   Frequency/Duration: 2 times a week for 90 Day(s)  GOALS: (Goals have been discussed and agreed upon with patient.)  Discharge Goals: Time Frame: 8 weeks  1. Pt. Will report <3/10 R hip pain with standing for 5 minutes  2. Pt. Will demonstrate 4+/5 MMT strength in the R hip abductors and flexors to improve ADL performance. 3. Pt. Will ambulate for 10 minutes without single point cane and absence of trendelenburg gait to improve walking. 4. Pt. Will score >50 on the LEFS to demonstrate improved pain and function. Rehabilitation Potential For Stated Goals: Good              The information in this section was collected on 10/23/18 (except where otherwise noted). HISTORY:   History of Present Injury/Illness (Reason for Referral):  Pt. Attends PT s/p R total hip replacement on 9/4/18. Recovery from surgery has been complicated from a pulmonary embolism. Pt. Notes he has not performed prescribed HEP secondary to fatigue and difficulty breathing. The pulmonary embolism is currently being managed and patient would like to resume exercise program to re-establish prior level of function. Pt. Notes he initially experienced R knee pain that was later attributed to severe hip OA leading to surgery. Pt. Notes he is having difficulty with prolonged standing, lifting the R LE, and walking without a cane. Past Medical History/Comorbidities:   Mr. Dory Arreguin  has a past medical history of Acute pulmonary embolism ([address] Arthritis, Chronic pain, Former smoker, GERD (gastroesophageal reflux disease), High cholesterol, Hypertension, Nausea & vomiting, Stomach cancer ([address] Total knee replacement status, and Unspecified adverse effect of anesthesia. Mr. Dory Arreguin  has a past surgical history that includes hx gi (2010); pr total hip arthroplasty (Left, 2004); hx shoulder arthroscopy (Right, 2015); hx knee arthroscopy (Right); pr total knee arthroplasty (Right, 2012); hx other surgical; hx cholecystectomy (2015); hx appendectomy; RIGHT HIP ARTHROPLASTY TOTAL/DEPUY (Right, 9/4/2018);  CHOLECYSTECTOMY LAPAROSCOPIC (N/A, 7/24/2015); RIGHT SHOULDER ARTHROPLASTY TOTAL REVERSE   (Right, 4/6/2015); KNEE ARTHROPLASTY TOTAL (Right, 2/21/2012); INFUSAPORT INSERTION (Left, 3/11/2010); and GASTRECTOMY LAPAROSCOPIC (N/A, 2/8/2010). Social History/Living Environment:     lives with wife in two story home. Prior Level of Function/Work/Activity:  Functioned independently without significant limitations. Current Medications:       Current Outpatient Medications:     tamsulosin (FLOMAX) 0.4 mg capsule, Take 0.4 mg by mouth daily. , Disp: , Rfl:     traMADol (ULTRAM) 50 mg tablet, Take 1 Tab by mouth every eight (8) hours as needed. Max Daily Amount: 150 mg., Disp: 40 Tab, Rfl: 1    traMADol (ULTRAM) 50 mg tablet, Take 50 mg by mouth every six (6) hours as needed for Pain., Disp: , Rfl:     aspirin delayed-release 81 mg tablet, Take 1 Tab by mouth every twelve (12) hours every twelve (12) hours. , Disp: 120 Tab, Rfl: 0    sucralfate (CARAFATE) 1 gram tablet, Take 1 g by mouth three (3) times daily. Take / use AM day of surgery  per anesthesia protocols. , Disp: , Rfl:     omeprazole (PRILOSEC) 20 mg capsule, Take 20 mg by mouth Daily (before breakfast). Take DOS per anesthesia protocol. Indications: gastroesophageal reflux disease, Disp: , Rfl:     zolpidem (AMBIEN) 10 mg tablet, Take 10 mg by mouth nightly as needed for Sleep. Indications: SLEEP-ONSET INSOMNIA, Disp: , Rfl:     cyanocobalamin (VITAMIN B-12) 1,000 mcg/mL injection, 1,000 mcg by IntraMUSCular route every twenty-eight (28) days. , Disp: , Rfl:     simvastatin (ZOCOR) 40 mg tablet, Take 40 mg by mouth nightly., Disp: , Rfl:     cetirizine (ZYRTEC) 10 mg tablet, Take 10 mg by mouth nightly. Indications: ALLERGIC RHINITIS, Disp: , Rfl:     fluticasone (FLONASE) 50 mcg/Actuation nasal spray, 2 Sprays by Nasal route nightly.  Indications: ALLERGIC RHINITIS, Disp: , Rfl:    Date Last Reviewed:  11/12/2018   Number of Personal Factors/Comorbidities that affect the Plan of Care: 3+: HIGH COMPLEXITY   EXAMINATION: Observation/Orthostatic Postural Assessment:          Incision well healed in the R gluteal region; Swelling present in B gaiter region, pt. Ambulates with a single point cane. Mild trendelenburg present during gait without use of single point cane. Palpation:          Tender to palpation of the R gluteal, hip external rotators, quadriceps, and hip flexors  ROM:          R hip ROM WFL, B knee extension limited. Strength:             Right Left   Hip extension 3+/5 4+/5   Hip abduction 3+/5 4+/5   Hip external rotation 3/5 4+/5   Hip flexion 3/5 4+/5        Body Structures Involved:  1. Joints  2. Muscles Body Functions Affected:  1. Neuromusculoskeletal  2. Movement Related Activities and Participation Affected:  1. Mobility  2. Self Care   Number of elements (examined above) that affect the Plan of Care: 1-2: LOW COMPLEXITY   CLINICAL PRESENTATION:   Presentation: Stable and uncomplicated: LOW COMPLEXITY   CLINICAL DECISION MAKING:   Outcome Measure: Tool Used: Lower Extremity Functional Scale (LEFS)  Score:  Initial: 33/80 Most Recent: X/80 (Date: -- )   Interpretation of Score: 20 questions each scored on a 5 point scale with 0 representing \"extreme difficulty or unable to perform\" and 4 representing \"no difficulty\". The lower the score, the greater the functional disability. 80/80 represents no disability. Minimal detectable change is 9 points. Score 80 79-63 62-48 47-32 31-16 15-1 0   Modifier CH CI CJ CK CL CM CN     ? Mobility - Walking and Moving Around:     - CURRENT STATUS: CK - 40%-59% impaired, limited or restricted    - GOAL STATUS: CI - 1%-19% impaired, limited or restricted    - D/C STATUS:  ---------------To be determined---------------    Medical Necessity:   · Patient is expected to demonstrate progress in strength and range of motion to increase independence with standing, walking, and ADL's.   Reason for Services/Other Comments:  · Patient will benefit from skilled PT to address impairments identified through evaluation and return to previous ADL and recreational capacity. Use of outcome tool(s) and clinical judgement create a POC that gives a: Clear prediction of patient's progress: LOW COMPLEXITY            TREATMENT:   (In addition to Assessment/Re-Assessment sessions the following treatments were rendered)  Pre-treatment Symptoms/Complaints:  Patient notes continued difficulty upon exertion with walking and exercise. Pt. Notes exercises at home are getting easier. Pain: Initial:   Pain Intensity 1: 2 /10 Post Session:  2/10     Therapeutic Exercise: (45 Minutes):  Exercises per grid below to improve mobility and strength. Required minimal verbal and manual cues to promote proper body alignment and promote proper body posture. Progressed range and repetitions as indicated. Date:  11/12/2018   Activity/Exercise Parameters   Clams  3 x 10   Quadriceps setting (with russian e-stim) --   Marching in supine --   Gluteal squeezes --   Assisted SLR in supine 3 x 10   Modified sit to stand 3 x 10   Hip extension in standing 3 x 15   Calf stretch 3 minutes   Lateral walking 5 minutes   Single leg balance 2 minutes   Calf raise 3 x 10   Sidely[place] hip abduction (minimal assistance) 3 x 10   Recumbent Bike 6 minutes   Quadriceps/hip flexor stretch 4 minutes   Manual Therapy (    Soft Tissue Mobilization Duration  Duration: 10 Minutes): Manual techniques to facilitate improved motion and decreased pain. (Used abbreviations: MET - muscle energy technique; PNF - proprioceptive neuromuscular facilitation; NMR - neuromuscular re-education; a/p - anterior to posterior; p/a - posterior to anterior)   · Soft tissue mobilization: R quadriceps, gluteals, and iliopsoas          [facility] Portal  Treatment/Session Assessment:    · Response to Treatment:  Patient is now able to complete clams and hip abduction on the right side against gravity indicating improved hip strength. Cardiovascular endurance on the recumbent bike improves additionally without oxygen saturation dropping. · Compliance with Program/Exercises: good compliance. · Recommendations/Intent for next treatment session: \"Next visit will focus on advancements to more challenging activities\".   Total Treatment Duration: therapeutic exercise 45 minutes, manual therapy 10 minutes, total time 55 minutes  PT Patient Time In/Time Out  Time In: 1330  Time Out: 9532 [facility]

## 2018-11-14 ENCOUNTER — HOSPITAL ENCOUNTER (OUTPATIENT)
Dept: PHYSICAL THERAPY | Age: 78
Discharge: HOME | End: 2018-11-14
Payer: MEDICARE

## 2018-11-14 PROCEDURE — 97110 THERAPEUTIC EXERCISES: CPT

## 2018-11-14 PROCEDURE — 97140 MANUAL THERAPY 1/> REGIONS: CPT

## 2018-11-14 NOTE — PROGRESS NOTES
Ledy Munoz  : 1940  [place]  [facility] [address] [facility] [address]  [phone]   [phone]        OUTPATIENT PHYSICAL THERAPY:Daily Note 2018   ICD-10: Treatment Diagnosis: Pain in Joint, R hip [M25.551]; Muscle weakness, generalized [M62.81]; Difficulty walking, not elsewhere classified [R26.2]  Precautions/Allergies:   Morphine; Other medication; and Shellfish derived   Fall Risk Score: 2 (? 5 = High Risk)  MD Orders: Evaluate and Treat MEDICAL/REFERRING DIAGNOSIS:  Hip pain [M25.559]   DATE OF ONSET: 18  REFERRING PHYSICIAN: Mony Gray MD  RETURN PHYSICIAN APPOINTMENT: TBD     INITIAL ASSESSMENT:  Mr. Rebeca Mathew presents s/p R total hip replacement on 18. Pt. Demonstrates altered gait mechanics with use of a single point cane and reduced muscular strength in the R hip abductors, R hip extensors, R hip flexors, and R hip external rotators. Pt. Demonstrates moderate difficulty with ADL's along with standing and walking secondary to weakness. Pt. Is also managing a blood clot resulting in fatigue and labored breathing upon exertion. Pt. Will benefit from skilled PT to return to previous level of ADL function. PROBLEM LIST (Impacting functional limitations):  1. Decreased Strength  2. Decreased ADL/Functional Activities  3. Increased Pain  4. Decreased Activity Tolerance  5. Increased Shortness of Breath  6. Decreased Flexibility/Joint Mobility  7. Decreased [place] with Home Exercise Program INTERVENTIONS PLANNED:  1. Gait Training  2. Home Exercise Program (HEP)  3. Manual Therapy  4. Range of Motion (ROM)  5. Therapeutic Exercise/Strengthening   TREATMENT PLAN:  Effective Dates: 10/23/2018 TO 2019 (90 days).   Frequency/Duration: 2 times a week for 90 Day(s)  GOALS: (Goals have been discussed and agreed upon with patient.)  Discharge Goals: Time Frame: 8 weeks  1. Pt. Will report <3/10 R hip pain with standing for 5 minutes  2. Pt. Will demonstrate 4+/5 MMT strength in the R hip abductors and flexors to improve ADL performance. 3. Pt. Will ambulate for 10 minutes without single point cane and absence of trendelenburg gait to improve walking. 4. Pt. Will score >50 on the LEFS to demonstrate improved pain and function. Rehabilitation Potential For Stated Goals: Good              The information in this section was collected on 10/23/18 (except where otherwise noted). HISTORY:   History of Present Injury/Illness (Reason for Referral):  Pt. Attends PT s/p R total hip replacement on 9/4/18. Recovery from surgery has been complicated from a pulmonary embolism. Pt. Notes he has not performed prescribed HEP secondary to fatigue and difficulty breathing. The pulmonary embolism is currently being managed and patient would like to resume exercise program to re-establish prior level of function. Pt. Notes he initially experienced R knee pain that was later attributed to severe hip OA leading to surgery. Pt. Notes he is having difficulty with prolonged standing, lifting the R LE, and walking without a cane. Past Medical History/Comorbidities:   Mr. Celeste Wayne  has a past medical history of Acute pulmonary embolism ([address] Arthritis, Chronic pain, Former smoker, GERD (gastroesophageal reflux disease), High cholesterol, Hypertension, Nausea & vomiting, Stomach cancer ([address] Total knee replacement status, and Unspecified adverse effect of anesthesia. Mr. Celeste Wayne  has a past surgical history that includes hx gi (2010); pr total hip arthroplasty (Left, 2004); hx shoulder arthroscopy (Right, 2015); hx knee arthroscopy (Right); pr total knee arthroplasty (Right, 2012); hx other surgical; hx cholecystectomy (2015); hx appendectomy; RIGHT HIP ARTHROPLASTY TOTAL/DEPUY (Right, 9/4/2018);  CHOLECYSTECTOMY LAPAROSCOPIC (N/A, 7/24/2015); RIGHT SHOULDER ARTHROPLASTY TOTAL REVERSE   (Right, 4/6/2015); KNEE ARTHROPLASTY TOTAL (Right, 2/21/2012); INFUSAPORT INSERTION (Left, 3/11/2010); and GASTRECTOMY LAPAROSCOPIC (N/A, 2/8/2010). Social History/Living Environment:     lives with wife in two story home. Prior Level of Function/Work/Activity:  Functioned independently without significant limitations. Current Medications:       Current Outpatient Medications:     tamsulosin (FLOMAX) 0.4 mg capsule, Take 0.4 mg by mouth daily. , Disp: , Rfl:     traMADol (ULTRAM) 50 mg tablet, Take 1 Tab by mouth every eight (8) hours as needed. Max Daily Amount: 150 mg., Disp: 40 Tab, Rfl: 1    traMADol (ULTRAM) 50 mg tablet, Take 50 mg by mouth every six (6) hours as needed for Pain., Disp: , Rfl:     aspirin delayed-release 81 mg tablet, Take 1 Tab by mouth every twelve (12) hours every twelve (12) hours. , Disp: 120 Tab, Rfl: 0    sucralfate (CARAFATE) 1 gram tablet, Take 1 g by mouth three (3) times daily. Take / use AM day of surgery  per anesthesia protocols. , Disp: , Rfl:     omeprazole (PRILOSEC) 20 mg capsule, Take 20 mg by mouth Daily (before breakfast). Take DOS per anesthesia protocol. Indications: gastroesophageal reflux disease, Disp: , Rfl:     zolpidem (AMBIEN) 10 mg tablet, Take 10 mg by mouth nightly as needed for Sleep. Indications: SLEEP-ONSET INSOMNIA, Disp: , Rfl:     cyanocobalamin (VITAMIN B-12) 1,000 mcg/mL injection, 1,000 mcg by IntraMUSCular route every twenty-eight (28) days. , Disp: , Rfl:     simvastatin (ZOCOR) 40 mg tablet, Take 40 mg by mouth nightly., Disp: , Rfl:     cetirizine (ZYRTEC) 10 mg tablet, Take 10 mg by mouth nightly. Indications: ALLERGIC RHINITIS, Disp: , Rfl:     fluticasone (FLONASE) 50 mcg/Actuation nasal spray, 2 Sprays by Nasal route nightly.  Indications: ALLERGIC RHINITIS, Disp: , Rfl:    Date Last Reviewed:  11/14/2018   Number of Personal Factors/Comorbidities that affect the Plan of Care: 3+: HIGH COMPLEXITY   EXAMINATION: Observation/Orthostatic Postural Assessment:          Incision well healed in the R gluteal region; Swelling present in B gaiter region, pt. Ambulates with a single point cane. Mild trendelenburg present during gait without use of single point cane. Palpation:          Tender to palpation of the R gluteal, hip external rotators, quadriceps, and hip flexors  ROM:          R hip ROM WFL, B knee extension limited. Strength:             Right Left   Hip extension 3+/5 4+/5   Hip abduction 3+/5 4+/5   Hip external rotation 3/5 4+/5   Hip flexion 3/5 4+/5        Body Structures Involved:  1. Joints  2. Muscles Body Functions Affected:  1. Neuromusculoskeletal  2. Movement Related Activities and Participation Affected:  1. Mobility  2. Self Care   Number of elements (examined above) that affect the Plan of Care: 1-2: LOW COMPLEXITY   CLINICAL PRESENTATION:   Presentation: Stable and uncomplicated: LOW COMPLEXITY   CLINICAL DECISION MAKING:   Outcome Measure: Tool Used: Lower Extremity Functional Scale (LEFS)  Score:  Initial: 33/80 Most Recent: X/80 (Date: -- )   Interpretation of Score: 20 questions each scored on a 5 point scale with 0 representing \"extreme difficulty or unable to perform\" and 4 representing \"no difficulty\". The lower the score, the greater the functional disability. 80/80 represents no disability. Minimal detectable change is 9 points. Score 80 79-63 62-48 47-32 31-16 15-1 0   Modifier CH CI CJ CK CL CM CN     ? Mobility - Walking and Moving Around:     - CURRENT STATUS: CK - 40%-59% impaired, limited or restricted    - GOAL STATUS: CI - 1%-19% impaired, limited or restricted    - D/C STATUS:  ---------------To be determined---------------    Medical Necessity:   · Patient is expected to demonstrate progress in strength and range of motion to increase independence with standing, walking, and ADL's.   Reason for Services/Other Comments:  · Patient will benefit from skilled PT to address impairments identified through evaluation and return to previous ADL and recreational capacity. Use of outcome tool(s) and clinical judgement create a POC that gives a: Clear prediction of patient's progress: LOW COMPLEXITY            TREATMENT:   (In addition to Assessment/Re-Assessment sessions the following treatments were rendered)  Pre-treatment Symptoms/Complaints:  Patient denies new complaints and good compliance with HEP. Pain: Initial:   Pain Intensity 1: 2 /10 Post Session:  2/10     Therapeutic Exercise: (45 Minutes):  Exercises per grid below to improve mobility and strength. Required minimal verbal and manual cues to promote proper body alignment and promote proper body posture. Progressed range and repetitions as indicated. Date:  11/14/2018   Activity/Exercise Parameters   Clams  3 x 10   Quadriceps setting (with russian e-stim) --   Marching in sitting 3 x 10   Gluteal squeezes --   Assisted SLR in supine --   Modified sit to stand --   Hip extension in standing 3 x 15   Calf stretch 3 minutes   Lateral walking 5 minutes   Single leg balance 2 minutes   Calf raise 3 x 10   Sidelying hip abduction (minimal assistance) 3 x 10   Recumbent Bike 6 minutes   Quadriceps/hip flexor stretch 4 minutes   Manual Therapy (    Soft Tissue Mobilization Duration  Duration: 10 Minutes): Manual techniques to facilitate improved motion and decreased pain. (Used abbreviations: MET - muscle energy technique; PNF - proprioceptive neuromuscular facilitation; NMR - neuromuscular re-education; a/p - anterior to posterior; p/a - posterior to anterior)   · Soft tissue mobilization: R quadriceps, gluteals, and iliopsoas          MedBridge Portal  Treatment/Session Assessment:    · Response to Treatment:  Pt. Continues to demonstrates improved strength and endurance in the R hip abductors and extensors. Pt. Will benefit from progression of program as tolerated.         · Compliance with Program/Exercises: good compliance. · Recommendations/Intent for next treatment session: \"Next visit will focus on advancements to more challenging activities\".   Total Treatment Duration: therapeutic exercise 45 minutes, manual therapy 10 minutes, total time 55 minutes  PT Patient Time In/Time Out  Time In: 1400  Time Out: [address] [facility], PT

## 2018-11-19 ENCOUNTER — HOSPITAL ENCOUNTER (OUTPATIENT)
Dept: PHYSICAL THERAPY | Age: 78
Discharge: HOME | End: 2018-11-19
Payer: MEDICARE

## 2018-11-19 PROCEDURE — 97110 THERAPEUTIC EXERCISES: CPT

## 2018-11-19 PROCEDURE — 97140 MANUAL THERAPY 1/> REGIONS: CPT

## 2018-11-19 NOTE — PROGRESS NOTES
Nadia Rod  : 1940  [place]  [facility]: [address] [facility] [address]  [phone]   [phone]        OUTPATIENT PHYSICAL THERAPY:Daily Note 2018   ICD-10: Treatment Diagnosis: Pain in Joint, R hip [M25.551]; Muscle weakness, generalized [M62.81]; Difficulty walking, not elsewhere classified [R26.2]  Precautions/Allergies:   Morphine; Other medication; and Shellfish derived   Fall Risk Score: 2 (? 5 = High Risk)  MD Orders: Evaluate and Treat MEDICAL/REFERRING DIAGNOSIS:  Hip pain [M25.559]   DATE OF ONSET: 18  REFERRING PHYSICIAN: Sebastian Roland MD  RETURN PHYSICIAN APPOINTMENT: TBD     INITIAL ASSESSMENT:  Mr. Dory Arreguin presents s/p R total hip replacement on 18. Pt. Demonstrates altered gait mechanics with use of a single point cane and reduced muscular strength in the R hip abductors, R hip extensors, R hip flexors, and R hip external rotators. Pt. Demonstrates moderate difficulty with ADL's along with standing and walking secondary to weakness. Pt. Is also managing a blood clot resulting in fatigue and labored breathing upon exertion. Pt. Will benefit from skilled PT to return to previous level of ADL function. PROBLEM LIST (Impacting functional limitations):  1. Decreased Strength  2. Decreased ADL/Functional Activities  3. Increased Pain  4. Decreased Activity Tolerance  5. Increased Shortness of Breath  6. Decreased Flexibility/Joint Mobility  7. Decreased [place] with Home Exercise Program INTERVENTIONS PLANNED:  1. Gait Training  2. Home Exercise Program (HEP)  3. Manual Therapy  4. Range of Motion (ROM)  5. Therapeutic Exercise/Strengthening   TREATMENT PLAN:  Effective Dates: 10/23/2018 TO 2019 (90 days).   Frequency/Duration: 2 times a week for 90 Day(s)  GOALS: (Goals have been discussed and agreed upon with patient.)  Discharge Goals: Time Frame: 8 weeks  1. Pt. Will report <3/10 R hip pain with standing for 5 minutes  2. Pt. Will demonstrate 4+/5 MMT strength in the R hip abductors and flexors to improve ADL performance. 3. Pt. Will ambulate for 10 minutes without single point cane and absence of trendelenburg gait to improve walking. 4. Pt. Will score >50 on the LEFS to demonstrate improved pain and function. Rehabilitation Potential For Stated Goals: Good              The information in this section was collected on 10/23/18 (except where otherwise noted). HISTORY:   History of Present Injury/Illness (Reason for Referral):  Pt. Attends PT s/p R total hip replacement on 9/4/18. Recovery from surgery has been complicated from a pulmonary embolism. Pt. Notes he has not performed prescribed HEP secondary to fatigue and difficulty breathing. The pulmonary embolism is currently being managed and patient would like to resume exercise program to re-establish prior level of function. Pt. Notes he initially experienced R knee pain that was later attributed to severe hip OA leading to surgery. Pt. Notes he is having difficulty with prolonged standing, lifting the R LE, and walking without a cane. Past Medical History/Comorbidities:   Mr. Nell Solorio  has a past medical history of Acute pulmonary embolism ([facility] [address] Arthritis, Chronic pain, Former smoker, GERD (gastroesophageal reflux disease), High cholesterol, Hypertension, Nausea & vomiting, Stomach cancer ([address] Total knee replacement status, and Unspecified adverse effect of anesthesia. Mr. Nell Solorio  has a past surgical history that includes hx gi (2010); pr total hip arthroplasty (Left, 2004); hx shoulder arthroscopy (Right, 2015); hx knee arthroscopy (Right); pr total knee arthroplasty (Right, 2012); hx other surgical; hx cholecystectomy (2015); hx appendectomy; RIGHT HIP ARTHROPLASTY TOTAL/DEPUY (Right, 9/4/2018);  CHOLECYSTECTOMY LAPAROSCOPIC (N/A, 7/24/2015); RIGHT SHOULDER ARTHROPLASTY TOTAL REVERSE   (Right, 4/6/2015); KNEE ARTHROPLASTY TOTAL (Right, 2/21/2012); INFUSAPORT INSERTION (Left, 3/11/2010); and GASTRECTOMY LAPAROSCOPIC (N/A, 2/8/2010). Social History/Living Environment:     lives with wife in two story home. Prior Level of Function/Work/Activity:  Functioned independently without significant limitations. Current Medications:       Current Outpatient Medications:     tamsulosin (FLOMAX) 0.4 mg capsule, Take 0.4 mg by mouth daily. , Disp: , Rfl:     traMADol (ULTRAM) 50 mg tablet, Take 1 Tab by mouth every eight (8) hours as needed. Max Daily Amount: 150 mg., Disp: 40 Tab, Rfl: 1    traMADol (ULTRAM) 50 mg tablet, Take 50 mg by mouth every six (6) hours as needed for Pain., Disp: , Rfl:     aspirin delayed-release 81 mg tablet, Take 1 Tab by mouth every twelve (12) hours every twelve (12) hours. , Disp: 120 Tab, Rfl: 0    sucralfate (CARAFATE) 1 gram tablet, Take 1 g by mouth three (3) times daily. Take / use AM day of surgery  per anesthesia protocols. , Disp: , Rfl:     omeprazole (PRILOSEC) 20 mg capsule, Take 20 mg by mouth Daily (before breakfast). Take DOS per anesthesia protocol. Indications: gastroesophageal reflux disease, Disp: , Rfl:     zolpidem (AMBIEN) 10 mg tablet, Take 10 mg by mouth nightly as needed for Sleep. Indications: SLEEP-ONSET INSOMNIA, Disp: , Rfl:     cyanocobalamin (VITAMIN B-12) 1,000 mcg/mL injection, 1,000 mcg by IntraMUSCular route every twenty-eight (28) days. , Disp: , Rfl:     simvastatin (ZOCOR) 40 mg tablet, Take 40 mg by mouth nightly., Disp: , Rfl:     cetirizine (ZYRTEC) 10 mg tablet, Take 10 mg by mouth nightly. Indications: ALLERGIC RHINITIS, Disp: , Rfl:     fluticasone (FLONASE) 50 mcg/Actuation nasal spray, 2 Sprays by Nasal route nightly.  Indications: ALLERGIC RHINITIS, Disp: , Rfl:    Date Last Reviewed:  11/19/2018   Number of Personal Factors/Comorbidities that affect the Plan of Care: 3+: HIGH COMPLEXITY   EXAMINATION: Observation/Orthostatic Postural Assessment:          Incision well healed in the R gluteal region; Swelling present in B gaiter region, pt. Ambulates with a single point cane. Mild trendelenburg present during gait without use of single point cane. Palpation:          Tender to palpation of the R gluteal, hip external rotators, quadriceps, and hip flexors  ROM:          R hip ROM WFL, B knee extension limited. Strength:             Right Left   Hip extension 3+/5 4+/5   Hip abduction 3+/5 4+/5   Hip external rotation 3/5 4+/5   Hip flexion 3/5 4+/5        Body Structures Involved:  1. Joints  2. Muscles Body Functions Affected:  1. Neuromusculoskeletal  2. Movement Related Activities and Participation Affected:  1. Mobility  2. Self Care   Number of elements (examined above) that affect the Plan of Care: 1-2: LOW COMPLEXITY   CLINICAL PRESENTATION:   Presentation: Stable and uncomplicated: LOW COMPLEXITY   CLINICAL DECISION MAKING:   Outcome Measure: Tool Used: Lower Extremity Functional Scale (LEFS)  Score:  Initial: 33/80 Most Recent: X/80 (Date: -- )   Interpretation of Score: 20 questions each scored on a 5 point scale with 0 representing \"extreme difficulty or unable to perform\" and 4 representing \"no difficulty\". The lower the score, the greater the functional disability. 80/80 represents no disability. Minimal detectable change is 9 points. Score 80 79-63 62-48 47-32 31-16 15-1 0   Modifier CH CI CJ CK CL CM CN     ? Mobility - Walking and Moving Around:     - CURRENT STATUS: CK - 40%-59% impaired, limited or restricted    - GOAL STATUS: CI - 1%-19% impaired, limited or restricted    - D/C STATUS:  ---------------To be determined---------------    Medical Necessity:   · Patient is expected to demonstrate progress in strength and range of motion to increase independence with standing, walking, and ADL's.   Reason for Services/Other Comments:  · Patient will benefit from skilled PT to address impairments identified through evaluation and return to previous ADL and recreational capacity. Use of outcome tool(s) and clinical judgement create a POC that gives a: Clear prediction of patient's progress: LOW COMPLEXITY            TREATMENT:   (In addition to Assessment/Re-Assessment sessions the following treatments were rendered)  Pre-treatment Symptoms/Complaints:  Patient notes minimal pain over the weekend, but a slight loss of balance while trying to walk without the cane. Pain: Initial:   Pain Intensity 1: 2 /10 Post Session:  2/10     Therapeutic Exercise: (45 Minutes):  Exercises per grid below to improve mobility and strength. Required minimal verbal and manual cues to promote proper body alignment and promote proper body posture. Progressed range and repetitions as indicated. Date:  11/19/2018   Activity/Exercise Parameters   Clams  3 x 12   Interval walking for increased walking speed. 5 minutes   Marching in sitting 3 x 10   stairs 10 minutes   SLR in supine 3 x 10   Modified sit to stand --   Hip extension in standing 3 x 15   Calf stretch 3 minutes   Lateral walking 5 minutes   Single leg balance --   Calf raise --   Sidelying hip abduction (minimal assistance) 3 x 10   Recumbent Bike --   Quadriceps/hip flexor stretch 4 minutes   Manual Therapy (    Soft Tissue Mobilization Duration  Duration: 10 Minutes): Manual techniques to facilitate improved motion and decreased pain. (Used abbreviations: MET - muscle energy technique; PNF - proprioceptive neuromuscular facilitation; NMR - neuromuscular re-education; a/p - anterior to posterior; p/a - posterior to anterior)   · Soft tissue mobilization: R quadriceps, gluteals, and iliopsoas          MedBridge Portal  Treatment/Session Assessment:    · Response to Treatment:  Pt. Gait is improved without cane for ambulation but will occassionally sway to one side, especially when distracted.   Pt. Is advised to utilize the can for another 2 weeks. Pt. Is able to ascend/descend 2 flights of stairs with on hand railing and step through pattern. · Compliance with Program/Exercises: good compliance. · Recommendations/Intent for next treatment session: \"Next visit will focus on advancements to more challenging activities\".   Total Treatment Duration: therapeutic exercise 45 minutes, manual therapy 10 minutes, total time 55 minutes  PT Patient Time In/Time Out  Time In: 1330  Time Out: [address] [place]

## 2018-11-21 ENCOUNTER — HOSPITAL ENCOUNTER (OUTPATIENT)
Dept: PHYSICAL THERAPY | Age: 78
Discharge: HOME | End: 2018-11-21
Payer: MEDICARE

## 2018-11-21 PROCEDURE — 97110 THERAPEUTIC EXERCISES: CPT

## 2018-11-21 PROCEDURE — 97140 MANUAL THERAPY 1/> REGIONS: CPT

## 2018-11-21 NOTE — PROGRESS NOTES
Dewey Augustine  : 1940  [place]  Secondary: [address]  [phone]   [phone]        OUTPATIENT PHYSICAL THERAPY:Daily Note 2018   ICD-10: Treatment Diagnosis: Pain in Joint, R hip [M25.551]; Muscle weakness, generalized [M62.81]; Difficulty walking, not elsewhere classified [R26.2]  Precautions/Allergies:   Morphine; Other medication; and Shellfish derived   Fall Risk Score: 2 (? 5 = High Risk)  MD Orders: Evaluate and Treat MEDICAL/REFERRING DIAGNOSIS:  Hip pain [M25.559]   DATE OF ONSET: 18  REFERRING PHYSICIAN: Hilda Juares MD  RETURN PHYSICIAN APPOINTMENT: TBD     INITIAL ASSESSMENT:  Mr. Megan Rice presents s/p R total hip replacement on 18. Pt. Demonstrates altered gait mechanics with use of a single point cane and reduced muscular strength in the R hip abductors, R hip extensors, R hip flexors, and R hip external rotators. Pt. Demonstrates moderate difficulty with ADL's along with standing and walking secondary to weakness. Pt. Is also managing a blood clot resulting in fatigue and labored breathing upon exertion. Pt. Will benefit from skilled PT to return to previous level of ADL function. PROBLEM LIST (Impacting functional limitations):  1. Decreased Strength  2. Decreased ADL/Functional Activities  3. Increased Pain  4. Decreased Activity Tolerance  5. Increased Shortness of Breath  6. Decreased Flexibility/Joint Mobility  7. Decreased [place] with Home Exercise Program INTERVENTIONS PLANNED:  1. Gait Training  2. Home Exercise Program (HEP)  3. Manual Therapy  4. Range of Motion (ROM)  5. Therapeutic Exercise/Strengthening   TREATMENT PLAN:  Effective Dates: 10/23/2018 TO 2019 (90 days).   Frequency/Duration: 2 times a week for 90 Day(s)  GOALS: (Goals have been discussed and agreed upon with patient.)  Discharge Goals: Time Frame: 8 weeks  1. Pt. Will report <3/10 R hip pain with standing for 5 minutes  2. Pt. Will demonstrate 4+/5 MMT strength in the R hip abductors and flexors to improve ADL performance. 3. Pt. Will ambulate for 10 minutes without single point cane and absence of trendelenburg gait to improve walking. 4. Pt. Will score >50 on the LEFS to demonstrate improved pain and function. Rehabilitation Potential For Stated Goals: Good              The information in this section was collected on 10/23/18 (except where otherwise noted). HISTORY:   History of Present Injury/Illness (Reason for Referral):  Pt. Attends PT s/p R total hip replacement on 9/4/18. Recovery from surgery has been complicated from a pulmonary embolism. Pt. Notes he has not performed prescribed HEP secondary to fatigue and difficulty breathing. The pulmonary embolism is currently being managed and patient would like to resume exercise program to re-establish prior level of function. Pt. Notes he initially experienced R knee pain that was later attributed to severe hip OA leading to surgery. Pt. Notes he is having difficulty with prolonged standing, lifting the R LE, and walking without a cane. Past Medical History/Comorbidities:   Mr. Marielena Soriano  has a past medical history of Acute pulmonary embolism ([facility] [address] Arthritis, Chronic pain, Former smoker, GERD (gastroesophageal reflux disease), High cholesterol, Hypertension, Nausea & vomiting, Stomach cancer ([address] Total knee replacement status, and Unspecified adverse effect of anesthesia. Mr. Marielena Soriano  has a past surgical history that includes hx gi (2010); pr total hip arthroplasty (Left, 2004); hx shoulder arthroscopy (Right, 2015); hx knee arthroscopy (Right); pr total knee arthroplasty (Right, 2012); hx other surgical; hx cholecystectomy (2015); hx appendectomy; RIGHT HIP ARTHROPLASTY TOTAL/DEPUY (Right, 9/4/2018);  CHOLECYSTECTOMY LAPAROSCOPIC (N/A, 7/24/2015); RIGHT SHOULDER ARTHROPLASTY TOTAL REVERSE   (Right, 4/6/2015); KNEE ARTHROPLASTY TOTAL (Right, 2/21/2012); INFUSAPORT INSERTION (Left, 3/11/2010); and GASTRECTOMY LAPAROSCOPIC (N/A, 2/8/2010). Social History/Living Environment:     lives with wife in two story home. Prior Level of Function/Work/Activity:  Functioned independently without significant limitations. Current Medications:       Current Outpatient Medications:     tamsulosin (FLOMAX) 0.4 mg capsule, Take 0.4 mg by mouth daily. , Disp: , Rfl:     traMADol (ULTRAM) 50 mg tablet, Take 1 Tab by mouth every eight (8) hours as needed. Max Daily Amount: 150 mg., Disp: 40 Tab, Rfl: 1    traMADol (ULTRAM) 50 mg tablet, Take 50 mg by mouth every six (6) hours as needed for Pain., Disp: , Rfl:     aspirin delayed-release 81 mg tablet, Take 1 Tab by mouth every twelve (12) hours every twelve (12) hours. , Disp: 120 Tab, Rfl: 0    sucralfate (CARAFATE) 1 gram tablet, Take 1 g by mouth three (3) times daily. Take / use AM day of surgery  per anesthesia protocols. , Disp: , Rfl:     omeprazole (PRILOSEC) 20 mg capsule, Take 20 mg by mouth Daily (before breakfast). Take DOS per anesthesia protocol. Indications: gastroesophageal reflux disease, Disp: , Rfl:     zolpidem (AMBIEN) 10 mg tablet, Take 10 mg by mouth nightly as needed for Sleep. Indications: SLEEP-ONSET INSOMNIA, Disp: , Rfl:     cyanocobalamin (VITAMIN B-12) 1,000 mcg/mL injection, 1,000 mcg by IntraMUSCular route every twenty-eight (28) days. , Disp: , Rfl:     simvastatin (ZOCOR) 40 mg tablet, Take 40 mg by mouth nightly., Disp: , Rfl:     cetirizine (ZYRTEC) 10 mg tablet, Take 10 mg by mouth nightly. Indications: ALLERGIC RHINITIS, Disp: , Rfl:     fluticasone (FLONASE) 50 mcg/Actuation nasal spray, 2 Sprays by Nasal route nightly.  Indications: ALLERGIC RHINITIS, Disp: , Rfl:    Date Last Reviewed:  11/21/2018   Number of Personal Factors/Comorbidities that affect the Plan of Care: 3+: HIGH COMPLEXITY   EXAMINATION: Observation/Orthostatic Postural Assessment:          Incision well healed in the R gluteal region; Swelling present in B gaiter region, pt. Ambulates with a single point cane. Mild trendelenburg present during gait without use of single point cane. Palpation:          Tender to palpation of the R gluteal, hip external rotators, quadriceps, and hip flexors  ROM:          R hip ROM WFL, B knee extension limited. Strength:             Right Left   Hip extension 3+/5 4+/5   Hip abduction 3+/5 4+/5   Hip external rotation 3/5 4+/5   Hip flexion 3/5 4+/5        Body Structures Involved:  1. Joints  2. Muscles Body Functions Affected:  1. Neuromusculoskeletal  2. Movement Related Activities and Participation Affected:  1. Mobility  2. Self Care   Number of elements (examined above) that affect the Plan of Care: 1-2: LOW COMPLEXITY   CLINICAL PRESENTATION:   Presentation: Stable and uncomplicated: LOW COMPLEXITY   CLINICAL DECISION MAKING:   Outcome Measure: Tool Used: Lower Extremity Functional Scale (LEFS)  Score:  Initial: 33/80 Most Recent: X/80 (Date: -- )   Interpretation of Score: 20 questions each scored on a 5 point scale with 0 representing \"extreme difficulty or unable to perform\" and 4 representing \"no difficulty\". The lower the score, the greater the functional disability. 80/80 represents no disability. Minimal detectable change is 9 points. Score 80 79-63 62-48 47-32 31-16 15-1 0   Modifier CH CI CJ CK CL CM CN     ? Mobility - Walking and Moving Around:     - CURRENT STATUS: CK - 40%-59% impaired, limited or restricted    - GOAL STATUS: CI - 1%-19% impaired, limited or restricted    - D/C STATUS:  ---------------To be determined---------------    Medical Necessity:   · Patient is expected to demonstrate progress in strength and range of motion to increase independence with standing, walking, and ADL's.   Reason for Services/Other Comments:  · Patient will benefit from skilled PT to address impairments identified through evaluation and return to previous ADL and recreational capacity. Use of outcome tool(s) and clinical judgement create a POC that gives a: Clear prediction of patient's progress: LOW COMPLEXITY            TREATMENT:   (In addition to Assessment/Re-Assessment sessions the following treatments were rendered)  Pre-treatment Symptoms/Complaints:  Patient notes continued improvement in L LE strength this week. Pain: Initial:   Pain Intensity 1: 2 /10 Post Session:  2/10     Therapeutic Exercise: (45 Minutes):  Exercises per grid below to improve mobility and strength. Required minimal verbal and manual cues to promote proper body alignment and promote proper body posture. Progressed range and repetitions as indicated. Date:  11/21/2018   Activity/Exercise Parameters   Clams  --   Interval walking for increased walking speed. 5 minutes   Marching in sitting --   stairs 10 minutes   SLR in supine 3 x 10   Modified sit to stand 3 x 10   Hip extension in standing 3 x 15   Calf stretch 3 minutes   Lateral walking 5 minutes   Single leg balance 3 minutes   Calf raise --   Sidelying hip abduction (minimal assistance) 3 x 10   Recumbent Bike --   Quadriceps/hip flexor stretch 4 minutes   Manual Therapy (    Soft Tissue Mobilization Duration  Duration: 10 Minutes): Manual techniques to facilitate improved motion and decreased pain. (Used abbreviations: MET - muscle energy technique; PNF - proprioceptive neuromuscular facilitation; NMR - neuromuscular re-education; a/p - anterior to posterior; p/a - posterior to anterior)   · Soft tissue mobilization: R quadriceps, gluteals, and iliopsoas          MedBridge Portal  Treatment/Session Assessment:    · Response to Treatment:  R hip flexion, abduction, and external rotation strength continues to improve with prescribed exercises. Pt. Is making excellent functional gains at this time.     · Compliance with Program/Exercises: good compliance. · Recommendations/Intent for next treatment session: \"Next visit will focus on advancements to more challenging activities\".   Total Treatment Duration: therapeutic exercise 45 minutes, manual therapy 10 minutes, total time 55 minutes  PT Patient Time In/Time Out  Time In: 1330  Time Out: [address] [facility] Sydnie Mccall

## 2018-11-26 ENCOUNTER — HOSPITAL ENCOUNTER (OUTPATIENT)
Dept: PHYSICAL THERAPY | Age: 78
Discharge: HOME | End: 2018-11-26
Payer: MEDICARE

## 2018-11-26 PROCEDURE — 97140 MANUAL THERAPY 1/> REGIONS: CPT

## 2018-11-26 PROCEDURE — 97110 THERAPEUTIC EXERCISES: CPT

## 2018-11-26 NOTE — PROGRESS NOTES
Leonie Blood  : 1940  [place]  Secondary: [address]  at [address]  [address] [place] [address]  [phone]   [phone]        OUTPATIENT PHYSICAL THERAPY:Daily Note 2018   ICD-10: Treatment Diagnosis: Pain in Joint, R hip [M25.551]; Muscle weakness, generalized [M62.81]; Difficulty walking, not elsewhere classified [R26.2]  Precautions/Allergies:   Morphine; Other medication; and Shellfish derived   Fall Risk Score: 2 (? 5 = High Risk)  MD Orders: Evaluate and Treat MEDICAL/REFERRING DIAGNOSIS:  Hip pain [M25.559]   DATE OF ONSET: 18  REFERRING PHYSICIAN: Danial Zamora MD  RETURN PHYSICIAN APPOINTMENT: TBD     INITIAL ASSESSMENT:  Mr. Héctor Andujar presents s/p R total hip replacement on 18. Pt. Demonstrates altered gait mechanics with use of a single point cane and reduced muscular strength in the R hip abductors, R hip extensors, R hip flexors, and R hip external rotators. Pt. Demonstrates moderate difficulty with ADL's along with standing and walking secondary to weakness. Pt. Is also managing a blood clot resulting in fatigue and labored breathing upon exertion. Pt. Will benefit from skilled PT to return to previous level of ADL function. PROBLEM LIST (Impacting functional limitations):  1. Decreased Strength  2. Decreased ADL/Functional Activities  3. Increased Pain  4. Decreased Activity Tolerance  5. Increased Shortness of Breath  6. Decreased Flexibility/Joint Mobility  7. Decreased [place] with Home Exercise Program INTERVENTIONS PLANNED:  1. Gait Training  2. Home Exercise Program (HEP)  3. Manual Therapy  4. Range of Motion (ROM)  5. Therapeutic Exercise/Strengthening   TREATMENT PLAN:  Effective Dates: 10/23/2018 TO 2019 (90 days).   Frequency/Duration: 2 times a week for 90 Day(s)  GOALS: (Goals have been discussed and agreed upon with patient.)  Discharge Goals: Time Frame: 8 weeks  1. Pt. Will report <3/10 R hip pain with standing for 5 minutes  2. Pt. Will demonstrate 4+/5 MMT strength in the R hip abductors and flexors to improve ADL performance. 3. Pt. Will ambulate for 10 minutes without single point cane and absence of trendelenburg gait to improve walking. 4. Pt. Will score >50 on the LEFS to demonstrate improved pain and function. Rehabilitation Potential For Stated Goals: Good              The information in this section was collected on 10/23/18 (except where otherwise noted). HISTORY:   History of Present Injury/Illness (Reason for Referral):  Pt. Attends PT s/p R total hip replacement on 9/4/18. Recovery from surgery has been complicated from a pulmonary embolism. Pt. Notes he has not performed prescribed HEP secondary to fatigue and difficulty breathing. The pulmonary embolism is currently being managed and patient would like to resume exercise program to re-establish prior level of function. Pt. Notes he initially experienced R knee pain that was later attributed to severe hip OA leading to surgery. Pt. Notes he is having difficulty with prolonged standing, lifting the R LE, and walking without a cane. Past Medical History/Comorbidities:   Mr. Mauricio Cabrera  has a past medical history of Acute pulmonary embolism ([facility] [address] Arthritis, Chronic pain, Former smoker, GERD (gastroesophageal reflux disease), High cholesterol, Hypertension, Nausea & vomiting, Stomach cancer ([address] Total knee replacement status, and Unspecified adverse effect of anesthesia. Mr. Mauircio Cabrera  has a past surgical history that includes hx gi (2010); pr total hip arthroplasty (Left, 2004); hx shoulder arthroscopy (Right, 2015); hx knee arthroscopy (Right); pr total knee arthroplasty (Right, 2012); hx other surgical; hx cholecystectomy (2015); hx appendectomy; RIGHT HIP ARTHROPLASTY TOTAL/DEPUY (Right, 9/4/2018);  CHOLECYSTECTOMY LAPAROSCOPIC (N/A, 7/24/2015); RIGHT SHOULDER ARTHROPLASTY TOTAL REVERSE   (Right, 4/6/2015); KNEE ARTHROPLASTY TOTAL (Right, 2/21/2012); INFUSAPORT INSERTION (Left, 3/11/2010); and GASTRECTOMY LAPAROSCOPIC (N/A, 2/8/2010). Social History/Living Environment:     lives with wife in two story home. Prior Level of Function/Work/Activity:  Functioned independently without significant limitations. Current Medications:       Current Outpatient Medications:     tamsulosin (FLOMAX) 0.4 mg capsule, Take 0.4 mg by mouth daily. , Disp: , Rfl:     traMADol (ULTRAM) 50 mg tablet, Take 1 Tab by mouth every eight (8) hours as needed. Max Daily Amount: 150 mg., Disp: 40 Tab, Rfl: 1    traMADol (ULTRAM) 50 mg tablet, Take 50 mg by mouth every six (6) hours as needed for Pain., Disp: , Rfl:     aspirin delayed-release 81 mg tablet, Take 1 Tab by mouth every twelve (12) hours every twelve (12) hours. , Disp: 120 Tab, Rfl: 0    sucralfate (CARAFATE) 1 gram tablet, Take 1 g by mouth three (3) times daily. Take / use AM day of surgery  per anesthesia protocols. , Disp: , Rfl:     omeprazole (PRILOSEC) 20 mg capsule, Take 20 mg by mouth Daily (before breakfast). Take DOS per anesthesia protocol. Indications: gastroesophageal reflux disease, Disp: , Rfl:     zolpidem (AMBIEN) 10 mg tablet, Take 10 mg by mouth nightly as needed for Sleep. Indications: SLEEP-ONSET INSOMNIA, Disp: , Rfl:     cyanocobalamin (VITAMIN B-12) 1,000 mcg/mL injection, 1,000 mcg by IntraMUSCular route every twenty-eight (28) days. , Disp: , Rfl:     simvastatin (ZOCOR) 40 mg tablet, Take 40 mg by mouth nightly., Disp: , Rfl:     cetirizine (ZYRTEC) 10 mg tablet, Take 10 mg by mouth nightly. Indications: ALLERGIC RHINITIS, Disp: , Rfl:     fluticasone (FLONASE) 50 mcg/Actuation nasal spray, 2 Sprays by Nasal route nightly.  Indications: ALLERGIC RHINITIS, Disp: , Rfl:    Date Last Reviewed:  11/26/2018   Number of Personal Factors/Comorbidities that affect the Plan of Care: 3+: HIGH COMPLEXITY   EXAMINATION: Observation/Orthostatic Postural Assessment:          Incision well healed in the R gluteal region; Swelling present in B gaiter region, pt. Ambulates with a single point cane. Mild trendelenburg present during gait without use of single point cane. Palpation:          Tender to palpation of the R gluteal, hip external rotators, quadriceps, and hip flexors  ROM:          R hip ROM WFL, B knee extension limited. Strength:             Right Left   Hip extension 3+/5 4+/5   Hip abduction 3+/5 4+/5   Hip external rotation 3/5 4+/5   Hip flexion 3/5 4+/5        Body Structures Involved:  1. Joints  2. Muscles Body Functions Affected:  1. Neuromusculoskeletal  2. Movement Related Activities and Participation Affected:  1. Mobility  2. Self Care   Number of elements (examined above) that affect the Plan of Care: 1-2: LOW COMPLEXITY   CLINICAL PRESENTATION:   Presentation: Stable and uncomplicated: LOW COMPLEXITY   CLINICAL DECISION MAKING:   Outcome Measure: Tool Used: Lower Extremity Functional Scale (LEFS)  Score:  Initial: 33/80 Most Recent: X/80 (Date: -- )   Interpretation of Score: 20 questions each scored on a 5 point scale with 0 representing \"extreme difficulty or unable to perform\" and 4 representing \"no difficulty\". The lower the score, the greater the functional disability. 80/80 represents no disability. Minimal detectable change is 9 points. Score 80 79-63 62-48 47-32 31-16 15-1 0   Modifier CH CI CJ CK CL CM CN     ? Mobility - Walking and Moving Around:     - CURRENT STATUS: CK - 40%-59% impaired, limited or restricted    - GOAL STATUS: CI - 1%-19% impaired, limited or restricted    - D/C STATUS:  ---------------To be determined---------------    Medical Necessity:   · Patient is expected to demonstrate progress in strength and range of motion to increase independence with standing, walking, and ADL's.   Reason for Services/Other Comments:  · Patient will benefit from skilled PT to address impairments identified through evaluation and return to previous ADL and recreational capacity. Use of outcome tool(s) and clinical judgement create a POC that gives a: Clear prediction of patient's progress: LOW COMPLEXITY            TREATMENT:   (In addition to Assessment/Re-Assessment sessions the following treatments were rendered)  Pre-treatment Symptoms/Complaints:  Patient reports intermittent sharp pain in the right hip. Pain is only brief. Pain: Initial:   Pain Intensity 1: 2 /10 Post Session:  2/10     Therapeutic Exercise: (45 Minutes):  Exercises per grid below to improve mobility and strength. Required minimal verbal and manual cues to promote proper body alignment and promote proper body posture. Progressed range and repetitions as indicated. Date:  11/26/2018   Activity/Exercise Parameters   Clams  --   Interval walking for increased walking speed. 5 minutes   Lateral step overs (4\" object) 4 minutes   stairs --   SLR in supine --   Modified sit to stand 3 x 10   Hip extension in standing 3 x 15   Calf stretch 3 minutes   Lateral walking 5 minutes   Single leg balance 3 minutes   Calf raise 3 x 15   Sidelying hip abduction (minimal assistance) 3 x 10   Recumbent Bike --   Quadriceps/hip flexor stretch 4 minutes   Manual Therapy (    Soft Tissue Mobilization Duration  Duration: 10 Minutes): Manual techniques to facilitate improved motion and decreased pain. (Used abbreviations: MET - muscle energy technique; PNF - proprioceptive neuromuscular facilitation; NMR - neuromuscular re-education; a/p - anterior to posterior; p/a - posterior to anterior)   · Soft tissue mobilization: R quadriceps, gluteals, and iliopsoas      MedBridge Portal  Treatment/Session Assessment:    · Response to Treatment:  Pt. Is advised to rest for 2-3 days from HEP to allow soreness to subside. Performance with hip strengthening exercises continues to improve.      · Compliance with Program/Exercises: good compliance. · Recommendations/Intent for next treatment session: \"Next visit will focus on advancements to more challenging activities\".   Total Treatment Duration: therapeutic exercise 45 minutes, manual therapy 10 minutes, total time 55 minutes  PT Patient Time In/Time Out  Time In: 1330  Time Out: [address] [facility]

## 2018-11-29 ENCOUNTER — HOSPITAL ENCOUNTER (OUTPATIENT)
Dept: PHYSICAL THERAPY | Age: 78
Discharge: HOME | End: 2018-11-29
Payer: MEDICARE

## 2018-11-29 PROCEDURE — G8979 MOBILITY GOAL STATUS: HCPCS

## 2018-11-29 PROCEDURE — G8978 MOBILITY CURRENT STATUS: HCPCS

## 2018-11-29 PROCEDURE — 97140 MANUAL THERAPY 1/> REGIONS: CPT

## 2018-11-29 PROCEDURE — 97110 THERAPEUTIC EXERCISES: CPT

## 2018-11-29 NOTE — PROGRESS NOTES
Elizabeth Danielle  : 1940  [place]  [facility]: [address] [facility]  [address], [address]  [phone]   [phone]        OUTPATIENT PHYSICAL THERAPY:Daily Note and Progress Report 2018   ICD-10: Treatment Diagnosis: Pain in Joint, R hip [M25.551]; Muscle weakness, generalized [M62.81]; Difficulty walking, not elsewhere classified [R26.2]  Precautions/Allergies:   Morphine; Other medication; and Shellfish derived   Fall Risk Score: 2 (? 5 = High Risk)  MD Orders: Evaluate and Treat MEDICAL/REFERRING DIAGNOSIS:  Hip pain [M25.559]   DATE OF ONSET: 18  REFERRING PHYSICIAN: Tavia Payne MD  RETURN PHYSICIAN APPOINTMENT: TBD     INITIAL ASSESSMENT:  Mr. Nella Richards presents s/p R total hip replacement on 18. Pt. Demonstrates altered gait mechanics with use of a single point cane and reduced muscular strength in the R hip abductors, R hip extensors, R hip flexors, and R hip external rotators. Pt. Demonstrates moderate difficulty with ADL's along with standing and walking secondary to weakness. Pt. Is also managing a blood clot resulting in fatigue and labored breathing upon exertion. Pt. Will benefit from skilled PT to return to previous level of ADL function. Progress Report 18: Pt. Attends 10 physical therapy visits. Pt. Is making excellent progress with functional goals and is now able to ambulate 10 minutes without AD or rest break. Cardiovascular and muscular endurance continue to improve. Pt. Demonstrates improved strength in the R hip flexors, abductors, and extensors since starting PT. Pt. Will benefit from continued progression off current program.    PROBLEM LIST (Impacting functional limitations):  1. Decreased Strength  2. Decreased ADL/Functional Activities  3. Increased Pain  4. Decreased Activity Tolerance  5. Increased Shortness of Breath  6.  Decreased Flexibility/Joint Mobility  7. Decreased Falls with Home Exercise Program INTERVENTIONS PLANNED:  1. Gait Training  2. Home Exercise Program (HEP)  3. Manual Therapy  4. Range of Motion (ROM)  5. Therapeutic Exercise/Strengthening   TREATMENT PLAN:  Effective Dates: 10/23/2018 TO 1/21/2019 (90 days). Frequency/Duration: 2 times a week for 90 Day(s)  GOALS: (Goals have been discussed and agreed upon with patient.)  Discharge Goals: Time Frame: 8 weeks  1. Pt. Will report <3/10 R hip pain with standing for 5 minutes. MET  2. Pt. Will demonstrate 4+/5 MMT strength in the R hip abductors and flexors to improve ADL performance. Ongoing  3. Pt. Will ambulate for 10 minutes without single point cane and absence of trendelenburg gait to improve walking. MET  4. Pt. Will score >50 on the LEFS to demonstrate improved pain and function. Ongoing  Rehabilitation Potential For Stated Goals: Good              The information in this section was collected on 10/23/18 (except where otherwise noted). HISTORY:   History of Present Injury/Illness (Reason for Referral):  Pt. Attends PT s/p R total hip replacement on 9/4/18. Recovery from surgery has been complicated from a pulmonary embolism. Pt. Notes he has not performed prescribed HEP secondary to fatigue and difficulty breathing. The pulmonary embolism is currently being managed and patient would like to resume exercise program to re-establish prior level of function. Pt. Notes he initially experienced R knee pain that was later attributed to severe hip OA leading to surgery. Pt. Notes he is having difficulty with prolonged standing, lifting the R LE, and walking without a cane.    Past Medical History/Comorbidities:   Mr. Dory Arreguin  has a past medical history of Acute pulmonary embolism ([facility] [address] Arthritis, Chronic pain, Former smoker, GERD (gastroesophageal reflux disease), High cholesterol, Hypertension, Nausea & vomiting, Stomach cancer ([address] Total knee replacement status, and Unspecified adverse effect of anesthesia. Mr. Sandy Nuñez  has a past surgical history that includes hx gi (2010); pr total hip arthroplasty (Left, 2004); hx shoulder arthroscopy (Right, 2015); hx knee arthroscopy (Right); pr total knee arthroplasty (Right, 2012); hx other surgical; hx cholecystectomy (2015); hx appendectomy; RIGHT HIP ARTHROPLASTY TOTAL/DEPUY (Right, 9/4/2018); CHOLECYSTECTOMY LAPAROSCOPIC (N/A, 7/24/2015); RIGHT SHOULDER ARTHROPLASTY TOTAL REVERSE   (Right, 4/6/2015); KNEE ARTHROPLASTY TOTAL (Right, 2/21/2012); INFUSAPORT INSERTION (Left, 3/11/2010); and GASTRECTOMY LAPAROSCOPIC (N/A, 2/8/2010). Social History/Living Environment:     lives with wife in two story home. Prior Level of Function/Work/Activity:  Functioned independently without significant limitations. Current Medications:       Current Outpatient Medications:     tamsulosin (FLOMAX) 0.4 mg capsule, Take 0.4 mg by mouth daily. , Disp: , Rfl:     traMADol (ULTRAM) 50 mg tablet, Take 1 Tab by mouth every eight (8) hours as needed. Max Daily Amount: 150 mg., Disp: 40 Tab, Rfl: 1    traMADol (ULTRAM) 50 mg tablet, Take 50 mg by mouth every six (6) hours as needed for Pain., Disp: , Rfl:     aspirin delayed-release 81 mg tablet, Take 1 Tab by mouth every twelve (12) hours every twelve (12) hours. , Disp: 120 Tab, Rfl: 0    sucralfate (CARAFATE) 1 gram tablet, Take 1 g by mouth three (3) times daily. Take / use AM day of surgery  per anesthesia protocols. , Disp: , Rfl:     omeprazole (PRILOSEC) 20 mg capsule, Take 20 mg by mouth Daily (before breakfast). Take DOS per anesthesia protocol. Indications: gastroesophageal reflux disease, Disp: , Rfl:     zolpidem (AMBIEN) 10 mg tablet, Take 10 mg by mouth nightly as needed for Sleep. Indications: SLEEP-ONSET INSOMNIA, Disp: , Rfl:     cyanocobalamin (VITAMIN B-12) 1,000 mcg/mL injection, 1,000 mcg by IntraMUSCular route every twenty-eight (28) days. , Disp: , Rfl:    simvastatin (ZOCOR) 40 mg tablet, Take 40 mg by mouth nightly., Disp: , Rfl:     cetirizine (ZYRTEC) 10 mg tablet, Take 10 mg by mouth nightly. Indications: ALLERGIC RHINITIS, Disp: , Rfl:     fluticasone (FLONASE) 50 mcg/Actuation nasal spray, 2 Sprays by Nasal route nightly. Indications: ALLERGIC RHINITIS, Disp: , Rfl:    Date Last Reviewed:  11/29/2018   Number of Personal Factors/Comorbidities that affect the Plan of Care: 3+: HIGH COMPLEXITY   EXAMINATION:   Observation/Orthostatic Postural Assessment:          Incision well healed in the R gluteal region; Swelling present in B gaiter region, pt. Ambulates with a single point cane. Mild trendelenburg present during gait without use of single point cane. Palpation:          Tender to palpation of the R gluteal, hip external rotators, quadriceps, and hip flexors  ROM:          R hip ROM WFL, B knee extension limited. Strength:             Right Left   Hip extension 4/5 4+/5   Hip abduction 4/5 4+/5   Hip external rotation 4/5 4+/5   Hip flexion 4/5 4+/5        Body Structures Involved:  1. Joints  2. Muscles Body Functions Affected:  1. Neuromusculoskeletal  2. Movement Related Activities and Participation Affected:  1. Mobility  2. Self Care   Number of elements (examined above) that affect the Plan of Care: 1-2: LOW COMPLEXITY   CLINICAL PRESENTATION:   Presentation: Stable and uncomplicated: LOW COMPLEXITY   CLINICAL DECISION MAKING:   Outcome Measure: Tool Used: Lower Extremity Functional Scale (LEFS)  Score:  Initial: 33/80 Most Recent: 43/80 (Date: 11/29/18 )   Interpretation of Score: 20 questions each scored on a 5 point scale with 0 representing \"extreme difficulty or unable to perform\" and 4 representing \"no difficulty\". The lower the score, the greater the functional disability. 80/80 represents no disability. Minimal detectable change is 9 points. Score 80 79-63 62-48 47-32 31-16 15-1 0   Modifier CH CI CJ CK CL CM CN     ?  Mobility - Walking and Moving Around:     - CURRENT STATUS: CK - 40%-59% impaired, limited or restricted    - GOAL STATUS: CI - 1%-19% impaired, limited or restricted    - D/C STATUS:  ---------------To be determined---------------    Medical Necessity:   · Patient is expected to demonstrate progress in strength and range of motion to increase independence with standing, walking, and ADL's. Reason for Services/Other Comments:  · Patient will benefit from skilled PT to address impairments identified through evaluation and return to previous ADL and recreational capacity. Use of outcome tool(s) and clinical judgement create a POC that gives a: Clear prediction of patient's progress: LOW COMPLEXITY            TREATMENT:   (In addition to Assessment/Re-Assessment sessions the following treatments were rendered)  Pre-treatment Symptoms/Complaints:  Patient notes absence of sharp R hip pain today after resting the past two days. Pain: Initial:   Pain Intensity 1: 2 /10 Post Session:  2/10     Therapeutic Exercise: (45 Minutes):  Exercises per grid below to improve mobility and strength. Required minimal verbal and manual cues to promote proper body alignment and promote proper body posture. Progressed range and repetitions as indicated. Date:  11/29/2018   Activity/Exercise Parameters   Clams  --   walking for increased walking speed. 10 minutes   Lateral step overs (4\" object) --   stairs --   SLR in supine --   Modified sit to stand 3 x 10   Hip extension in standing 3 x 15   Calf stretch 3 minutes   Lateral walking 5 minutes   Single leg balance 3 minutes   Calf raise 3 x 15   Sidelying hip abduction (minimal assistance) 3 x 10   Recumbent Bike --   Quadriceps/hip flexor stretch 4 minutes   Manual Therapy (    Soft Tissue Mobilization Duration  Duration: 10 Minutes): Manual techniques to facilitate improved motion and decreased pain.  (Used abbreviations: MET - muscle energy technique; PNF - proprioceptive neuromuscular facilitation; NMR - neuromuscular re-education; a/p - anterior to posterior; p/a - posterior to anterior)   · Soft tissue mobilization: R quadriceps, gluteals, and iliopsoas      MedBridge Portal  Treatment/Session Assessment:    · Response to Treatment:  Pt. Demonstrates improved walking endurance today. Pt. Fatigues with therapeutic exercises toward the end of session and requires increased rest breaks. · Compliance with Program/Exercises: good compliance. · Recommendations/Intent for next treatment session: \"Next visit will focus on advancements to more challenging activities\".   Total Treatment Duration: therapeutic exercise 45 minutes, manual therapy 10 minutes, total time 55 minutes  PT Patient Time In/Time Out  Time In: 1500  Time Out: Charles Conti [id number], PT

## 2018-12-03 ENCOUNTER — HOSPITAL ENCOUNTER (OUTPATIENT)
Dept: PHYSICAL THERAPY | Age: 78
Discharge: HOME | End: 2018-12-03
Payer: MEDICARE

## 2018-12-03 PROCEDURE — 97110 THERAPEUTIC EXERCISES: CPT

## 2018-12-03 PROCEDURE — 97140 MANUAL THERAPY 1/> REGIONS: CPT

## 2018-12-03 NOTE — PROGRESS NOTES
Puma Falling  : 1940  [place]  Secondary: [address] [facility] [place] [address]  [phone]   [phone]        OUTPATIENT PHYSICAL THERAPY:Daily Note 12/3/2018   ICD-10: Treatment Diagnosis: Pain in Joint, R hip [M25.551]; Muscle weakness, generalized [M62.81]; Difficulty walking, not elsewhere classified [R26.2]  Precautions/Allergies:   Morphine; Other medication; and Shellfish derived   Fall Risk Score: 2 (? 5 = High Risk)  MD Orders: Evaluate and Treat MEDICAL/REFERRING DIAGNOSIS:  Hip pain [M25.559]   DATE OF ONSET: 18  REFERRING PHYSICIAN: Penny Lozano MD  RETURN PHYSICIAN APPOINTMENT: TBD     INITIAL ASSESSMENT:  Mr. Cristal Fuller presents s/p R total hip replacement on 18. Pt. Demonstrates altered gait mechanics with use of a single point cane and reduced muscular strength in the R hip abductors, R hip extensors, R hip flexors, and R hip external rotators. Pt. Demonstrates moderate difficulty with ADL's along with standing and walking secondary to weakness. Pt. Is also managing a blood clot resulting in fatigue and labored breathing upon exertion. Pt. Will benefit from skilled PT to return to previous level of ADL function. Progress Report 18: Pt. Attends 10 physical therapy visits. Pt. Is making excellent progress with functional goals and is now able to ambulate 10 minutes without AD or rest break. Cardiovascular and muscular endurance continue to improve. Pt. Demonstrates improved strength in the R hip flexors, abductors, and extensors since starting PT. Pt. Will benefit from continued progression off current program.    PROBLEM LIST (Impacting functional limitations):  1. Decreased Strength  2. Decreased ADL/Functional Activities  3. Increased Pain  4. Decreased Activity Tolerance  5. Increased Shortness of Breath  6.  Decreased Flexibility/Joint Mobility  7. Decreased [place] with Home Exercise Program INTERVENTIONS PLANNED:  1. Gait Training  2. Home Exercise Program (HEP)  3. Manual Therapy  4. Range of Motion (ROM)  5. Therapeutic Exercise/Strengthening   TREATMENT PLAN:  Effective Dates: 10/23/2018 TO 1/21/2019 (90 days). Frequency/Duration: 2 times a week for 90 Day(s)  GOALS: (Goals have been discussed and agreed upon with patient.)  Discharge Goals: Time Frame: 8 weeks  1. Pt. Will report <3/10 R hip pain with standing for 5 minutes. MET  2. Pt. Will demonstrate 4+/5 MMT strength in the R hip abductors and flexors to improve ADL performance. Ongoing  3. Pt. Will ambulate for 10 minutes without single point cane and absence of trendelenburg gait to improve walking. MET  4. Pt. Will score >50 on the LEFS to demonstrate improved pain and function. Ongoing  Rehabilitation Potential For Stated Goals: Good              The information in this section was collected on 10/23/18 (except where otherwise noted). HISTORY:   History of Present Injury/Illness (Reason for Referral):  Pt. Attends PT s/p R total hip replacement on 9/4/18. Recovery from surgery has been complicated from a pulmonary embolism. Pt. Notes he has not performed prescribed HEP secondary to fatigue and difficulty breathing. The pulmonary embolism is currently being managed and patient would like to resume exercise program to re-establish prior level of function. Pt. Notes he initially experienced R knee pain that was later attributed to severe hip OA leading to surgery. Pt. Notes he is having difficulty with prolonged standing, lifting the R LE, and walking without a cane.    Past Medical History/Comorbidities:   Mr. Nini Villarreal  has a past medical history of Acute pulmonary embolism ([facility] [address] Arthritis, Chronic pain, Former smoker, GERD (gastroesophageal reflux disease), High cholesterol, Hypertension, Nausea & vomiting, Stomach cancer ([address] Total knee replacement status, and Unspecified adverse effect of anesthesia. Mr. Flor Doran  has a past surgical history that includes hx gi (2010); pr total hip arthroplasty (Left, 2004); hx shoulder arthroscopy (Right, 2015); hx knee arthroscopy (Right); pr total knee arthroplasty (Right, 2012); hx other surgical; hx cholecystectomy (2015); hx appendectomy; RIGHT HIP ARTHROPLASTY TOTAL/DEPUY (Right, 9/4/2018); CHOLECYSTECTOMY LAPAROSCOPIC (N/A, 7/24/2015); RIGHT SHOULDER ARTHROPLASTY TOTAL REVERSE   (Right, 4/6/2015); KNEE ARTHROPLASTY TOTAL (Right, 2/21/2012); INFUSAPORT INSERTION (Left, 3/11/2010); and GASTRECTOMY LAPAROSCOPIC (N/A, 2/8/2010). Social History/Living Environment:     lives with wife in two story home. Prior Level of Function/Work/Activity:  Functioned independently without significant limitations. Current Medications:       Current Outpatient Medications:     tamsulosin (FLOMAX) 0.4 mg capsule, Take 0.4 mg by mouth daily. , Disp: , Rfl:     traMADol (ULTRAM) 50 mg tablet, Take 1 Tab by mouth every eight (8) hours as needed. Max Daily Amount: 150 mg., Disp: 40 Tab, Rfl: 1    traMADol (ULTRAM) 50 mg tablet, Take 50 mg by mouth every six (6) hours as needed for Pain., Disp: , Rfl:     aspirin delayed-release 81 mg tablet, Take 1 Tab by mouth every twelve (12) hours every twelve (12) hours. , Disp: 120 Tab, Rfl: 0    sucralfate (CARAFATE) 1 gram tablet, Take 1 g by mouth three (3) times daily. Take / use AM day of surgery  per anesthesia protocols. , Disp: , Rfl:     omeprazole (PRILOSEC) 20 mg capsule, Take 20 mg by mouth Daily (before breakfast). Take DOS per anesthesia protocol. Indications: gastroesophageal reflux disease, Disp: , Rfl:     zolpidem (AMBIEN) 10 mg tablet, Take 10 mg by mouth nightly as needed for Sleep. Indications: SLEEP-ONSET INSOMNIA, Disp: , Rfl:     cyanocobalamin (VITAMIN B-12) 1,000 mcg/mL injection, 1,000 mcg by IntraMUSCular route every twenty-eight (28) days. , Disp: , Rfl:     simvastatin (ZOCOR) 40 mg tablet, Take 40 mg by mouth nightly., Disp: , Rfl:     cetirizine (ZYRTEC) 10 mg tablet, Take 10 mg by mouth nightly. Indications: ALLERGIC RHINITIS, Disp: , Rfl:     fluticasone (FLONASE) 50 mcg/Actuation nasal spray, 2 Sprays by Nasal route nightly. Indications: ALLERGIC RHINITIS, Disp: , Rfl:    Date Last Reviewed:  12/3/2018   Number of Personal Factors/Comorbidities that affect the Plan of Care: 3+: HIGH COMPLEXITY   EXAMINATION:   Observation/Orthostatic Postural Assessment:          Incision well healed in the R gluteal region; Swelling present in B gaiter region, pt. Ambulates with a single point cane. Mild trendelenburg present during gait without use of single point cane. Palpation:          Tender to palpation of the R gluteal, hip external rotators, quadriceps, and hip flexors  ROM:          R hip ROM WFL, B knee extension limited. Strength:             Right Left   Hip extension 4/5 4+/5   Hip abduction 4/5 4+/5   Hip external rotation 4/5 4+/5   Hip flexion 4/5 4+/5        Body Structures Involved:  1. Joints  2. Muscles Body Functions Affected:  1. Neuromusculoskeletal  2. Movement Related Activities and Participation Affected:  1. Mobility  2. Self Care   Number of elements (examined above) that affect the Plan of Care: 1-2: LOW COMPLEXITY   CLINICAL PRESENTATION:   Presentation: Stable and uncomplicated: LOW COMPLEXITY   CLINICAL DECISION MAKING:   Outcome Measure: Tool Used: Lower Extremity Functional Scale (LEFS)  Score:  Initial: 33/80 Most Recent: 43/80 (Date: 11/29/18 )   Interpretation of Score: 20 questions each scored on a 5 point scale with 0 representing \"extreme difficulty or unable to perform\" and 4 representing \"no difficulty\". The lower the score, the greater the functional disability. 80/80 represents no disability. Minimal detectable change is 9 points. Score 80 79-63 62-48 47-32 31-16 15-1 0   Modifier CH CI CJ CK CL CM CN     ?  Mobility - Walking and Moving Around:     - CURRENT STATUS: CK - 40%-59% impaired, limited or restricted    - GOAL STATUS: CI - 1%-19% impaired, limited or restricted    - D/C STATUS:  ---------------To be determined---------------    Medical Necessity:   · Patient is expected to demonstrate progress in strength and range of motion to increase independence with standing, walking, and ADL's. Reason for Services/Other Comments:  · Patient will benefit from skilled PT to address impairments identified through evaluation and return to previous ADL and recreational capacity. Use of outcome tool(s) and clinical judgement create a POC that gives a: Clear prediction of patient's progress: LOW COMPLEXITY            TREATMENT:   (In addition to Assessment/Re-Assessment sessions the following treatments were rendered)  Pre-treatment Symptoms/Complaints:  Patient notes absence of sharp R hip pain today after resting the past two days. Pain: Initial:   Pain Intensity 1: 2 /10 Post Session:  2/10     Therapeutic Exercise: (45 Minutes):  Exercises per grid below to improve mobility and strength. Required minimal verbal and manual cues to promote proper body alignment and promote proper body posture. Progressed range and repetitions as indicated. Date:  12/3/2018   Activity/Exercise Parameters   Clams  --   walking for increased walking speed.  --   Lateral step overs (4\" object) 4 minutes   Stairs (one hand railing) 5 minutes   SLR in supine --   Modified sit to stand 3 x 10   Hip extension in standing 3 x 15   Calf stretch 3 minutes   Lateral walking --   Single leg balance 3 minutes   Calf raise --   Sidelying hip abduction (minimal assistance) 3 x 10   Recumbent Bike --   Quadriceps/hip flexor stretch 4 minutes   Quadriceps Extensions (20#) 3 x 10   Front and lateral step ups (4\") 6 minutes   Manual Therapy (    Soft Tissue Mobilization Duration  Duration: 10 Minutes): Manual techniques to facilitate improved motion and decreased pain. (Used abbreviations: MET - muscle energy technique; PNF - proprioceptive neuromuscular facilitation; NMR - neuromuscular re-education; a/p - anterior to posterior; p/a - posterior to anterior)   · Soft tissue mobilization: R quadriceps, gluteals, and iliopsoas      MedBridge Portal  Treatment/Session Assessment:    · Response to Treatment:  Functional R gluteal and quadriceps strength remains limited compared to the L side. Weakness I evidenced with performance on step up activities and stairs. Pt. Will benefit from continued specific strengthening. .      · Compliance with Program/Exercises: good compliance. · Recommendations/Intent for next treatment session: \"Next visit will focus on advancements to more challenging activities\".   Total Treatment Duration: therapeutic exercise 45 minutes, manual therapy 10 minutes, total time 55 minutes  PT Patient Time In/Time Out  Time In: 1330  Time Out: [address] [facility]

## 2018-12-06 ENCOUNTER — HOSPITAL ENCOUNTER (OUTPATIENT)
Dept: PHYSICAL THERAPY | Age: 78
Discharge: HOME | End: 2018-12-06
Payer: MEDICARE

## 2018-12-06 PROCEDURE — 97110 THERAPEUTIC EXERCISES: CPT

## 2018-12-06 PROCEDURE — 97140 MANUAL THERAPY 1/> REGIONS: CPT

## 2018-12-06 NOTE — PROGRESS NOTES
Ernie Palafox  : 1940  [place]  Secondary: [address] [place] [id number] [address]  [phone]   [phone]        OUTPATIENT PHYSICAL THERAPY:Daily Note 2018   ICD-10: Treatment Diagnosis: Pain in Joint, R hip [M25.551]; Muscle weakness, generalized [M62.81]; Difficulty walking, not elsewhere classified [R26.2]  Precautions/Allergies:   Morphine; Other medication; and Shellfish derived   Fall Risk Score: 2 (? 5 = High Risk)  MD Orders: Evaluate and Treat MEDICAL/REFERRING DIAGNOSIS:  Hip pain [M25.559]   DATE OF ONSET: 18  REFERRING PHYSICIAN: Chase Marcum MD  RETURN PHYSICIAN APPOINTMENT: TBD     INITIAL ASSESSMENT:  Mr. Marielena Soriano presents s/p R total hip replacement on 18. Pt. Demonstrates altered gait mechanics with use of a single point cane and reduced muscular strength in the R hip abductors, R hip extensors, R hip flexors, and R hip external rotators. Pt. Demonstrates moderate difficulty with ADL's along with standing and walking secondary to weakness. Pt. Is also managing a blood clot resulting in fatigue and labored breathing upon exertion. Pt. Will benefit from skilled PT to return to previous level of ADL function. Progress Report 18: Pt. Attends 10 physical therapy visits. Pt. Is making excellent progress with functional goals and is now able to ambulate 10 minutes without AD or rest break. Cardiovascular and muscular endurance continue to improve. Pt. Demonstrates improved strength in the R hip flexors, abductors, and extensors since starting PT. Pt. Will benefit from continued progression off current program.    PROBLEM LIST (Impacting functional limitations):  1. Decreased Strength  2. Decreased ADL/Functional Activities  3. Increased Pain  4. Decreased Activity Tolerance  5. Increased Shortness of Breath  6.  Decreased Flexibility/Joint Mobility  7. Decreased [place] with Home Exercise Program INTERVENTIONS PLANNED:  1. Gait Training  2. Home Exercise Program (HEP)  3. Manual Therapy  4. Range of Motion (ROM)  5. Therapeutic Exercise/Strengthening   TREATMENT PLAN:  Effective Dates: 10/23/2018 TO 1/21/2019 (90 days). Frequency/Duration: 2 times a week for 90 Day(s)  GOALS: (Goals have been discussed and agreed upon with patient.)  Discharge Goals: Time Frame: 8 weeks  1. Pt. Will report <3/10 R hip pain with standing for 5 minutes. MET  2. Pt. Will demonstrate 4+/5 MMT strength in the R hip abductors and flexors to improve ADL performance. Ongoing  3. Pt. Will ambulate for 10 minutes without single point cane and absence of trendelenburg gait to improve walking. MET  4. Pt. Will score >50 on the LEFS to demonstrate improved pain and function. Ongoing  Rehabilitation Potential For Stated Goals: Good              The information in this section was collected on 10/23/18 (except where otherwise noted). HISTORY:   History of Present Injury/Illness (Reason for Referral):  Pt. Attends PT s/p R total hip replacement on 9/4/18. Recovery from surgery has been complicated from a pulmonary embolism. Pt. Notes he has not performed prescribed HEP secondary to fatigue and difficulty breathing. The pulmonary embolism is currently being managed and patient would like to resume exercise program to re-establish prior level of function. Pt. Notes he initially experienced R knee pain that was later attributed to severe hip OA leading to surgery. Pt. Notes he is having difficulty with prolonged standing, lifting the R LE, and walking without a cane.    Past Medical History/Comorbidities:   Mr. Tarik Strange  has a past medical history of Acute pulmonary embolism ([facility] [address] Arthritis, Chronic pain, Former smoker, GERD (gastroesophageal reflux disease), High cholesterol, Hypertension, Nausea & vomiting, Stomach cancer ([address] Total knee replacement status, and Unspecified adverse effect of anesthesia. Mr. Marielena Soriano  has a past surgical history that includes hx gi (2010); pr total hip arthroplasty (Left, 2004); hx shoulder arthroscopy (Right, 2015); hx knee arthroscopy (Right); pr total knee arthroplasty (Right, 2012); hx other surgical; hx cholecystectomy (2015); hx appendectomy; RIGHT HIP ARTHROPLASTY TOTAL/DEPUY (Right, 9/4/2018); CHOLECYSTECTOMY LAPAROSCOPIC (N/A, 7/24/2015); RIGHT SHOULDER ARTHROPLASTY TOTAL REVERSE   (Right, 4/6/2015); KNEE ARTHROPLASTY TOTAL (Right, 2/21/2012); INFUSAPORT INSERTION (Left, 3/11/2010); and GASTRECTOMY LAPAROSCOPIC (N/A, 2/8/2010). Social History/Living Environment:     lives with wife in two story home. Prior Level of Function/Work/Activity:  Functioned independently without significant limitations. Current Medications:       Current Outpatient Medications:     tamsulosin (FLOMAX) 0.4 mg capsule, Take 0.4 mg by mouth daily. , Disp: , Rfl:     traMADol (ULTRAM) 50 mg tablet, Take 1 Tab by mouth every eight (8) hours as needed. Max Daily Amount: 150 mg., Disp: 40 Tab, Rfl: 1    traMADol (ULTRAM) 50 mg tablet, Take 50 mg by mouth every six (6) hours as needed for Pain., Disp: , Rfl:     aspirin delayed-release 81 mg tablet, Take 1 Tab by mouth every twelve (12) hours every twelve (12) hours. , Disp: 120 Tab, Rfl: 0    sucralfate (CARAFATE) 1 gram tablet, Take 1 g by mouth three (3) times daily. Take / use AM day of surgery  per anesthesia protocols. , Disp: , Rfl:     omeprazole (PRILOSEC) 20 mg capsule, Take 20 mg by mouth Daily (before breakfast). Take DOS per anesthesia protocol. Indications: gastroesophageal reflux disease, Disp: , Rfl:     zolpidem (AMBIEN) 10 mg tablet, Take 10 mg by mouth nightly as needed for Sleep. Indications: SLEEP-ONSET INSOMNIA, Disp: , Rfl:     cyanocobalamin (VITAMIN B-12) 1,000 mcg/mL injection, 1,000 mcg by IntraMUSCular route every twenty-eight (28) days. , Disp: , Rfl:     simvastatin (ZOCOR) 40 mg tablet, Take 40 mg by mouth nightly., Disp: , Rfl:     cetirizine (ZYRTEC) 10 mg tablet, Take 10 mg by mouth nightly. Indications: ALLERGIC RHINITIS, Disp: , Rfl:     fluticasone (FLONASE) 50 mcg/Actuation nasal spray, 2 Sprays by Nasal route nightly. Indications: ALLERGIC RHINITIS, Disp: , Rfl:    Date Last Reviewed:  12/6/2018   Number of Personal Factors/Comorbidities that affect the Plan of Care: 3+: HIGH COMPLEXITY   EXAMINATION:   Observation/Orthostatic Postural Assessment:          Incision well healed in the R gluteal region; Swelling present in B gaiter region, pt. Ambulates with a single point cane. Mild trendelenburg present during gait without use of single point cane. Palpation:          Tender to palpation of the R gluteal, hip external rotators, quadriceps, and hip flexors  ROM:          R hip ROM WFL, B knee extension limited. Strength:             Right Left   Hip extension 4/5 4+/5   Hip abduction 4/5 4+/5   Hip external rotation 4/5 4+/5   Hip flexion 4/5 4+/5        Body Structures Involved:  1. Joints  2. Muscles Body Functions Affected:  1. Neuromusculoskeletal  2. Movement Related Activities and Participation Affected:  1. Mobility  2. Self Care   Number of elements (examined above) that affect the Plan of Care: 1-2: LOW COMPLEXITY   CLINICAL PRESENTATION:   Presentation: Stable and uncomplicated: LOW COMPLEXITY   CLINICAL DECISION MAKING:   Outcome Measure: Tool Used: Lower Extremity Functional Scale (LEFS)  Score:  Initial: 33/80 Most Recent: 43/80 (Date: 11/29/18 )   Interpretation of Score: 20 questions each scored on a 5 point scale with 0 representing \"extreme difficulty or unable to perform\" and 4 representing \"no difficulty\". The lower the score, the greater the functional disability. 80/80 represents no disability. Minimal detectable change is 9 points. Score 80 79-63 62-48 47-32 31-16 15-1 0   Modifier CH CI CJ CK CL CM CN     ?  Mobility - Walking and Moving Around:     - CURRENT STATUS: CK - 40%-59% impaired, limited or restricted    - GOAL STATUS: CI - 1%-19% impaired, limited or restricted    - D/C STATUS:  ---------------To be determined---------------    Medical Necessity:   · Patient is expected to demonstrate progress in strength and range of motion to increase independence with standing, walking, and ADL's. Reason for Services/Other Comments:  · Patient will benefit from skilled PT to address impairments identified through evaluation and return to previous ADL and recreational capacity. Use of outcome tool(s) and clinical judgement create a POC that gives a: Clear prediction of patient's progress: LOW COMPLEXITY            TREATMENT:   (In addition to Assessment/Re-Assessment sessions the following treatments were rendered)  Pre-treatment Symptoms/Complaints:  Patient notes absence of sharp R hip pain today after resting the past two days. Pain: Initial:   Pain Intensity 1: 2 /10 Post Session:  2/10     Therapeutic Exercise: (45 Minutes):  Exercises per grid below to improve mobility and strength. Required minimal verbal and manual cues to promote proper body alignment and promote proper body posture. Progressed range and repetitions as indicated. Date:  12/6/2018   Activity/Exercise Parameters   Clams  3 x 15   walking for increased walking speed.  --   Lateral step overs (4\" object) --   Stairs (one hand railing) 5 minutes   SLR in supine --   Modified sit to stand 3 x 10   Hip extension in standing 3 x 15   Calf stretch 3 minutes   Lateral walking 5 minutes   Single leg balance 3 minutes   Calf raise --   Sidelying hip abduction (minimal assistance) 3 x 10   Recumbent Bike --   Quadriceps/hip flexor stretch 4 minutes   Quadriceps Extensions (20#) --   Front and lateral step ups (4\") 6 minutes   Manual Therapy (    Soft Tissue Mobilization Duration  Duration: 10 Minutes): Manual techniques to facilitate improved motion and decreased pain. (Used abbreviations: MET - muscle energy technique; PNF - proprioceptive neuromuscular facilitation; NMR - neuromuscular re-education; a/p - anterior to posterior; p/a - posterior to anterior)   · Soft tissue mobilization: R quadriceps, gluteals, and iliopsoas      MedBridge Portal  Treatment/Session Assessment:    · Response to Treatment:  Strengthening exercises are tolerated today without complaints. Pt. Demonstrates some improvement in endurance but is limited with cardiorespiratory. · Compliance with Program/Exercises: good compliance. · Recommendations/Intent for next treatment session: \"Next visit will focus on advancements to more challenging activities\".   Total Treatment Duration: therapeutic exercise 45 minutes, manual therapy 10 minutes, total time 55 minutes  PT Patient Time In/Time Out  Time In: 1500  Time Out: Charles Conti [id number], PT

## 2018-12-10 ENCOUNTER — HOSPITAL ENCOUNTER (OUTPATIENT)
Dept: PHYSICAL THERAPY | Age: 78
Discharge: HOME | End: 2018-12-10
Payer: MEDICARE

## 2018-12-10 PROCEDURE — 97110 THERAPEUTIC EXERCISES: CPT

## 2018-12-10 PROCEDURE — 97140 MANUAL THERAPY 1/> REGIONS: CPT

## 2018-12-10 NOTE — PROGRESS NOTES
Ani Agustin  : 1940  [place]  [facility]: [address] [facility] [place] [id number] [address], [address] [place] [address]  [phone]   [phone]        OUTPATIENT PHYSICAL THERAPY:Daily Note 12/10/2018   ICD-10: Treatment Diagnosis: Pain in Joint, R hip [M25.551]; Muscle weakness, generalized [M62.81]; Difficulty walking, not elsewhere classified [R26.2]  Precautions/Allergies:   Morphine; Other medication; and Shellfish derived   Fall Risk Score: 2 (? 5 = High Risk)  MD Orders: Evaluate and Treat MEDICAL/REFERRING DIAGNOSIS:  Hip pain [M25.559]   DATE OF ONSET: 18  REFERRING PHYSICIAN: Neftali Downey MD  RETURN PHYSICIAN APPOINTMENT: TBD     INITIAL ASSESSMENT:  Mr. Roberta Horton presents s/p R total hip replacement on 18. Pt. Demonstrates altered gait mechanics with use of a single point cane and reduced muscular strength in the R hip abductors, R hip extensors, R hip flexors, and R hip external rotators. Pt. Demonstrates moderate difficulty with ADL's along with standing and walking secondary to weakness. Pt. Is also managing a blood clot resulting in fatigue and labored breathing upon exertion. Pt. Will benefit from skilled PT to return to previous level of ADL function. Progress Report 18: Pt. Attends 10 physical therapy visits. Pt. Is making excellent progress with functional goals and is now able to ambulate 10 minutes without AD or rest break. Cardiovascular and muscular endurance continue to improve. Pt. Demonstrates improved strength in the R hip flexors, abductors, and extensors since starting PT. Pt. Will benefit from continued progression off current program.    PROBLEM LIST (Impacting functional limitations):  1. Decreased Strength  2. Decreased ADL/Functional Activities  3. Increased Pain  4. Decreased Activity Tolerance  5. Increased Shortness of Breath  6.  Decreased Flexibility/Joint Mobility  7. Decreased [place] with Home Exercise Program INTERVENTIONS PLANNED:  1. Gait Training  2. Home Exercise Program (HEP)  3. Manual Therapy  4. Range of Motion (ROM)  5. Therapeutic Exercise/Strengthening   TREATMENT PLAN:  Effective Dates: 10/23/2018 TO 1/21/2019 (90 days). Frequency/Duration: 2 times a week for 90 Day(s)  GOALS: (Goals have been discussed and agreed upon with patient.)  Discharge Goals: Time Frame: 8 weeks  1. Pt. Will report <3/10 R hip pain with standing for 5 minutes. MET  2. Pt. Will demonstrate 4+/5 MMT strength in the R hip abductors and flexors to improve ADL performance. Ongoing  3. Pt. Will ambulate for 10 minutes without single point cane and absence of trendelenburg gait to improve walking. MET  4. Pt. Will score >50 on the LEFS to demonstrate improved pain and function. Ongoing  Rehabilitation Potential For Stated Goals: Good              The information in this section was collected on 10/23/18 (except where otherwise noted). HISTORY:   History of Present Injury/Illness (Reason for Referral):  Pt. Attends PT s/p R total hip replacement on 9/4/18. Recovery from surgery has been complicated from a pulmonary embolism. Pt. Notes he has not performed prescribed HEP secondary to fatigue and difficulty breathing. The pulmonary embolism is currently being managed and patient would like to resume exercise program to re-establish prior level of function. Pt. Notes he initially experienced R knee pain that was later attributed to severe hip OA leading to surgery. Pt. Notes he is having difficulty with prolonged standing, lifting the R LE, and walking without a cane.    Past Medical History/Comorbidities:   Mr. Héctor Andujar  has a past medical history of Acute pulmonary embolism ([facility] [address] Arthritis, Chronic pain, Former smoker, GERD (gastroesophageal reflux disease), High cholesterol, Hypertension, Nausea & vomiting, Stomach cancer ([address] Total knee replacement status, and Unspecified adverse effect of anesthesia. Mr. Benigno Kan  has a past surgical history that includes hx gi (2010); pr total hip arthroplasty (Left, 2004); hx shoulder arthroscopy (Right, 2015); hx knee arthroscopy (Right); pr total knee arthroplasty (Right, 2012); hx other surgical; hx cholecystectomy (2015); hx appendectomy; RIGHT HIP ARTHROPLASTY TOTAL/DEPUY (Right, 9/4/2018); CHOLECYSTECTOMY LAPAROSCOPIC (N/A, 7/24/2015); RIGHT SHOULDER ARTHROPLASTY TOTAL REVERSE   (Right, 4/6/2015); KNEE ARTHROPLASTY TOTAL (Right, 2/21/2012); INFUSAPORT INSERTION (Left, 3/11/2010); and GASTRECTOMY LAPAROSCOPIC (N/A, 2/8/2010). Social History/Living Environment:     lives with wife in two story home. Prior Level of Function/Work/Activity:  Functioned independently without significant limitations. Current Medications:       Current Outpatient Medications:     tamsulosin (FLOMAX) 0.4 mg capsule, Take 0.4 mg by mouth daily. , Disp: , Rfl:     traMADol (ULTRAM) 50 mg tablet, Take 1 Tab by mouth every eight (8) hours as needed. Max Daily Amount: 150 mg., Disp: 40 Tab, Rfl: 1    traMADol (ULTRAM) 50 mg tablet, Take 50 mg by mouth every six (6) hours as needed for Pain., Disp: , Rfl:     aspirin delayed-release 81 mg tablet, Take 1 Tab by mouth every twelve (12) hours every twelve (12) hours. , Disp: 120 Tab, Rfl: 0    sucralfate (CARAFATE) 1 gram tablet, Take 1 g by mouth three (3) times daily. Take / use AM day of surgery  per anesthesia protocols. , Disp: , Rfl:     omeprazole (PRILOSEC) 20 mg capsule, Take 20 mg by mouth Daily (before breakfast). Take DOS per anesthesia protocol. Indications: gastroesophageal reflux disease, Disp: , Rfl:     zolpidem (AMBIEN) 10 mg tablet, Take 10 mg by mouth nightly as needed for Sleep. Indications: SLEEP-ONSET INSOMNIA, Disp: , Rfl:     cyanocobalamin (VITAMIN B-12) 1,000 mcg/mL injection, 1,000 mcg by IntraMUSCular route every twenty-eight (28) days. , Disp: , Rfl:     simvastatin (ZOCOR) 40 mg tablet, Take 40 mg by mouth nightly., Disp: , Rfl:     cetirizine (ZYRTEC) 10 mg tablet, Take 10 mg by mouth nightly. Indications: ALLERGIC RHINITIS, Disp: , Rfl:     fluticasone (FLONASE) 50 mcg/Actuation nasal spray, 2 Sprays by Nasal route nightly. Indications: ALLERGIC RHINITIS, Disp: , Rfl:    Date Last Reviewed:  12/10/2018   Number of Personal Factors/Comorbidities that affect the Plan of Care: 3+: HIGH COMPLEXITY   EXAMINATION:   Observation/Orthostatic Postural Assessment:          Incision well healed in the R gluteal region; Swelling present in B gaiter region, pt. Ambulates with a single point cane. Mild trendelenburg present during gait without use of single point cane. Palpation:          Tender to palpation of the R gluteal, hip external rotators, quadriceps, and hip flexors  ROM:          R hip ROM WFL, B knee extension limited. Strength:             Right Left   Hip extension 4/5 4+/5   Hip abduction 4/5 4+/5   Hip external rotation 4/5 4+/5   Hip flexion 4/5 4+/5        Body Structures Involved:  1. Joints  2. Muscles Body Functions Affected:  1. Neuromusculoskeletal  2. Movement Related Activities and Participation Affected:  1. Mobility  2. Self Care   Number of elements (examined above) that affect the Plan of Care: 1-2: LOW COMPLEXITY   CLINICAL PRESENTATION:   Presentation: Stable and uncomplicated: LOW COMPLEXITY   CLINICAL DECISION MAKING:   Outcome Measure: Tool Used: Lower Extremity Functional Scale (LEFS)  Score:  Initial: 33/80 Most Recent: 43/80 (Date: 11/29/18 )   Interpretation of Score: 20 questions each scored on a 5 point scale with 0 representing \"extreme difficulty or unable to perform\" and 4 representing \"no difficulty\". The lower the score, the greater the functional disability. 80/80 represents no disability. Minimal detectable change is 9 points. Score 80 79-63 62-48 47-32 31-16 15-1 0   Modifier CH CI CJ CK CL CM CN     ?  Mobility - Walking and Moving Around:     - CURRENT STATUS: CK - 40%-59% impaired, limited or restricted    - GOAL STATUS: CI - 1%-19% impaired, limited or restricted    - D/C STATUS:  ---------------To be determined---------------    Medical Necessity:   · Patient is expected to demonstrate progress in strength and range of motion to increase independence with standing, walking, and ADL's. Reason for Services/Other Comments:  · Patient will benefit from skilled PT to address impairments identified through evaluation and return to previous ADL and recreational capacity. Use of outcome tool(s) and clinical judgement create a POC that gives a: Clear prediction of patient's progress: LOW COMPLEXITY            TREATMENT:   (In addition to Assessment/Re-Assessment sessions the following treatments were rendered)  Pre-treatment Symptoms/Complaints:  Patient reports minimal R hip pain this week. Pt. Notes primary limitation is breathing difficulty. Pain: Initial:   Pain Intensity 1: 2 /10 Post Session:  2/10     Therapeutic Exercise: (45 Minutes):  Exercises per grid below to improve mobility and strength. Required minimal verbal and manual cues to promote proper body alignment and promote proper body posture. Progressed range and repetitions as indicated. Date:  12/10/2018   Activity/Exercise Parameters   Clams  3 x 15   walking for increased walking speed.  4 minutes   Lateral step overs (4\" object) --   Stairs (one hand railing) 5 minutes   SLR in supine 3 x 10   Modified sit to stand 3 x 15   Hip extension in standing --   Calf stretch 3 minutes   Lateral walking 5 minutes   Single leg balance --   Calf raise 3 x 20   Sidelying hip abduction  --   Recumbent Bike --   Quadriceps/hip flexor stretch 4 minutes   Quadriceps Extensions (20#) --   Front and lateral step ups (4\") 6 minutes   Manual Therapy (    Soft Tissue Mobilization Duration  Duration: 10 Minutes): Manual techniques to facilitate improved motion and decreased pain. (Used abbreviations: MET - muscle energy technique; PNF - proprioceptive neuromuscular facilitation; NMR - neuromuscular re-education; a/p - anterior to posterior; p/a - posterior to anterior)   · Soft tissue mobilization: R quadriceps, gluteals, and iliopsoas      MedBridge Portal  Treatment/Session Assessment:    · Response to Treatment:  Pt. Tolerates therapeutic exercises for hip strengthening without complaints today. Muscular endurance continues to improve. · Compliance with Program/Exercises: good compliance. · Recommendations/Intent for next treatment session: \"Next visit will focus on advancements to more challenging activities\".   Total Treatment Duration: therapeutic exercise 45 minutes, manual therapy 10 minutes, total time 55 minutes  PT Patient Time In/Time Out  Time In: 1330  Time Out: 4531 [facility]

## 2018-12-12 ENCOUNTER — HOSPITAL ENCOUNTER (OUTPATIENT)
Dept: PHYSICAL THERAPY | Age: 78
Discharge: HOME | End: 2018-12-12
Payer: MEDICARE

## 2018-12-12 PROCEDURE — 97110 THERAPEUTIC EXERCISES: CPT

## 2018-12-12 PROCEDURE — 97140 MANUAL THERAPY 1/> REGIONS: CPT

## 2018-12-12 NOTE — PROGRESS NOTES
Vanesa Blackmon  : 1940  [place]  [facility]: [facility] [facility]  at [address]  [address], [address] [place] [address]  [phone]   [phone]        OUTPATIENT PHYSICAL THERAPY:Daily Note 2018   ICD-10: Treatment Diagnosis: Pain in Joint, R hip [M25.551]; Muscle weakness, generalized [M62.81]; Difficulty walking, not elsewhere classified [R26.2]  Precautions/Allergies:   Morphine; Other medication; and Shellfish derived   Fall Risk Score: 2 (? 5 = High Risk)  MD Orders: Evaluate and Treat MEDICAL/REFERRING DIAGNOSIS:  Hip pain [M25.559]   DATE OF ONSET: 18  REFERRING PHYSICIAN: Liz Corrales MD  RETURN PHYSICIAN APPOINTMENT: TBD     INITIAL ASSESSMENT:  Mr. Lora Zuniga presents s/p R total hip replacement on 18. Pt. Demonstrates altered gait mechanics with use of a single point cane and reduced muscular strength in the R hip abductors, R hip extensors, R hip flexors, and R hip external rotators. Pt. Demonstrates moderate difficulty with ADL's along with standing and walking secondary to weakness. Pt. Is also managing a blood clot resulting in fatigue and labored breathing upon exertion. Pt. Will benefit from skilled PT to return to previous level of ADL function. Progress Report 18: Pt. Attends 10 physical therapy visits. Pt. Is making excellent progress with functional goals and is now able to ambulate 10 minutes without AD or rest break. Cardiovascular and muscular endurance continue to improve. Pt. Demonstrates improved strength in the R hip flexors, abductors, and extensors since starting PT. Pt. Will benefit from continued progression off current program.    PROBLEM LIST (Impacting functional limitations):  1. Decreased Strength  2. Decreased ADL/Functional Activities  3. Increased Pain  4. Decreased Activity Tolerance  5. Increased Shortness of Breath  6.  Decreased Flexibility/Joint Mobility  7. Decreased [place] with Home Exercise Program INTERVENTIONS PLANNED:  1. Gait Training  2. Home Exercise Program (HEP)  3. Manual Therapy  4. Range of Motion (ROM)  5. Therapeutic Exercise/Strengthening   TREATMENT PLAN:  Effective Dates: 10/23/2018 TO 1/21/2019 (90 days). Frequency/Duration: 2 times a week for 90 Day(s)  GOALS: (Goals have been discussed and agreed upon with patient.)  Discharge Goals: Time Frame: 8 weeks  1. Pt. Will report <3/10 R hip pain with standing for 5 minutes. MET  2. Pt. Will demonstrate 4+/5 MMT strength in the R hip abductors and flexors to improve ADL performance. Ongoing  3. Pt. Will ambulate for 10 minutes without single point cane and absence of trendelenburg gait to improve walking. MET  4. Pt. Will score >50 on the LEFS to demonstrate improved pain and function. Ongoing  Rehabilitation Potential For Stated Goals: Good              The information in this section was collected on 10/23/18 (except where otherwise noted). HISTORY:   History of Present Injury/Illness (Reason for Referral):  Pt. Attends PT s/p R total hip replacement on 9/4/18. Recovery from surgery has been complicated from a pulmonary embolism. Pt. Notes he has not performed prescribed HEP secondary to fatigue and difficulty breathing. The pulmonary embolism is currently being managed and patient would like to resume exercise program to re-establish prior level of function. Pt. Notes he initially experienced R knee pain that was later attributed to severe hip OA leading to surgery. Pt. Notes he is having difficulty with prolonged standing, lifting the R LE, and walking without a cane.    Past Medical History/Comorbidities:   Mr. Melissa Guy  has a past medical history of Acute pulmonary embolism ([facility] [address] Arthritis, Chronic pain, Former smoker, GERD (gastroesophageal reflux disease), High cholesterol, Hypertension, Nausea & vomiting, Stomach cancer ([address] Total knee replacement status, and Unspecified adverse effect of anesthesia. Mr. Tarik Strange  has a past surgical history that includes hx gi (2010); pr total hip arthroplasty (Left, 2004); hx shoulder arthroscopy (Right, 2015); hx knee arthroscopy (Right); pr total knee arthroplasty (Right, 2012); hx other surgical; hx cholecystectomy (2015); hx appendectomy; RIGHT HIP ARTHROPLASTY TOTAL/DEPUY (Right, 9/4/2018); CHOLECYSTECTOMY LAPAROSCOPIC (N/A, 7/24/2015); RIGHT SHOULDER ARTHROPLASTY TOTAL REVERSE   (Right, 4/6/2015); KNEE ARTHROPLASTY TOTAL (Right, 2/21/2012); INFUSAPORT INSERTION (Left, 3/11/2010); and GASTRECTOMY LAPAROSCOPIC (N/A, 2/8/2010). Social History/Living Environment:     lives with wife in two story home. Prior Level of Function/Work/Activity:  Functioned independently without significant limitations. Current Medications:       Current Outpatient Medications:     tamsulosin (FLOMAX) 0.4 mg capsule, Take 0.4 mg by mouth daily. , Disp: , Rfl:     traMADol (ULTRAM) 50 mg tablet, Take 1 Tab by mouth every eight (8) hours as needed. Max Daily Amount: 150 mg., Disp: 40 Tab, Rfl: 1    traMADol (ULTRAM) 50 mg tablet, Take 50 mg by mouth every six (6) hours as needed for Pain., Disp: , Rfl:     aspirin delayed-release 81 mg tablet, Take 1 Tab by mouth every twelve (12) hours every twelve (12) hours. , Disp: 120 Tab, Rfl: 0    sucralfate (CARAFATE) 1 gram tablet, Take 1 g by mouth three (3) times daily. Take / use AM day of surgery  per anesthesia protocols. , Disp: , Rfl:     omeprazole (PRILOSEC) 20 mg capsule, Take 20 mg by mouth Daily (before breakfast). Take DOS per anesthesia protocol. Indications: gastroesophageal reflux disease, Disp: , Rfl:     zolpidem (AMBIEN) 10 mg tablet, Take 10 mg by mouth nightly as needed for Sleep. Indications: SLEEP-ONSET INSOMNIA, Disp: , Rfl:     cyanocobalamin (VITAMIN B-12) 1,000 mcg/mL injection, 1,000 mcg by IntraMUSCular route every twenty-eight (28) days. , Disp: , Rfl:     simvastatin (ZOCOR) 40 mg tablet, Take 40 mg by mouth nightly., Disp: , Rfl:     cetirizine (ZYRTEC) 10 mg tablet, Take 10 mg by mouth nightly. Indications: ALLERGIC RHINITIS, Disp: , Rfl:     fluticasone (FLONASE) 50 mcg/Actuation nasal spray, 2 Sprays by Nasal route nightly. Indications: ALLERGIC RHINITIS, Disp: , Rfl:    Date Last Reviewed:  12/12/2018   Number of Personal Factors/Comorbidities that affect the Plan of Care: 3+: HIGH COMPLEXITY   EXAMINATION:   Observation/Orthostatic Postural Assessment:          Incision well healed in the R gluteal region; Swelling present in B gaiter region, pt. Ambulates with a single point cane. Mild trendelenburg present during gait without use of single point cane. Palpation:          Tender to palpation of the R gluteal, hip external rotators, quadriceps, and hip flexors  ROM:          R hip ROM WFL, B knee extension limited. Strength:             Right Left   Hip extension 4/5 4+/5   Hip abduction 4/5 4+/5   Hip external rotation 4/5 4+/5   Hip flexion 4/5 4+/5        Body Structures Involved:  1. Joints  2. Muscles Body Functions Affected:  1. Neuromusculoskeletal  2. Movement Related Activities and Participation Affected:  1. Mobility  2. Self Care   Number of elements (examined above) that affect the Plan of Care: 1-2: LOW COMPLEXITY   CLINICAL PRESENTATION:   Presentation: Stable and uncomplicated: LOW COMPLEXITY   CLINICAL DECISION MAKING:   Outcome Measure: Tool Used: Lower Extremity Functional Scale (LEFS)  Score:  Initial: 33/80 Most Recent: 43/80 (Date: 11/29/18 )   Interpretation of Score: 20 questions each scored on a 5 point scale with 0 representing \"extreme difficulty or unable to perform\" and 4 representing \"no difficulty\". The lower the score, the greater the functional disability. 80/80 represents no disability. Minimal detectable change is 9 points. Score 80 79-63 62-48 47-32 31-16 15-1 0   Modifier CH CI CJ CK CL CM CN     ?  Mobility - Walking and Moving Around:     - CURRENT STATUS: CK - 40%-59% impaired, limited or restricted    - GOAL STATUS: CI - 1%-19% impaired, limited or restricted    - D/C STATUS:  ---------------To be determined---------------    Medical Necessity:   · Patient is expected to demonstrate progress in strength and range of motion to increase independence with standing, walking, and ADL's. Reason for Services/Other Comments:  · Patient will benefit from skilled PT to address impairments identified through evaluation and return to previous ADL and recreational capacity. Use of outcome tool(s) and clinical judgement create a POC that gives a: Clear prediction of patient's progress: LOW COMPLEXITY            TREATMENT:   (In addition to Assessment/Re-Assessment sessions the following treatments were rendered)  Pre-treatment Symptoms/Complaints:  Patient notes improvement with stairs at home and in public. Pain: Initial:   Pain Intensity 1: 1 /10 Post Session:  1/10     Therapeutic Exercise: (45 Minutes):  Exercises per grid below to improve mobility and strength. Required minimal verbal and manual cues to promote proper body alignment and promote proper body posture. Progressed range and repetitions as indicated. Date:  12/12/2018   Activity/Exercise Parameters   Clams  --   walking for increased walking speed. 4 minutes   Lateral step overs (4\" object) --   Stairs (one hand railing) 5 minutes   SLR in supine --   Modified sit to stand 3 x 15   Hip extension in standing --   Calf stretch 3 minutes   Lateral walking 5 minutes   Single leg balance 2 minutes   Calf raise 3 x 20   Sidelying hip abduction  3 x 10   Recumbent Bike --   Quadriceps/hip flexor stretch 4 minutes   Quadriceps Extensions (10#) unilateral 3 x 10   Front and lateral step ups (4\") 6 minutes   Manual Therapy (    Soft Tissue Mobilization Duration  Duration: 10 Minutes): Manual techniques to facilitate improved motion and decreased pain. (Used abbreviations: MET - muscle energy technique; PNF - proprioceptive neuromuscular facilitation; NMR - neuromuscular re-education; a/p - anterior to posterior; p/a - posterior to anterior)   · Soft tissue mobilization: R quadriceps, gluteals, and iliopsoas      [facility] Portal  Treatment/Session Assessment:    · Response to Treatment:  Pt. Continues to demonstrate improved lower quarter endurance with exercises. Pt. Has discontinued use of assistive device as balance and gait remain improved. · Compliance with Program/Exercises: good compliance. · Recommendations/Intent for next treatment session: \"Next visit will focus on advancements to more challenging activities\".   Total Treatment Duration: therapeutic exercise 45 minutes, manual therapy 10 minutes, total time 55 minutes  PT Patient Time In/Time Out  Time In: 1330  Time Out: [address] [facility]

## 2018-12-17 ENCOUNTER — HOSPITAL ENCOUNTER (OUTPATIENT)
Dept: PHYSICAL THERAPY | Age: 78
Discharge: HOME | End: 2018-12-17
Payer: MEDICARE

## 2018-12-17 PROCEDURE — 97110 THERAPEUTIC EXERCISES: CPT

## 2018-12-17 NOTE — PROGRESS NOTES
Britany Meals  : 1940  [place]  [facility]: [address] [facility]  [address]  [phone]   [phone]        OUTPATIENT PHYSICAL THERAPY:Daily Note 2018   ICD-10: Treatment Diagnosis: Pain in Joint, R hip [M25.551]; Muscle weakness, generalized [M62.81]; Difficulty walking, not elsewhere classified [R26.2]  Precautions/Allergies:   Morphine; Other medication; and Shellfish derived   Fall Risk Score: 2 (? 5 = High Risk)  MD Orders: Evaluate and Treat MEDICAL/REFERRING DIAGNOSIS:  Hip pain [M25.559]   DATE OF ONSET: 18  REFERRING PHYSICIAN: Sarina Sales MD  RETURN PHYSICIAN APPOINTMENT: TBD     INITIAL ASSESSMENT:  Mr. Tavon Dunn presents s/p R total hip replacement on 18. Pt. Demonstrates altered gait mechanics with use of a single point cane and reduced muscular strength in the R hip abductors, R hip extensors, R hip flexors, and R hip external rotators. Pt. Demonstrates moderate difficulty with ADL's along with standing and walking secondary to weakness. Pt. Is also managing a blood clot resulting in fatigue and labored breathing upon exertion. Pt. Will benefit from skilled PT to return to previous level of ADL function. Progress Report 18: Pt. Attends 10 physical therapy visits. Pt. Is making excellent progress with functional goals and is now able to ambulate 10 minutes without AD or rest break. Cardiovascular and muscular endurance continue to improve. Pt. Demonstrates improved strength in the R hip flexors, abductors, and extensors since starting PT. Pt. Will benefit from continued progression off current program.    PROBLEM LIST (Impacting functional limitations):  1. Decreased Strength  2. Decreased ADL/Functional Activities  3. Increased Pain  4. Decreased Activity Tolerance  5. Increased Shortness of Breath  6.  Decreased Flexibility/Joint Mobility  7. Decreased [place] with Home Exercise Program INTERVENTIONS PLANNED:  1. Gait Training  2. Home Exercise Program (HEP)  3. Manual Therapy  4. Range of Motion (ROM)  5. Therapeutic Exercise/Strengthening   TREATMENT PLAN:  Effective Dates: 10/23/2018 TO 1/21/2019 (90 days). Frequency/Duration: 2 times a week for 90 Day(s)  GOALS: (Goals have been discussed and agreed upon with patient.)  Discharge Goals: Time Frame: 8 weeks  1. Pt. Will report <3/10 R hip pain with standing for 5 minutes. MET  2. Pt. Will demonstrate 4+/5 MMT strength in the R hip abductors and flexors to improve ADL performance. Ongoing  3. Pt. Will ambulate for 10 minutes without single point cane and absence of trendelenburg gait to improve walking. MET  4. Pt. Will score >50 on the LEFS to demonstrate improved pain and function. Ongoing  Rehabilitation Potential For Stated Goals: Good              The information in this section was collected on 10/23/18 (except where otherwise noted). HISTORY:   History of Present Injury/Illness (Reason for Referral):  Pt. Attends PT s/p R total hip replacement on 9/4/18. Recovery from surgery has been complicated from a pulmonary embolism. Pt. Notes he has not performed prescribed HEP secondary to fatigue and difficulty breathing. The pulmonary embolism is currently being managed and patient would like to resume exercise program to re-establish prior level of function. Pt. Notes he initially experienced R knee pain that was later attributed to severe hip OA leading to surgery. Pt. Notes he is having difficulty with prolonged standing, lifting the R LE, and walking without a cane.    Past Medical History/Comorbidities:   Mr. Nell Solorio  has a past medical history of Acute pulmonary embolism ([facility] [address] Arthritis, Chronic pain, Former smoker, GERD (gastroesophageal reflux disease), High cholesterol, Hypertension, Nausea & vomiting, Stomach cancer ([place] [address] Total knee replacement status, and Unspecified adverse effect of anesthesia. Mr. Juice Escalante  has a past surgical history that includes hx gi (2010); pr total hip arthroplasty (Left, 2004); hx shoulder arthroscopy (Right, 2015); hx knee arthroscopy (Right); pr total knee arthroplasty (Right, 2012); hx other surgical; hx cholecystectomy (2015); hx appendectomy; RIGHT HIP ARTHROPLASTY TOTAL/DEPUY (Right, 9/4/2018); CHOLECYSTECTOMY LAPAROSCOPIC (N/A, 7/24/2015); RIGHT SHOULDER ARTHROPLASTY TOTAL REVERSE   (Right, 4/6/2015); KNEE ARTHROPLASTY TOTAL (Right, 2/21/2012); INFUSAPORT INSERTION (Left, 3/11/2010); and GASTRECTOMY LAPAROSCOPIC (N/A, 2/8/2010). Social History/Living Environment:     lives with wife in two story home. Prior Level of Function/Work/Activity:  Functioned independently without significant limitations. Current Medications:       Current Outpatient Medications:     tamsulosin (FLOMAX) 0.4 mg capsule, Take 0.4 mg by mouth daily. , Disp: , Rfl:     traMADol (ULTRAM) 50 mg tablet, Take 1 Tab by mouth every eight (8) hours as needed. Max Daily Amount: 150 mg., Disp: 40 Tab, Rfl: 1    traMADol (ULTRAM) 50 mg tablet, Take 50 mg by mouth every six (6) hours as needed for Pain., Disp: , Rfl:     aspirin delayed-release 81 mg tablet, Take 1 Tab by mouth every twelve (12) hours every twelve (12) hours. , Disp: 120 Tab, Rfl: 0    sucralfate (CARAFATE) 1 gram tablet, Take 1 g by mouth three (3) times daily. Take / use AM day of surgery  per anesthesia protocols. , Disp: , Rfl:     omeprazole (PRILOSEC) 20 mg capsule, Take 20 mg by mouth Daily (before breakfast). Take DOS per anesthesia protocol. Indications: gastroesophageal reflux disease, Disp: , Rfl:     zolpidem (AMBIEN) 10 mg tablet, Take 10 mg by mouth nightly as needed for Sleep. Indications: SLEEP-ONSET INSOMNIA, Disp: , Rfl:     cyanocobalamin (VITAMIN B-12) 1,000 mcg/mL injection, 1,000 mcg by IntraMUSCular route every twenty-eight (28) days. , Disp: , Rfl:     simvastatin (ZOCOR) 40 mg tablet, Take 40 mg by mouth nightly., Disp: , Rfl:     cetirizine (ZYRTEC) 10 mg tablet, Take 10 mg by mouth nightly. Indications: ALLERGIC RHINITIS, Disp: , Rfl:     fluticasone (FLONASE) 50 mcg/Actuation nasal spray, 2 Sprays by Nasal route nightly. Indications: ALLERGIC RHINITIS, Disp: , Rfl:    Date Last Reviewed:  12/17/2018   Number of Personal Factors/Comorbidities that affect the Plan of Care: 3+: HIGH COMPLEXITY   EXAMINATION:   Observation/Orthostatic Postural Assessment:          Incision well healed in the R gluteal region; Swelling present in B gaiter region, pt. Ambulates with a single point cane. Mild trendelenburg present during gait without use of single point cane. Palpation:          Tender to palpation of the R gluteal, hip external rotators, quadriceps, and hip flexors  ROM:          R hip ROM WFL, B knee extension limited. Strength:             Right Left   Hip extension 4/5 4+/5   Hip abduction 4/5 4+/5   Hip external rotation 4/5 4+/5   Hip flexion 4/5 4+/5        Body Structures Involved:  1. Joints  2. Muscles Body Functions Affected:  1. Neuromusculoskeletal  2. Movement Related Activities and Participation Affected:  1. Mobility  2. Self Care   Number of elements (examined above) that affect the Plan of Care: 1-2: LOW COMPLEXITY   CLINICAL PRESENTATION:   Presentation: Stable and uncomplicated: LOW COMPLEXITY   CLINICAL DECISION MAKING:   Outcome Measure: Tool Used: Lower Extremity Functional Scale (LEFS)  Score:  Initial: 33/80 Most Recent: 43/80 (Date: 11/29/18 )   Interpretation of Score: 20 questions each scored on a 5 point scale with 0 representing \"extreme difficulty or unable to perform\" and 4 representing \"no difficulty\". The lower the score, the greater the functional disability. 80/80 represents no disability. Minimal detectable change is 9 points. Score 80 79-63 62-48 47-32 31-16 15-1 0   Modifier CH CI CJ CK CL CM CN     ?  Mobility - Walking and Moving Around:     - CURRENT STATUS: CK - 40%-59% impaired, limited or restricted    - GOAL STATUS: CI - 1%-19% impaired, limited or restricted    - D/C STATUS:  ---------------To be determined---------------    Medical Necessity:   · Patient is expected to demonstrate progress in strength and range of motion to increase independence with standing, walking, and ADL's. Reason for Services/Other Comments:  · Patient will benefit from skilled PT to address impairments identified through evaluation and return to previous ADL and recreational capacity. Use of outcome tool(s) and clinical judgement create a POC that gives a: Clear prediction of patient's progress: LOW COMPLEXITY            TREATMENT:   (In addition to Assessment/Re-Assessment sessions the following treatments were rendered)  Pre-treatment Symptoms/Complaints:  Patient reports improved walking endurance and speed since starting PT. Pain: Initial:   Pain Intensity 1: 1 /10 Post Session:  1/10     Therapeutic Exercise: (55 Minutes):  Exercises per grid below to improve mobility and strength. Required minimal verbal and manual cues to promote proper body alignment and promote proper body posture. Progressed range and repetitions as indicated. Date:  12/17/2018   Activity/Exercise Parameters   Clams  3 x 12   walking for increased walking speed. 4 minutes   Lateral step overs (4\" object) --   Stairs (one hand railing) 5 minutes   SLR in supine 3 x 10   Modified sit to stand 80 repetitions   Hip extension in standing 3 x 30   Calf stretch 3 minutes   Lateral walking 5 minutes   Single leg balance --   Calf raise --   Sidelying hip abduction  3 x 10   Recumbent Bike --   Quadriceps/hip flexor stretch 4 minutes   Quadriceps Extensions (10#) unilateral --   Front and lateral step ups (4\") 6 minutes   Manual Therapy (     ): Manual techniques to facilitate improved motion and decreased pain.  (Used abbreviations: MET - muscle energy technique; PNF - proprioceptive neuromuscular facilitation; NMR - neuromuscular re-education; a/p - anterior to posterior; p/a - posterior to anterior)   · Soft tissue mobilization: R quadriceps, gluteals, and iliopsoas (NOT PERFORMED)      [facility] Portal  Treatment/Session Assessment:    · Response to Treatment:  Walking speed continues to improve and patient requires less rest break with exercise. Stairs remains challenging secondary to increased cardiorespiratory demand during activity. · Compliance with Program/Exercises: good compliance. · Recommendations/Intent for next treatment session: \"Next visit will focus on advancements to more challenging activities\".   Total Treatment Duration: therapeutic exercise 55 minutes, total time 55 minutes  PT Patient Time In/Time Out  Time In: 1330  Time Out: [facility]

## 2018-12-19 ENCOUNTER — APPOINTMENT (OUTPATIENT)
Dept: PHYSICAL THERAPY | Age: 78
End: 2018-12-19
Payer: MEDICARE

## 2018-12-20 ENCOUNTER — HOSPITAL ENCOUNTER (OUTPATIENT)
Dept: PHYSICAL THERAPY | Age: 78
Discharge: HOME | End: 2018-12-20
Payer: MEDICARE

## 2018-12-20 PROCEDURE — 97140 MANUAL THERAPY 1/> REGIONS: CPT

## 2018-12-20 PROCEDURE — 97110 THERAPEUTIC EXERCISES: CPT

## 2018-12-20 NOTE — PROGRESS NOTES
Miriam Service  : 1940  [place]  Secondary: [address], [address] [place] [address]  [phone]   [phone]        OUTPATIENT PHYSICAL THERAPY:Daily Note 2018   ICD-10: Treatment Diagnosis: Pain in Joint, R hip [M25.551]; Muscle weakness, generalized [M62.81]; Difficulty walking, not elsewhere classified [R26.2]  Precautions/Allergies:   Morphine; Other medication; and Shellfish derived   Fall Risk Score: 2 (? 5 = High Risk)  MD Orders: Evaluate and Treat MEDICAL/REFERRING DIAGNOSIS:  Hip pain [M25.559]   DATE OF ONSET: 18  REFERRING PHYSICIAN: Jaylen Campbell MD  RETURN PHYSICIAN APPOINTMENT: TBD     INITIAL ASSESSMENT:  Mr. Candance Patrick presents s/p R total hip replacement on 18. Pt. Demonstrates altered gait mechanics with use of a single point cane and reduced muscular strength in the R hip abductors, R hip extensors, R hip flexors, and R hip external rotators. Pt. Demonstrates moderate difficulty with ADL's along with standing and walking secondary to weakness. Pt. Is also managing a blood clot resulting in fatigue and labored breathing upon exertion. Pt. Will benefit from skilled PT to return to previous level of ADL function. Progress Report 18: Pt. Attends 10 physical therapy visits. Pt. Is making excellent progress with functional goals and is now able to ambulate 10 minutes without AD or rest break. Cardiovascular and muscular endurance continue to improve. Pt. Demonstrates improved strength in the R hip flexors, abductors, and extensors since starting PT. Pt. Will benefit from continued progression off current program.    PROBLEM LIST (Impacting functional limitations):  1. Decreased Strength  2. Decreased ADL/Functional Activities  3. Increased Pain  4. Decreased Activity Tolerance  5. Increased Shortness of Breath  6.  Decreased Flexibility/Joint Mobility  7. Decreased [place] with Home Exercise Program INTERVENTIONS PLANNED:  1. Gait Training  2. Home Exercise Program (HEP)  3. Manual Therapy  4. Range of Motion (ROM)  5. Therapeutic Exercise/Strengthening   TREATMENT PLAN:  Effective Dates: 10/23/2018 TO 1/21/2019 (90 days). Frequency/Duration: 2 times a week for 90 Day(s)  GOALS: (Goals have been discussed and agreed upon with patient.)  Discharge Goals: Time Frame: 8 weeks  1. Pt. Will report <3/10 R hip pain with standing for 5 minutes. MET  2. Pt. Will demonstrate 4+/5 MMT strength in the R hip abductors and flexors to improve ADL performance. Ongoing  3. Pt. Will ambulate for 10 minutes without single point cane and absence of trendelenburg gait to improve walking. MET  4. Pt. Will score >50 on the LEFS to demonstrate improved pain and function. Ongoing  Rehabilitation Potential For Stated Goals: Good              The information in this section was collected on 10/23/18 (except where otherwise noted). HISTORY:   History of Present Injury/Illness (Reason for Referral):  Pt. Attends PT s/p R total hip replacement on 9/4/18. Recovery from surgery has been complicated from a pulmonary embolism. Pt. Notes he has not performed prescribed HEP secondary to fatigue and difficulty breathing. The pulmonary embolism is currently being managed and patient would like to resume exercise program to re-establish prior level of function. Pt. Notes he initially experienced R knee pain that was later attributed to severe hip OA leading to surgery. Pt. Notes he is having difficulty with prolonged standing, lifting the R LE, and walking without a cane.    Past Medical History/Comorbidities:   Mr. Cristal Fuller  has a past medical history of Acute pulmonary embolism ([facility] [address] Arthritis, Chronic pain, Former smoker, GERD (gastroesophageal reflux disease), High cholesterol, Hypertension, Nausea & vomiting, Stomach cancer ([address] Total knee replacement status, and Unspecified adverse effect of anesthesia. Mr. Micki Ramos  has a past surgical history that includes hx gi (2010); pr total hip arthroplasty (Left, 2004); hx shoulder arthroscopy (Right, 2015); hx knee arthroscopy (Right); pr total knee arthroplasty (Right, 2012); hx other surgical; hx cholecystectomy (2015); hx appendectomy; RIGHT HIP ARTHROPLASTY TOTAL/DEPUY (Right, 9/4/2018); CHOLECYSTECTOMY LAPAROSCOPIC (N/A, 7/24/2015); RIGHT SHOULDER ARTHROPLASTY TOTAL REVERSE   (Right, 4/6/2015); KNEE ARTHROPLASTY TOTAL (Right, 2/21/2012); INFUSAPORT INSERTION (Left, 3/11/2010); and GASTRECTOMY LAPAROSCOPIC (N/A, 2/8/2010). Social History/Living Environment:     lives with wife in two story home. Prior Level of Function/Work/Activity:  Functioned independently without significant limitations. Current Medications:       Current Outpatient Medications:     tamsulosin (FLOMAX) 0.4 mg capsule, Take 0.4 mg by mouth daily. , Disp: , Rfl:     traMADol (ULTRAM) 50 mg tablet, Take 1 Tab by mouth every eight (8) hours as needed. Max Daily Amount: 150 mg., Disp: 40 Tab, Rfl: 1    traMADol (ULTRAM) 50 mg tablet, Take 50 mg by mouth every six (6) hours as needed for Pain., Disp: , Rfl:     aspirin delayed-release 81 mg tablet, Take 1 Tab by mouth every twelve (12) hours every twelve (12) hours. , Disp: 120 Tab, Rfl: 0    sucralfate (CARAFATE) 1 gram tablet, Take 1 g by mouth three (3) times daily. Take / use AM day of surgery  per anesthesia protocols. , Disp: , Rfl:     omeprazole (PRILOSEC) 20 mg capsule, Take 20 mg by mouth Daily (before breakfast). Take DOS per anesthesia protocol. Indications: gastroesophageal reflux disease, Disp: , Rfl:     zolpidem (AMBIEN) 10 mg tablet, Take 10 mg by mouth nightly as needed for Sleep. Indications: SLEEP-ONSET INSOMNIA, Disp: , Rfl:     cyanocobalamin (VITAMIN B-12) 1,000 mcg/mL injection, 1,000 mcg by IntraMUSCular route every twenty-eight (28) days. , Disp: , Rfl:     simvastatin (ZOCOR) 40 mg tablet, Take 40 mg by mouth nightly., Disp: , Rfl:     cetirizine (ZYRTEC) 10 mg tablet, Take 10 mg by mouth nightly. Indications: ALLERGIC RHINITIS, Disp: , Rfl:     fluticasone (FLONASE) 50 mcg/Actuation nasal spray, 2 Sprays by Nasal route nightly. Indications: ALLERGIC RHINITIS, Disp: , Rfl:    Date Last Reviewed:  12/20/2018   Number of Personal Factors/Comorbidities that affect the Plan of Care: 3+: HIGH COMPLEXITY   EXAMINATION:   Observation/Orthostatic Postural Assessment:          Incision well healed in the R gluteal region; Swelling present in B gaiter region, pt. Ambulates with a single point cane. Mild trendelenburg present during gait without use of single point cane. Palpation:          Tender to palpation of the R gluteal, hip external rotators, quadriceps, and hip flexors  ROM:          R hip ROM WFL, B knee extension limited. Strength:             Right Left   Hip extension 4/5 4+/5   Hip abduction 4/5 4+/5   Hip external rotation 4/5 4+/5   Hip flexion 4/5 4+/5        Body Structures Involved:  1. Joints  2. Muscles Body Functions Affected:  1. Neuromusculoskeletal  2. Movement Related Activities and Participation Affected:  1. Mobility  2. Self Care   Number of elements (examined above) that affect the Plan of Care: 1-2: LOW COMPLEXITY   CLINICAL PRESENTATION:   Presentation: Stable and uncomplicated: LOW COMPLEXITY   CLINICAL DECISION MAKING:   Outcome Measure: Tool Used: Lower Extremity Functional Scale (LEFS)  Score:  Initial: 33/80 Most Recent: 43/80 (Date: 11/29/18 )   Interpretation of Score: 20 questions each scored on a 5 point scale with 0 representing \"extreme difficulty or unable to perform\" and 4 representing \"no difficulty\". The lower the score, the greater the functional disability. 80/80 represents no disability. Minimal detectable change is 9 points. Score 80 79-63 62-48 47-32 31-16 15-1 0   Modifier CH CI CJ CK CL CM CN     ?  Mobility - Walking and Moving Around:     - CURRENT STATUS: CK - 40%-59% impaired, limited or restricted    - GOAL STATUS: CI - 1%-19% impaired, limited or restricted    - D/C STATUS:  ---------------To be determined---------------    Medical Necessity:   · Patient is expected to demonstrate progress in strength and range of motion to increase independence with standing, walking, and ADL's. Reason for Services/Other Comments:  · Patient will benefit from skilled PT to address impairments identified through evaluation and return to previous ADL and recreational capacity. Use of outcome tool(s) and clinical judgement create a POC that gives a: Clear prediction of patient's progress: LOW COMPLEXITY            TREATMENT:   (In addition to Assessment/Re-Assessment sessions the following treatments were rendered)  Pre-treatment Symptoms/Complaints:  Patient reports difficulty walking [place] hills at the soccer fields secondary to cardiorespiratory endurance. Pain: Initial:   Pain Intensity 1: 1 /10 Post Session:  1/10     Therapeutic Exercise: (45 Minutes):  Exercises per grid below to improve mobility and strength. Required minimal verbal and manual cues to promote proper body alignment and promote proper body posture. Progressed range and repetitions as indicated. Date:  12/20/2018   Activity/Exercise Parameters   Clams  3 x 12   walking for increased walking speed.  4 minutes   Lateral step overs (4\" object) --   Stairs (one hand railing) 5 minutes   SLR in supine --   Modified sit to stand 30 repetitions   Hip extension in standing --   Calf stretch 3 minutes   Lateral walking 5 minutes   Tandem walk (contact guard assist) 5 minutes   Calf raise --   Sidelying hip abduction  3 x 10   Recumbent Bike --   Quadriceps/hip flexor stretch 4 minutes   Quadriceps Extensions (10#) unilateral --   Front and lateral step ups (4\") 6 minutes   Manual Therapy (    Soft Tissue Mobilization Duration  Duration: 10 Minutes): Manual techniques to facilitate improved motion and decreased pain. (Used abbreviations: MET - muscle energy technique; PNF - proprioceptive neuromuscular facilitation; NMR - neuromuscular re-education; a/p - anterior to posterior; p/a - posterior to anterior)   · Soft tissue mobilization: R quadriceps, gluteals, and iliopsoas      MedBridge Portal  Treatment/Session Assessment:    · Response to Treatment:  Endurance with exercise is reduced today. Pt. Is advised to rest for two to three days from HEP to allow muscular recovery. · Compliance with Program/Exercises: good compliance. · Recommendations/Intent for next treatment session: \"Next visit will focus on advancements to more challenging activities\".   Total Treatment Duration: therapeutic exercise 45 minutes, manual therapy 10 minutes, total time 55 minutes  PT Patient Time In/Time Out  Time In: 1400  Time Out: [address] [facility], PT

## 2018-12-26 ENCOUNTER — HOSPITAL ENCOUNTER (OUTPATIENT)
Dept: PHYSICAL THERAPY | Age: 78
Discharge: HOME | End: 2018-12-26
Payer: MEDICARE

## 2018-12-26 PROCEDURE — 97110 THERAPEUTIC EXERCISES: CPT

## 2018-12-26 PROCEDURE — 97140 MANUAL THERAPY 1/> REGIONS: CPT

## 2018-12-26 NOTE — PROGRESS NOTES
Vanesa Blackmon  : 1940  [place]  Secondary: [address] [facility]  [address], [address] [place] [address]  [phone]   [phone]        OUTPATIENT PHYSICAL THERAPY:Daily Note 2018   ICD-10: Treatment Diagnosis: Pain in Joint, R hip [M25.551]; Muscle weakness, generalized [M62.81]; Difficulty walking, not elsewhere classified [R26.2]  Precautions/Allergies:   Morphine; Other medication; and Shellfish derived   Fall Risk Score: 2 (? 5 = High Risk)  MD Orders: Evaluate and Treat MEDICAL/REFERRING DIAGNOSIS:  Hip pain [M25.559]   DATE OF ONSET: 18  REFERRING PHYSICIAN: Liz Corrales MD  RETURN PHYSICIAN APPOINTMENT: TBD     INITIAL ASSESSMENT:  Mr. Lora Zuniga presents s/p R total hip replacement on 18. Pt. Demonstrates altered gait mechanics with use of a single point cane and reduced muscular strength in the R hip abductors, R hip extensors, R hip flexors, and R hip external rotators. Pt. Demonstrates moderate difficulty with ADL's along with standing and walking secondary to weakness. Pt. Is also managing a blood clot resulting in fatigue and labored breathing upon exertion. Pt. Will benefit from skilled PT to return to previous level of ADL function. Progress Report 18: Pt. Attends 10 physical therapy visits. Pt. Is making excellent progress with functional goals and is now able to ambulate 10 minutes without AD or rest break. Cardiovascular and muscular endurance continue to improve. Pt. Demonstrates improved strength in the R hip flexors, abductors, and extensors since starting PT. Pt. Will benefit from continued progression off current program.    PROBLEM LIST (Impacting functional limitations):  1. Decreased Strength  2. Decreased ADL/Functional Activities  3. Increased Pain  4. Decreased Activity Tolerance  5. Increased Shortness of Breath  6.  Decreased Flexibility/Joint Mobility  7. Decreased [place] with Home Exercise Program INTERVENTIONS PLANNED:  1. Gait Training  2. Home Exercise Program (HEP)  3. Manual Therapy  4. Range of Motion (ROM)  5. Therapeutic Exercise/Strengthening   TREATMENT PLAN:  Effective Dates: 10/23/2018 TO 1/21/2019 (90 days). Frequency/Duration: 2 times a week for 90 Day(s)  GOALS: (Goals have been discussed and agreed upon with patient.)  Discharge Goals: Time Frame: 8 weeks  1. Pt. Will report <3/10 R hip pain with standing for 5 minutes. MET  2. Pt. Will demonstrate 4+/5 MMT strength in the R hip abductors and flexors to improve ADL performance. Ongoing  3. Pt. Will ambulate for 10 minutes without single point cane and absence of trendelenburg gait to improve walking. MET  4. Pt. Will score >50 on the LEFS to demonstrate improved pain and function. Ongoing  Rehabilitation Potential For Stated Goals: Good              The information in this section was collected on 10/23/18 (except where otherwise noted). HISTORY:   History of Present Injury/Illness (Reason for Referral):  Pt. Attends PT s/p R total hip replacement on 9/4/18. Recovery from surgery has been complicated from a pulmonary embolism. Pt. Notes he has not performed prescribed HEP secondary to fatigue and difficulty breathing. The pulmonary embolism is currently being managed and patient would like to resume exercise program to re-establish prior level of function. Pt. Notes he initially experienced R knee pain that was later attributed to severe hip OA leading to surgery. Pt. Notes he is having difficulty with prolonged standing, lifting the R LE, and walking without a cane.    Past Medical History/Comorbidities:   Mr. Tavon Dunn  has a past medical history of Acute pulmonary embolism ([facility] [address] Arthritis, Chronic pain, Former smoker, GERD (gastroesophageal reflux disease), High cholesterol, Hypertension, Nausea & vomiting, Stomach cancer ([address] Total knee replacement status, and Unspecified adverse effect of anesthesia. Mr. Claudia Olson  has a past surgical history that includes hx gi (2010); pr total hip arthroplasty (Left, 2004); hx shoulder arthroscopy (Right, 2015); hx knee arthroscopy (Right); pr total knee arthroplasty (Right, 2012); hx other surgical; hx cholecystectomy (2015); hx appendectomy; RIGHT HIP ARTHROPLASTY TOTAL/DEPUY (Right, 9/4/2018); CHOLECYSTECTOMY LAPAROSCOPIC (N/A, 7/24/2015); RIGHT SHOULDER ARTHROPLASTY TOTAL REVERSE   (Right, 4/6/2015); KNEE ARTHROPLASTY TOTAL (Right, 2/21/2012); INFUSAPORT INSERTION (Left, 3/11/2010); and GASTRECTOMY LAPAROSCOPIC (N/A, 2/8/2010). Social History/Living Environment:     lives with wife in two story home. Prior Level of Function/Work/Activity:  Functioned independently without significant limitations. Current Medications:       Current Outpatient Medications:     tamsulosin (FLOMAX) 0.4 mg capsule, Take 0.4 mg by mouth daily. , Disp: , Rfl:     traMADol (ULTRAM) 50 mg tablet, Take 1 Tab by mouth every eight (8) hours as needed. Max Daily Amount: 150 mg., Disp: 40 Tab, Rfl: 1    traMADol (ULTRAM) 50 mg tablet, Take 50 mg by mouth every six (6) hours as needed for Pain., Disp: , Rfl:     aspirin delayed-release 81 mg tablet, Take 1 Tab by mouth every twelve (12) hours every twelve (12) hours. , Disp: 120 Tab, Rfl: 0    sucralfate (CARAFATE) 1 gram tablet, Take 1 g by mouth three (3) times daily. Take / use AM day of surgery  per anesthesia protocols. , Disp: , Rfl:     omeprazole (PRILOSEC) 20 mg capsule, Take 20 mg by mouth Daily (before breakfast). Take DOS per anesthesia protocol. Indications: gastroesophageal reflux disease, Disp: , Rfl:     zolpidem (AMBIEN) 10 mg tablet, Take 10 mg by mouth nightly as needed for Sleep. Indications: SLEEP-ONSET INSOMNIA, Disp: , Rfl:     cyanocobalamin (VITAMIN B-12) 1,000 mcg/mL injection, 1,000 mcg by IntraMUSCular route every twenty-eight (28) days. , Disp: , Rfl:     simvastatin (ZOCOR) 40 mg tablet, Take 40 mg by mouth nightly., Disp: , Rfl:     cetirizine (ZYRTEC) 10 mg tablet, Take 10 mg by mouth nightly. Indications: ALLERGIC RHINITIS, Disp: , Rfl:     fluticasone (FLONASE) 50 mcg/Actuation nasal spray, 2 Sprays by Nasal route nightly. Indications: ALLERGIC RHINITIS, Disp: , Rfl:    Date Last Reviewed:  12/26/2018   Number of Personal Factors/Comorbidities that affect the Plan of Care: 3+: HIGH COMPLEXITY   EXAMINATION:   Observation/Orthostatic Postural Assessment:          Incision well healed in the R gluteal region; Swelling present in B gaiter region, pt. Ambulates with a single point cane. Mild trendelenburg present during gait without use of single point cane. Palpation:          Tender to palpation of the R gluteal, hip external rotators, quadriceps, and hip flexors  ROM:          R hip ROM WFL, B knee extension limited. Strength:             Right Left   Hip extension 4/5 4+/5   Hip abduction 4/5 4+/5   Hip external rotation 4/5 4+/5   Hip flexion 4/5 4+/5        Body Structures Involved:  1. Joints  2. Muscles Body Functions Affected:  1. Neuromusculoskeletal  2. Movement Related Activities and Participation Affected:  1. Mobility  2. Self Care   Number of elements (examined above) that affect the Plan of Care: 1-2: LOW COMPLEXITY   CLINICAL PRESENTATION:   Presentation: Stable and uncomplicated: LOW COMPLEXITY   CLINICAL DECISION MAKING:   Outcome Measure: Tool Used: Lower Extremity Functional Scale (LEFS)  Score:  Initial: 33/80 Most Recent: 43/80 (Date: 11/29/18 )   Interpretation of Score: 20 questions each scored on a 5 point scale with 0 representing \"extreme difficulty or unable to perform\" and 4 representing \"no difficulty\". The lower the score, the greater the functional disability. 80/80 represents no disability. Minimal detectable change is 9 points. Score 80 79-63 62-48 47-32 31-16 15-1 0   Modifier CH CI CJ CK CL CM CN     ?  Mobility - Walking and Moving Around:     - CURRENT STATUS: CK - 40%-59% impaired, limited or restricted    - GOAL STATUS: CI - 1%-19% impaired, limited or restricted    - D/C STATUS:  ---------------To be determined---------------    Medical Necessity:   · Patient is expected to demonstrate progress in strength and range of motion to increase independence with standing, walking, and ADL's. Reason for Services/Other Comments:  · Patient will benefit from skilled PT to address impairments identified through evaluation and return to previous ADL and recreational capacity. Use of outcome tool(s) and clinical judgement create a POC that gives a: Clear prediction of patient's progress: LOW COMPLEXITY            TREATMENT:   (In addition to Assessment/Re-Assessment sessions the following treatments were rendered)  Pre-treatment Symptoms/Complaints:  Patient notes difficulty with walking hill terrain secondary to shortness of breath. Pain: Initial:   Pain Intensity 1: 1 /10 Post Session:  1/10     Therapeutic Exercise: (45 Minutes):  Exercises per grid below to improve mobility and strength. Required minimal verbal and manual cues to promote proper body alignment and promote proper body posture. Progressed range and repetitions as indicated. Date:  12/26/2018   Activity/Exercise Parameters   Clams  3 x 12   walking for increased walking speed.  4 minutes   Lateral step overs (4\" object) 4 minutes   Stairs (one hand railing) --   SLR in supine 3 x 10   Modified sit to stand 30 repetitions   Hip extension in standing 3 x 20   Calf stretch 3 minutes   Lateral walking 5 minutes   Tandem walk (contact guard assist) --   Calf raise --   Sidelying hip abduction  3 x 10   Recumbent Bike --   Quadriceps/hip flexor stretch 4 minutes   Quadriceps Extensions (10#) unilateral --   Front and lateral step ups (4\") --   Manual Therapy (    Soft Tissue Mobilization Duration  Duration: 10 Minutes): Manual techniques to facilitate improved motion and decreased pain. (Used abbreviations: MET - muscle energy technique; PNF - proprioceptive neuromuscular facilitation; NMR - neuromuscular re-education; a/p - anterior to posterior; p/a - posterior to anterior)   · Soft tissue mobilization: R quadriceps, gluteals, and iliopsoas      MedBridge Portal  Treatment/Session Assessment:    · Response to Treatment:  Pt. Continues to demonstrate improved muscular endurance with therapeutic exercises. Pt. Is making excellent progress toward d/c with HEP. · Compliance with Program/Exercises: good compliance. · Recommendations/Intent for next treatment session: \"Next visit will focus on advancements to more challenging activities\".   Total Treatment Duration: therapeutic exercise 45 minutes, manual therapy 10 minutes, total time 55 minutes  PT Patient Time In/Time Out  Time In: 1330  Time Out: [address]

## 2018-12-28 ENCOUNTER — HOSPITAL ENCOUNTER (OUTPATIENT)
Dept: PHYSICAL THERAPY | Age: 78
Discharge: HOME | End: 2018-12-28
Payer: MEDICARE

## 2018-12-28 PROCEDURE — G8980 MOBILITY D/C STATUS: HCPCS

## 2018-12-28 PROCEDURE — 97110 THERAPEUTIC EXERCISES: CPT

## 2018-12-28 PROCEDURE — G8979 MOBILITY GOAL STATUS: HCPCS

## 2018-12-28 NOTE — THERAPY DISCHARGE
Bob Leonard  : 1940  [place]  Secondary: [address]  at [place] [id number] [address]  [phone]   [phone]        OUTPATIENT PHYSICAL THERAPY:Daily Note and Discharge 2018   ICD-10: Treatment Diagnosis: Pain in Joint, R hip [M25.551]; Muscle weakness, generalized [M62.81]; Difficulty walking, not elsewhere classified [R26.2]  Precautions/Allergies:   Morphine; Other medication; and Shellfish derived   Fall Risk Score: 2 (? 5 = High Risk)  MD Orders: Evaluate and Treat MEDICAL/REFERRING DIAGNOSIS:  Hip pain [M25.559]   DATE OF ONSET: 18  REFERRING PHYSICIAN: Milana Castillo MD  RETURN PHYSICIAN APPOINTMENT: TBD     INITIAL ASSESSMENT:  Mr. Mary Leblanc presents s/p R total hip replacement on 18. Pt. Demonstrates altered gait mechanics with use of a single point cane and reduced muscular strength in the R hip abductors, R hip extensors, R hip flexors, and R hip external rotators. Pt. Demonstrates moderate difficulty with ADL's along with standing and walking secondary to weakness. Pt. Is also managing a blood clot resulting in fatigue and labored breathing upon exertion. Pt. Will benefit from skilled PT to return to previous level of ADL function. Progress Report 18: Pt. Attends 10 physical therapy visits. Pt. Is making excellent progress with functional goals and is now able to ambulate 10 minutes without AD or rest break. Cardiovascular and muscular endurance continue to improve. Pt. Demonstrates improved strength in the R hip flexors, abductors, and extensors since starting PT. Pt. Will benefit from continued progression off current program. 18 Discharge Summary: Pt. Attends 18 physical therapy visits. Pt. Raimundo Veras all set goals and is independent with HEP at this time.   Pt. Remains limited with exercise and ADL endurance secondary to cardiorespiratory endurance related to blood clot. Pt. Does continue to improve with endurance however. Pt. Is appropriate for discharge at this time with current HEP. PROBLEM LIST (Impacting functional limitations):  1. Decreased Strength  2. Decreased ADL/Functional Activities  3. Increased Pain  4. Decreased Activity Tolerance  5. Increased Shortness of Breath  6. Decreased Flexibility/Joint Mobility  7. Decreased [place] with Home Exercise Program INTERVENTIONS PLANNED:  1. Gait Training  2. Home Exercise Program (HEP)  3. Manual Therapy  4. Range of Motion (ROM)  5. Therapeutic Exercise/Strengthening     GOALS: (Goals have been discussed and agreed upon with patient.)  Discharge Goals: Time Frame: 8 weeks  1. Pt. Will report <3/10 R hip pain with standing for 5 minutes. MET  2. Pt. Will demonstrate 4+/5 MMT strength in the R hip abductors and flexors to improve ADL performance. MET  3. Pt. Will ambulate for 10 minutes without single point cane and absence of trendelenburg gait to improve walking. MET  4. Pt. Will score >50 on the LEFS to demonstrate improved pain and function. MET                The information in this section was collected on 10/23/18 (except where otherwise noted). HISTORY:   History of Present Injury/Illness (Reason for Referral):  Pt. Attends PT s/p R total hip replacement on 9/4/18. Recovery from surgery has been complicated from a pulmonary embolism. Pt. Notes he has not performed prescribed HEP secondary to fatigue and difficulty breathing. The pulmonary embolism is currently being managed and patient would like to resume exercise program to re-establish prior level of function. Pt. Notes he initially experienced R knee pain that was later attributed to severe hip OA leading to surgery. Pt. Notes he is having difficulty with prolonged standing, lifting the R LE, and walking without a cane.    Past Medical History/Comorbidities:   Mr. Tin Morales  has a past medical history of Acute pulmonary embolism ([facility] [address] Arthritis, Chronic pain, Former smoker, GERD (gastroesophageal reflux disease), High cholesterol, Hypertension, Nausea & vomiting, Stomach cancer ([facility] [address] Total knee replacement status, and Unspecified adverse effect of anesthesia. Mr. Yasmany Guzman  has a past surgical history that includes hx gi (2010); pr total hip arthroplasty (Left, 2004); hx shoulder arthroscopy (Right, 2015); hx knee arthroscopy (Right); pr total knee arthroplasty (Right, 2012); hx other surgical; hx cholecystectomy (2015); hx appendectomy; RIGHT HIP ARTHROPLASTY TOTAL/DEPUY (Right, 9/4/2018); CHOLECYSTECTOMY LAPAROSCOPIC (N/A, 7/24/2015); RIGHT SHOULDER ARTHROPLASTY TOTAL REVERSE   (Right, 4/6/2015); KNEE ARTHROPLASTY TOTAL (Right, 2/21/2012); INFUSAPORT INSERTION (Left, 3/11/2010); and GASTRECTOMY LAPAROSCOPIC (N/A, 2/8/2010). Social History/Living Environment:     lives with wife in two story home. Prior Level of Function/Work/Activity:  Functioned independently without significant limitations. Current Medications:       Current Outpatient Medications:     tamsulosin (FLOMAX) 0.4 mg capsule, Take 0.4 mg by mouth daily. , Disp: , Rfl:     traMADol (ULTRAM) 50 mg tablet, Take 1 Tab by mouth every eight (8) hours as needed. Max Daily Amount: 150 mg., Disp: 40 Tab, Rfl: 1    traMADol (ULTRAM) 50 mg tablet, Take 50 mg by mouth every six (6) hours as needed for Pain., Disp: , Rfl:     aspirin delayed-release 81 mg tablet, Take 1 Tab by mouth every twelve (12) hours every twelve (12) hours. , Disp: 120 Tab, Rfl: 0    sucralfate (CARAFATE) 1 gram tablet, Take 1 g by mouth three (3) times daily. Take / use AM day of surgery  per anesthesia protocols. , Disp: , Rfl:     omeprazole (PRILOSEC) 20 mg capsule, Take 20 mg by mouth Daily (before breakfast). Take DOS per anesthesia protocol. Indications: gastroesophageal reflux disease, Disp: , Rfl:     zolpidem (AMBIEN) 10 mg tablet, Take 10 mg by mouth nightly as needed for Sleep. Indications: SLEEP-ONSET INSOMNIA, Disp: , Rfl:     cyanocobalamin (VITAMIN B-12) 1,000 mcg/mL injection, 1,000 mcg by IntraMUSCular route every twenty-eight (28) days. , Disp: , Rfl:     simvastatin (ZOCOR) 40 mg tablet, Take 40 mg by mouth nightly., Disp: , Rfl:     cetirizine (ZYRTEC) 10 mg tablet, Take 10 mg by mouth nightly. Indications: ALLERGIC RHINITIS, Disp: , Rfl:     fluticasone (FLONASE) 50 mcg/Actuation nasal spray, 2 Sprays by Nasal route nightly. Indications: ALLERGIC RHINITIS, Disp: , Rfl:    Date Last Reviewed:  12/28/2018   Number of Personal Factors/Comorbidities that affect the Plan of Care: 3+: HIGH COMPLEXITY   EXAMINATION:   Observation/Orthostatic Postural Assessment:          Incision well healed in the R gluteal region; Swelling present in B gaiter region, pt. Ambulates without cane at this time and absence of trendelenburg gait   Palpation:          Tender to palpation of the R quadriceps  ROM:          R hip ROM WFL, B knee extension limited. Strength:             Right Left   Hip extension 4+/5 4+/5   Hip abduction 4+/5 4+/5   Hip external rotation 4+/5 4+/5   Hip flexion 4+/5 4+/5        Body Structures Involved:  1. Joints  2. Muscles Body Functions Affected:  1. Neuromusculoskeletal  2. Movement Related Activities and Participation Affected:  1. Mobility  2. Self Care   Number of elements (examined above) that affect the Plan of Care: 1-2: LOW COMPLEXITY   CLINICAL PRESENTATION:   Presentation: Stable and uncomplicated: LOW COMPLEXITY   CLINICAL DECISION MAKING:   Outcome Measure: Tool Used: Lower Extremity Functional Scale (LEFS)  Score:  Initial: 33/80 Most Recent: 56/80 (Date: 12/28/18 )   Interpretation of Score: 20 questions each scored on a 5 point scale with 0 representing \"extreme difficulty or unable to perform\" and 4 representing \"no difficulty\". The lower the score, the greater the functional disability. 80/80 represents no disability.   Minimal detectable change is 9 points. Score 80 79-63 62-48 47-32 31-16 15-1 0   Modifier CH CI CJ CK CL CM CN     ? Mobility - Walking and Moving Around:     - CURRENT STATUS: ---------------To be determined---------------    - GOAL STATUS: CI - 1%-19% impaired, limited or restricted    - D/C STATUS:  CJ - 20%-39% impaired, limited or restricted       Use of outcome tool(s) and clinical judgement create a POC that gives a: Clear prediction of patient's progress: LOW COMPLEXITY            TREATMENT:   (In addition to Assessment/Re-Assessment sessions the following treatments were rendered)  Pre-treatment Symptoms/Complaints:  Patient reports less fatigue this week after sleeping well for 2 days. Pt. Notes he is confident to discharge at this time with hEP. Pain: Initial:   Pain Intensity 1: 0 /10 Post Session:  0/10     Therapeutic Exercise: (55 Minutes):  Exercises per grid below to improve mobility and strength. Required minimal verbal and manual cues to promote proper body alignment and promote proper body posture. Progressed range and repetitions as indicated. Date:  12/28/2018   Activity/Exercise Parameters   Clams  3 x 12   walking for increased walking speed. 4 minutes   Bridges 3 x 10   Stairs (one hand railing) 5 minutes   SLR in supine --   Modified sit to stand (8#) 30 repetitions   Hip extension in standing 3 x 20   Calf stretch 3 minutes   Lateral walking (yellow band) 5 minutes   Tandem walk (contact guard assist) 4 minutes   Calf raise 3 x 10   Sidelying hip abduction  3 x 10   Recumbent Bike --   Quadriceps/hip flexor stretch --   Quadriceps Extensions (10#) unilateral --   Front and lateral step ups (4\") 5 minutes     InteKrin  Treatment/Session Assessment:    · Response to Treatment:  Pt. Demonstrates appropriate psychomotor knowledge of HEP and is appropriate for discharge at this time. · Compliance with Program/Exercises: good compliance.    · Recommendations/Intent for next treatment session: \"Next visit will focus on advancements to more challenging activities\".   Total Treatment Duration: therapeutic exercise 55 minutes, total time 55 minutes  PT Patient Time In/Time Out  Time In: 1030  Time Out: 1434 [facility] Radha, PT

## 2020-11-27 ENCOUNTER — HOSPITAL ENCOUNTER (OUTPATIENT)
Dept: WOUND CARE | Age: 80
Discharge: HOME | End: 2020-11-27
Attending: SURGERY
Payer: MEDICARE

## 2020-11-27 VITALS
WEIGHT: 165 LBS | DIASTOLIC BLOOD PRESSURE: 80 MMHG | SYSTOLIC BLOOD PRESSURE: 127 MMHG | BODY MASS INDEX: 26.52 KG/M2 | RESPIRATION RATE: 18 BRPM | OXYGEN SATURATION: 98 % | HEIGHT: 66 IN | TEMPERATURE: 96.4 F | HEART RATE: 78 BPM

## 2020-11-27 PROCEDURE — 29580 STRAPPING UNNA BOOT: CPT

## 2020-11-27 PROCEDURE — 29581 APPL MULTLAYER CMPRN SYS LEG: CPT

## 2020-11-27 PROCEDURE — 99213 OFFICE O/P EST LOW 20 MIN: CPT

## 2020-11-27 NOTE — WOUND CARE
[address]  [address], [address]   Phone: [phone]  Fax: [phone]    Patient: Rebecca Ramirez MRN: [id number]  SSN: [id number]    YOB: 1940  Age: 78 y.o. Sex: male       Return Appointment: 1 week with Nika Nails MD                                    Tuesday for clinician visit and wound assessment    Instructions: Left medial lower leg  Cleanse wound and periwound with wound cleanser or normal saline. Hydrofera Ready: Cut to wound size, place in wound bed, shiny side out. Wrap with Calamine Coflex. Change dressing twice a week at wound center. Please do not get dressing wet in shower  Please purchase cast cover. Should you experience increased redness, swelling, pain, foul odor, size of wound(s), or have a temperature over 101 degrees please contact the [address] [place] [address] Road at [phone] or if after hours contact your primary care physician or go to the hospital emergency department.     Signed By: Chris Kang RN     November 27, 2020

## 2020-11-27 NOTE — WOUND CARE
Multilayer Compression Wrap   (Not Unna) Below the Knee    NAME:  Harlan Najjar  YOB: 1940  MEDICAL RECORD NUMBER:  [id number]  DATE:  11/27/2020    Removed old Multilayer wrap if indicated and wash leg with mild soap/water. Applied moisturizing agent to dry skin as needed. Applied primary and secondary dressing as ordered. Applied multilayered dressing below the knee to left lower leg. Instructed patient/caregiver not to remove dressing and to keep it clean and dry. Instructed patient/caregiver on complications to report to provider, such as pain, numbness in toes, heavy drainage, and slippage of dressing. Instructed patient on purpose of compression dressing and on activity and exercise recommendations.     Response to treatment: Well tolerated by patient       Electronically signed by Annamaria Earl RN on 11/27/2020 at 9:01 AM

## 2020-11-27 NOTE — WOUND CARE
11/27/20 0829   Wound Leg lower Left;Medial 11/27/20   Date First Assessed/Time First Assessed: 11/27/20 0828   Present on Hospital Admission: Yes  Wound Approximate Age at First Assessment (Weeks): 3 weeks  Primary Wound Type: Venous  Location: Leg lower  Wound Location Orientation: Left;Medial  Date of . .. Wound Image    Dressing Status Old drainage noted   Cleansed Cleansed with saline   Wound Length (cm) 2 cm   Wound Width (cm) 2.1 cm   Wound Depth (cm) 0.3 cm   Wound Surface Area (cm^2) 4.2 cm^2   Wound Volume (cm^3) 1.26 cm^3   Wound Assessment Granulation tissue   Drainage Amount Moderate   Drainage Description Serosanguinous   Wound Odor None   Wound Thickness Description Full thickness     Wound very gently mechanically debrided with saline dampened gauze.   Patient is on Xarelto

## 2020-11-27 NOTE — DISCHARGE INSTRUCTIONS
[address]  [address], [address]  Phone: [phone]  Fax: [phone]    Patient: Brionna Suero MRN: [id number]  SSN: [id number]    YOB: 1940  Age: 78 y.o. Sex: male       Return Appointment: 1 week with Kourtney Casarez MD                                    Tuesday for clinician visit and wound assessment    Instructions: Left medial lower leg  Cleanse wound and periwound with wound cleanser or normal saline. Hydrofera Ready: Cut to wound size, place in wound bed, shiny side out. Wrap with Calamine Coflex. Change dressing twice a week at wound center. Please do not get dressing wet in shower  Please purchase cast cover. Should you experience increased redness, swelling, pain, foul odor, size of wound(s), or have a temperature over 101 degrees please contact the [address] [place] [address] Road at [phone] or if after hours contact your primary care physician or go to the hospital emergency department.     Signed By: Jacy Hook RN     November 27, 2020

## 2020-12-01 ENCOUNTER — HOSPITAL ENCOUNTER (OUTPATIENT)
Dept: WOUND CARE | Age: 80
Discharge: HOME | End: 2020-12-01
Attending: SURGERY
Payer: MEDICARE

## 2020-12-01 VITALS — TEMPERATURE: 96.4 F | HEIGHT: 66 IN | BODY MASS INDEX: 26.63 KG/M2

## 2020-12-01 PROCEDURE — 29580 STRAPPING UNNA BOOT: CPT

## 2020-12-01 NOTE — WOUND CARE
12/01/20 7667 Wound Leg Lower; Anterior 12/01/20 Date First Assessed/Time First Assessed: 12/01/20 0902   Present on Hospital Admission: Yes  Wound Approximate Age at First Assessment (Weeks): 24 weeks  Primary Wound Type: Venous Ulcer  Location: Leg  Wound Location Orientation: Lower; Anterior  Date. .. Wound Image Wound Etiology Venous Dressing Status (Hydrofera Ready, ABD, Calamine [place]) Cleansed Cleansed with saline Wound Length (cm) 1 cm Wound Width (cm) 0.9 cm Wound Depth (cm) 0.1 cm Wound Surface Area (cm^2) 0.9 cm^2 Wound Volume (cm^3) 0.09 cm^3 Wound Assessment Granulation tissue Drainage Amount Small Drainage Description Serosanguinous Wound Odor None [place]-Wound/Incision Assessment Intact; Hemosiderin staining (brown yellow) Edges Attached edges Wound Thickness Description Full thickness Wound Leg lower Left;Medial 11/27/20 Date First Assessed/Time First Assessed: 11/27/20 0828   Present on Hospital Admission: Yes  Wound Approximate Age at First Assessment (Weeks): 3 weeks  Primary Wound Type: Venous  Location: Leg lower  Wound Location Orientation: Left;Medial  Date of . .. Wound Image Wound Etiology Traumatic Dressing Status Clean; Intact; Old drainage noted Cleansed Cleansed with saline Wound Length (cm) 2.4 cm Wound Width (cm) 2.1 cm Wound Depth (cm) 0.3 cm Wound Surface Area (cm^2) 5.04 cm^2 Change in Wound Size % -20 Wound Volume (cm^3) 1.51 cm^3 Wound Healing % -20 Wound Assessment Granulation tissue Drainage Amount Moderate Drainage Description Serosanguinous Wound Odor None [place]-Wound/Incision Assessment Intact; Hemosiderin staining (brown yellow) Edges Attached edges Wound Thickness Description Full thickness ANTICOAGULANT THERAPY: Xarelto Wounds lightly debrided with gauze and normals saline. Clinician visit only.

## 2020-12-01 NOTE — WOUND CARE
[employer] Below Knee NAME:  Cecille Lefort YOB: 1940 MEDICAL RECORD NUMBER:  [id number] DATE:  12/1/2020 Remove old Unna Boot or Boots. Applied moisturizing agent to dry skin as needed. Appied primary and secondary dressing as ordered. Applied Unna roll from toes to knee overlapping each time. Applied ace wrap or coban from toes to below the knee. Secured with tape and/or metal clips covered with tape. Instructed patient/caregiver to keep dressing dry and intact. DO NOT REMOVE DRESSING. Instructed pt/family/caregiver to report excessive draining, loose bandage, wet dressing, severe pain or tingling in toes. Applied [place] dressing below the knee to left lower leg. Unna Boot(s) were applied per  Guidelines. Response to treatment: Well tolerated by patient  Electronically signed by Martín Fuller RN on 12/1/2020 at 9:23 AM

## 2020-12-04 ENCOUNTER — HOSPITAL ENCOUNTER (OUTPATIENT)
Dept: WOUND CARE | Age: 80
Discharge: HOME | End: 2020-12-04
Attending: SURGERY
Payer: MEDICARE

## 2020-12-04 VITALS — TEMPERATURE: 95.9 F | HEIGHT: 66 IN | BODY MASS INDEX: 26.68 KG/M2 | WEIGHT: 166 LBS

## 2020-12-04 PROCEDURE — 99213 OFFICE O/P EST LOW 20 MIN: CPT

## 2020-12-04 NOTE — DISCHARGE INSTRUCTIONS
[address]  [address], [address]  Phone: [phone]  Fax: [phone]    Patient: Luis A Feliciano MRN: [id number]  SSN: [id number]    YOB: 1940  Age: 78 y.o. Sex: male       Return Appointment: 2 weeks with Nanci Saavedra MD    Instructions: Left medial lower leg  Cleanse wound and periwound with wound cleanser or normal saline. Hydrofera Ready: Cut to wound size, place in wound bed, shiny side out. Cover with cover bandage. Change dressing three times a week. Tubigrip to left lower leg. May remove at night when sleeping. Do not get dressing wet. Should you experience increased redness, swelling, pain, foul odor, size of wound(s), or have a temperature over 101 degrees please contact the [address] [place] [address] Road at [phone] or if after hours contact your primary care physician or go to the hospital emergency department.     Signed By: Airam Galdamez RN     December 4, 2020

## 2020-12-04 NOTE — WOUND CARE
12/04/20 0947   Wound Leg Lower; Anterior 12/01/20   Date First Assessed/Time First Assessed: 12/01/20 0902   Present on Hospital Admission: Yes  Wound Approximate Age at First Assessment (Weeks): 24 weeks  Primary Wound Type: Venous Ulcer  Location: Leg  Wound Location Orientation: Lower; Anterior  Date. .. Wound Image    Wound Etiology Venous   Dressing Status   (mepitel one)   Cleansed Cleansed with saline   Wound Length (cm) 0.7 cm   Wound Width (cm) 0.6 cm   Wound Depth (cm) 0.1 cm   Wound Surface Area (cm^2) 0.42 cm^2   Change in Wound Size % 53.33   Wound Volume (cm^3) 0.04 cm^3   Wound Healing % 56   Wound Assessment Granulation tissue   Drainage Amount Small   Drainage Description Sanguineous   Wound Odor None   [place]-Wound/Incision Assessment Hemosiderin staining (brown yellow)   Wound Thickness Description Full thickness   Wound Leg lower Left;Medial 11/27/20   Date First Assessed/Time First Assessed: 11/27/20 0828   Present on Hospital Admission: Yes  Wound Approximate Age at First Assessment (Weeks): 3 weeks  Primary Wound Type: Venous  Location: Leg lower  Wound Location Orientation: Left;Medial  Date of . ..    Wound Etiology Traumatic   Dressing Status Old drainage noted   Cleansed Cleansed with saline   Dressing/Treatment   (hydrofera ready, abd,calamine unna)   Wound Length (cm) 2.1 cm   Wound Width (cm) 1.9 cm   Wound Depth (cm) 0.2 cm   Wound Surface Area (cm^2) 3.99 cm^2   Change in Wound Size % 5   Wound Volume (cm^3) 0.8 cm^3   Wound Healing % 37   Wound Assessment Granulation tissue   Drainage Amount Small   Drainage Description Serosanguinous   Wound Odor None   Patient is taking Xarelto daily

## 2020-12-04 NOTE — WOUND CARE
[address]  [address], [address]  Phone: [phone]  Fax: [phone]    Patient: Daniela Moon MRN: [id number]  SSN: [id number]    YOB: 1940  Age: 78 y.o. Sex: male       Return Appointment: 2 weeks with Lor Melendez MD    Instructions:   Left medial lower leg  Cleanse wound and periwound with wound cleanser or normal saline. Hydrofera Ready: Cut to wound size, place in wound bed, shiny side out. Cover with cover bandage. Change dressing three times a week. Tubigrip to left lower leg. May remove at night when sleeping. Do not get dressing wet. Should you experience increased redness, swelling, pain, foul odor, size of wound(s), or have a temperature over 101 degrees please contact the [address] [place] [address] Road at [phone] or if after hours contact your primary care physician or go to the hospital emergency department.     Signed By: Bismark Donald RN     December 4, 2020

## 2020-12-04 NOTE — PROGRESS NOTES
Wound Center Progress Note    Patient: Osmar Purdy MRN: [id number]  SSN: [id number]    YOB: 1940  Age: 78 y.o. Sex: male      Subjective:     Chief Complaint:  LLE wound      History of Present Illness:       Wound Caused By: Trauma  Associated Signs and Symptoms: pain, drainage  Timing: Since Nov 2020  Quality: wound  Severity: Full thickness    Has been keeping simple cover on wound since occurred. No previous trauma. No previous difficulty with wounds. On Xarelto.       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    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    Unspecified adverse effect of anesthesia     wife states slow to wake      Past Surgical History:   Procedure Laterality Date    HX APPENDECTOMY      HX CHOLECYSTECTOMY  2015    HX GI  2010    gastrectomy 2/8/10-has 30% stomach left    HX KNEE ARTHROSCOPY Right     HX OTHER SURGICAL      attempted tracheal dilation- stopped due to inflammation    HX SHOULDER ARTHROSCOPY Right 2015    TOTAL HIP ARTHROPLASTY Left 2004    TOTAL KNEE ARTHROPLASTY Right 2012     Family History   Problem Relation Age of Onset    Kidney Disease Mother     Dementia Father     Alcohol abuse Brother     Arthritis-osteo Brother       Social History     Tobacco Use    Smoking status: Former Smoker     Packs/day: 0.50     Years: 5.00     Pack years: 2.50    Smokeless tobacco: Never Used    Tobacco comment: quit age 20   Substance Use Topics    Alcohol use: Yes     Comment: 3 drinks per night; wine and liquor       Prior to Admission medications    Medication Sig Start Date End Date Taking? Authorizing Provider   rivaroxaban (Xarelto) 20 mg tab tablet Take 20 mg by mouth daily. Yes Provider, Historical   tamsulosin (FLOMAX) 0.4 mg capsule Take 0.4 mg by mouth daily. Ella Tim MD   traMADol [employer]) 50 mg tablet Take 1 Tab by mouth every eight (8) hours as needed. Max Daily Amount: 150 mg. 9/19/18   Georgeanne Leventhal, MD   traMADol [employer]) 50 mg tablet Take 50 mg by mouth every six (6) hours as needed for Pain. Provider, Historical   aspirin delayed-release 81 mg tablet Take 1 Tab by mouth every twelve (12) hours every twelve (12) hours. 9/5/18   Brandt Martínez PA   sucralfate (CARAFATE) 1 gram tablet Take 1 g by mouth three (3) times daily. Take / use AM day of surgery  per anesthesia protocols. Provider, Historical   omeprazole (PRILOSEC) 20 mg capsule Take 20 mg by mouth Daily (before breakfast). Take DOS per anesthesia protocol. Indications: gastroesophageal reflux disease    Provider, Historical   zolpidem (AMBIEN) 10 mg tablet Take 10 mg by mouth nightly as needed for Sleep. Indications: SLEEP-ONSET INSOMNIA    Provider, Historical   cyanocobalamin (VITAMIN B-12) 1,000 mcg/mL injection 1,000 mcg by IntraMUSCular route every twenty-eight (28) days. Provider, Historical   simvastatin (ZOCOR) 40 mg tablet Take 40 mg by mouth nightly. 3/10/10   Provider, Historical   cetirizine (ZYRTEC) 10 mg tablet Take 10 mg by mouth nightly. Indications: ALLERGIC RHINITIS 2/2/10   Provider, Historical   fluticasone (FLONASE) 50 mcg/Actuation nasal spray 2 Sprays by Nasal route nightly. Indications: ALLERGIC RHINITIS    Provider, Historical     Allergies   Allergen Reactions    Morphine Nausea and Vomiting    Other Medication Nausea and Vomiting     Dust and pollen causes runny nose.   Mollusks/clams- N/V    Shellfish Derived Nausea and Vomiting        Review of Systems:  CONSTITUTIONAL: No fever, chills  HEAD: No headache  EYES: No visual loss  ENT: No hearing loss  SKIN: No rash  CARDIOVASCULAR: No chest pain  RESPIRATORY: No shortness of breath  GASTROINTESTINAL: No nausea, vomiting  GENITOURINARY: No excessive urination  NEUROLOGICAL: + weakness  MUSCULOSKELETAL: No muscle pain. No neck pain  HEMATOLOGIC: No easy bleeding  LYMPHATICS: No lymphedema. PSYCHIATRIC: No current depression  ENDOCRINOLOGIC: No high sugars  ALLERGIES: No history of asthma, hives, eczema or rhinitis. No results found for: HBA1C, FOV9FSJP, HGBE8, PNW5GSVN, KPB7ZLWK     Immunization History   Administered Date(s) Administered    TB Skin Test (PPD) Intradermal 09/04/2018       Body mass index is 26.63 kg/m². Current medications:  Current Outpatient Medications   Medication Sig Dispense Refill    rivaroxaban (Xarelto) 20 mg tab tablet Take 20 mg by mouth daily.  tamsulosin (FLOMAX) 0.4 mg capsule Take 0.4 mg by mouth daily.  traMADol (ULTRAM) 50 mg tablet Take 1 Tab by mouth every eight (8) hours as needed. Max Daily Amount: 150 mg. 40 Tab 1    traMADol (ULTRAM) 50 mg tablet Take 50 mg by mouth every six (6) hours as needed for Pain.  aspirin delayed-release 81 mg tablet Take 1 Tab by mouth every twelve (12) hours every twelve (12) hours. 120 Tab 0    sucralfate (CARAFATE) 1 gram tablet Take 1 g by mouth three (3) times daily. Take / use AM day of surgery  per anesthesia protocols.  omeprazole (PRILOSEC) 20 mg capsule Take 20 mg by mouth Daily (before breakfast). Take DOS per anesthesia protocol. Indications: gastroesophageal reflux disease      zolpidem (AMBIEN) 10 mg tablet Take 10 mg by mouth nightly as needed for Sleep. Indications: SLEEP-ONSET INSOMNIA      cyanocobalamin (VITAMIN B-12) 1,000 mcg/mL injection 1,000 mcg by IntraMUSCular route every twenty-eight (28) days.  simvastatin (ZOCOR) 40 mg tablet Take 40 mg by mouth nightly.  cetirizine (ZYRTEC) 10 mg tablet Take 10 mg by mouth nightly.  Indications: ALLERGIC RHINITIS      fluticasone (FLONASE) 50 mcg/Actuation nasal spray 2 Sprays by Nasal route nightly. Indications: ALLERGIC RHINITIS           Objective:     Physical Exam:     Visit Vitals  /80 (BP 1 Location: Right arm, BP Patient Position: Sitting)   Pulse 78   Temp (!) 96.4 °F (35.8 °C)   Resp 18   Ht 5' 6\" (1.676 m)   Wt 74.8 kg (165 lb)   SpO2 98%   BMI 26.63 kg/m²       General: thin elderly lady  Psych: cooperative. Pleasant  Neuro: alert and oriented to person/place/situation. Otherwise nonfocal.  Derm: Normal turgor for age, dry skin  HEENT: Normocephalic, atraumatic. EOMI. Neck: Normal range of motion. Chest: Respirations nonlabored  Cardio: RRR  Abdomen: Soft, nondistended  Lower extremities:   No hemosiderrosis  Some large and small vessel varicosities  Capillary refill <3 sec    Right  1+ DP/PT    Left  1+ DP/PT            Ulcer Description:   Wound Closed incision/surgical site Knee Right (Active)   Number of days: 3209       Wound Incision Knee Right (Active)   Number of days: 3209       Wound Shoulder Right (Active)   Number of days: 2069       Wound Abdomen (Active)   Number of days: 1960       Wound Hip Right (Active)   Number of days: 822       Wound Leg lower Left;Medial 11/27/20 (Active)   Wound Image   12/01/20 0849   Wound Etiology Traumatic 12/01/20 0849   Dressing Status Clean; Intact; Old drainage noted 12/01/20 0849   Cleansed Cleansed with saline 12/01/20 0849   Wound Length (cm) 2.4 cm 12/01/20 0849   Wound Width (cm) 2.1 cm 12/01/20 0849   Wound Depth (cm) 0.3 cm 12/01/20 0849   Wound Surface Area (cm^2) 5.04 cm^2 12/01/20 0849   Change in Wound Size % -20 12/01/20 0849   Wound Volume (cm^3) 1.51 cm^3 12/01/20 0849   Wound Healing % -20 12/01/20 0849   Wound Assessment Granulation tissue 12/01/20 0849   Drainage Amount Moderate 12/01/20 0849   Drainage Description Serosanguinous 12/01/20 0849   Wound Odor None 12/01/20 0849   [place]-Wound/Incision Assessment Intact; Hemosiderin staining (brown yellow) 12/01/20 0849   Edges Attached edges 12/01/20 0849   Wound Thickness Description Full thickness 12/01/20 0849   Number of days: 7       Wound Leg Lower; Anterior 12/01/20 (Active)   Wound Image   12/01/20 0849   Wound Etiology Venous 12/01/20 0849   Cleansed Cleansed with saline 12/01/20 0849   Wound Length (cm) 1 cm 12/01/20 0849   Wound Width (cm) 0.9 cm 12/01/20 0849   Wound Depth (cm) 0.1 cm 12/01/20 0849   Wound Surface Area (cm^2) 0.9 cm^2 12/01/20 0849   Wound Volume (cm^3) 0.09 cm^3 12/01/20 0849   Wound Assessment Granulation tissue 12/01/20 0849   Drainage Amount Small 12/01/20 0849   Drainage Description Serosanguinous 12/01/20 0849   Wound Odor None 12/01/20 0849   [place]-Wound/Incision Assessment Intact; Hemosiderin staining (brown yellow) 12/01/20 0849   Edges Attached edges 12/01/20 0849   Wound Thickness Description Full thickness 12/01/20 0849   Number of days: 3           Problem List  Date Reviewed: 9/4/2018          Codes Class Noted    Acute pulmonary embolism ([facility] ICD-10-CM: I26.99  ICD-9-CM: 415.19  9/18/2018        Acute respiratory failure with hypoxemia (HCC) ICD-10-CM: J96.01  ICD-9-CM: 518.81  9/18/2018        Arthritis of right hip ICD-10-CM: M16.11  ICD-9-CM: 716.95  9/4/2018        CKD (chronic kidney disease) stage 3, GFR 30-59 ml/min ICD-10-CM: N18.30  ICD-9-CM: 585.3  8/21/2018        S/P laparoscopic cholecystectomy ICD-10-CM: Z90.49  ICD-9-CM: V45.89  8/3/2015        Cholecystitis, acute ICD-10-CM: K81.0  ICD-9-CM: 575.0  7/23/2015        Arthritis of right shoulder region ICD-10-CM: M19.011  ICD-9-CM: 716.91  4/6/2015        Status post shoulder replacement ICD-10-CM: T90.100  ICD-9-CM: V43.61  4/6/2015        Osteoarthritis of right knee ICD-10-CM: M17.11  ICD-9-CM: 715.96  2/21/2012        Total knee replacement status ICD-10-CM: Q00.541  ICD-9-CM: V43.65  2/21/2012                    Assessment/Plan:     Will start dressings with hydrafera and unna for mild compression and protection.  Keep elevated whenever possible. Recheck in one week.      Signed By: Celestino Kasper MD

## 2020-12-11 ENCOUNTER — APPOINTMENT (OUTPATIENT)
Dept: GENERAL RADIOLOGY | Age: 80
End: 2020-12-11
Attending: EMERGENCY MEDICINE
Payer: MEDICARE

## 2020-12-11 ENCOUNTER — HOSPITAL ENCOUNTER (EMERGENCY)
Age: 80
Discharge: HOME | End: 2020-12-11
Attending: EMERGENCY MEDICINE
Payer: MEDICARE

## 2020-12-11 VITALS
OXYGEN SATURATION: 100 % | BODY MASS INDEX: 27.66 KG/M2 | WEIGHT: 162 LBS | TEMPERATURE: 98.2 F | RESPIRATION RATE: 16 BRPM | SYSTOLIC BLOOD PRESSURE: 146 MMHG | DIASTOLIC BLOOD PRESSURE: 85 MMHG | HEART RATE: 77 BPM | HEIGHT: 64 IN

## 2020-12-11 DIAGNOSIS — Z96.611 INSTABILITY OF REVERSE TOTAL RIGHT SHOULDER ARTHROPLASTY (HCC): Primary | ICD-10-CM

## 2020-12-11 DIAGNOSIS — T84.028A INSTABILITY OF REVERSE TOTAL RIGHT SHOULDER ARTHROPLASTY (HCC): Primary | ICD-10-CM

## 2020-12-11 PROCEDURE — 73030 X-RAY EXAM OF SHOULDER: CPT

## 2020-12-11 PROCEDURE — 75810000301 HC ER LEVEL 1 CLOSED TREATMNT FRACTURE/DISLOCATION

## 2020-12-11 PROCEDURE — 99283 EMERGENCY DEPT VISIT LOW MDM: CPT

## 2020-12-11 NOTE — PROGRESS NOTES
Wound Center Progress Note    Patient: Rebecca Ramirez MRN: [id number]  SSN: [id number]    YOB: 1940  Age: 78 y.o. Sex: male      Subjective:     Chief Complaint:  LLE wound      History of Present Illness:       Wound Caused By: Trauma  Associated Signs and Symptoms: pain, drainage  Timing: Since Nov 2020  Quality: wound  Severity: Full thickness    Has been keeping simple cover on wound since occurred. No previous trauma. No previous difficulty with wounds. On Xarelto. 12/4/2020 Tolerating dressings. No new pain or other issues. No further trauma.        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    Unspecified adverse effect of anesthesia     wife states slow to wake      Past Surgical History:   Procedure Laterality Date    HX APPENDECTOMY      HX CHOLECYSTECTOMY  2015    HX GI  2010    gastrectomy 2/8/10-has 30% stomach left    HX KNEE ARTHROSCOPY Right     HX OTHER SURGICAL      attempted tracheal dilation- stopped due to inflammation    HX SHOULDER ARTHROSCOPY Right 2015    TOTAL HIP ARTHROPLASTY Left 2004    TOTAL KNEE ARTHROPLASTY Right 2012     Family History   Problem Relation Age of Onset    Kidney Disease Mother     Dementia Father     Alcohol abuse Brother     Arthritis-osteo Brother       Social History     Tobacco Use    Smoking status: Former Smoker     Packs/day: 0.50     Years: 5.00     Pack years: 2.50    Smokeless tobacco: Never Used    Tobacco comment: quit age 20   Substance Use Topics    Alcohol use: Yes     Comment: 3 drinks per night; wine and liquor       Prior to Admission medications    Medication Sig Start Date End Date Taking? Authorizing Provider   rivaroxaban (Xarelto) 20 mg tab tablet Take 20 mg by mouth daily. Provider, Historical   tamsulosin (FLOMAX) 0.4 mg capsule Take 0.4 mg by mouth daily. Sandra Ken MD   traMADol [employer]) 50 mg tablet Take 1 Tab by mouth every eight (8) hours as needed. Max Daily Amount: 150 mg. 9/19/18   Rosa Palacio MD   traMADol [employer]) 50 mg tablet Take 50 mg by mouth every six (6) hours as needed for Pain. Provider, Historical   aspirin delayed-release 81 mg tablet Take 1 Tab by mouth every twelve (12) hours every twelve (12) hours. 9/5/18   Monica Martínez PA   sucralfate (CARAFATE) 1 gram tablet Take 1 g by mouth three (3) times daily. Take / use AM day of surgery  per anesthesia protocols. Provider, Historical   omeprazole (PRILOSEC) 20 mg capsule Take 20 mg by mouth Daily (before breakfast). Take DOS per anesthesia protocol. Indications: gastroesophageal reflux disease    Provider, Historical   zolpidem (AMBIEN) 10 mg tablet Take 10 mg by mouth nightly as needed for Sleep. Indications: SLEEP-ONSET INSOMNIA    Provider, Historical   cyanocobalamin (VITAMIN B-12) 1,000 mcg/mL injection 1,000 mcg by IntraMUSCular route every twenty-eight (28) days. Provider, Historical   simvastatin (ZOCOR) 40 mg tablet Take 40 mg by mouth nightly. 3/10/10   Provider, Historical   cetirizine (ZYRTEC) 10 mg tablet Take 10 mg by mouth nightly. Indications: ALLERGIC RHINITIS 2/2/10   Provider, Historical   fluticasone (FLONASE) 50 mcg/Actuation nasal spray 2 Sprays by Nasal route nightly. Indications: ALLERGIC RHINITIS    Provider, Historical     Allergies   Allergen Reactions    Morphine Nausea and Vomiting    Other Medication Nausea and Vomiting     Dust and pollen causes runny nose.   Mollusks/clams- N/V    Shellfish Derived Nausea and Vomiting        Review of Systems:  CONSTITUTIONAL: No fever, chills  HEAD: No headache  EYES: No visual loss  ENT: No hearing loss  SKIN: No rash  CARDIOVASCULAR: No chest pain  RESPIRATORY: No shortness of breath  GASTROINTESTINAL: No nausea, vomiting  GENITOURINARY: No excessive urination  NEUROLOGICAL: + weakness  MUSCULOSKELETAL: No muscle pain. No neck pain  HEMATOLOGIC: No easy bleeding  LYMPHATICS: No lymphedema. PSYCHIATRIC: No current depression  ENDOCRINOLOGIC: No high sugars  ALLERGIES: No history of asthma, hives, eczema or rhinitis. No results found for: HBA1C, AIV2RXSC, HGBE8, BRJ7BKVU, HMU5VJKC, UVA3TPDF     Immunization History   Administered Date(s) Administered    TB Skin Test (PPD) Intradermal 09/04/2018       Body mass index is 26.79 kg/m². Current medications:  Current Outpatient Medications   Medication Sig Dispense Refill    rivaroxaban (Xarelto) 20 mg tab tablet Take 20 mg by mouth daily.  tamsulosin (FLOMAX) 0.4 mg capsule Take 0.4 mg by mouth daily.  traMADol (ULTRAM) 50 mg tablet Take 1 Tab by mouth every eight (8) hours as needed. Max Daily Amount: 150 mg. 40 Tab 1    traMADol (ULTRAM) 50 mg tablet Take 50 mg by mouth every six (6) hours as needed for Pain.  aspirin delayed-release 81 mg tablet Take 1 Tab by mouth every twelve (12) hours every twelve (12) hours. 120 Tab 0    sucralfate (CARAFATE) 1 gram tablet Take 1 g by mouth three (3) times daily. Take / use AM day of surgery  per anesthesia protocols.  omeprazole (PRILOSEC) 20 mg capsule Take 20 mg by mouth Daily (before breakfast). Take DOS per anesthesia protocol. Indications: gastroesophageal reflux disease      zolpidem (AMBIEN) 10 mg tablet Take 10 mg by mouth nightly as needed for Sleep. Indications: SLEEP-ONSET INSOMNIA      cyanocobalamin (VITAMIN B-12) 1,000 mcg/mL injection 1,000 mcg by IntraMUSCular route every twenty-eight (28) days.  simvastatin (ZOCOR) 40 mg tablet Take 40 mg by mouth nightly.       cetirizine (ZYRTEC) 10 mg tablet Take 10 mg by mouth nightly. Indications: ALLERGIC RHINITIS      fluticasone (FLONASE) 50 mcg/Actuation nasal spray 2 Sprays by Nasal route nightly. Indications: ALLERGIC RHINITIS           Objective:     Physical Exam:     Visit Vitals  Temp (!) 95.9 °F (35.5 °C)   Ht 5' 6\" (1.676 m)   Wt 75.3 kg (166 lb)   BMI 26.79 kg/m²       General: thin elderly lady  Psych: cooperative. Pleasant  Neuro: alert and oriented to person/place/situation. Otherwise nonfocal.  Derm: Normal turgor for age, dry skin  HEENT: Normocephalic, atraumatic. EOMI. Neck: Normal range of motion. Chest: Respirations nonlabored  Cardio: RRR  Abdomen: Soft, nondistended  Lower extremities:   No hemosiderrosis  Some large and small vessel varicosities  Capillary refill <3 sec    Right  1+ DP/PT    Left  1+ DP/PT                Ulcer Description:   Wound Closed incision/surgical site Knee Right (Active)   Number of days: 3216       Wound Incision Knee Right (Active)   Number of days: 3216       Wound Shoulder Right (Active)   Number of days: 2076       Wound Abdomen (Active)   Number of days: 1967       Wound Hip Right (Active)   Number of days: 829       Wound Leg lower Left;Medial 11/27/20 (Active)   Wound Image   12/01/20 0849   Wound Etiology Traumatic 12/04/20 0947   Dressing Status Old drainage noted 12/04/20 0947   Cleansed Cleansed with saline 12/04/20 0947   Wound Length (cm) 2.1 cm 12/04/20 0947   Wound Width (cm) 1.9 cm 12/04/20 0947   Wound Depth (cm) 0.2 cm 12/04/20 0947   Wound Surface Area (cm^2) 3.99 cm^2 12/04/20 0947   Change in Wound Size % 5 12/04/20 0947   Wound Volume (cm^3) 0.8 cm^3 12/04/20 0947   Wound Healing % 37 12/04/20 0947   Wound Assessment Granulation tissue 12/04/20 0947   Drainage Amount Small 12/04/20 0947   Drainage Description Serosanguinous 12/04/20 0947   Wound Odor None 12/04/20 0947   [place]-Wound/Incision Assessment Intact; Hemosiderin staining (brown yellow) 12/01/20 0884   Edges Attached edges 12/01/20 3211 Wound Thickness Description Full thickness 12/01/20 0849   Number of days: 14       Wound Leg Lower; Anterior 12/01/20 (Active)   Wound Image   12/04/20 0947   Wound Etiology Venous 12/04/20 0947   Cleansed Cleansed with saline 12/04/20 0947   Wound Length (cm) 0.7 cm 12/04/20 0947   Wound Width (cm) 0.6 cm 12/04/20 0947   Wound Depth (cm) 0.1 cm 12/04/20 0947   Wound Surface Area (cm^2) 0.42 cm^2 12/04/20 0947   Change in Wound Size % 53.33 12/04/20 0947   Wound Volume (cm^3) 0.04 cm^3 12/04/20 0947   Wound Healing % 56 12/04/20 0947   Wound Assessment Granulation tissue 12/04/20 0947   Drainage Amount Small 12/04/20 0947   Drainage Description Sanguineous 12/04/20 0947   Wound Odor None 12/04/20 0947   [place]-Wound/Incision Assessment Hemosiderin staining (brown yellow) 12/04/20 0947   Edges Attached edges 12/01/20 0849   Wound Thickness Description Full thickness 12/04/20 0947   Number of days: 10           Problem List  Date Reviewed: 9/4/2018          Codes Class Noted    Acute pulmonary embolism ([facility] ICD-10-CM: I26.99  ICD-9-CM: 415.19  9/18/2018        Acute respiratory failure with hypoxemia [facility]) ICD-10-CM: J96.01  ICD-9-CM: 518.81  9/18/2018        Arthritis of right hip ICD-10-CM: M16.11  ICD-9-CM: 716.95  9/4/2018        CKD (chronic kidney disease) stage 3, GFR 30-59 ml/min ICD-10-CM: N18.30  ICD-9-CM: 585.3  8/21/2018        S/P laparoscopic cholecystectomy ICD-10-CM: Z90.49  ICD-9-CM: V45.89  8/3/2015        Cholecystitis, acute ICD-10-CM: K81.0  ICD-9-CM: 575.0  7/23/2015        Arthritis of right shoulder region ICD-10-CM: M19.011  ICD-9-CM: 716.91  4/6/2015        Status post shoulder replacement ICD-10-CM: A52.576  ICD-9-CM: V43.61  4/6/2015        Osteoarthritis of right knee ICD-10-CM: M17.11  ICD-9-CM: 715.96  2/21/2012        Total knee replacement status ICD-10-CM: Z96.659  ICD-9-CM: V43.65  2/21/2012                    Assessment/Plan:     Good progress in size and overall quality of wound bed.   Continue hydrafera and unna for mild compression and protection. Keep elevated whenever possible.        Signed By: Stephen Casarez MD

## 2020-12-11 NOTE — ED NOTES
I have reviewed discharge instructions with the patient. The patient verbalized understanding. Patient left ED via Discharge Method: ambulatory to Home with spouse. Opportunity for questions and clarification provided. Patient given 0 scripts. Follow up with ortho discussed.

## 2020-12-11 NOTE — DISCHARGE INSTRUCTIONS
Follow up as per Dr Etienne Later sling   Follow Dr Andrea Ritchie instructions  Cool pack to area if helps  OTC for pain

## 2020-12-11 NOTE — CONSULTS
Consult    Patient: Britney Clifton MRN: [id number]  SSN: [id number]    YOB: 1940  Age: 78 y.o. Sex: male      Subjective:      Britney Clifton is a 78 y.o. male who is being seen for right shoulder reverse total shoulder instability. Patient had his shoulder placed over 4 years ago. He was last seen in the office in 2016. He did have one episode postoperatively of instability which was closed reduced and treated in a sling. He states he has not had any more instability since that time. He has had no new pain. He states he was performing normal activities today when he reached out and awkwardly felt pain and a subluxation in his shoulder. He came to the ER where x-rays were obtained and his shoulder appeared to be anteriorly superiorly dislocated. Oliva Vargas our nurse practitioner called me and informed me the patient was in the ER. I was here already for other work-related duties and evaluate the patient while here. No further abnormalities noted other than patient stated he had difficulty moving his hand. .    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    Unspecified adverse effect of anesthesia     wife states slow to wake     Past Surgical History:   Procedure Laterality Date    HX APPENDECTOMY      HX CHOLECYSTECTOMY  2015    HX GI  2010    gastrectomy 2/8/10-has 30% stomach left    HX KNEE ARTHROSCOPY Right     HX OTHER SURGICAL      attempted tracheal dilation- stopped due to inflammation    HX SHOULDER ARTHROSCOPY Right 2015    TOTAL HIP ARTHROPLASTY Left 2004  TOTAL KNEE ARTHROPLASTY Right 2012      Family History   Problem Relation Age of Onset    Kidney Disease Mother     Dementia Father     Alcohol abuse Brother     Arthritis-osteo Brother      Social History     Tobacco Use    Smoking status: Former Smoker     Packs/day: 0.50     Years: 5.00     Pack years: 2.50    Smokeless tobacco: Never Used    Tobacco comment: quit age 20   Substance Use Topics    Alcohol use: Yes     Comment: 3 drinks per night; wine and liquor      Current Outpatient Medications   Medication Sig Dispense Refill    rivaroxaban (Xarelto) 20 mg tab tablet Take 20 mg by mouth daily.  tamsulosin (FLOMAX) 0.4 mg capsule Take 0.4 mg by mouth daily.  traMADol (ULTRAM) 50 mg tablet Take 1 Tab by mouth every eight (8) hours as needed. Max Daily Amount: 150 mg. 40 Tab 1    traMADol (ULTRAM) 50 mg tablet Take 50 mg by mouth every six (6) hours as needed for Pain.  aspirin delayed-release 81 mg tablet Take 1 Tab by mouth every twelve (12) hours every twelve (12) hours. 120 Tab 0    sucralfate (CARAFATE) 1 gram tablet Take 1 g by mouth three (3) times daily. Take / use AM day of surgery  per anesthesia protocols.  omeprazole (PRILOSEC) 20 mg capsule Take 20 mg by mouth Daily (before breakfast). Take DOS per anesthesia protocol. Indications: gastroesophageal reflux disease      zolpidem (AMBIEN) 10 mg tablet Take 10 mg by mouth nightly as needed for Sleep. Indications: SLEEP-ONSET INSOMNIA      cyanocobalamin (VITAMIN B-12) 1,000 mcg/mL injection 1,000 mcg by IntraMUSCular route every twenty-eight (28) days.  simvastatin (ZOCOR) 40 mg tablet Take 40 mg by mouth nightly.  cetirizine (ZYRTEC) 10 mg tablet Take 10 mg by mouth nightly. Indications: ALLERGIC RHINITIS      fluticasone (FLONASE) 50 mcg/Actuation nasal spray 2 Sprays by Nasal route nightly.  Indications: ALLERGIC RHINITIS          Allergies   Allergen Reactions    Morphine Nausea and Vomiting    Other Medication Nausea and Vomiting     Dust and pollen causes runny nose. Mollusks/clams- N/V    Shellfish Derived Nausea and Vomiting       Review of Systems:  Pertinent items are noted in the History of Present Illness. Objective:     Vitals:    12/11/20 1201 12/11/20 1400   BP: (!) 146/85    Pulse: 83 77   Resp: 14 16   Temp: 98.2 °F (36.8 °C)    SpO2: 99% 100%   Weight: 73.5 kg (162 lb)    Height: 5' 4\" (1.626 m)         Physical Exam:  GENERAL: alert, cooperative, no distress, appears stated age, NCAT  EYE: EOMI, PERRLA  LUNG: Normal respirations with no increased effort,   HEART: regular rate and rhythm, Normal pulses on extremity exam.   ABDOMEN: normal findings: soft, non-tender  EXTREMITIES:  extremities normal, atraumatic, no cyanosis or edema. Patient's right arm was palpated. He had normal pulses in the radial artery. Good fire AIN, PIN and intrinsics but states he felt a little numb in his hand to light touch. Had difficulty with internal extra rotation of the shoulder. There was a palpable prominence along the anterior superior aspect of the shoulder just lateral to his coracoid. Incision site appeared normal no signs of erythema. Had normal elbow range of motion and wrist range of motion. LABORATORY DATA                      Lab Results   Component Value Date/Time    Culture result:  08/20/2018 01:05 PM     SA target not detected. A MRSA NEGATIVE, SA NEGATIVE test result does not preclude MRSA or SA nasal colonization. Culture result: NO GROWTH 2 DAYS 01/13/2017 10:50 AM    Culture result:  03/30/2015 09:35 AM     MRSA target DNA not detected, SA target DNA detected. A MRSA negative, SA positive test result does not preclude MRSA nasal colonization. Culture result:  02/06/2012 12:35 PM     MRSA target DNA not detected, SA target DNA detected. A MRSA negative, SA positive test result does not preclude MRSA nasal colonization.        IMAGING   A right shoulder x-ray was obtained through the ER showing instability of the right reverse total shoulder. The humeral component appeared to be anteriorly superiorly escape. Postreduction films also obtained showing appropriate reduction of right reverse total shoulder with no signs of fracture. THREE-VIEW RIGHT SHOULDER:     CLINICAL HISTORY:  Sudden onset of right arm pain this morning with a history of  surgery 4 years ago.     COMPARISON:  April 6, 2015.     FINDINGS: There is anterior dislocation of the reverse total shoulder  prosthesis. No definite fracture, malalignment, or merari bone destruction is  evident. No persistent radiopaque foreign body is seen.     IMPRESSION  IMPRESSION:  ANTERIOR DISLOCATION OF THE REVERSE TOTAL SHOULDER PROSTHESIS WITH  NO DEFINITE ASSOCIATED BONY FRACTURE IDENTIFIED. 3 VIEW POSTREDUCTION RIGHT SHOULDER:     CLINICAL HISTORY:  Follow-up reduction of anterior dislocation.     COMPARISON:  Injury images today and radiographs of April 6, 2015.     FINDINGS:  No definite fracture, malalignment, or merari bone destruction is  evident. Components of the reverse total shoulder arthroplasty are now in  expected positions status post interval reduction of the previous anterior  dislocation.     IMPRESSION  IMPRESSION:  SUCCESSFUL REDUCTION OF PRIOR ANTERIOR DISLOCATION WITH NO DEFINITE  ASSOCIATED FRACTURE. IMPRESSION/ASSESSMENT     Right reverse total shoulder instability  Plan:     I discussed with the patient and his wife sitting in the hallway of the ER the findings. We reviewed his previous history. Previously he was able to be reduced for years ago after checking the notes while laying just in a prone position and pulling some mild traction. He agreed to try this maneuver. Under his own power he was able to lay down on the stretcher prone. His arm was then taken passively into a downward position with gravity.   Manipulation of his scapula and the proximal humerus was performed and the shoulder reduced without complication. He sat up without issue and his shoulder immediately felt much better. He states his hand felt normal again. He was able to passively internally and externally rotate without issue. He was able to abduct actually at least to 60 degrees under his own power before I suggested we placed him in an abduction sling. No complication was noted at the time. I talked with ER physician and post reduction x-rays were obtained. He was placed in an abduction pillow sling which he will remain in until follow-up. The ER doctors follow through with appropriate discharge instructions.     Signed By: Sushil Cullen MD     December 11, 2020

## 2020-12-11 NOTE — ED TRIAGE NOTES
Pt states he had his right shoulder replaced approx 4 years ago. States while he was picking up the newspaper this morning he felt a sudden pain in his upper right arm. Denies shoulder pain.

## 2020-12-11 NOTE — ED PROVIDER NOTES
About 4 years ago had right shoulder replacement by Dr Nichol Enriquez. Today went to  news paper and had pain that continues to upper right humerus. l no distal hand sensory or movement issue. The history is provided by the patient. Arm Pain    This is a new problem. The current episode started 1 to 2 hours ago. The problem occurs constantly. The problem has not changed since onset. The pain is present in the right arm. The quality of the pain is described as aching. The pain is moderate. Pertinent negatives include no tingling and no neck pain. He has tried nothing for the symptoms.         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    Unspecified adverse effect of anesthesia     wife states slow to wake       Past Surgical History:   Procedure Laterality Date    HX APPENDECTOMY      HX CHOLECYSTECTOMY  2015    HX GI  2010    gastrectomy 2/8/10-has 30% stomach left    HX KNEE ARTHROSCOPY Right     HX OTHER SURGICAL      attempted tracheal dilation- stopped due to inflammation    HX SHOULDER ARTHROSCOPY Right 2015    TOTAL HIP ARTHROPLASTY Left 2004    TOTAL KNEE ARTHROPLASTY Right 2012         Family History:   Problem Relation Age of Onset    Kidney Disease Mother     Dementia Father     Alcohol abuse Brother     Arthritis-osteo Brother        Social History     Socioeconomic History    Marital status:      Spouse name: Not on file    Number of children: Not on file    Years of education: Not on file    Highest education level: Not on file   Occupational History    Not on file   Social Needs    Financial resource strain: Not on file    Food insecurity     Worry: Not on file     Inability: Not on file    Transportation needs     Medical: Not on file     Non-medical: Not on file   Tobacco Use    Smoking status: Former Smoker     Packs/day: 0.50     Years: 5.00     Pack years: 2.50    Smokeless tobacco: Never Used    Tobacco comment: quit age 20   Substance and Sexual Activity    Alcohol use: Yes     Comment: 3 drinks per night; wine and liquor    Drug use: No    Sexual activity: Not on file   Lifestyle    Physical activity     Days per week: Not on file     Minutes per session: Not on file    Stress: Not on file   Relationships    Social connections     Talks on phone: Not on file     Gets together: Not on file     Attends Gnosticist service: Not on file     Active member of club or organization: Not on file     Attends meetings of clubs or organizations: Not on file     Relationship status: Not on file    Intimate partner violence     Fear of current or ex partner: Not on file     Emotionally abused: Not on file     Physically abused: Not on file     Forced sexual activity: Not on file   Other Topics Concern    Not on file   Social History Narrative    Not on file         ALLERGIES: Morphine; Other medication; and Shellfish derived    Review of Systems   Constitutional: Negative for chills and fever. HENT: Negative. Respiratory: Negative. Cardiovascular: Negative. Genitourinary: Negative. Musculoskeletal: Negative for neck pain. Neurological: Negative for tingling. All other systems reviewed and are negative. Vitals:    12/11/20 1201   BP: (!) 146/85   Pulse: 83   Resp: 14   Temp: 98.2 °F (36.8 °C)   SpO2: 99%   Weight: 73.5 kg (162 lb)   Height: 5' 4\" (1.626 m)            Physical Exam  Vitals signs and nursing note reviewed. Constitutional:       General: He is not in acute distress. Appearance: He is well-developed. HENT:      Head: Atraumatic.    Eyes: General: No scleral icterus. Neck:      Musculoskeletal: Neck supple. Cardiovascular:      Rate and Rhythm: Normal rate. Pulmonary:      Effort: Pulmonary effort is normal. No respiratory distress. Abdominal:      General: Abdomen is flat. Palpations: Abdomen is soft. Musculoskeletal:         General: Tenderness and signs of injury present. Right shoulder: He exhibits tenderness and deformity. He exhibits normal pulse and normal strength. Cervical back: Normal.   Skin:     General: Skin is warm and dry. Neurological:      General: No focal deficit present. Mental Status: He is disoriented. Psychiatric:         Thought Content: Thought content normal.          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   Postreduction films are confirmatory       Amount and/or Complexity of Data Reviewed  Tests in the radiology section of CPT®: reviewed and ordered  Discuss the patient with other providers: yes    Risk of Complications, Morbidity, and/or Mortality  Presenting problems: moderate  Diagnostic procedures: low  Management options: moderate    Patient Progress  Patient progress: stable         Procedures

## 2020-12-18 ENCOUNTER — HOSPITAL ENCOUNTER (OUTPATIENT)
Dept: WOUND CARE | Age: 80
Discharge: HOME | End: 2020-12-18
Attending: SURGERY
Payer: MEDICARE

## 2020-12-18 ENCOUNTER — TRANSCRIBE ORDER (OUTPATIENT)
Dept: SCHEDULING | Age: 80
End: 2020-12-18

## 2020-12-18 VITALS
HEIGHT: 66 IN | TEMPERATURE: 96.4 F | DIASTOLIC BLOOD PRESSURE: 75 MMHG | BODY MASS INDEX: 27 KG/M2 | WEIGHT: 168 LBS | SYSTOLIC BLOOD PRESSURE: 138 MMHG | HEART RATE: 83 BPM

## 2020-12-18 DIAGNOSIS — T84.50XA INFECTION AND INFLAMMATORY REACTION DUE TO UNSPECIFIED INTERNAL JOINT PROSTHESIS, INITIAL ENCOUNTER (HCC): Primary | ICD-10-CM

## 2020-12-18 DIAGNOSIS — Z96.611 S/P REVERSE TOTAL SHOULDER ARTHROPLASTY, RIGHT: ICD-10-CM

## 2020-12-18 PROCEDURE — 99213 OFFICE O/P EST LOW 20 MIN: CPT

## 2020-12-18 NOTE — WOUND CARE
12/18/20 0298 Wound Leg Lower; Anterior 12/01/20 Date First Assessed/Time First Assessed: 12/01/20 0902   Present on Hospital Admission: Yes  Wound Approximate Age at First Assessment (Weeks): 24 weeks  Primary Wound Type: Venous Ulcer  Location: Leg  Wound Location Orientation: Lower; Anterior  Date. .. Wound Image Wound Etiology Venous Dressing Status Old drainage noted;New dressing applied Cleansed Cleansed with saline Dressing/Treatment  
(hydrofera ready, covrsite) Wound Length (cm) 0.9 cm Wound Width (cm) 1.1 cm Wound Depth (cm) 0.1 cm Wound Surface Area (cm^2) 0.99 cm^2 Change in Wound Size % -10 Wound Volume (cm^3) 0.1 cm^3 Wound Healing % -11 Wound Assessment Granulation tissue Drainage Amount Moderate Drainage Description Serosanguinous Wound Odor None Wound Thickness Description Full thickness Wound Leg lower Left;Medial 11/27/20 Date First Assessed/Time First Assessed: 11/27/20 0828   Present on Hospital Admission: Yes  Wound Approximate Age at First Assessment (Weeks): 3 weeks  Primary Wound Type: Venous  Location: Leg lower  Wound Location Orientation: Left;Medial  Date of . .. Wound Etiology Venous Dressing Status New dressing applied; Old drainage noted Cleansed Cleansed with saline Dressing/Treatment  
(hydrofera ready, covrsite) Wound Length (cm) 1.2 cm Wound Width (cm) 0.9 cm Wound Depth (cm) 0.2 cm Wound Surface Area (cm^2) 1.08 cm^2 Change in Wound Size % 74.29 Wound Volume (cm^3) 0.22 cm^3 Wound Healing % 83 Wound Assessment Pink/red Drainage Amount Moderate Drainage Description Serosanguinous Wound Odor None Wound Thickness Description Full thickness  
patient is taking xarelto and aspirin Wound mechanically debrided with saline and gauze.

## 2020-12-18 NOTE — WOUND CARE
[address] [place] [address] [address] Phone: [phone] Fax: [phone] Patient: Jossy Gates MRN: [id number]  SSN: [id number] YOB: 1940  Age: 78 y.o. Sex: male Return Appointment: 3 weeks with Lynette Cueto MD 
 
Instructions: Left medial lower leg Cleanse wound and periwound with wound cleanser or normal saline. Apply collagen with silver to wound bed. Apply desitin ointment to irritated skin around wound. Cover with cover bandage. Change dressing three times a week. 
  
Tubigrip to left lower leg. May remove at night when sleeping. 
  
Do not get dressing wet. Should you experience increased redness, swelling, pain, foul odor, size of wound(s), or have a temperature over 101 degrees please contact the [address] [place] [address] Road at [phone] or if after hours contact your primary care physician or go to the hospital emergency department. Signed By: Shanique Esparza RN December 18, 2020

## 2020-12-24 ENCOUNTER — HOSPITAL ENCOUNTER (OUTPATIENT)
Dept: GENERAL RADIOLOGY | Age: 80
Discharge: HOME | End: 2020-12-24
Attending: ORTHOPAEDIC SURGERY
Payer: MEDICARE

## 2020-12-24 VITALS
OXYGEN SATURATION: 100 % | RESPIRATION RATE: 20 BRPM | HEART RATE: 85 BPM | DIASTOLIC BLOOD PRESSURE: 75 MMHG | TEMPERATURE: 97.5 F | SYSTOLIC BLOOD PRESSURE: 135 MMHG

## 2020-12-24 DIAGNOSIS — T84.50XA INFECTION AND INFLAMMATORY REACTION DUE TO UNSPECIFIED INTERNAL JOINT PROSTHESIS, INITIAL ENCOUNTER (HCC): ICD-10-CM

## 2020-12-24 DIAGNOSIS — Z96.611 S/P REVERSE TOTAL SHOULDER ARTHROPLASTY, RIGHT: ICD-10-CM

## 2020-12-24 LAB
APPEARANCE FLD: NORMAL
COLOR FLD: NORMAL
LYMPHOCYTES NFR BRONCH MANUAL: 55 %
MACROPHAGES NFR BRONCH MANUAL: 30 %
NEUTROPHILS NFR BRONCH MANUAL: 15 %
NUC CELL # FLD: 5581 /CU MM
RBC # FLD: NORMAL /CU MM
SPECIMEN SOURCE FLD: NORMAL

## 2020-12-24 PROCEDURE — 74011000250 HC RX REV CODE- 250: Performed by: ORTHOPAEDIC SURGERY

## 2020-12-24 PROCEDURE — 87075 CULTR BACTERIA EXCEPT BLOOD: CPT

## 2020-12-24 PROCEDURE — 20610 DRAIN/INJ JOINT/BURSA W/O US: CPT

## 2020-12-24 PROCEDURE — 73060999999 HC MISC LAB CHARGE

## 2020-12-24 PROCEDURE — 89050 BODY FLUID CELL COUNT: CPT

## 2020-12-24 PROCEDURE — 87205 SMEAR GRAM STAIN: CPT

## 2020-12-24 PROCEDURE — 83516 IMMUNOASSAY NONANTIBODY: CPT

## 2020-12-24 RX ORDER — LIDOCAINE HYDROCHLORIDE AND EPINEPHRINE 10; 10 MG/ML; UG/ML
1.5 INJECTION, SOLUTION INFILTRATION; PERINEURAL
Status: COMPLETED | OUTPATIENT
Start: 2020-12-24 | End: 2020-12-24

## 2020-12-24 RX ADMIN — LIDOCAINE HYDROCHLORIDE AND EPINEPHRINE 3 ML: 10; 10 INJECTION, SOLUTION INFILTRATION; PERINEURAL at 11:00

## 2020-12-26 LAB
BACTERIA SPEC CULT: NORMAL
GRAM STN SPEC: NORMAL
GRAM STN SPEC: NORMAL
SERVICE CMNT-IMP: NORMAL

## 2021-01-05 LAB
Lab: NORMAL
REFERENCE LAB,REFLB: NORMAL
TEST DESCRIPTION:,ATST: NORMAL

## 2021-01-08 ENCOUNTER — HOSPITAL ENCOUNTER (OUTPATIENT)
Dept: WOUND CARE | Age: 81
Discharge: HOME | End: 2021-01-08
Attending: SURGERY
Payer: MEDICARE

## 2021-01-08 VITALS
BODY MASS INDEX: 27.48 KG/M2 | SYSTOLIC BLOOD PRESSURE: 119 MMHG | TEMPERATURE: 96.9 F | WEIGHT: 171 LBS | DIASTOLIC BLOOD PRESSURE: 66 MMHG | HEIGHT: 66 IN | HEART RATE: 87 BPM

## 2021-01-08 PROCEDURE — 17250 CHEM CAUT OF GRANLTJ TISSUE: CPT

## 2021-01-08 RX ORDER — SILVER NITRATE 38.21; 12.74 MG/1; MG/1
1 STICK TOPICAL ONCE
Status: COMPLETED | OUTPATIENT
Start: 2021-01-08 | End: 2021-01-08

## 2021-01-08 RX ADMIN — SILVER NITRATE 1 APPLICATOR: 38.21; 12.74 STICK TOPICAL at 11:41

## 2021-01-08 NOTE — DISCHARGE INSTRUCTIONS
[address]  [address], [address]   Phone: [phone]  Fax: [phone]    Patient: Niecy Forman MRN: [id number]  SSN: [id number]    YOB: 1940  Age: 78 y.o. Sex: male       Return Appointment: 2 weeks with Clifton Guzman MD    Instructions: Left anterior leg  Cleanse wound and periwound with wound cleanser or normal saline. Xeroform- apply to wound bed. Secure with covrsite. Change 3x weekly or as needed. Tubigrip to left leg. Silver nitrate to wound at today's visit. Follow up in 2 weeks. If no improvement at next visit, will consider biopsy of wound    Ernie samples given at today's visit. Should you experience increased redness, swelling, pain, foul odor, size of wound(s), or have a temperature over 101 degrees please contact the [address] [place] [address] Road at [phone] or if after hours contact your primary care physician or go to the hospital emergency department.     Signed By: Cherelle Finley     January 8, 2021

## 2021-01-08 NOTE — WOUND CARE
01/08/21 2047 Wound Leg lower Left;Medial 11/27/20 Date First Assessed/Time First Assessed: 11/27/20 0828   Present on Hospital Admission: Yes  Wound Approximate Age at First Assessment (Weeks): 3 weeks  Primary Wound Type: Venous  Location: Leg lower  Wound Location Orientation: Left;Medial  Date of . .. Wound Image Wound Etiology Venous Dressing Status Clean;Dry; Intact Cleansed Cleansed with saline Dressing/Treatment  
(collagen ag) Wound Length (cm) 0 cm Wound Width (cm) 0 cm Wound Depth (cm) 0 cm Wound Surface Area (cm^2) 0 cm^2 Change in Wound Size % 100 Wound Volume (cm^3) 0 cm^3 Wound Healing % 100 Wound Assessment Epithelialization Drainage Amount None Wound Odor None Smith[place]-Wound/Incision Assessment Fragile Wound Thickness Description Full thickness Wound Leg Lower; Anterior 12/01/20 Date First Assessed/Time First Assessed: 12/01/20 0902   Present on Hospital Admission: Yes  Wound Approximate Age at First Assessment (Weeks): 24 weeks  Primary Wound Type: Venous Ulcer  Location: Leg  Wound Location Orientation: Lower; Anterior  Date. .. Wound Image Wound Etiology Venous Dressing Status Clean;Dry; Intact Cleansed Cleansed with saline Dressing/Treatment  
(collagen ag) Wound Length (cm) 0.6 cm Wound Width (cm) 0.7 cm Wound Depth (cm) 0.1 cm Wound Surface Area (cm^2) 0.42 cm^2 Change in Wound Size % 53.33 Wound Volume (cm^3) 0.04 cm^3 Wound Healing % 56 Wound Assessment Hyper granulation tissue Drainage Amount Moderate Drainage Description Serosanguinous Wound Odor None [place]-Wound/Incision Assessment Hemosiderin staining (brown yellow); Fragile Wound Thickness Description Full thickness Patient is on an anti coagulant daily asa and xarelto

## 2021-01-08 NOTE — WOUND CARE
[address] [place] [address] [address] Phone: [phone] Fax: [phone] Patient: King Gali MRN: [id number]  SSN: [id number] YOB: 1940  Age: 78 y.o. Sex: male Return Appointment: 2 weeks with Neelam Connelly MD 
 
Instructions: Left anterior leg Cleanse wound and periwound with wound cleanser or normal saline. Xeroform- apply to wound bed. Secure with covrsite. Change 3x weekly or as needed. Tubigrip to left leg. Silver nitrate to wound at today's visit. Follow up in 2 weeks. If no improvement at next visit, will consider biopsy of wound Ernie samples given at today's visit. Should you experience increased redness, swelling, pain, foul odor, size of wound(s), or have a temperature over 101 degrees please contact the [address] [address] [address] at [phone] or if after hours contact your primary care physician or go to the hospital emergency department. Signed By: Tereza Barron   
 January 8, 2021

## 2021-01-13 NOTE — PROGRESS NOTES
Wound Center Progress Note    Patient: Jodie Shaver MRN: [id number]  SSN: [id number]    YOB: 1940  Age: 78 y.o. Sex: male      Subjective:     Chief Complaint:  LLE wound      History of Present Illness:       Wound Caused By: Trauma  Associated Signs and Symptoms: pain, drainage  Timing: Since Nov 2020  Quality: wound  Severity: Full thickness    Has been keeping simple cover on wound since occurred. No previous trauma. No previous difficulty with wounds. On Xarelto. 12/4/2020 Tolerating dressings. No new pain or other issues. No further trauma. 12/18/2020 Continues to do well with dressings. Performing dressings himself. No significant bleeding or bruising. No redness or increased pain. 1/8/2020 No new issues. Tolerating dressings. No new bleeding. No further trauma.        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    Unspecified adverse effect of anesthesia     wife states slow to wake      Past Surgical History:   Procedure Laterality Date    HX APPENDECTOMY      HX CHOLECYSTECTOMY  2015    HX GI  2010    gastrectomy 2/8/10-has 30% stomach left    HX KNEE ARTHROSCOPY Right     HX OTHER SURGICAL      attempted tracheal dilation- stopped due to inflammation    HX SHOULDER ARTHROSCOPY Right 2015    MS TOTAL HIP ARTHROPLASTY Left 2004    MS TOTAL KNEE ARTHROPLASTY Right 2012     Family History   Problem Relation Age of Onset    Kidney Disease Mother     Dementia Father     Alcohol abuse Brother     Arthritis-osteo Brother       Social History     Tobacco Use    Smoking status: Former Smoker     Packs/day: 0.50     Years: 5.00     Pack years: 2.50    Smokeless tobacco: Never Used    Tobacco comment: quit age 20   Substance Use Topics    Alcohol use: Yes     Comment: 3 drinks per night; wine and liquor       Prior to Admission medications    Medication Sig Start Date End Date Taking? Authorizing Provider   rivaroxaban (Xarelto) 20 mg tab tablet Take 20 mg by mouth daily. Provider, Historical   tamsulosin (FLOMAX) 0.4 mg capsule Take 0.4 mg by mouth daily. Alberta Turner MD   traMADol Dorann [place]) 50 mg tablet Take 1 Tab by mouth every eight (8) hours as needed. Max Daily Amount: 150 mg. 9/19/18   Sophia Neri MD   traMADol Dorann Blaine) 50 mg tablet Take 50 mg by mouth every six (6) hours as needed for Pain. Provider, Historical   aspirin delayed-release 81 mg tablet Take 1 Tab by mouth every twelve (12) hours every twelve (12) hours. 9/5/18   Nikki Martínez PA   sucralfate (CARAFATE) 1 gram tablet Take 1 g by mouth three (3) times daily. Take / use AM day of surgery  per anesthesia protocols. Provider, Historical   omeprazole (PRILOSEC) 20 mg capsule Take 20 mg by mouth Daily (before breakfast). Take DOS per anesthesia protocol. Indications: gastroesophageal reflux disease    Provider, Historical   zolpidem (AMBIEN) 10 mg tablet Take 10 mg by mouth nightly as needed for Sleep. Indications: SLEEP-ONSET INSOMNIA    Provider, Historical   cyanocobalamin (VITAMIN B-12) 1,000 mcg/mL injection 1,000 mcg by IntraMUSCular route every twenty-eight (28) days. Provider, Historical   simvastatin (ZOCOR) 40 mg tablet Take 40 mg by mouth nightly. 3/10/10   Provider, Historical   cetirizine (ZYRTEC) 10 mg tablet Take 10 mg by mouth nightly. Indications: ALLERGIC RHINITIS 2/2/10   Provider, Historical   fluticasone (FLONASE) 50 mcg/Actuation nasal spray 2 Sprays by Nasal route nightly.  Indications: ALLERGIC RHINITIS    Provider, Historical     Allergies   Allergen Reactions    Morphine Nausea and Vomiting    Other Medication Nausea and Vomiting     Dust and pollen causes runny nose. Mollusks/clams- N/V    Shellfish Derived Nausea and Vomiting        Review of Systems:  CONSTITUTIONAL: No fever, chills  HEAD: No headache  EYES: No visual loss  ENT: No hearing loss  SKIN: No rash  CARDIOVASCULAR: No chest pain  RESPIRATORY: No shortness of breath  GASTROINTESTINAL: No nausea, vomiting  GENITOURINARY: No excessive urination  NEUROLOGICAL: + weakness  MUSCULOSKELETAL: No muscle pain. No neck pain  HEMATOLOGIC: No easy bleeding  LYMPHATICS: No lymphedema. PSYCHIATRIC: No current depression  ENDOCRINOLOGIC: No high sugars  ALLERGIES: No history of asthma, hives, eczema or rhinitis. No results found for: HBA1C, TFF3SKXZ, HGBE8, DZT1ZHZA, VFH4XODR, KWY5POZO     Immunization History   Administered Date(s) Administered    TB Skin Test (PPD) Intradermal 09/04/2018       Body mass index is 27.6 kg/m². Current medications:  Current Outpatient Medications   Medication Sig Dispense Refill    rivaroxaban (Xarelto) 20 mg tab tablet Take 20 mg by mouth daily.  tamsulosin (FLOMAX) 0.4 mg capsule Take 0.4 mg by mouth daily.  traMADol (ULTRAM) 50 mg tablet Take 1 Tab by mouth every eight (8) hours as needed. Max Daily Amount: 150 mg. 40 Tab 1    traMADol (ULTRAM) 50 mg tablet Take 50 mg by mouth every six (6) hours as needed for Pain.  aspirin delayed-release 81 mg tablet Take 1 Tab by mouth every twelve (12) hours every twelve (12) hours. 120 Tab 0    sucralfate (CARAFATE) 1 gram tablet Take 1 g by mouth three (3) times daily. Take / use AM day of surgery  per anesthesia protocols.  omeprazole (PRILOSEC) 20 mg capsule Take 20 mg by mouth Daily (before breakfast). Take DOS per anesthesia protocol. Indications: gastroesophageal reflux disease      zolpidem (AMBIEN) 10 mg tablet Take 10 mg by mouth nightly as needed for Sleep.  Indications: SLEEP-ONSET INSOMNIA      cyanocobalamin (VITAMIN B-12) 1,000 mcg/mL injection 1,000 mcg by IntraMUSCular route every twenty-eight (28) days.  simvastatin (ZOCOR) 40 mg tablet Take 40 mg by mouth nightly.  cetirizine (ZYRTEC) 10 mg tablet Take 10 mg by mouth nightly. Indications: ALLERGIC RHINITIS      fluticasone (FLONASE) 50 mcg/Actuation nasal spray 2 Sprays by Nasal route nightly. Indications: ALLERGIC RHINITIS           Objective:     Physical Exam:     Visit Vitals  /66 (BP 1 Location: Left arm, BP Patient Position: Sitting)   Pulse 87   Temp 96.9 °F (36.1 °C)   Ht 5' 6\" (1.676 m)   Wt 77.6 kg (171 lb)   BMI 27.60 kg/m²       General: thin elderly lady  Psych: cooperative. Pleasant  Neuro: alert and oriented to person/place/situation. Otherwise nonfocal.  Derm: Normal turgor for age, dry skin  HEENT: Normocephalic, atraumatic. EOMI. Neck: Normal range of motion. Chest: Respirations nonlabored  Cardio: RRR  Abdomen: Soft, nondistended  Lower extremities:   No hemosiderrosis  Some large and small vessel varicosities  Capillary refill <3 sec    Right  1+ DP/PT    Left  1+ DP/PT                        Ulcer Description:   Wound Leg lower Left;Medial 11/27/20 (Active)   Wound Image   01/08/21 0952   Wound Etiology Venous 01/08/21 0952   Dressing Status Clean;Dry; Intact 01/08/21 0952   Cleansed Cleansed with saline 01/08/21 0952   Wound Length (cm) 0 cm 01/08/21 0952   Wound Width (cm) 0 cm 01/08/21 0952   Wound Depth (cm) 0 cm 01/08/21 0952   Wound Surface Area (cm^2) 0 cm^2 01/08/21 0952   Change in Wound Size % 100 01/08/21 0952   Wound Volume (cm^3) 0 cm^3 01/08/21 0952   Wound Healing % 100 01/08/21 0952   Wound Assessment Epithelialization 01/08/21 0952   Drainage Amount None 01/08/21 0952   Drainage Description Serosanguinous 12/18/20 0903   Wound Odor None 01/08/21 0952   [place]-Wound/Incision Assessment Fragile 01/08/21 0952   Edges Attached edges 12/01/20 0849   Wound Thickness Description Full thickness 01/08/21 2011   Number of days: 47       Wound Leg Lower; Anterior 12/01/20 (Active)   Wound Image   01/08/21 0952   Wound Etiology Venous 01/08/21 0952   Dressing Status Clean;Dry; Intact 01/08/21 0952   Cleansed Cleansed with saline 01/08/21 0952   Wound Length (cm) 0.6 cm 01/08/21 0952   Wound Width (cm) 0.7 cm 01/08/21 0952   Wound Depth (cm) 0.1 cm 01/08/21 0952   Wound Surface Area (cm^2) 0.42 cm^2 01/08/21 0952   Change in Wound Size % 53.33 01/08/21 0952   Wound Volume (cm^3) 0.04 cm^3 01/08/21 0952   Wound Healing % 56 01/08/21 0952   Wound Assessment Hyper granulation tissue 01/08/21 0952   Drainage Amount Moderate 01/08/21 0952   Drainage Description Serosanguinous 01/08/21 0952   Wound Odor None 01/08/21 0952   [place]-Wound/Incision Assessment Hemosiderin staining (brown yellow); Fragile 01/08/21 0952   Edges Attached edges 12/01/20 0849   Wound Thickness Description Full thickness 01/08/21 0952   Number of days: 37       [REMOVED] Read-Only, Retired. ZZZ DO NOT USE OLD WOUND LDA Knee Right (Removed)   Number of days: 7193       [REMOVED] Read-Only, Retired. ZZZ DO NOT USE OLD WOUND LDA Knee Right (Removed)   Number of days: 9200       [REMOVED] Read-Only, Retired. ZZZ DO NOT USE OLD WOUND LDA Shoulder Right (Removed)   Number of days: 2104       [REMOVED] Read-Only, Retired. ZZZ DO NOT USE OLD WOUND LDA Abdomen (Removed)   Number of days: 1995       [REMOVED] Read-Only, Retired.  ZZZ DO NOT USE OLD WOUND LDA Hip Right (Removed)   Number of days: 539           Problem List  Date Reviewed: 9/4/2018          Codes Class Noted    Acute pulmonary embolism ([facility] ICD-10-CM: I26.99  ICD-9-CM: 415.19  9/18/2018        Acute respiratory failure with hypoxemia [facility]) ICD-10-CM: J96.01  ICD-9-CM: 518.81  9/18/2018        Arthritis of right hip ICD-10-CM: M16.11  ICD-9-CM: 716.95  9/4/2018        CKD (chronic kidney disease) stage 3, GFR 30-59 ml/min ICD-10-CM: N18.30  ICD-9-CM: 585.3  8/21/2018        S/P laparoscopic cholecystectomy ICD-10-CM: Z90.49  ICD-9-CM: V45.89  8/3/2015        Cholecystitis, acute ICD-10-CM: K81.0  ICD-9-CM: 575.0  7/23/2015        Arthritis of right shoulder region ICD-10-CM: M19.011  ICD-9-CM: 716.91  4/6/2015        Status post shoulder replacement ICD-10-CM: Q49.454  ICD-9-CM: V43.61  4/6/2015        Osteoarthritis of right knee ICD-10-CM: M17.11  ICD-9-CM: 715.96  2/21/2012        Total knee replacement status ICD-10-CM: W87.156  ICD-9-CM: V43.65  2/21/2012                    Assessment/Plan:     Some HTGT treated today. If fails to resolve will consider biopsy at next. Continue hydrafera and unna for mild compression and protection. Keep elevated whenever possible.        Signed By: Marshall Stiles MD

## 2021-01-13 NOTE — PROGRESS NOTES
Wound Center Progress Note    Patient: Harlan Najjar MRN: [id number]  SSN: [id number]    YOB: 1940  Age: 78 y.o. Sex: male      Subjective:     Chief Complaint:  LLE wound      History of Present Illness:       Wound Caused By: Trauma  Associated Signs and Symptoms: pain, drainage  Timing: Since Nov 2020  Quality: wound  Severity: Full thickness    Has been keeping simple cover on wound since occurred. No previous trauma. No previous difficulty with wounds. On Xarelto. 12/4/2020 Tolerating dressings. No new pain or other issues. No further trauma. 12/18/2020 Continues to do well with dressings. Performing dressings himself. No significant bleeding or bruising. No redness or increased pain.        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    Unspecified adverse effect of anesthesia     wife states slow to wake      Past Surgical History:   Procedure Laterality Date    HX APPENDECTOMY      HX CHOLECYSTECTOMY  2015    HX GI  2010    gastrectomy 2/8/10-has 30% stomach left    HX KNEE ARTHROSCOPY Right     HX OTHER SURGICAL      attempted tracheal dilation- stopped due to inflammation    HX SHOULDER ARTHROSCOPY Right 2015    TX TOTAL HIP ARTHROPLASTY Left 2004    TX TOTAL KNEE ARTHROPLASTY Right 2012     Family History   Problem Relation Age of Onset    Kidney Disease Mother     Dementia Father     Alcohol abuse Brother     Arthritis-osteo Brother       Social History     Tobacco Use    Smoking status: Former Smoker     Packs/day: 0.50     Years: 5.00     Pack years: 2.50    Smokeless tobacco: Never Used    Tobacco comment: quit age 20   Substance Use Topics    Alcohol use: Yes     Comment: 3 drinks per night; wine and liquor       Prior to Admission medications    Medication Sig Start Date End Date Taking? Authorizing Provider   rivaroxaban (Xarelto) 20 mg tab tablet Take 20 mg by mouth daily. Provider, Historical   tamsulosin (FLOMAX) 0.4 mg capsule Take 0.4 mg by mouth daily. Lucía Lai MD   traMADol Twilla Males) 50 mg tablet Take 1 Tab by mouth every eight (8) hours as needed. Max Daily Amount: 150 mg. 9/19/18   Manjula Hansen MD   traMADol Twilla Males) 50 mg tablet Take 50 mg by mouth every six (6) hours as needed for Pain. Provider, Historical   aspirin delayed-release 81 mg tablet Take 1 Tab by mouth every twelve (12) hours every twelve (12) hours. 9/5/18   Rosemarie Martínez PA   sucralfate (CARAFATE) 1 gram tablet Take 1 g by mouth three (3) times daily. Take / use AM day of surgery  per anesthesia protocols. Provider, Historical   omeprazole (PRILOSEC) 20 mg capsule Take 20 mg by mouth Daily (before breakfast). Take DOS per anesthesia protocol. Indications: gastroesophageal reflux disease    Provider, Historical   zolpidem (AMBIEN) 10 mg tablet Take 10 mg by mouth nightly as needed for Sleep. Indications: SLEEP-ONSET INSOMNIA    Provider, Historical   cyanocobalamin (VITAMIN B-12) 1,000 mcg/mL injection 1,000 mcg by IntraMUSCular route every twenty-eight (28) days. Provider, Historical   simvastatin (ZOCOR) 40 mg tablet Take 40 mg by mouth nightly. 3/10/10   Provider, Historical   cetirizine (ZYRTEC) 10 mg tablet Take 10 mg by mouth nightly. Indications: ALLERGIC RHINITIS 2/2/10   Provider, Historical   fluticasone (FLONASE) 50 mcg/Actuation nasal spray 2 Sprays by Nasal route nightly.  Indications: ALLERGIC RHINITIS    Provider, Historical     Allergies   Allergen Reactions    Morphine Nausea and Vomiting    Other Medication Nausea and Vomiting     Dust and pollen causes runny nose. Mollusks/clams- N/V    Shellfish Derived Nausea and Vomiting        Review of Systems:  CONSTITUTIONAL: No fever, chills  HEAD: No headache  EYES: No visual loss  ENT: No hearing loss  SKIN: No rash  CARDIOVASCULAR: No chest pain  RESPIRATORY: No shortness of breath  GASTROINTESTINAL: No nausea, vomiting  GENITOURINARY: No excessive urination  NEUROLOGICAL: + weakness  MUSCULOSKELETAL: No muscle pain. No neck pain  HEMATOLOGIC: No easy bleeding  LYMPHATICS: No lymphedema. PSYCHIATRIC: No current depression  ENDOCRINOLOGIC: No high sugars  ALLERGIES: No history of asthma, hives, eczema or rhinitis. No results found for: HBA1C, YPZ6FKXK, HGBE8, IHE6GEFD, HFX6EWCA, AGS1WUPA     Immunization History   Administered Date(s) Administered    TB Skin Test (PPD) Intradermal 09/04/2018       Body mass index is 27.12 kg/m². Current medications:  Current Outpatient Medications   Medication Sig Dispense Refill    rivaroxaban (Xarelto) 20 mg tab tablet Take 20 mg by mouth daily.  tamsulosin (FLOMAX) 0.4 mg capsule Take 0.4 mg by mouth daily.  traMADol (ULTRAM) 50 mg tablet Take 1 Tab by mouth every eight (8) hours as needed. Max Daily Amount: 150 mg. 40 Tab 1    traMADol (ULTRAM) 50 mg tablet Take 50 mg by mouth every six (6) hours as needed for Pain.  aspirin delayed-release 81 mg tablet Take 1 Tab by mouth every twelve (12) hours every twelve (12) hours. 120 Tab 0    sucralfate (CARAFATE) 1 gram tablet Take 1 g by mouth three (3) times daily. Take / use AM day of surgery  per anesthesia protocols.  omeprazole (PRILOSEC) 20 mg capsule Take 20 mg by mouth Daily (before breakfast). Take DOS per anesthesia protocol. Indications: gastroesophageal reflux disease      zolpidem (AMBIEN) 10 mg tablet Take 10 mg by mouth nightly as needed for Sleep.  Indications: SLEEP-ONSET INSOMNIA      cyanocobalamin (VITAMIN B-12) 1,000 mcg/mL injection 1,000 mcg by IntraMUSCular route every twenty-eight (28) days.  simvastatin (ZOCOR) 40 mg tablet Take 40 mg by mouth nightly.  cetirizine (ZYRTEC) 10 mg tablet Take 10 mg by mouth nightly. Indications: ALLERGIC RHINITIS      fluticasone (FLONASE) 50 mcg/Actuation nasal spray 2 Sprays by Nasal route nightly. Indications: ALLERGIC RHINITIS           Objective:     Physical Exam:     Visit Vitals  /75 (BP 1 Location: Right arm, BP Patient Position: At rest)   Pulse 83   Temp (!) 96.4 °F (35.8 °C)   Ht 5' 6\" (1.676 m)   Wt 76.2 kg (168 lb)   BMI 27.12 kg/m²       General: thin elderly lady  Psych: cooperative. Pleasant  Neuro: alert and oriented to person/place/situation. Otherwise nonfocal.  Derm: Normal turgor for age, dry skin  HEENT: Normocephalic, atraumatic. EOMI. Neck: Normal range of motion. Chest: Respirations nonlabored  Cardio: RRR  Abdomen: Soft, nondistended  Lower extremities:   No hemosiderrosis  Some large and small vessel varicosities  Capillary refill <3 sec    Right  1+ DP/PT    Left  1+ DP/PT                    Ulcer Description:   Wound Leg lower Left;Medial 11/27/20 (Active)   Wound Image   01/08/21 0952   Wound Etiology Venous 01/08/21 0952   Dressing Status Clean;Dry; Intact 01/08/21 0952   Cleansed Cleansed with saline 01/08/21 0952   Wound Length (cm) 0 cm 01/08/21 0952   Wound Width (cm) 0 cm 01/08/21 0952   Wound Depth (cm) 0 cm 01/08/21 0952   Wound Surface Area (cm^2) 0 cm^2 01/08/21 0952   Change in Wound Size % 100 01/08/21 0952   Wound Volume (cm^3) 0 cm^3 01/08/21 0952   Wound Healing % 100 01/08/21 0952   Wound Assessment Epithelialization 01/08/21 0952   Drainage Amount None 01/08/21 0952   Drainage Description Serosanguinous 12/18/20 0903   Wound Odor None 01/08/21 0952   [place]-Wound/Incision Assessment Fragile 01/08/21 0952   Edges Attached edges 12/01/20 0849   Wound Thickness Description Full thickness 01/08/21 0952   Number of days: 47       Wound Leg Lower; Anterior 12/01/20 (Active)   Wound Image   01/08/21 0952   Wound Etiology Venous 01/08/21 0952   Dressing Status Clean;Dry; Intact 01/08/21 0952   Cleansed Cleansed with saline 01/08/21 0952   Wound Length (cm) 0.6 cm 01/08/21 0952   Wound Width (cm) 0.7 cm 01/08/21 0952   Wound Depth (cm) 0.1 cm 01/08/21 0952   Wound Surface Area (cm^2) 0.42 cm^2 01/08/21 0952   Change in Wound Size % 53.33 01/08/21 0952   Wound Volume (cm^3) 0.04 cm^3 01/08/21 0952   Wound Healing % 56 01/08/21 0952   Wound Assessment Hyper granulation tissue 01/08/21 0952   Drainage Amount Moderate 01/08/21 0952   Drainage Description Serosanguinous 01/08/21 0952   Wound Odor None 01/08/21 0952   [place]-Wound/Incision Assessment Hemosiderin staining (brown yellow); Fragile 01/08/21 0952   Edges Attached edges 12/01/20 0849   Wound Thickness Description Full thickness 01/08/21 0952   Number of days: 37       [REMOVED] Read-Only, Retired. ZZZ DO NOT USE OLD WOUND LDA Knee Right (Removed)   Number of days: 0398       [REMOVED] Read-Only, Retired. ZZZ DO NOT USE OLD WOUND LDA Knee Right (Removed)   Number of days: 3519       [REMOVED] Read-Only, Retired. ZZZ DO NOT USE OLD WOUND LDA Shoulder Right (Removed)   Number of days: 2104       [REMOVED] Read-Only, Retired. ZZZ DO NOT USE OLD WOUND LDA Abdomen (Removed)   Number of days: 1995       [REMOVED] Read-Only, Retired.  ZZZ DO NOT USE OLD WOUND LDA Hip Right (Removed)   Number of days: 936           Problem List  Date Reviewed: 9/4/2018          Codes Class Noted    Acute pulmonary embolism ([facility] [address] ICD-10-CM: I26.99  ICD-9-CM: 415.19  9/18/2018        Acute respiratory failure with hypoxemia ([facility]) ICD-10-CM: J96.01  ICD-9-CM: 518.81  9/18/2018        Arthritis of right hip ICD-10-CM: M16.11  ICD-9-CM: 716.95  9/4/2018        CKD (chronic kidney disease) stage 3, GFR 30-59 ml/min ICD-10-CM: N18.30  ICD-9-CM: 585.3  8/21/2018        S/P laparoscopic cholecystectomy ICD-10-CM: Z90.49  ICD-9-CM: V45.89  8/3/2015        Cholecystitis, acute ICD-10-CM: K81.0  ICD-9-CM: 575.0  7/23/2015        Arthritis of right shoulder region ICD-10-CM: M19.011  ICD-9-CM: 716.91  4/6/2015        Status post shoulder replacement ICD-10-CM: P16.763  ICD-9-CM: V43.61  4/6/2015        Osteoarthritis of right knee ICD-10-CM: M17.11  ICD-9-CM: 715.96  2/21/2012        Total knee replacement status ICD-10-CM: P59.938  ICD-9-CM: V43.65  2/21/2012                    Assessment/Plan:     Appropriate interval improvement. Continue hydrafera and unna for mild compression and protection. Keep elevated whenever possible.        Signed By: Humera Dumont MD

## 2021-01-22 ENCOUNTER — HOSPITAL ENCOUNTER (OUTPATIENT)
Dept: WOUND CARE | Age: 81
Discharge: HOME | End: 2021-01-22
Attending: SURGERY
Payer: MEDICARE

## 2021-01-22 VITALS
BODY MASS INDEX: 27.32 KG/M2 | HEIGHT: 66 IN | DIASTOLIC BLOOD PRESSURE: 69 MMHG | OXYGEN SATURATION: 99 % | WEIGHT: 170 LBS | HEART RATE: 84 BPM | SYSTOLIC BLOOD PRESSURE: 124 MMHG | TEMPERATURE: 97.9 F | RESPIRATION RATE: 18 BRPM

## 2021-01-22 LAB
BACTERIA SPEC CULT: NORMAL
SERVICE CMNT-IMP: NORMAL

## 2021-01-22 PROCEDURE — 99213 OFFICE O/P EST LOW 20 MIN: CPT

## 2021-01-22 NOTE — DISCHARGE INSTRUCTIONS
[address]  [address], [address]  Phone: [phone]  Fax: [phone]    Patient: Jose De Jesus Peirce MRN: [id number]  SSN: [id number]    YOB: 1940  Age: 78 y.o. Sex: male       Return Appointment: 2 weeks with Manny Cannon MD    Instructions: Left anterior leg  Cleanse wound and periwound with wound cleanser or normal saline. Promogran (may substitute equivalent collagen):cut to approximate wound size, apply to wound bed. Secure with gauze and covrsite. Change 3x weekly. Tubigrip to left leg. On in AM and off in PM.  Follow up in 2 weeks. Should you experience increased redness, swelling, pain, foul odor, size of wound(s), or have a temperature over 101 degrees please contact the [address] [place] [address] Road at [phone] or if after hours contact your primary care physician or go to the hospital emergency department.     Signed By: Hong Deutsch     January 22, 2021

## 2021-01-22 NOTE — WOUND CARE
[address] [place] [address], [facility] [address] Phone: [phone] Fax: [phone] Patient: Lauri Goetz MRN: [id number]  SSN: [id number] YOB: 1940  Age: 78 y.o. Sex: male Return Appointment: 2 weeks with Cristino Garcia MD 
 
Instructions: Left anterior leg Cleanse wound and periwound with wound cleanser or normal saline. Promogran (may substitute equivalent collagen):cut to approximate wound size, apply to wound bed. Secure with gauze and covrsite. Change 3x weekly. Tubigrip to left leg. On in AM and off in PM. 
Follow up in 2 weeks. Should you experience increased redness, swelling, pain, foul odor, size of wound(s), or have a temperature over 101 degrees please contact the [address] [place] [address] Road at [phone] or if after hours contact your primary care physician or go to the hospital emergency department. Signed By: Gigi Aschoff   
 January 22, 2021

## 2021-01-22 NOTE — WOUND CARE
01/22/21 3289 Wound Leg Lower; Anterior 12/01/20 Date First Assessed/Time First Assessed: 12/01/20 0902   Present on Hospital Admission: Yes  Wound Approximate Age at First Assessment (Weeks): 24 weeks  Primary Wound Type: Venous Ulcer  Location: Leg  Wound Location Orientation: Lower; Anterior  Date. .. Wound Image Wound Etiology Venous Dressing Status Clean;Dry; Intact Cleansed Cleansed with saline Dressing/Treatment  
(xeroform, tubigrip) Wound Length (cm) 1.3 cm Wound Width (cm) 1.1 cm Wound Depth (cm) 0.1 cm Wound Surface Area (cm^2) 1.43 cm^2 Change in Wound Size % -58.89 Wound Volume (cm^3) 0.14 cm^3 Wound Healing % -56 Wound Assessment Hyper granulation tissue;Granulation tissue Drainage Amount Small Drainage Description Serosanguinous Wound Odor None [place]-Wound/Incision Assessment Hemosiderin staining (brown yellow) Wound Thickness Description Full thickness Patient is on an anti coagulant daily asa

## 2021-02-05 ENCOUNTER — HOSPITAL ENCOUNTER (OUTPATIENT)
Dept: WOUND CARE | Age: 81
Discharge: HOME | End: 2021-02-05
Attending: SURGERY
Payer: MEDICARE

## 2021-02-05 VITALS
DIASTOLIC BLOOD PRESSURE: 76 MMHG | RESPIRATION RATE: 18 BRPM | HEART RATE: 84 BPM | HEIGHT: 66 IN | TEMPERATURE: 97.1 F | WEIGHT: 174 LBS | SYSTOLIC BLOOD PRESSURE: 123 MMHG | BODY MASS INDEX: 27.97 KG/M2

## 2021-02-05 PROCEDURE — 99213 OFFICE O/P EST LOW 20 MIN: CPT

## 2021-02-05 NOTE — WOUND CARE
[address] [place], [place] [address] Formerly [facility] [address] [place], [address] Phone: [phone] Fax: [phone] Patient: King Gali MRN: [id number]  SSN: [id number] YOB: 1940  Age: 78 y.o. Sex: male Return Appointment: 2 weeks with Neelam Connelly MD 
 
Instructions: Left anterior leg Cleanse wound and periwound with wound cleanser or normal saline. Promogran (may substitute equivalent collagen):cut to approximate wound size, apply to wound bed. Secure with gauze and covrsite. Change 3x weekly. Tubigrip to left leg. On in AM and off in PM. 
Follow up in 2 weeks at wound center. Dr Tony Schmidt private office ([address] [place] [address]) will call you to schedule an appointment to biopsy your wound. [address], [place], [address] 
[phone] Should you experience increased redness, swelling, pain, foul odor, size of wound(s), or have a temperature over 101 degrees please contact the [address] [place] [address] Road at [phone] or if after hours contact your primary care physician or go to the hospital emergency department. Signed By: Tereza Barron February 5, 2021

## 2021-02-05 NOTE — DISCHARGE INSTRUCTIONS
[address]  [address], [address]  Phone: [phone]  Fax: [phone]    Patient: Ling Yoo MRN: [id number]  SSN: [id number]    YOB: 1940  Age: 78 y.o. Sex: male       Return Appointment: 2 weeks with Nani Steiner MD    Instructions: Left anterior leg  Cleanse wound and periwound with wound cleanser or normal saline. Promogran (may substitute equivalent collagen):cut to approximate wound size, apply to wound bed. Secure with gauze and covrsite. Change 3x weekly. Tubigrip to left leg. On in AM and off in PM.  Follow up in 2 weeks at wound center. Dr Danya Kenyon private office ([address] [place] [address]) will call you to schedule an appointment to biopsy your wound. [address], [place], [address]  [phone]      Should you experience increased redness, swelling, pain, foul odor, size of wound(s), or have a temperature over 101 degrees please contact the [address] [place] [address] Road at [phone] or if after hours contact your primary care physician or go to the hospital emergency department.     Signed By: Calin Orellana     February 5, 2021

## 2021-02-05 NOTE — PROGRESS NOTES
Wound Center Progress Note    Patient: Erica Arauz MRN: [id number]  SSN: [id number]    YOB: 1940  Age: 78 y.o. Sex: male      Subjective:     Chief Complaint:  LLE wound      History of Present Illness:       Wound Caused By: Trauma  Associated Signs and Symptoms: pain, drainage  Timing: Since Nov 2020  Quality: wound  Severity: Full thickness    Has been keeping simple cover on wound since occurred. No previous trauma. No previous difficulty with wounds. On Xarelto. 12/4/2020 Tolerating dressings. No new pain or other issues. No further trauma. 12/18/2020 Continues to do well with dressings. Performing dressings himself. No significant bleeding or bruising. No redness or increased pain. 1/8/2021 No new issues. Tolerating dressings. No new bleeding. No further trauma. 1/15/2021 Continues to show limited progress. Minimal pain. No bleeding. Stable on Xarelto.        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    Unspecified adverse effect of anesthesia     wife states slow to wake      Past Surgical History:   Procedure Laterality Date    HX APPENDECTOMY      HX CHOLECYSTECTOMY  2015    HX GI  2010    gastrectomy 2/8/10-has 30% stomach left    HX KNEE ARTHROSCOPY Right     HX OTHER SURGICAL      attempted tracheal dilation- stopped due to inflammation    HX SHOULDER ARTHROSCOPY Right 2015    [place] TOTAL HIP ARTHROPLASTY Left 2004    [place] TOTAL KNEE ARTHROPLASTY Right 2012     Family History   Problem Relation Age of Onset    Kidney Disease Mother     Dementia Father    [place] Stabs Alcohol abuse Brother     Arthritis-osteo Brother       Social History     Tobacco Use    Smoking status: Former Smoker     Packs/day: 0.50     Years: 5.00     Pack years: 2.50    Smokeless tobacco: Never Used    Tobacco comment: quit age 20   Substance Use Topics    Alcohol use: Yes     Comment: 3 drinks per night; wine and liquor       Prior to Admission medications    Medication Sig Start Date End Date Taking? Authorizing Provider   rivaroxaban (Xarelto) 20 mg tab tablet Take 20 mg by mouth daily. Provider, Historical   tamsulosin (FLOMAX) 0.4 mg capsule Take 0.4 mg by mouth daily. Jim Mayfield MD   traMADol Raguel Lombard) 50 mg tablet Take 1 Tab by mouth every eight (8) hours as needed. Max Daily Amount: 150 mg. 9/19/18   Devorah Mendez MD   traMADol Raguel Lombard) 50 mg tablet Take 50 mg by mouth every six (6) hours as needed for Pain. Provider, Historical   aspirin delayed-release 81 mg tablet Take 1 Tab by mouth every twelve (12) hours every twelve (12) hours. 9/5/18   Ed Martínez PA   sucralfate (CARAFATE) 1 gram tablet Take 1 g by mouth three (3) times daily. Take / use AM day of surgery  per anesthesia protocols. Provider, Historical   omeprazole (PRILOSEC) 20 mg capsule Take 20 mg by mouth Daily (before breakfast). Take DOS per anesthesia protocol. Indications: gastroesophageal reflux disease    Provider, Historical   zolpidem (AMBIEN) 10 mg tablet Take 10 mg by mouth nightly as needed for Sleep. Indications: SLEEP-ONSET INSOMNIA    Provider, Historical   cyanocobalamin (VITAMIN B-12) 1,000 mcg/mL injection 1,000 mcg by IntraMUSCular route every twenty-eight (28) days. Provider, Historical   simvastatin (ZOCOR) 40 mg tablet Take 40 mg by mouth nightly. 3/10/10   Provider, Historical   cetirizine (ZYRTEC) 10 mg tablet Take 10 mg by mouth nightly.  Indications: ALLERGIC RHINITIS 2/2/10   Provider, Historical   fluticasone (FLONASE) 50 mcg/Actuation nasal spray 2 Sprays by Nasal route nightly. Indications: ALLERGIC RHINITIS    Provider, Historical     Allergies   Allergen Reactions    Morphine Nausea and Vomiting    Other Medication Nausea and Vomiting     Dust and pollen causes runny nose. Mollusks/clams- N/V    Shellfish Derived Nausea and Vomiting        Review of Systems:  CONSTITUTIONAL: No fever, chills  HEAD: No headache  EYES: No visual loss  ENT: No hearing loss  SKIN: No rash  CARDIOVASCULAR: No chest pain  RESPIRATORY: No shortness of breath  GASTROINTESTINAL: No nausea, vomiting  GENITOURINARY: No excessive urination  NEUROLOGICAL: + weakness  MUSCULOSKELETAL: No muscle pain. No neck pain  HEMATOLOGIC: + easy bleeding  LYMPHATICS: No lymphedema. PSYCHIATRIC: No current depression  ENDOCRINOLOGIC: No high sugars  ALLERGIES: No history of asthma, hives, eczema or rhinitis. No results found for: HBA1C, GSC8OVWT, HGBE8, FOG7ATOB, PIO2MLJT, QFV4TPKK     Immunization History   Administered Date(s) Administered    TB Skin Test (PPD) Intradermal 09/04/2018       Body mass index is 27.44 kg/m². Current medications:  Current Outpatient Medications   Medication Sig Dispense Refill    rivaroxaban (Xarelto) 20 mg tab tablet Take 20 mg by mouth daily.  tamsulosin (FLOMAX) 0.4 mg capsule Take 0.4 mg by mouth daily.  traMADol (ULTRAM) 50 mg tablet Take 1 Tab by mouth every eight (8) hours as needed. Max Daily Amount: 150 mg. 40 Tab 1    traMADol (ULTRAM) 50 mg tablet Take 50 mg by mouth every six (6) hours as needed for Pain.  aspirin delayed-release 81 mg tablet Take 1 Tab by mouth every twelve (12) hours every twelve (12) hours. 120 Tab 0    sucralfate (CARAFATE) 1 gram tablet Take 1 g by mouth three (3) times daily. Take / use AM day of surgery  per anesthesia protocols.  omeprazole (PRILOSEC) 20 mg capsule Take 20 mg by mouth Daily (before breakfast). Take DOS per anesthesia protocol.    Indications: gastroesophageal reflux disease      zolpidem (AMBIEN) 10 mg tablet Take 10 mg by mouth nightly as needed for Sleep. Indications: SLEEP-ONSET INSOMNIA      cyanocobalamin (VITAMIN B-12) 1,000 mcg/mL injection 1,000 mcg by IntraMUSCular route every twenty-eight (28) days.  simvastatin (ZOCOR) 40 mg tablet Take 40 mg by mouth nightly.  cetirizine (ZYRTEC) 10 mg tablet Take 10 mg by mouth nightly. Indications: ALLERGIC RHINITIS      fluticasone (FLONASE) 50 mcg/Actuation nasal spray 2 Sprays by Nasal route nightly. Indications: ALLERGIC RHINITIS           Objective:     Physical Exam:     Visit Vitals  /69 (BP 1 Location: Left arm)   Pulse 84   Temp 97.9 °F (36.6 °C)   Resp 18   Ht 5' 6\" (1.676 m)   Wt 77.1 kg (170 lb)   SpO2 99%   BMI 27.44 kg/m²       General: thin elderly lady  Psych: cooperative. Pleasant  Neuro: alert and oriented to person/place/situation. Otherwise nonfocal.  Derm: Normal turgor for age, dry skin  HEENT: Normocephalic, atraumatic. EOMI. Neck: Normal range of motion. Chest: Respirations nonlabored  Cardio: irreg irreg  Abdomen: Soft, nondistended  Lower extremities:   No hemosiderrosis  Some large and small vessel varicosities  Capillary refill <3 sec    Right  1+ DP/PT    Left  1+ DP/PT                      Ulcer Description:   Wound Leg Lower; Anterior 12/01/20 (Active)   Wound Image   02/05/21 0948   Wound Etiology Other (Comment) 02/05/21 0948   Dressing Status Clean;Dry; Intact 02/05/21 0948   Cleansed Cleansed with saline 02/05/21 0948   Wound Length (cm) 1.2 cm 02/05/21 0948   Wound Width (cm) 1 cm 02/05/21 0948   Wound Depth (cm) 0.1 cm 02/05/21 0948   Wound Surface Area (cm^2) 1.2 cm^2 02/05/21 0948   Change in Wound Size % -33.33 02/05/21 0948   Wound Volume (cm^3) 0.12 cm^3 02/05/21 0948   Wound Healing % -33 02/05/21 0948   Wound Assessment Hyper granulation tissue 02/05/21 0948   Drainage Amount Small 02/05/21 0948   Drainage Description Serosanguinous 02/05/21 0948   Wound Odor None 02/05/21 0948   Smith[place]-Wound/Incision Assessment Hemosiderin staining (brown yellow) 02/05/21 0948   Edges Attached edges 12/01/20 0849   Wound Thickness Description Full thickness 02/05/21 0948   Number of days: 77       [REMOVED] Read-Only, Retired. ZZZ DO NOT USE OLD WOUND LDA Knee Right (Removed)   Number of days: 5341       [REMOVED] Read-Only, Retired. ZZZ DO NOT USE OLD WOUND LDA Knee Right (Removed)   Number of days: 2184       [REMOVED] Read-Only, Retired. ZZZ DO NOT USE OLD WOUND LDA Shoulder Right (Removed)   Number of days: 2104       [REMOVED] Read-Only, Retired. ZZZ DO NOT USE OLD WOUND LDA Abdomen (Removed)   Number of days: 1995       [REMOVED] Read-Only, Retired. ZZZ DO NOT USE OLD WOUND LDA Hip Right (Removed)   Number of days: 666       [REMOVED] Wound Leg lower Left;Medial 11/27/20 (Removed)   Wound Image   01/08/21 0952   Wound Etiology Venous 01/08/21 0952   Dressing Status Clean;Dry; Intact 01/08/21 0952   Cleansed Cleansed with saline 01/08/21 0952   Wound Length (cm) 0 cm 01/08/21 0952   Wound Width (cm) 0 cm 01/08/21 0952   Wound Depth (cm) 0 cm 01/08/21 0952   Wound Surface Area (cm^2) 0 cm^2 01/08/21 0952   Change in Wound Size % 100 01/08/21 0952   Wound Volume (cm^3) 0 cm^3 01/08/21 0952   Wound Healing % 100 01/08/21 0952   Wound Assessment Epithelialization 01/08/21 0952   Drainage Amount None 01/08/21 0952   Drainage Description Serosanguinous 12/18/20 0903   Wound Odor None 01/08/21 0952   Smith[place]-Wound/Incision Assessment Fragile 01/08/21 0952   Edges Attached edges 12/01/20 0849   Wound Thickness Description Full thickness 01/08/21 0952   Number of days: 56           Problem List  Date Reviewed: 9/4/2018          Codes Class Noted    Acute pulmonary embolism ([facility] ICD-10-CM: I26.99  ICD-9-CM: 415.19  9/18/2018        Acute respiratory failure with hypoxemia [facility]) ICD-10-CM: J96.01  ICD-9-CM: 518.81  9/18/2018        Arthritis of right hip ICD-10-CM: M16.11  ICD-9-CM: 716.95  9/4/2018        CKD (chronic kidney disease) stage 3, GFR 30-59 ml/min ICD-10-CM: N18.30  ICD-9-CM: 585.3  8/21/2018        S/P laparoscopic cholecystectomy ICD-10-CM: Z90.49  ICD-9-CM: V45.89  8/3/2015        Cholecystitis, acute ICD-10-CM: K81.0  ICD-9-CM: 575.0  7/23/2015        Arthritis of right shoulder region ICD-10-CM: M19.011  ICD-9-CM: 716.91  4/6/2015        Status post shoulder replacement ICD-10-CM: Z72.770  ICD-9-CM: V43.61  4/6/2015        Osteoarthritis of right knee ICD-10-CM: M17.11  ICD-9-CM: 715.96  2/21/2012        Total knee replacement status ICD-10-CM: B63.348  ICD-9-CM: V43.65  2/21/2012                    Assessment/Plan:     Will change to promogram. Apprehensive about biopsy. Keep elevated whenever possible.        Signed By: Demi Samuel MD

## 2021-02-05 NOTE — WOUND CARE
02/05/21 8133 Wound Leg Lower; Anterior 12/01/20 Date First Assessed/Time First Assessed: 12/01/20 0902   Present on Hospital Admission: Yes  Wound Approximate Age at First Assessment (Weeks): 24 weeks  Primary Wound Type: Venous Ulcer  Location: Leg  Wound Location Orientation: Lower; Anterior  Date. .. Wound Image Wound Etiology Other (Comment) Dressing Status Clean;Dry; Intact Cleansed Cleansed with saline Wound Length (cm) 1.2 cm Wound Width (cm) 1 cm Wound Depth (cm) 0.1 cm Wound Surface Area (cm^2) 1.2 cm^2 Change in Wound Size % -33.33 Wound Volume (cm^3) 0.12 cm^3 Wound Healing % -33 Wound Assessment Hyper granulation tissue Drainage Amount Small Drainage Description Serosanguinous Wound Odor None Smitha-Wound/Incision Assessment Hemosiderin staining (brown yellow) Wound Thickness Description Full thickness Patient is on an anti coagulant daily asa and xarelto

## 2021-02-19 ENCOUNTER — HOSPITAL ENCOUNTER (OUTPATIENT)
Dept: WOUND CARE | Age: 81
Discharge: HOME | End: 2021-02-19
Attending: SURGERY
Payer: MEDICARE

## 2021-02-19 VITALS
BODY MASS INDEX: 27.97 KG/M2 | WEIGHT: 174 LBS | DIASTOLIC BLOOD PRESSURE: 71 MMHG | SYSTOLIC BLOOD PRESSURE: 135 MMHG | TEMPERATURE: 98.2 F | HEART RATE: 89 BPM | HEIGHT: 66 IN

## 2021-02-19 PROCEDURE — 99213 OFFICE O/P EST LOW 20 MIN: CPT

## 2021-02-19 NOTE — WOUND CARE
02/19/21 1000   Wound Leg Lower; Anterior 12/01/20   Date First Assessed/Time First Assessed: 12/01/20 0902   Present on Hospital Admission: Yes  Wound Approximate Age at First Assessment (Weeks): 24 weeks  Primary Wound Type: (c) Other (comment)  Location: Leg  Wound Location Orientation: Lower; Anterio. .. Wound Image    Wound Etiology Venous   Dressing Status Clean;Dry; Intact   Cleansed Cleansed with saline   Dressing/Treatment   (xeroform, gauze)   Wound Length (cm) 0.5 cm   Wound Width (cm) 0.6 cm   Wound Depth (cm) 0.1 cm   Wound Surface Area (cm^2) 0.3 cm^2   Change in Wound Size % 66.67   Wound Volume (cm^3) 0.03 cm^3   Wound Healing % 67   Wound Assessment Pink/red   Drainage Amount Scant   Drainage Description Sanguineous   Wound Odor None   Smitha-Wound/Incision Assessment Hemosiderin staining (brown yellow)   Wound Thickness Description Partial thickness   patient is taking xarelto and aspirin

## 2021-02-19 NOTE — WOUND CARE
[address]  [address], [address]   Phone: [phone]  Fax: [phone]    Patient: Kiran Eye MRN: [id number]  SSN: [id number]    YOB: 1940  Age: 78 y.o. Sex: male       Return Appointment: 2 weeks with Becca Thompson MD    Instructions: Left anterior leg  Cleanse wound and periwound with wound cleanser or normal saline. Promogran (may substitute equivalent collagen):cut to approximate wound size, apply to wound bed. Cover collagen with xeroform. Secure with gauze and covrsite. Change 3x weekly. Tubigrip to left leg. On in AM and off in PM.  Follow up in 2 weeks at wound center. Should you experience increased redness, swelling, pain, foul odor, size of wound(s), or have a temperature over 101 degrees please contact the [address] [place] [address] Road at [phone] or if after hours contact your primary care physician or go to the hospital emergency department.     Signed By: Konstantin Rodriguez RN     February 19, 2021

## 2021-03-05 ENCOUNTER — HOSPITAL ENCOUNTER (OUTPATIENT)
Dept: WOUND CARE | Age: 81
Discharge: HOME | End: 2021-03-05
Attending: SURGERY
Payer: MEDICARE

## 2021-03-05 VITALS
HEIGHT: 66 IN | SYSTOLIC BLOOD PRESSURE: 123 MMHG | DIASTOLIC BLOOD PRESSURE: 64 MMHG | BODY MASS INDEX: 28.45 KG/M2 | OXYGEN SATURATION: 100 % | WEIGHT: 177 LBS | RESPIRATION RATE: 18 BRPM | HEART RATE: 81 BPM

## 2021-03-05 PROCEDURE — 99213 OFFICE O/P EST LOW 20 MIN: CPT

## 2021-03-05 NOTE — WOUND CARE
03/05/21 4337 Wound Leg Lower; Anterior 12/01/20 Date First Assessed/Time First Assessed: 12/01/20 0902   Present on Hospital Admission: Yes  Wound Approximate Age at First Assessment (Weeks): 24 weeks  Primary Wound Type: (c) Other (comment)  Location: Leg  Wound Location Orientation: Lower; Anterio. .. Wound Image Wound Etiology Venous Dressing Status Clean; Intact; Old drainage noted Cleansed Cleansed with saline Dressing/Treatment  
(Collagen Ag, Xeroform, CovRsite) Wound Length (cm) 1 cm Wound Width (cm) 0.8 cm Wound Surface Area (cm^2) 0.8 cm^2 Change in Wound Size % 11.11 Wound Assessment Bleeding;Granulation tissue Drainage Amount Small Drainage Description Sanguineous Wound Odor None Smith[place]-Wound/Incision Assessment Edematous; Hemosiderin staining (brown yellow) Edges Attached edges Wound Thickness Description Full thickness Patient is currently on ANTICOAGULANT THERAPY: Xarelto Wound gently mechanically debrided with gauze and normal saline. CORRECTION RE Treatment: Collagen without Silver

## 2021-03-05 NOTE — DISCHARGE INSTRUCTIONS
[address]  [address], [address]  Phone: [phone]  Fax: [phone]    Patient: Niecy Forman MRN: [id number]  SSN: [id number]    YOB: 1940  Age: 78 y.o. Sex: male       Return Appointment: 3 weeks with Clifton Guzman MD    Instructions: Left Anterior Lower Leg:  Cleanse wound and periwound with wound cleanser or normal saline. Promogran (may substitute equivalent collagen):cut to approximate wound size, apply to wound bed. Secure with gauze and covrsite. Change 3x weekly. Tubigrip to left leg. On in AM and off in PM.  Follow up in 2 weeks at wound center.     Biopsy, March 18, 2021 @ 11AM  Dr Menon Pi: [address], [place], [address]  [phone]        Should you experience increased redness, swelling, pain, foul odor, size of wound(s), or have a temperature over 101 degrees please contact the [address] [place] [address] Road at [phone] or if after hours contact your primary care physician or go to the hospital emergency department.     Signed By: Leland Ravi RN     March 5, 2021

## 2021-03-05 NOTE — WOUND CARE
[address] [place], [place] [address] Formerly [facility] [address] [place], [address] Phone: [phone] Fax: [phone] Patient: Lamin Chu MRN: [id number]  SSN: [id number] YOB: 1940  Age: 78 y.o. Sex: male Return Appointment: 3 weeks with Marisa Sanchez MD 
 
Instructions: Left Anterior Lower Leg: 
Cleanse wound and periwound with wound cleanser or normal saline. Promogran (may substitute equivalent collagen):cut to approximate wound size, apply to wound bed. Secure with gauze and covrsite. Change 3x weekly. Tubigrip to left leg. On in AM and off in PM. 
Follow up in 2 weeks at wound center. 
  
Biopsy, March 18, 2021 @ 11AM 
Dr Avilez : Darren Mohs [address], [place], [address] 
[phone] Should you experience increased redness, swelling, pain, foul odor, size of wound(s), or have a temperature over 101 degrees please contact the [address] [place] [address] at [phone] or if after hours contact your primary care physician or go to the hospital emergency department. Signed By: Sydnie Pete RN   
 March 5, 2021

## 2021-03-12 NOTE — PROGRESS NOTES
Wound Center Progress Note    Patient: Victor Hugo Guzman MRN: [id number]  SSN: [id number]    YOB: 1940  Age: 78 y.o. Sex: male      Subjective:     Chief Complaint:  LLE wound      History of Present Illness:       Wound Caused By: Trauma  Associated Signs and Symptoms: pain, drainage  Timing: Since Nov 2020  Quality: wound  Severity: Full thickness    Has been keeping simple cover on wound since occurred. No previous trauma. No previous difficulty with wounds. On Xarelto. 2/5/2021 Continues to show limited progress. Minimal pain. No bleeding. Stable on Xarelto. 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    Unspecified adverse effect of anesthesia     wife states slow to wake      Past Surgical History:   Procedure Laterality Date    HX APPENDECTOMY      HX CHOLECYSTECTOMY  2015    HX GI  2010    gastrectomy 2/8/10-has 30% stomach left    HX KNEE ARTHROSCOPY Right     HX OTHER SURGICAL      attempted tracheal dilation- stopped due to inflammation    HX SHOULDER ARTHROSCOPY Right 2015    PA TOTAL HIP ARTHROPLASTY Left 2004    PA TOTAL KNEE ARTHROPLASTY Right 2012     Family History   Problem Relation Age of Onset    Kidney Disease Mother     Dementia Father     Alcohol abuse Brother     Arthritis-osteo Brother       Social History     Tobacco Use    Smoking status: Former Smoker     Packs/day: 0.50     Years: 5.00     Pack years: 2.50    Smokeless tobacco: Never Used    Tobacco comment: quit age 20; continuing cessation   Substance Use Topics    Alcohol use:  Yes Comment: 3 drinks per night; wine and liquor       Prior to Admission medications    Medication Sig Start Date End Date Taking? Authorizing Provider   rivaroxaban (Xarelto) 20 mg tab tablet Take 20 mg by mouth daily. Provider, Historical   sucralfate (CARAFATE) 1 gram tablet Take 1 g by mouth three (3) times daily. Take / use AM day of surgery  per anesthesia protocols. Provider, Historical   omeprazole (PRILOSEC) 20 mg capsule Take 20 mg by mouth Daily (before breakfast). Take DOS per anesthesia protocol. Indications: gastroesophageal reflux disease    Provider, Historical   zolpidem (AMBIEN) 10 mg tablet Take 10 mg by mouth nightly as needed for Sleep. Indications: SLEEP-ONSET INSOMNIA    Provider, Historical   cyanocobalamin (VITAMIN B-12) 1,000 mcg/mL injection 1,000 mcg by IntraMUSCular route every twenty-eight (28) days. Provider, Historical   simvastatin (ZOCOR) 40 mg tablet Take 40 mg by mouth nightly. 3/10/10   Provider, Historical   cetirizine (ZYRTEC) 10 mg tablet Take 10 mg by mouth nightly. Indications: ALLERGIC RHINITIS 2/2/10   Provider, Historical   fluticasone (FLONASE) 50 mcg/Actuation nasal spray 2 Sprays by Nasal route nightly. Indications: ALLERGIC RHINITIS    Provider, Historical     Allergies   Allergen Reactions    Morphine Nausea and Vomiting    Other Medication Nausea and Vomiting     Dust and pollen causes runny nose. Mollusks/clams- N/V    Shellfish Derived Nausea and Vomiting        Review of Systems:  CONSTITUTIONAL: No fever, chills  HEAD: No headache  EYES: No visual loss  ENT: No hearing loss  SKIN: No rash  CARDIOVASCULAR: No chest pain  RESPIRATORY: No shortness of breath  GASTROINTESTINAL: No nausea, vomiting  GENITOURINARY: No excessive urination  NEUROLOGICAL: + weakness  MUSCULOSKELETAL: No muscle pain. No neck pain  HEMATOLOGIC: + easy bleeding  LYMPHATICS: No lymphedema.   PSYCHIATRIC: No current depression  ENDOCRINOLOGIC: No high sugars  ALLERGIES: No history of asthma, hives, eczema or rhinitis. No results found for: HBA1C, FSH4PGAC, HGBE8, RGR6THXN, CTU9OOFO, KNV8RVIT     Immunization History   Administered Date(s) Administered    TB Skin Test (PPD) Intradermal 09/04/2018       Body mass index is 28.08 kg/m². Current medications:  Current Outpatient Medications   Medication Sig Dispense Refill    rivaroxaban (Xarelto) 20 mg tab tablet Take 20 mg by mouth daily.  sucralfate (CARAFATE) 1 gram tablet Take 1 g by mouth three (3) times daily. Take / use AM day of surgery  per anesthesia protocols.  omeprazole (PRILOSEC) 20 mg capsule Take 20 mg by mouth Daily (before breakfast). Take DOS per anesthesia protocol. Indications: gastroesophageal reflux disease      zolpidem (AMBIEN) 10 mg tablet Take 10 mg by mouth nightly as needed for Sleep. Indications: SLEEP-ONSET INSOMNIA      cyanocobalamin (VITAMIN B-12) 1,000 mcg/mL injection 1,000 mcg by IntraMUSCular route every twenty-eight (28) days.  simvastatin (ZOCOR) 40 mg tablet Take 40 mg by mouth nightly.  cetirizine (ZYRTEC) 10 mg tablet Take 10 mg by mouth nightly. Indications: ALLERGIC RHINITIS      fluticasone (FLONASE) 50 mcg/Actuation nasal spray 2 Sprays by Nasal route nightly. Indications: ALLERGIC RHINITIS           Objective:     Physical Exam:     Visit Vitals  /76 (BP 1 Location: Right upper arm, BP Patient Position: Sitting)   Pulse 84   Temp 97.1 °F (36.2 °C)   Resp 18   Ht 5' 6\" (1.676 m)   Wt 78.9 kg (174 lb)   BMI 28.08 kg/m²       General: thin elderly lady  Psych: cooperative. Pleasant  Neuro: alert and oriented to person/place/situation. Otherwise nonfocal.  Derm: Normal turgor for age, dry skin  HEENT: Normocephalic, atraumatic. EOMI. Neck: Normal range of motion.   Chest: Respirations nonlabored  Cardio: irreg irreg  Abdomen: Soft, nondistended  Lower extremities:   No hemosiderrosis  Some large and small vessel varicosities  Capillary refill <3 sec    Right  1+ DP/PT    Left  1+ DP/PT                      Ulcer Description:   Wound Leg Lower; Anterior 12/01/20 (Active)   Wound Image   03/05/21 0958   Wound Etiology Venous 03/05/21 0958   Dressing Status Clean; Intact; Old drainage noted 03/05/21 0958   Cleansed Cleansed with saline 03/05/21 0958   Wound Length (cm) 1 cm 03/05/21 0958   Wound Width (cm) 0.8 cm 03/05/21 0958   Wound Depth (cm) 0.1 cm 02/19/21 1000   Wound Surface Area (cm^2) 0.8 cm^2 03/05/21 0958   Change in Wound Size % 11.11 03/05/21 0958   Wound Volume (cm^3) 0.03 cm^3 02/19/21 1000   Wound Healing % 67 02/19/21 1000   Wound Assessment Bleeding;Granulation tissue 03/05/21 0958   Drainage Amount Small 03/05/21 0958   Drainage Description Sanguineous 03/05/21 0958   Wound Odor None 03/05/21 0958   Smith[place]-Wound/Incision Assessment Edematous; Hemosiderin staining (brown yellow) 03/05/21 0958   Edges Attached edges 03/05/21 0958   Wound Thickness Description Full thickness 03/05/21 0958   Number of days: 101       [REMOVED] Read-Only, Retired. ZZZ DO NOT USE OLD WOUND LDA Knee Right (Removed)   Number of days: 8841       [REMOVED] Read-Only, Retired. ZZZ DO NOT USE OLD WOUND LDA Knee Right (Removed)   Number of days: 7395       [REMOVED] Read-Only, Retired. ZZZ DO NOT USE OLD WOUND LDA Shoulder Right (Removed)   Number of days: 2104       [REMOVED] Read-Only, Retired. ZZZ DO NOT USE OLD WOUND LDA Abdomen (Removed)   Number of days: 1995       [REMOVED] Read-Only, Retired. ZZZ DO NOT USE OLD WOUND LDA Hip Right (Removed)   Number of days: 295       [REMOVED] Wound Leg lower Left;Medial 11/27/20 (Removed)   Wound Image   01/08/21 0952   Wound Etiology Venous 01/08/21 0952   Dressing Status Clean;Dry; Intact 01/08/21 0952   Cleansed Cleansed with saline 01/08/21 0952   Wound Length (cm) 0 cm 01/08/21 0952   Wound Width (cm) 0 cm 01/08/21 0952   Wound Depth (cm) 0 cm 01/08/21 0952   Wound Surface Area (cm^2) 0 cm^2 01/08/21 0952   Change in Wound Size % 100 01/08/21 6399   Wound Volume (cm^3) 0 cm^3 01/08/21 0952   Wound Healing % 100 01/08/21 0952   Wound Assessment Epithelialization 01/08/21 0952   Drainage Amount None 01/08/21 0952   Drainage Description Serosanguinous 12/18/20 0903   Wound Odor None 01/08/21 0952   [place]-Wound/Incision Assessment Fragile 01/08/21 0952   Edges Attached edges 12/01/20 0849   Wound Thickness Description Full thickness 01/08/21 0952   Number of days: 56           Problem List  Date Reviewed: 9/4/2018          Codes Class Noted    Acute pulmonary embolism ([facility] [address] ICD-10-CM: I26.99  ICD-9-CM: 415.19  9/18/2018        Acute respiratory failure with hypoxemia [facility]) ICD-10-CM: J96.01  ICD-9-CM: 518.81  9/18/2018        Arthritis of right hip ICD-10-CM: M16.11  ICD-9-CM: 716.95  9/4/2018        CKD (chronic kidney disease) stage 3, GFR 30-59 ml/min ICD-10-CM: N18.30  ICD-9-CM: 585.3  8/21/2018        S/P laparoscopic cholecystectomy ICD-10-CM: Z90.49  ICD-9-CM: V45.89  8/3/2015        Cholecystitis, acute ICD-10-CM: K81.0  ICD-9-CM: 575.0  7/23/2015        Arthritis of right shoulder region ICD-10-CM: M19.011  ICD-9-CM: 716.91  4/6/2015        Status post shoulder replacement ICD-10-CM: A68.212  ICD-9-CM: V43.61  4/6/2015        Osteoarthritis of right knee ICD-10-CM: M17.11  ICD-9-CM: 715.96  2/21/2012        Total knee replacement status ICD-10-CM: F16.838  ICD-9-CM: V43.65  2/21/2012                    Assessment/Plan:     Will change to promogram.   Will schedule full excisional biopsy. Will see how he heals from this but may need small skin graft to cover if slow to heal depending on pathology. Keep elevated whenever possible.        Signed By: Micheal Lao MD

## 2021-03-22 PROBLEM — Z87.891 HISTORY OF TOBACCO ABUSE: Status: ACTIVE | Noted: 2018-12-05

## 2021-03-22 PROBLEM — R60.0 LOWER EXTREMITY EDEMA: Status: ACTIVE | Noted: 2018-12-05

## 2021-03-22 PROBLEM — E87.20 METABOLIC ACIDOSIS: Status: ACTIVE | Noted: 2020-02-07

## 2021-03-22 PROBLEM — N17.9 AKI (ACUTE KIDNEY INJURY) (HCC): Status: ACTIVE | Noted: 2020-02-05

## 2021-03-22 PROBLEM — I10 ESSENTIAL HYPERTENSION: Status: ACTIVE | Noted: 2018-12-05

## 2021-03-22 PROBLEM — R14.2 BELCHING: Status: ACTIVE | Noted: 2018-12-31

## 2021-03-22 PROBLEM — R06.02 SHORTNESS OF BREATH: Status: ACTIVE | Noted: 2018-12-05

## 2021-03-22 PROBLEM — R63.0 ANOREXIA: Status: ACTIVE | Noted: 2017-11-03

## 2021-03-22 PROBLEM — I82.4Z2 ACUTE DEEP VEIN THROMBOSIS (DVT) OF DISTAL VEIN OF LEFT LOWER EXTREMITY (HCC): Status: ACTIVE | Noted: 2020-02-05

## 2021-03-22 PROBLEM — E78.5 DYSLIPIDEMIA: Status: ACTIVE | Noted: 2018-12-05

## 2021-03-23 ENCOUNTER — HOSPITAL ENCOUNTER (OUTPATIENT)
Dept: WOUND CARE | Age: 81
Discharge: HOME | End: 2021-03-23
Attending: SURGERY
Payer: MEDICARE

## 2021-03-23 VITALS
BODY MASS INDEX: 28.45 KG/M2 | RESPIRATION RATE: 18 BRPM | OXYGEN SATURATION: 99 % | TEMPERATURE: 97 F | HEART RATE: 92 BPM | SYSTOLIC BLOOD PRESSURE: 139 MMHG | DIASTOLIC BLOOD PRESSURE: 68 MMHG | WEIGHT: 177 LBS | HEIGHT: 66 IN

## 2021-03-23 PROCEDURE — 99213 OFFICE O/P EST LOW 20 MIN: CPT

## 2021-03-23 NOTE — WOUND CARE
03/23/21 1113 Wound Leg Lower; Anterior 12/01/20 Date First Assessed/Time First Assessed: 12/01/20 0902   Present on Hospital Admission: Yes  Wound Approximate Age at First Assessment (Weeks): 24 weeks  Primary Wound Type: (c) Other (comment)  Location: Leg  Wound Location Orientation: Lower; Anterio. .. Wound Image Wound Etiology Venous Dressing Status Clean; Intact Dressing/Treatment (TXA soaked Surgicel, ABD, Kerlix, Coban) Wound Assessment  
(intact TXA soaked Surgicel) Drainage Description Sanguineous Patient on ANTICOAGULANT THERAPY: Xarelto. Patient had in-office Biopsy March 18, 2021, with Dr. Ivette Adams. Patient's wife changed bandage on March 20, 2021. Patient unable to attain hemostasis after dressing change. Patient went to ER at [facility]. ER, injected Lidocaine and Epi, applied TXA soaked Surgicel, and wrapped to compress. Hemostasis achieved at ER. Hemostasis maintained since March 20, 2021 ER visit. Patient scheduled for dressing change at wound center today, March 23, 2021. Approximately 1 gtt of sanguinous drainage seeped from superior aspect of Surgicel. This RN, maintained and reinforced the compression dressing, covering intact Surgicel with Sorbact Gauze followed by [employer] pad, secured with Rolled Gauze and lightly wrapped Coban. Patient instructed to keep this dressing intact until MD appointment on Friday. Patient also instructed to elevate lower leg.

## 2021-03-26 ENCOUNTER — HOSPITAL ENCOUNTER (OUTPATIENT)
Dept: WOUND CARE | Age: 81
Discharge: HOME | End: 2021-03-26
Attending: SURGERY
Payer: MEDICARE

## 2021-03-26 VITALS
HEART RATE: 74 BPM | HEIGHT: 66 IN | BODY MASS INDEX: 28.48 KG/M2 | OXYGEN SATURATION: 100 % | TEMPERATURE: 96.2 F | DIASTOLIC BLOOD PRESSURE: 57 MMHG | SYSTOLIC BLOOD PRESSURE: 135 MMHG | WEIGHT: 177.2 LBS | RESPIRATION RATE: 18 BRPM

## 2021-03-26 PROCEDURE — 29581 APPL MULTLAYER CMPRN SYS LEG: CPT

## 2021-03-26 NOTE — DISCHARGE INSTRUCTIONS
[address]  [address], [address]  Phone: [phone]  Fax: [phone]    Patient: Nereida Jaeger MRN: [id number]  SSN: [id number]    YOB: 1940  Age: 78 y.o. Sex: male       Return Appointment: 1 week with Grey Knight MD  Prn if needed  Instructions: Left leg  Cleanse wound and periwound with wound cleanser or normal saline. Hydrofera Blue: Cut to wound size, moisten with saline, and apply to wound bed. Secure with abd  2 layer compression with wound and lower extremity assessment. If there is any problem with the dressing (too tight, slides down, etc.) Patient to return to wound clinic to have re-wrapped by clinician. Change in one week or prn if needed.           Patient to be scheduled for outpatient surgery by Dr Ifeanyi Potter private office for removal of skin cancer with a skin graft to cover. Stop taking your Xarelto 3 days prior to your surgery. Should you experience increased redness, swelling, pain, foul odor, size of wound(s), or have a temperature over 101 degrees please contact the [address] [place] [address] Road at [phone] or if after hours contact your primary care physician or go to the hospital emergency department.     Signed By: Queen Scarlett     March 26, 2021

## 2021-03-26 NOTE — WOUND CARE
03/26/21 6459 Wound Leg Lower; Anterior 12/01/20 Date First Assessed/Time First Assessed: 12/01/20 0902   Present on Hospital Admission: Yes  Wound Approximate Age at First Assessment (Weeks): 24 weeks  Primary Wound Type: (c) Other (comment)  Location: Leg  Wound Location Orientation: Lower; Anterio. .. Wound Image Wound Etiology Venous Dressing Status Clean;Dry; Intact Cleansed Cleansed with saline Dressing/Treatment  
(surgicel, gauze) Wound Length (cm) 2 cm Wound Width (cm) 1.8 cm Wound Depth (cm) 0.1 cm Wound Surface Area (cm^2) 3.6 cm^2 Change in Wound Size % -300 Wound Volume (cm^3) 0.36 cm^3 Wound Healing % -300 Wound Assessment [facility] Drainage Amount Small Drainage Description Serosanguinous Wound Odor None Smitha-Wound/Incision Assessment Hemosiderin staining (brown yellow) Wound Thickness Description Full thickness Patient is on an anti coagulant daily xarelto.

## 2021-04-02 ENCOUNTER — HOSPITAL ENCOUNTER (OUTPATIENT)
Dept: WOUND CARE | Age: 81
Discharge: HOME | End: 2021-04-02
Attending: SURGERY
Payer: MEDICARE

## 2021-04-02 VITALS
TEMPERATURE: 96.2 F | HEART RATE: 87 BPM | WEIGHT: 177 LBS | OXYGEN SATURATION: 100 % | BODY MASS INDEX: 28.45 KG/M2 | SYSTOLIC BLOOD PRESSURE: 121 MMHG | HEIGHT: 66 IN | RESPIRATION RATE: 18 BRPM | DIASTOLIC BLOOD PRESSURE: 79 MMHG

## 2021-04-02 PROCEDURE — 29581 APPL MULTLAYER CMPRN SYS LEG: CPT

## 2021-04-02 NOTE — DISCHARGE INSTRUCTIONS
[address]  [address], [address]  Phone: [phone]  Fax: [phone]    Patient: Shreyas Staton MRN: [id number]  SSN: [id number]    YOB: 1940  Age: 78 y.o. Sex: male       Return Appointment: 2 weeks with Arpita Watson MD    Instructions: Left anterior lower leg  Cleanse wound and periwound with wound cleanser or normal saline. Hydrofera Blue: Cut to wound size, moisten with saline, and apply to wound bed. Cover with ABD pad  2 layer compression with wound and lower extremity assessment. If there is any problem with the dressing (too tight, slides down, etc.) Patient to return to wound clinic to have re-wrapped by clinician. Patient is scheduled for outpatient surgery by Dr Aileen Ford on Tuesday, 4/6/21 for removal of skin cancer with a skin graft to cover pending cardiac clearance. Stop taking your Xarelto 3 days prior to your surgery. Should you experience increased redness, swelling, pain, foul odor, size of wound(s), or have a temperature over 101 degrees please contact the [address] [place] [address] Road at [phone] or if after hours contact your primary care physician or go to the hospital emergency department.     Signed By: Sandy Enriquez PT, [facility]     April 2, 2021

## 2021-04-02 NOTE — WOUND CARE
Multilayer Compression Wrap 
 (Not Unna) Below the Knee NAME:  Gaurang Centeno YOB: 1940 MEDICAL RECORD NUMBER:  [id number] DATE:  4/2/2021 Removed old Multilayer wrap if indicated and wash leg with mild soap/water. Applied primary and secondary dressing as ordered. Applied multilayered dressing below the knee to left lower leg. Instructed patient/caregiver not to remove dressing and to keep it clean and dry. Instructed patient/caregiver on complications to report to provider, such as pain, numbness in toes, heavy drainage, and slippage of dressing. Instructed patient on purpose of compression dressing and on activity and exercise recommendations. Response to treatment: Well tolerated by patient Electronically signed by Mazin Lake PT, [address] [place] [address],[address] on 4/2/2021 at 10:10 AM

## 2021-04-05 ENCOUNTER — ANESTHESIA EVENT (OUTPATIENT)
Dept: SURGERY | Age: 81
End: 2021-04-05
Payer: MEDICARE

## 2021-04-06 ENCOUNTER — HOSPITAL ENCOUNTER (OUTPATIENT)
Age: 81
Setting detail: OUTPATIENT SURGERY
Discharge: HOME | End: 2021-04-06
Attending: SURGERY | Admitting: SURGERY
Payer: MEDICARE

## 2021-04-06 ENCOUNTER — ANESTHESIA (OUTPATIENT)
Dept: SURGERY | Age: 81
End: 2021-04-06
Payer: MEDICARE

## 2021-04-06 VITALS
RESPIRATION RATE: 16 BRPM | HEIGHT: 64 IN | SYSTOLIC BLOOD PRESSURE: 158 MMHG | TEMPERATURE: 98.3 F | DIASTOLIC BLOOD PRESSURE: 79 MMHG | OXYGEN SATURATION: 98 % | HEART RATE: 73 BPM | WEIGHT: 155 LBS | BODY MASS INDEX: 26.46 KG/M2

## 2021-04-06 DIAGNOSIS — L76.82 PAIN AT SURGICAL INCISION: Primary | ICD-10-CM

## 2021-04-06 PROCEDURE — 77030002996 HC SUT SLK J&J -A: Performed by: SURGERY

## 2021-04-06 PROCEDURE — 77030008462 HC STPLR SKN PROX J&J -A: Performed by: SURGERY

## 2021-04-06 PROCEDURE — 77030018673: Performed by: SURGERY

## 2021-04-06 PROCEDURE — 77030013629 HC ELECTRD NDL STRY -B: Performed by: SURGERY

## 2021-04-06 PROCEDURE — 77030025869: Performed by: SURGERY

## 2021-04-06 PROCEDURE — 74011250636 HC RX REV CODE- 250/636: Performed by: NURSE ANESTHETIST, CERTIFIED REGISTERED

## 2021-04-06 PROCEDURE — 74011250636 HC RX REV CODE- 250/636: Performed by: SURGERY

## 2021-04-06 PROCEDURE — 76210000021 HC REC RM PH II 0.5 TO 1 HR: Performed by: SURGERY

## 2021-04-06 PROCEDURE — 2709999900 HC NON-CHARGEABLE SUPPLY: Performed by: SURGERY

## 2021-04-06 PROCEDURE — 77030003666 HC NDL SPINAL BD -A: Performed by: SURGERY

## 2021-04-06 PROCEDURE — 76210000063 HC OR PH I REC FIRST 0.5 HR: Performed by: SURGERY

## 2021-04-06 PROCEDURE — 74011000250 HC RX REV CODE- 250: Performed by: SURGERY

## 2021-04-06 PROCEDURE — 74011000250 HC RX REV CODE- 250: Performed by: NURSE ANESTHETIST, CERTIFIED REGISTERED

## 2021-04-06 PROCEDURE — 77030006627 HC BLD GRFT DRMTO ZIMM -B: Performed by: SURGERY

## 2021-04-06 PROCEDURE — 74011250636 HC RX REV CODE- 250/636: Performed by: ANESTHESIOLOGY

## 2021-04-06 PROCEDURE — 77030002986 HC SUT PROL J&J -A: Performed by: SURGERY

## 2021-04-06 PROCEDURE — 76060000033 HC ANESTHESIA 1 TO 1.5 HR: Performed by: SURGERY

## 2021-04-06 PROCEDURE — 76010000138 HC OR TIME 0.5 TO 1 HR: Performed by: SURGERY

## 2021-04-06 PROCEDURE — 88305 TISSUE EXAM BY PATHOLOGIST: CPT

## 2021-04-06 PROCEDURE — 77030031139 HC SUT VCRL2 J&J -A: Performed by: SURGERY

## 2021-04-06 RX ORDER — PROPOFOL 10 MG/ML
INJECTION, EMULSION INTRAVENOUS AS NEEDED
Status: DISCONTINUED | OUTPATIENT
Start: 2021-04-06 | End: 2021-04-06 | Stop reason: HOSPADM

## 2021-04-06 RX ORDER — SODIUM CHLORIDE, SODIUM LACTATE, POTASSIUM CHLORIDE, CALCIUM CHLORIDE 600; 310; 30; 20 MG/100ML; MG/100ML; MG/100ML; MG/100ML
75 INJECTION, SOLUTION INTRAVENOUS CONTINUOUS
Status: DISCONTINUED | OUTPATIENT
Start: 2021-04-06 | End: 2021-04-06 | Stop reason: HOSPADM

## 2021-04-06 RX ORDER — OXYCODONE HYDROCHLORIDE 5 MG/1
5 TABLET ORAL
Status: DISCONTINUED | OUTPATIENT
Start: 2021-04-06 | End: 2021-04-06 | Stop reason: HOSPADM

## 2021-04-06 RX ORDER — FENTANYL CITRATE 50 UG/ML
100 INJECTION, SOLUTION INTRAMUSCULAR; INTRAVENOUS ONCE
Status: DISCONTINUED | OUTPATIENT
Start: 2021-04-06 | End: 2021-04-06 | Stop reason: HOSPADM

## 2021-04-06 RX ORDER — HYDROCODONE BITARTRATE AND ACETAMINOPHEN 5; 325 MG/1; MG/1
2 TABLET ORAL AS NEEDED
Status: DISCONTINUED | OUTPATIENT
Start: 2021-04-06 | End: 2021-04-06 | Stop reason: HOSPADM

## 2021-04-06 RX ORDER — CEFAZOLIN SODIUM/WATER 2 G/20 ML
2 SYRINGE (ML) INTRAVENOUS ONCE
Status: COMPLETED | OUTPATIENT
Start: 2021-04-06 | End: 2021-04-06

## 2021-04-06 RX ORDER — MIDAZOLAM HYDROCHLORIDE 1 MG/ML
2 INJECTION, SOLUTION INTRAMUSCULAR; INTRAVENOUS
Status: DISCONTINUED | OUTPATIENT
Start: 2021-04-06 | End: 2021-04-06 | Stop reason: HOSPADM

## 2021-04-06 RX ORDER — HYDROCODONE BITARTRATE AND ACETAMINOPHEN 5; 325 MG/1; MG/1
1 TABLET ORAL
Qty: 20 TAB | Refills: 0 | Status: SHIPPED | OUTPATIENT
Start: 2021-04-06 | End: 2021-04-09

## 2021-04-06 RX ORDER — HYDROMORPHONE HYDROCHLORIDE 1 MG/ML
0.5 INJECTION, SOLUTION INTRAMUSCULAR; INTRAVENOUS; SUBCUTANEOUS
Status: DISCONTINUED | OUTPATIENT
Start: 2021-04-06 | End: 2021-04-06 | Stop reason: HOSPADM

## 2021-04-06 RX ORDER — LIDOCAINE HYDROCHLORIDE 20 MG/ML
INJECTION, SOLUTION EPIDURAL; INFILTRATION; INTRACAUDAL; PERINEURAL AS NEEDED
Status: DISCONTINUED | OUTPATIENT
Start: 2021-04-06 | End: 2021-04-06 | Stop reason: HOSPADM

## 2021-04-06 RX ORDER — LIDOCAINE HYDROCHLORIDE 10 MG/ML
0.1 INJECTION INFILTRATION; PERINEURAL AS NEEDED
Status: DISCONTINUED | OUTPATIENT
Start: 2021-04-06 | End: 2021-04-06 | Stop reason: HOSPADM

## 2021-04-06 RX ORDER — BUPIVACAINE HYDROCHLORIDE AND EPINEPHRINE 2.5; 5 MG/ML; UG/ML
INJECTION, SOLUTION EPIDURAL; INFILTRATION; INTRACAUDAL; PERINEURAL AS NEEDED
Status: DISCONTINUED | OUTPATIENT
Start: 2021-04-06 | End: 2021-04-06 | Stop reason: HOSPADM

## 2021-04-06 RX ORDER — LIDOCAINE HYDROCHLORIDE AND EPINEPHRINE 10; 10 MG/ML; UG/ML
INJECTION, SOLUTION INFILTRATION; PERINEURAL AS NEEDED
Status: DISCONTINUED | OUTPATIENT
Start: 2021-04-06 | End: 2021-04-06 | Stop reason: HOSPADM

## 2021-04-06 RX ORDER — PROPOFOL 10 MG/ML
INJECTION, EMULSION INTRAVENOUS
Status: DISCONTINUED | OUTPATIENT
Start: 2021-04-06 | End: 2021-04-06 | Stop reason: HOSPADM

## 2021-04-06 RX ADMIN — SODIUM CHLORIDE, SODIUM LACTATE, POTASSIUM CHLORIDE, AND CALCIUM CHLORIDE 75 ML/HR: 600; 310; 30; 20 INJECTION, SOLUTION INTRAVENOUS at 10:46

## 2021-04-06 RX ADMIN — LIDOCAINE HYDROCHLORIDE 20 MG: 20 INJECTION, SOLUTION EPIDURAL; INFILTRATION; INTRACAUDAL; PERINEURAL at 13:42

## 2021-04-06 RX ADMIN — PROPOFOL 50 MCG/KG/MIN: 10 INJECTION, EMULSION INTRAVENOUS at 13:48

## 2021-04-06 RX ADMIN — CEFAZOLIN 2 G: 1 INJECTION, POWDER, FOR SOLUTION INTRAVENOUS at 13:35

## 2021-04-06 RX ADMIN — PROPOFOL 30 MG: 10 INJECTION, EMULSION INTRAVENOUS at 13:42

## 2021-04-06 RX ADMIN — PROPOFOL 40 MG: 10 INJECTION, EMULSION INTRAVENOUS at 13:44

## 2021-04-06 NOTE — ANESTHESIA POSTPROCEDURE EVALUATION
Procedure(s):  LEFT LOWER LEG WIDE EXCISION OF BASAL CELL CARCINOMA/  SPLIT THICKNESS SKIN  GRAFT FROM LEFT THIGH TO LEFT LOWER LEG. general    Anesthesia Post Evaluation      Multimodal analgesia: multimodal analgesia not used between 6 hours prior to anesthesia start to PACU discharge  Patient location during evaluation: PACU  Patient participation: complete - patient participated  Level of consciousness: awake and alert  Pain score: 0  Pain management: adequate  Airway patency: patent  Anesthetic complications: no  Cardiovascular status: acceptable and hemodynamically stable  Respiratory status: acceptable and spontaneous ventilation  Hydration status: acceptable  Post anesthesia nausea and vomiting:  none  Final Post Anesthesia Temperature Assessment:  Normothermia (36.0-37.5 degrees C)      INITIAL Post-op Vital signs:   Vitals Value Taken Time   /72 04/06/21 1440   Temp 36.8 °C (98.3 °F) 04/06/21 1436   Pulse 71 04/06/21 1442   Resp 16 04/06/21 1440   SpO2 98 % 04/06/21 1442   Vitals shown include unvalidated device data.

## 2021-04-06 NOTE — DISCHARGE INSTRUCTIONS
Keep dressings in place and dry until return to office. Keep left leg elevated whenever possible. ACTIVITY  · As tolerated and as directed by your doctor. · Bathe or shower as directed by your doctor. DIET  · Clear liquids until no nausea or vomiting; then light diet for the first day. · Advance to regular diet on second day, unless your doctor orders otherwise. · If nausea and vomiting continues, call your doctor. PAIN  · Take pain medication as directed by your doctor. · Call your doctor if pain is NOT relieved by medication. · DO NOT take aspirin of blood thinners unless directed by your doctor. CALL YOUR DOCTOR IF   · Excessive bleeding that does not stop after holding pressure over the area  · Temperature of 101 degrees F or above  · Excessive redness, swelling or bruising, and/ or green or yellow, smelly discharge from incision    AFTER ANESTHESIA   · For the first 24 hours: DO NOT Drive, Drink alcoholic beverages, or Make important decisions. · Be aware of dizziness following anesthesia and while taking pain medication. DISCHARGE SUMMARY from Nurse    PATIENT INSTRUCTIONS:    After general anesthesia or intravenous sedation, for 24 hours or while taking prescription Narcotics:  · Limit your activities  · Do not drive and operate hazardous machinery  · Do not make important personal or business decisions  · Do  not drink alcoholic beverages  · If you have not urinated within 8 hours after discharge, please contact your surgeon on call. *  Please give a list of your current medications to your Primary Care Provider. *  Please update this list whenever your medications are discontinued, doses are      changed, or new medications (including over-the-counter products) are added. *  Please carry medication information at all times in case of emergency situations.       These are general instructions for a healthy lifestyle:    No smoking/ No tobacco products/ Avoid exposure to second hand smoke    Surgeon General's Warning:  Quitting smoking now greatly reduces serious risk to your health. Obesity, smoking, and sedentary lifestyle greatly increases your risk for illness    A healthy diet, regular physical exercise & weight monitoring are important for maintaining a healthy lifestyle    You may be retaining fluid if you have a history of heart failure or if you experience any of the following symptoms:  Weight gain of 3 pounds or more overnight or 5 pounds in a week, increased swelling in our hands or feet or shortness of breath while lying flat in bed. Please call your doctor as soon as you notice any of these symptoms; do not wait until your next office visit. Recognize signs and symptoms of STROKE:    F-face looks uneven    A-arms unable to move or move unevenly    S-speech slurred or non-existent    T-time-call 911 as soon as signs and symptoms begin-DO NOT go       Back to bed or wait to see if you get better-TIME IS BRAIN. Learning About COVID-19 and Social Distancing  What is it? Social distancing means putting space between yourself and other people. The recommended distance is 6 feet, or about 2 meters. This also means staying away from any place where people may gather, such as amador or other public gathering places. Why is it important? Social distancing is the best way to reduce the spread of COVID-19. This virus seems to spread from person to person through droplets from coughing and sneezing. So if you keep your distance from others, you're less likely to get it or spread it. And social distancing is important for everyone, not just those who are at high risk of infection, like older people. You might have the virus but not have symptoms. You could then give the infection to someone you come into contact with. How is it done? Putting 6 feet, or about 2 meters, between you and other people is the recommended distance.  Also stay away from any place where people may gather, such as amador or other public gathering places. So if possible:  · Work from home, and keep your kids at home. · Don't travel if you don't have to. And avoid public transportation, ride-shares, and taxis unless you have no choice. · Limit shopping to essentials, like food and medicines. · Wear a cloth face cover if you have to go to a public place like the grocery store or pharmacy. · Don't eat in restaurants. (You can still get takeout or food deliveries.)  · Avoid crowds and busy places. Follow stay-at-home orders or other directions for your area. Where can you learn more? Go to http://www.gray.com/  Enter A133 in the search box to learn more about \"Learning About COVID-19 and Social Distancing. \"  Current as of: December 18, 2020               Content Version: 12.8  © 7236-1949 [employer], [facility]. Care instructions adapted under license by [employer] (which disclaims liability or warranty for this information). If you have questions about a medical condition or this instruction, always ask your healthcare professional. [place] [address] any warranty or liability for your use of this information.

## 2021-04-06 NOTE — ANESTHESIA PREPROCEDURE EVALUATION
Anesthetic History     PONV          Review of Systems / Medical History  Patient summary reviewed, nursing notes reviewed and pertinent labs reviewed    Pulmonary  Within defined limits                 Neuro/Psych   Within defined limits           Cardiovascular    Hypertension: well controlled              Exercise tolerance: >4 METS  Comments: Hx of PE on Xeralto   GI/Hepatic/Renal     GERD: well controlled    Renal disease: CRI       Endo/Other        Arthritis     Other Findings              Physical Exam    Airway  Mallampati: III    Neck ROM: normal range of motion   Mouth opening: Normal     Cardiovascular  Regular rate and rhythm,  S1 and S2 normal,  no murmur, click, rub, or gallop             Dental  No notable dental hx       Pulmonary  Breath sounds clear to auscultation               Abdominal         Other Findings            Anesthetic Plan    ASA: 3  Anesthesia type: total IV anesthesia          Induction: Intravenous  Anesthetic plan and risks discussed with: Patient and Spouse

## 2021-04-06 NOTE — PERIOP NOTES
DC instructions reviewed, including reviewing the complete Prevena 125 booklet with pt and his wife and going over the alarms,cannister, clamps& showing them how to charge unit, helped pt dress and wearing lanyard to secure wound vac.

## 2021-04-13 NOTE — OP NOTES
Operative Note    Patient: Isa Anderson  YOB: 1940  MRN: [id number]    Date of Procedure: 4/6/2021     Pre-Op Diagnosis: Basal cell carcinoma (BCC) left lower leg 3 x 4 cm    Post-Op Diagnosis: Same    Procedure(s):  LEFT LOWER LEG WIDE EXCISION OF BASAL CELL CARCINOM 3 x 4 cm  SPLIT THICKNESS SKIN  GRAFT FROM LEFT THIGH TO LEFT LOWER LEG 3 x 4 cm      Prior to the procedure the patient was met in holding. Once again a discussion of the risks, benefits and alternatives was conducted including but not limited to bleeding, infection, failure of the surgery, need for further procedures, disappointing or unacceptable cosmesis, damage to adjacent structures was conducted. The patient again affirmed understanding and consent. The patient was marked in the upright and relaxed position. Patient brought to the OR, surgical timeout performed and anesthesia induced. Patient positioned and prepped and draped.     Attention turned to left chest. This was irrigated with scrub brush and 1 NS after tangential excision with SG blad and 12 blade. Healthy bleeding tissue encountered with no sign of infection. Epi soaked gauze placed on wound.     Attention then turned to donor site. Wound dimensions transferred and thigh tumesced with 100 mls kleins. After 10 minutes had elapsed pneumatic dermatome used to harvest skin graft at 11/1000 thickness.      Grafts then meshed 1:1.5. Epi soaked gauze placed on the thigh. Attention turned to chest and hemostasis obtained with bovie. Graft placed, trimmed and secured with staples. Xeroform interface layer placed and wound vac placed with ioban over this. Thigh dressed with xeroform, abd, tape.            Surgeon(s):  Amaya Messina MD    Surgical Assistant: Surg Asst-1: Babak Tamayo    Anesthesia: General     Estimated Blood Loss (mL): Minimal    Complications: None immediate.     Specimens:   ID Type Source Tests Collected by Time Destination   1 : basal cell carcinoma left leg Preservative Leg, Left  Moister, Zach Gann MD 4/6/2021 1400 Pathology

## 2021-04-16 ENCOUNTER — HOSPITAL ENCOUNTER (OUTPATIENT)
Dept: WOUND CARE | Age: 81
Discharge: HOME | End: 2021-04-16
Attending: SURGERY
Payer: MEDICARE

## 2021-04-16 VITALS
HEART RATE: 86 BPM | RESPIRATION RATE: 18 BRPM | BODY MASS INDEX: 29.41 KG/M2 | OXYGEN SATURATION: 98 % | WEIGHT: 183 LBS | HEIGHT: 66 IN | SYSTOLIC BLOOD PRESSURE: 150 MMHG | DIASTOLIC BLOOD PRESSURE: 80 MMHG | TEMPERATURE: 97.9 F

## 2021-04-16 PROCEDURE — 29580 STRAPPING UNNA BOOT: CPT

## 2021-04-16 NOTE — DISCHARGE INSTRUCTIONS
[address]  [address], [address]  Phone: [phone]  Fax: [phone]    Patient: Brody Andujar MRN: [id number]  SSN: [id number]    YOB: 1940  Age: 78 y.o. Sex: male       Return Appointment: 1 week with Nerissa Edge MD    Instructions: Left leg  Cleanse wound and periwound with wound cleanser or normal saline. Apply Xeroform- apply to wound bed. .  Secure with abd  Unna boot with wound and lower extremity assessment. (Calamine Co Flex TLC) If there is any problem with the dressing (too tight, slides down,, etc.)  Patient to return to clinic to have re-wrapped by clinician. Change in one week or prn if needed. Should you experience increased redness, swelling, pain, foul odor, size of wound(s), or have a temperature over 101 degrees please contact the [address] [place] [address] Road at [phone] or if after hours contact your primary care physician or go to the hospital emergency department.     Signed By: Domenica Murphy RN     April 16, 2021

## 2021-04-16 NOTE — WOUND CARE
[employer] Below Knee NAME:  Shreyas Staton YOB: 1940 MEDICAL RECORD NUMBER:  [id number] DATE:  4/16/2021 Appied primary and secondary dressing as ordered. Applied Unna roll from toes to knee overlapping each time. Applied ace wrap or coban from toes to below the knee. Secured with tape and/or metal clips covered with tape. Instructed patient/caregiver to keep dressing dry and intact. DO NOT REMOVE DRESSING. Instructed pt/family/caregiver to report excessive draining, loose bandage, wet dressing, severe pain or tingling in toes. Applied [employer] dressing below the knee to left lower leg. Unna Boot(s) were applied per  Guidelines. Response to treatment: Well tolerated by patient  Electronically signed by Marshal Luo RN on 4/16/2021 at 10:19 AM

## 2021-04-16 NOTE — WOUND CARE
04/16/21 1772 Wound Leg Lower; Anterior Post Skin Graft-4. 3.1242 12/01/20 Date First Assessed/Time First Assessed: 12/01/20 0902   Present on Hospital Admission: Yes  Wound Approximate Age at First Assessment (Weeks): 24 weeks  Primary Wound Type: (c) Other (comment)  Location: Leg  Wound Location Orientation: Lower; Anterio. .. Wound Image Wound Etiology Surgical  
Dressing Status Old drainage noted Dressing/Treatment  
(Xeroform, ABD, Kerlix, Coban) Wound Length (cm) 3.6 cm Wound Width (cm) 1.5 cm Wound Depth (cm) 0.2 cm Wound Surface Area (cm^2) 5.4 cm^2 Change in Wound Size % -500 Wound Volume (cm^3) 1.08 cm^3 Wound Healing % -1100 Wound Assessment Graft Drainage Amount Moderate Drainage Description Serosanguinous Wound Odor None Smitha-Wound/Incision Assessment Hemosiderin staining (brown yellow) Wound Thickness Description Full thickness Patient takes Xarelto Daily

## 2021-04-16 NOTE — WOUND CARE
[address] [place] [address] [address] Phone: [phone] Fax: [phone] Patient: Yobani Gruber MRN: [id number]  SSN: [id number] YOB: 1940  Age: 78 y.o. Sex: male Return Appointment: 1 week with Lexy Andrade MD 
 
Instructions: Left leg Cleanse wound and periwound with wound cleanser or normal saline. Apply Xeroform- apply to wound bed. Alvarado Prost Secure with abd Talita boot with wound and lower extremity assessment. (Calamine Co Flex TLC) If there is any problem with the dressing (too tight, slides down,, etc.)  Patient to return to clinic to have re-wrapped by clinician. Change in one week or prn if needed. Should you experience increased redness, swelling, pain, foul odor, size of wound(s), or have a temperature over 101 degrees please contact the [address] [place] [address] Road at [phone] or if after hours contact your primary care physician or go to the hospital emergency department. Signed By: Rodriguez Jerez RN April 16, 2021

## 2021-04-23 ENCOUNTER — HOSPITAL ENCOUNTER (OUTPATIENT)
Dept: WOUND CARE | Age: 81
Discharge: HOME | End: 2021-04-23
Attending: SURGERY
Payer: MEDICARE

## 2021-04-23 VITALS
DIASTOLIC BLOOD PRESSURE: 80 MMHG | TEMPERATURE: 98.3 F | OXYGEN SATURATION: 96 % | SYSTOLIC BLOOD PRESSURE: 159 MMHG | BODY MASS INDEX: 28.93 KG/M2 | HEIGHT: 66 IN | HEART RATE: 77 BPM | RESPIRATION RATE: 18 BRPM | WEIGHT: 180 LBS

## 2021-04-23 PROCEDURE — 99213 OFFICE O/P EST LOW 20 MIN: CPT

## 2021-04-23 NOTE — DISCHARGE INSTRUCTIONS
[address]  [address], [address]  Phone: [phone]  Fax: [phone]    Patient: Christin Bravo MRN: [id number]  SSN: [id number]    YOB: 1940  Age: 78 y.o. Sex: male       Return Appointment: 2 weeks with Joyce Kussmaul, MD    Instructions: Left anterior lower leg  Patient may shower without wound covered. Xeroform- apply to wound bed. Secure with ABD  Secure with tubigrip. Change dressing daily. Return to wound center in 2 weeks    Should you experience increased redness, swelling, pain, foul odor, size of wound(s), or have a temperature over 101 degrees please contact the [address] [place] [address] Road at [phone] or if after hours contact your primary care physician or go to the hospital emergency department.     Signed By: Elmer Ramon PT, [facility]     April 23, 2021

## 2021-04-23 NOTE — PROGRESS NOTES
04/23/21 1544   Wound Leg Lower; Anterior Post Skin Graft-4. 6.2021 12/01/20   Date First Assessed/Time First Assessed: 12/01/20 0902   Present on Hospital Admission: Yes  Wound Approximate Age at First Assessment (Weeks): 24 weeks  Primary Wound Type: (c) Other (comment)  Location: Leg  Wound Location Orientation: Lower; Anterio. .. Wound Image    Wound Etiology Surgical   Dressing Status Old drainage noted   Cleansed Soap and water   Dressing/Treatment   (Xeroform, ABD, tubigrip)   Wound Length (cm) 3.5 cm   Wound Width (cm) 1.5 cm   Wound Depth (cm) 0.2 cm   Wound Surface Area (cm^2) 5.25 cm^2   Change in Wound Size % -483.33   Wound Volume (cm^3) 1.05 cm^3   Wound Healing % -1067   Wound Assessment Graft   Drainage Amount Moderate   Drainage Description Serosanguinous   Wound Odor None   Wound Thickness Description Full thickness   Patient is currently taking Xarelto.

## 2021-04-23 NOTE — WOUND CARE
[address] [place] [address], [address] Phone: [phone] Fax: [phone] Patient: Yakov Berman MRN: [id number]  SSN: [id number] YOB: 1940  Age: 78 y.o. Sex: male Return Appointment: 2 weeks with Donal Miguel MD 
 
Instructions: Left anterior lower leg Patient may shower without wound covered. Xeroform- apply to wound bed. Secure with ABD Secure with tubigrip. Change dressing daily. Return to wound center in 2 weeks. Should you experience increased redness, swelling, pain, foul odor, size of wound(s), or have a temperature over 101 degrees please contact the [address] [place] [address] Road at [phone] or if after hours contact your primary care physician or go to the hospital emergency department. Signed By: Rasheed Winn PT, [facility] April 23, 2021

## 2021-05-07 ENCOUNTER — HOSPITAL ENCOUNTER (OUTPATIENT)
Dept: WOUND CARE | Age: 81
Discharge: HOME | End: 2021-05-07
Attending: SURGERY

## 2021-05-07 VITALS
HEIGHT: 66 IN | BODY MASS INDEX: 28.77 KG/M2 | DIASTOLIC BLOOD PRESSURE: 72 MMHG | HEART RATE: 83 BPM | OXYGEN SATURATION: 98 % | WEIGHT: 179 LBS | RESPIRATION RATE: 20 BRPM | TEMPERATURE: 96.9 F | SYSTOLIC BLOOD PRESSURE: 135 MMHG

## 2021-05-07 NOTE — WOUND CARE
[address] [place] [address], [address] Phone: [phone] Fax: [phone] Patient: Evaristo Everett MRN: [id number]  SSN: [id number] YOB: 1940  Age: 78 y.o. Sex: male Return Appointment: 2 weeks with Ben Aguilar MD 
 
Instructions: Left anterior lower leg Patient may shower without wound covered. Apply Hydrofera Ready: Cut to wound size, place in wound bed, shiny side out. Nicholes Coca Secure with ABD Secure with tubigrip. Change dressing every other day. Should you experience increased redness, swelling, pain, foul odor, size of wound(s), or have a temperature over 101 degrees please contact the [address] [place] [address] Road at [phone] or if after hours contact your primary care physician or go to the hospital emergency department. Signed By: Violet Thomas RN May 7, 2021

## 2021-05-07 NOTE — DISCHARGE INSTRUCTIONS
[address]  [address], [address]  Phone: [phone]  Fax: [phone]    Patient: Yobani Gruber MRN: [id number]  SSN: [id number]    YOB: 1940  Age: 78 y.o. Sex: male       Return Appointment: 2 weeks with Lexy Andrade MD    Instructions: Left anterior lower leg  Patient may shower without wound covered. Apply Hydrofera Ready: Cut to wound size, place in wound bed, shiny side out. .  Secure with ABD  Secure with tubigrip. Change dressing every other day. Should you experience increased redness, swelling, pain, foul odor, size of wound(s), or have a temperature over 101 degrees please contact the [address] [place] [address] Road at [phone] or if after hours contact your primary care physician or go to the hospital emergency department.     Signed By: Rodriguez Jerez RN     May 7, 2021 , .

## 2021-05-07 NOTE — WOUND CARE
05/07/21 1023 Wound Leg Lower; Anterior Post Skin Graft-4. 8.2238 12/01/20 Date First Assessed/Time First Assessed: 12/01/20 0902   Present on Hospital Admission: Yes  Wound Approximate Age at First Assessment (Weeks): 24 weeks  Primary Wound Type: (c) Other (comment)  Location: Leg  Wound Location Orientation: Lower; Anterio. .. Wound Image Wound Etiology Surgical  
Dressing Status Old drainage noted Cleansed Cleansed with saline Dressing/Treatment  
(Xeroform, Telfa) Wound Length (cm) 2.4 cm Wound Width (cm) 1.1 cm Wound Depth (cm) 0.2 cm Wound Surface Area (cm^2) 2.64 cm^2 Change in Wound Size % -193.33 Wound Volume (cm^3) 0.53 cm^3 Wound Healing % -489 Wound Assessment Graft Drainage Amount Moderate Drainage Description Serosanguinous Wound Odor None Smitha-Wound/Incision Assessment Hemosiderin staining (brown yellow) Wound Thickness Description Full thickness Patient takes Xarelto Daily

## 2021-05-21 ENCOUNTER — HOSPITAL ENCOUNTER (OUTPATIENT)
Dept: WOUND CARE | Age: 81
Discharge: HOME | End: 2021-05-21
Attending: SURGERY

## 2021-05-21 VITALS
RESPIRATION RATE: 18 BRPM | OXYGEN SATURATION: 93 % | HEIGHT: 66 IN | HEART RATE: 82 BPM | SYSTOLIC BLOOD PRESSURE: 143 MMHG | WEIGHT: 178 LBS | BODY MASS INDEX: 28.61 KG/M2 | TEMPERATURE: 97.7 F | DIASTOLIC BLOOD PRESSURE: 77 MMHG

## 2021-05-21 NOTE — DISCHARGE INSTRUCTIONS
[address]  [address], [address]   Phone: [phone]  Fax: [phone]    Patient: Mika Johns MRN: [id number]  SSN: [id number]    YOB: 1940  Age: 78 y.o. Sex: male       Return Appointment: Discharge with Mirlande Diaz MD    Instructions: Discharge from [facility]ound [facility]aling Center[facility]    Continue to Follow Up with Dermatologist    Should you experience increased redness, swelling, pain, foul odor, size of wound(s), or have a temperature over 101 degrees please contact the [address] [place] [address] Road at [phone] or if after hours contact your primary care physician or go to the hospital emergency department.     Signed By: Kash Sargent RN     May 21, 2021

## 2021-05-21 NOTE — WOUND CARE
05/21/21 1106 Wound Leg Lower; Anterior Post Skin Graft-4. 9.6619 12/01/20 Date First Assessed/Time First Assessed: 12/01/20 0902   Present on Hospital Admission: Yes  Wound Approximate Age at First Assessment (Weeks): 24 weeks  Primary Wound Type: (c) Other (comment)  Location: Leg  Wound Location Orientation: Lower; Anterio. .. Wound Image Wound Etiology Surgical  
Dressing Status Clean;Dry; Intact Cleansed Cleansed with saline Dressing/Treatment  
(Hydrofera Ready, Bandaid) Wound Length (cm) 0 cm Wound Width (cm) 0 cm Wound Depth (cm) 0 cm Wound Surface Area (cm^2) 0 cm^2 Change in Wound Size % 100 Wound Volume (cm^3) 0 cm^3 Wound Healing % 100 Wound Assessment Graft Drainage Amount None Wound Odor None Smitha-Wound/Incision Assessment Hemosiderin staining (brown yellow) Wound Thickness Description Full thickness Patient takes Xarelto Daily

## 2021-05-21 NOTE — WOUND CARE
[address] [place] [address] [address], [address] Phone: [phone] Fax: [phone] Patient: Yasmin Blanton MRN: [id number]  SSN: [id number] YOB: 1940  Age: 78 y.o. Sex: male Return Appointment: Discharge with Ophelia Leger MD 
 
Instructions: Discharge from [facility]ound [facility]aling Center[facility] Continue to Follow Up with Dermatologist 
 
Should you experience increased redness, swelling, pain, foul odor, size of wound(s), or have a temperature over 101 degrees please contact the [address] [place] [address] Road at [phone] or if after hours contact your primary care physician or go to the hospital emergency department. Signed By: Zane Mcpherson RN May 21, 2021

## 2021-07-13 PROBLEM — R06.00 DYSPNEA: Status: ACTIVE | Noted: 2021-05-03

## 2021-07-13 PROBLEM — R94.39 ABNORMAL NUCLEAR STRESS TEST: Status: ACTIVE | Noted: 2021-05-03

## 2021-07-14 NOTE — PROGRESS NOTES
Wound Center Progress Note    Patient: Waldemar Granger MRN: [id number]  SSN: [id number]    YOB: 1940  Age: 80 y.o.  Sex: male      Subjective:     Chief Complaint:  LLE wound      History of Present Illness:       Wound Caused By: Trauma  Associated Signs and Symptoms: pain, drainage  Timing: Since Nov 2020  Quality: wound  Severity: Full thickness    Has been keeping simple cover on wound since occurred. No previous trauma. No previous difficulty with wounds. On Xarelto.    2/19/2021 Feels that wound is improving. Minimal pain. No bleeding. Stable on Xarelto.       Past Medical History:   Diagnosis Date   • Acute pulmonary embolism (HCC) 9/18/2018   • Arthritis     osteo   • Basal cell carcinoma     LLE   • Chronic pain     right shoulder   • Current use of long term anticoagulation     Xarelto   • Former smoker    • GERD (gastroesophageal reflux disease)     controlled with medication   • High cholesterol    • History of stomach cancer 2010   • Hypertension     controlled with medication   • Macular degeneration    • Nausea & vomiting     post-op nausea  states from morphine   • 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   • Unspecified adverse effect of anesthesia     wife states slow to wake      Past Surgical History:   Procedure Laterality Date   • HX APPENDECTOMY     • HX CHOLECYSTECTOMY  2015   • HX GI  2010    gastrectomy 2/8/10-has 30% stomach left   • HX KNEE ARTHROSCOPY Right    • HX OTHER SURGICAL      attempted tracheal dilation- stopped due to inflammation   • HX SHOULDER ARTHROSCOPY Right 2015   • NE TOTAL HIP ARTHROPLASTY Bilateral 2004   • NE TOTAL KNEE ARTHROPLASTY Right 2012     Family History   Problem Relation Age of Onset   • Kidney Disease Mother    • Dementia Father    • Alcohol abuse Brother    • Arthritis-osteo Brother       Social History     Tobacco Use   • Smoking status: Former Smoker     Packs/day: 0.50     Years: 5.00     Pack years: 2.50   • Smokeless  tobacco: Never Used    Tobacco comment: quit age 20; continuing cessation   Substance Use Topics    Alcohol use: Yes     Comment: 3 drinks per night; wine and liquor       Prior to Admission medications    Medication Sig Start Date End Date Taking? Authorizing Provider   enoxaparin (LOVENOX) 80 mg/0.8 mL injection 80 mg by SubCUTAneous route two (2) times a day. 5/3/21   Provider, Historical   vit C/vit E ac/lut/copper/zinc (PRESERVISION LUTEIN PO) Take  by mouth daily. Provider, Historical   rivaroxaban (Xarelto) 20 mg tab tablet Take 20 mg by mouth nightly. Provider, Historical   omeprazole (PRILOSEC) 20 mg capsule Take 20 mg by mouth Daily (before breakfast). Take DOS per anesthesia protocol. Indications: gastroesophageal reflux disease    Provider, Historical   zolpidem (AMBIEN) 10 mg tablet Take 10 mg by mouth nightly as needed for Sleep. Indications: SLEEP-ONSET INSOMNIA    Provider, Historical   cyanocobalamin (VITAMIN B-12) 1,000 mcg/mL injection 1,000 mcg by IntraMUSCular route every twenty-eight (28) days. Provider, Historical   simvastatin (ZOCOR) 40 mg tablet Take 40 mg by mouth nightly. 3/10/10   Provider, Historical   cetirizine (ZYRTEC) 10 mg tablet Take 10 mg by mouth nightly. Indications: ALLERGIC RHINITIS 2/2/10   Provider, Historical   fluticasone (FLONASE) 50 mcg/Actuation nasal spray 2 Sprays by Nasal route nightly. Indications: ALLERGIC RHINITIS    Provider, Historical     Allergies   Allergen Reactions    Morphine Nausea and Vomiting     Other reaction(s): Nausea and/or vomiting-Intolerance    Other Medication Nausea and Vomiting     Dust and pollen causes runny nose. Mollusks/clams- N/V    Other Plant, Animal, Environmental Nausea and Vomiting     Dust and pollen causes runny nose.   Mollusks/clams- N/V    Shellfish Derived Nausea and Vomiting    Clams Nausea and Vomiting        Review of Systems:  CONSTITUTIONAL: No fever, chills  HEAD: No headache  EYES: No visual loss  ENT: No hearing loss  SKIN: No rash  CARDIOVASCULAR: No chest pain  RESPIRATORY: No shortness of breath  GASTROINTESTINAL: No nausea, vomiting  GENITOURINARY: No excessive urination  NEUROLOGICAL: + weakness  MUSCULOSKELETAL: No muscle pain. No neck pain  HEMATOLOGIC: + easy bleeding  LYMPHATICS: No lymphedema. PSYCHIATRIC: No current depression  ENDOCRINOLOGIC: No high sugars  ALLERGIES: No history of asthma, hives, eczema or rhinitis. No results found for: HBA1C, UIO3CJLF, KNQ5LACD, WVY7YLAI, RHV3UHKS     Immunization History   Administered Date(s) Administered    COVID-19, PFIZER, MRNA, LNP-S, PF, 30MCG/0.3ML DOSE 01/26/2021, 02/12/2021    TB Skin Test (PPD) Intradermal 09/04/2018    Td 11/13/2019       Body mass index is 28.08 kg/m². Current medications:  Current Outpatient Medications   Medication Sig Dispense Refill    enoxaparin (LOVENOX) 80 mg/0.8 mL injection 80 mg by SubCUTAneous route two (2) times a day.  vit C/vit E ac/lut/copper/zinc (PRESERVISION LUTEIN PO) Take  by mouth daily.  rivaroxaban (Xarelto) 20 mg tab tablet Take 20 mg by mouth nightly.  omeprazole (PRILOSEC) 20 mg capsule Take 20 mg by mouth Daily (before breakfast). Take DOS per anesthesia protocol. Indications: gastroesophageal reflux disease      zolpidem (AMBIEN) 10 mg tablet Take 10 mg by mouth nightly as needed for Sleep. Indications: SLEEP-ONSET INSOMNIA      cyanocobalamin (VITAMIN B-12) 1,000 mcg/mL injection 1,000 mcg by IntraMUSCular route every twenty-eight (28) days.  simvastatin (ZOCOR) 40 mg tablet Take 40 mg by mouth nightly.  cetirizine (ZYRTEC) 10 mg tablet Take 10 mg by mouth nightly. Indications: ALLERGIC RHINITIS      fluticasone (FLONASE) 50 mcg/Actuation nasal spray 2 Sprays by Nasal route nightly.  Indications: ALLERGIC RHINITIS           Objective:     Physical Exam:     Visit Vitals  /71 (BP 1 Location: Left upper arm, BP Patient Position: At rest)   Pulse 89 Temp 98.2 °F (36.8 °C)   Ht 5' 6\" (1.676 m)   Wt 78.9 kg (174 lb)   BMI 28.08 kg/m²       General: thin elderly lady  Psych: cooperative. Pleasant  Neuro: alert and oriented to person/place/situation. Otherwise nonfocal.  Derm: Normal turgor for age, dry skin  HEENT: Normocephalic, atraumatic. EOMI. Neck: Normal range of motion.   Chest: Respirations nonlabored  Cardio: irreg irreg  Abdomen: Soft, nondistended  Lower extremities:   No hemosiderrosis  Some large and small vessel varicosities  Capillary refill <3 sec    Right  1+ DP/PT    Left  1+ DP/PT            Problem List  Date Reviewed: 9/4/2018        Codes Class Noted    Abnormal nuclear stress test ICD-10-CM: R94.39  ICD-9-CM: 794.39  5/3/2021    Overview Signed 7/13/2021  5:00 PM by Antonio Esparza of this note might be different from the original.  Added automatically from request for surgery [id number]             Dyspnea ICD-10-CM: R06.00  ICD-9-CM: 786.09  5/3/2021    Overview Signed 7/13/2021  5:00 PM by Antonio Esparza of this note might be different from the original.  Added automatically from request for surgery [id number]             Metabolic acidosis KYJ-[id number]: E87.2  ICD-9-CM: 276.2  2/7/2020        Acute deep vein thrombosis (DVT) of distal vein of left lower extremity ([facility] ICD-10-CM: W43.8V2  ICD-9-CM: 453.42  2/5/2020        JENNY (acute kidney injury) ([facility] ICD-10-CM: N17.9  ICD-9-CM: 584.9  2/5/2020        Belching ICD-10-CM: R14.2  ICD-9-CM: 787.3  12/31/2018        Dyslipidemia ICD-10-CM: E78.5  ICD-9-CM: 272.4  12/5/2018        Essential hypertension ICD-10-CM: I10  ICD-9-CM: 401.9  12/5/2018        History of tobacco abuse ICD-10-CM: I20.833  ICD-9-CM: V15.82  12/5/2018        Lower extremity edema ICD-10-CM: R60.0  ICD-9-CM: 782.3  12/5/2018        Shortness of breath ICD-10-CM: R06.02  ICD-9-CM: 786.05  12/5/2018        Acute pulmonary embolus ([facility] ICD-10-CM: I26.99  ICD-9-CM: 415.19  9/18/2018 Acute respiratory failure with hypoxemia (HCC) ICD-10-CM: J96.01  ICD-9-CM: 518.81  9/18/2018        Arthritis of right hip ICD-10-CM: M16.11  ICD-9-CM: 716.95  9/4/2018        CKD (chronic kidney disease) stage 3, GFR 30-59 ml/min (HCC) ICD-10-CM: N18.30  ICD-9-CM: 585.3  8/21/2018        Anorexia ICD-10-CM: R63.0  ICD-9-CM: 783.0  11/3/2017        Malignant neoplasm of stomach (HCC) ICD-10-CM: C16.9  ICD-9-CM: 151.9  5/20/2016        Sialorrhea ICD-10-CM: K11.7  ICD-9-CM: 527.7  5/20/2016        Sinusitis, maxillary, chronic ICD-10-CM: J32.0  ICD-9-CM: 473.0  5/20/2016        Dysphagia ICD-10-CM: R13.10  ICD-9-CM: 787.20  4/19/2016        S/P laparoscopic cholecystectomy ICD-10-CM: Z90.49  ICD-9-CM: V45.89  8/3/2015        Cholecystitis, acute ICD-10-CM: K81.0  ICD-9-CM: 575.0  7/23/2015        Arthritis of right shoulder region ICD-10-CM: M19.011  ICD-9-CM: 716.91  4/6/2015        Status post shoulder replacement ICD-10-CM: B54.311  ICD-9-CM: V43.61  4/6/2015        Osteoarthritis of right knee ICD-10-CM: M17.11  ICD-9-CM: 715.96  2/21/2012        Total knee replacement status ICD-10-CM: Q31.806  ICD-9-CM: V43.65  2/21/2012                    Assessment/Plan:     Will change to promogram.   Doing somewhat better so will hold on biopsy    Signed By: Imani Thompson MD

## 2022-01-13 PROBLEM — Z96.611 STATUS POST REVERSE TOTAL ARTHROPLASTY OF RIGHT SHOULDER: Status: ACTIVE | Noted: 2022-01-13

## 2022-02-07 ENCOUNTER — APPOINTMENT (RX ONLY)
Dept: URBAN - METROPOLITAN AREA CLINIC 330 | Facility: CLINIC | Age: 82
Setting detail: DERMATOLOGY
End: 2022-02-07

## 2022-02-07 DIAGNOSIS — L57.0 ACTINIC KERATOSIS: ICD-10-CM

## 2022-02-07 DIAGNOSIS — D22 MELANOCYTIC NEVI: ICD-10-CM

## 2022-02-07 DIAGNOSIS — D18.0 HEMANGIOMA: ICD-10-CM

## 2022-02-07 DIAGNOSIS — L81.4 OTHER MELANIN HYPERPIGMENTATION: ICD-10-CM

## 2022-02-07 DIAGNOSIS — L57.8 OTHER SKIN CHANGES DUE TO CHRONIC EXPOSURE TO NONIONIZING RADIATION: ICD-10-CM

## 2022-02-07 DIAGNOSIS — L82.1 OTHER SEBORRHEIC KERATOSIS: ICD-10-CM

## 2022-02-07 PROBLEM — D22.5 MELANOCYTIC NEVI OF TRUNK: Status: ACTIVE | Noted: 2022-02-07

## 2022-02-07 PROBLEM — D18.01 HEMANGIOMA OF SKIN AND SUBCUTANEOUS TISSUE: Status: ACTIVE | Noted: 2022-02-07

## 2022-02-07 PROCEDURE — ? ADDITIONAL NOTES

## 2022-02-07 PROCEDURE — 99213 OFFICE O/P EST LOW 20 MIN: CPT | Mod: 25

## 2022-02-07 PROCEDURE — ? LIQUID NITROGEN

## 2022-02-07 PROCEDURE — 17003 DESTRUCT PREMALG LES 2-14: CPT

## 2022-02-07 PROCEDURE — ? FULL BODY SKIN EXAM

## 2022-02-07 PROCEDURE — ? TREATMENT REGIMEN

## 2022-02-07 PROCEDURE — ? DEFER

## 2022-02-07 PROCEDURE — 17000 DESTRUCT PREMALG LESION: CPT

## 2022-02-07 PROCEDURE — ? COUNSELING

## 2022-02-07 ASSESSMENT — LOCATION SIMPLE DESCRIPTION DERM
LOCATION SIMPLE: RIGHT POSTERIOR UPPER ARM
LOCATION SIMPLE: RIGHT HAND
LOCATION SIMPLE: UPPER BACK
LOCATION SIMPLE: RIGHT CHEEK
LOCATION SIMPLE: POSTERIOR SCALP
LOCATION SIMPLE: LEFT UPPER BACK
LOCATION SIMPLE: RIGHT SCALP
LOCATION SIMPLE: RIGHT FOREARM
LOCATION SIMPLE: SCALP
LOCATION SIMPLE: LEFT CHEEK
LOCATION SIMPLE: LEFT POSTERIOR UPPER ARM
LOCATION SIMPLE: LEFT LOWER BACK
LOCATION SIMPLE: SUPERIOR FOREHEAD
LOCATION SIMPLE: LEFT FOREARM

## 2022-02-07 ASSESSMENT — LOCATION DETAILED DESCRIPTION DERM
LOCATION DETAILED: LEFT INFERIOR UPPER BACK
LOCATION DETAILED: LEFT SUPERIOR PARIETAL SCALP
LOCATION DETAILED: LEFT VENTRAL PROXIMAL FOREARM
LOCATION DETAILED: RIGHT DISTAL POSTERIOR UPPER ARM
LOCATION DETAILED: RIGHT CENTRAL MALAR CHEEK
LOCATION DETAILED: MID-OCCIPITAL SCALP
LOCATION DETAILED: LEFT LATERAL MALAR CHEEK
LOCATION DETAILED: RIGHT CENTRAL FRONTAL SCALP
LOCATION DETAILED: RIGHT RADIAL DORSAL HAND
LOCATION DETAILED: INFERIOR THORACIC SPINE
LOCATION DETAILED: RIGHT PROXIMAL DORSAL FOREARM
LOCATION DETAILED: SUPERIOR MID FOREHEAD
LOCATION DETAILED: LEFT DISTAL POSTERIOR UPPER ARM
LOCATION DETAILED: LEFT SUPERIOR UPPER BACK
LOCATION DETAILED: LEFT PROXIMAL DORSAL FOREARM
LOCATION DETAILED: RIGHT DISTAL RADIAL DORSAL FOREARM
LOCATION DETAILED: LEFT INFERIOR MEDIAL MIDBACK
LOCATION DETAILED: RIGHT SUPERIOR PARIETAL SCALP

## 2022-02-07 ASSESSMENT — LOCATION ZONE DERM
LOCATION ZONE: SCALP
LOCATION ZONE: TRUNK
LOCATION ZONE: FACE
LOCATION ZONE: ARM
LOCATION ZONE: HAND

## 2022-02-07 NOTE — PROCEDURE: ADDITIONAL NOTES
Render Risk Assessment In Note?: no
Additional Notes: *Patient to discuss with hairdresser to monitor scalp for any new or changing skin lesions.\\n*Perform skin self-checks with significant other for new or changing lesions.  Photographic documentation is encouraged.\\n\\nPatient consent was obtained to proceed with the visit and recommended plan of care after discussion of all risks and benefits, including the risks of COVID-19 exposure.
Detail Level: Generalized

## 2022-02-07 NOTE — PROCEDURE: DEFER
Introduction Text (Please End With A Colon): The following procedure was deferred:  topical 5FU
Detail Level: Detailed

## 2022-02-14 PROBLEM — T84.028A INSTABILITY OF REVERSE TOTAL ARTHROPLASTY OF RIGHT SHOULDER (HCC): Status: ACTIVE | Noted: 2022-02-14

## 2022-02-14 PROBLEM — I50.42 CHRONIC COMBINED SYSTOLIC AND DIASTOLIC CONGESTIVE HEART FAILURE (HCC): Status: ACTIVE | Noted: 2021-11-05

## 2022-02-14 PROBLEM — K21.00 REFLUX ESOPHAGITIS: Status: ACTIVE | Noted: 2022-01-19

## 2022-02-14 PROBLEM — K22.4 DYSKINESIA OF ESOPHAGUS: Status: ACTIVE | Noted: 2022-01-19

## 2022-02-14 PROBLEM — Z90.81 S/P SPLENECTOMY: Status: ACTIVE | Noted: 2022-01-19

## 2022-02-14 PROBLEM — Q39.3 CONGENITAL TRACHEOESOPHAGEAL FISTULA, ESOPHAGEAL ATRESIA AND STENOSIS: Status: ACTIVE | Noted: 2022-01-19

## 2022-02-14 PROBLEM — K29.70 GASTRITIS WITHOUT BLEEDING: Status: ACTIVE | Noted: 2022-01-19

## 2022-02-14 PROBLEM — I42.8 NONISCHEMIC CARDIOMYOPATHY (HCC): Status: ACTIVE | Noted: 2021-08-18

## 2022-02-14 PROBLEM — D51.0 VITAMIN B12 DEFICIENCY ANEMIA DUE TO INTRINSIC FACTOR DEFICIENCY: Status: ACTIVE | Noted: 2022-01-20

## 2022-02-14 PROBLEM — K22.2 STRICTURE AND STENOSIS OF ESOPHAGUS: Status: ACTIVE | Noted: 2022-01-19

## 2022-02-14 PROBLEM — R10.9 ABDOMINAL PAIN: Status: ACTIVE | Noted: 2022-01-19

## 2022-02-14 PROBLEM — K92.0 HEMATEMESIS: Status: ACTIVE | Noted: 2022-01-19

## 2022-02-14 PROBLEM — J01.90 ACUTE SINUSITIS: Status: ACTIVE | Noted: 2022-01-19

## 2022-02-14 PROBLEM — K22.10 ULCER OF ESOPHAGUS WITHOUT BLEEDING: Status: ACTIVE | Noted: 2022-01-19

## 2022-02-14 PROBLEM — R06.09 DOE (DYSPNEA ON EXERTION): Status: ACTIVE | Noted: 2021-05-03

## 2022-02-14 PROBLEM — Z96.611 INSTABILITY OF REVERSE TOTAL ARTHROPLASTY OF RIGHT SHOULDER (HCC): Status: ACTIVE | Noted: 2022-02-14

## 2022-02-14 PROBLEM — K22.2 ESOPHAGEAL OBSTRUCTION: Status: ACTIVE | Noted: 2022-01-19

## 2022-02-14 PROBLEM — Q39.1 CONGENITAL TRACHEOESOPHAGEAL FISTULA, ESOPHAGEAL ATRESIA AND STENOSIS: Status: ACTIVE | Noted: 2022-01-19

## 2022-02-14 PROBLEM — K13.70 DISEASE OF THE ORAL SOFT TISSUES: Status: ACTIVE | Noted: 2022-01-19

## 2022-02-14 PROBLEM — R19.7 DIARRHEA: Status: ACTIVE | Noted: 2022-01-19

## 2022-02-14 PROBLEM — R63.4 WEIGHT LOSS: Status: ACTIVE | Noted: 2022-01-19

## 2022-02-14 PROBLEM — J30.9 ALLERGIC RHINITIS: Status: ACTIVE | Noted: 2022-01-19

## 2022-02-14 PROBLEM — R05.9 COUGH: Status: ACTIVE | Noted: 2022-01-19

## 2022-02-14 PROBLEM — K21.9 ESOPHAGEAL REFLUX: Status: ACTIVE | Noted: 2022-01-19

## 2022-02-16 ENCOUNTER — ANESTHESIA EVENT (OUTPATIENT)
Dept: SURGERY | Age: 82
DRG: 483 | End: 2022-02-16
Payer: MEDICARE

## 2022-02-17 ENCOUNTER — HOSPITAL ENCOUNTER (OUTPATIENT)
Dept: SURGERY | Age: 82
Discharge: HOME | DRG: 483 | End: 2022-02-17
Attending: ORTHOPAEDIC SURGERY
Payer: MEDICARE

## 2022-02-17 VITALS
BODY MASS INDEX: 29.23 KG/M2 | SYSTOLIC BLOOD PRESSURE: 94 MMHG | HEART RATE: 80 BPM | WEIGHT: 165 LBS | HEIGHT: 63 IN | DIASTOLIC BLOOD PRESSURE: 59 MMHG | OXYGEN SATURATION: 100 % | RESPIRATION RATE: 18 BRPM | TEMPERATURE: 98.2 F

## 2022-02-17 LAB
ANION GAP SERPL CALC-SCNC: 6 MMOL/L (ref 7–16)
BACTERIA SPEC CULT: NORMAL
BUN SERPL-MCNC: 22 MG/DL (ref 8–23)
CALCIUM SERPL-MCNC: 9 MG/DL (ref 8.3–10.4)
CHLORIDE SERPL-SCNC: 112 MMOL/L (ref 98–107)
CO2 SERPL-SCNC: 21 MMOL/L (ref 21–32)
CREAT SERPL-MCNC: 1.65 MG/DL (ref 0.8–1.5)
ERYTHROCYTE [DISTWIDTH] IN BLOOD BY AUTOMATED COUNT: 15.9 % (ref 11.9–14.6)
EST. AVERAGE GLUCOSE BLD GHB EST-MCNC: 117 MG/DL
GLUCOSE SERPL-MCNC: 93 MG/DL (ref 65–100)
HBA1C MFR BLD: 5.7 % (ref 4.2–6.3)
HCT VFR BLD AUTO: 38.9 % (ref 41.1–50.3)
HGB BLD-MCNC: 12.5 G/DL (ref 13.6–17.2)
MCH RBC QN AUTO: 28.8 PG (ref 26.1–32.9)
MCHC RBC AUTO-ENTMCNC: 32.1 G/DL (ref 31.4–35)
MCV RBC AUTO: 89.6 FL (ref 79.6–97.8)
NRBC # BLD: 0 K/UL (ref 0–0.2)
PLATELET # BLD AUTO: 280 K/UL (ref 150–450)
PMV BLD AUTO: 9.9 FL (ref 9.4–12.3)
POTASSIUM SERPL-SCNC: 4.7 MMOL/L (ref 3.5–5.1)
RBC # BLD AUTO: 4.34 M/UL (ref 4.23–5.6)
SERVICE CMNT-IMP: NORMAL
SODIUM SERPL-SCNC: 139 MMOL/L (ref 136–145)
WBC # BLD AUTO: 8.2 K/UL (ref 4.3–11.1)

## 2022-02-17 PROCEDURE — 83036 HEMOGLOBIN GLYCOSYLATED A1C: CPT

## 2022-02-17 PROCEDURE — 87641 MR-STAPH DNA AMP PROBE: CPT

## 2022-02-17 PROCEDURE — 80048 BASIC METABOLIC PNL TOTAL CA: CPT

## 2022-02-17 PROCEDURE — 36415 COLL VENOUS BLD VENIPUNCTURE: CPT

## 2022-02-17 PROCEDURE — 85027 COMPLETE CBC AUTOMATED: CPT

## 2022-02-17 RX ORDER — MINERAL OIL
ENEMA (ML) RECTAL
COMMUNITY

## 2022-02-17 RX ORDER — AZELASTINE 1 MG/ML
1 SPRAY, METERED NASAL 2 TIMES DAILY
COMMUNITY

## 2022-02-17 NOTE — PERIOP NOTES
PLEASE CONTINUE TAKING ALL PRESCRIPTION MEDICATIONS UP TO THE DAY OF SURGERY UNLESS OTHERWISE DIRECTED BELOW. DISCONTINUE all vitamins, herbals and supplements 21 days prior to surgery. DISCONTINUE Non-Steriodal Anti-Inflammatory (NSAIDS) such as Advil, Ibuprofen, and Aleve 5 days prior to surgery. Home Medications to HOLD      All vitamins, supplements, and herbals stop now. All NSAIDs such as Advil, Aleve, Ibuprofen, Diclofenac, Naproxen, etc. Stop now. *FOLLOW SURGEON'S INSTRUCTIONS REGARDING XARELTO*     Home Medications to take  the day of surgery   Percocet (Oxycodone-acetaminophen) if needed, Omeprazole, nasal sprays, Metoprolol        Comments   BRING: Omeprazole, bottle of soap (Dynahex), and incentive spirometer. Please do not bring home medications with you on the day of surgery unless otherwise directed by your nurse. If you are instructed to bring home medications, please give them to your nurse as they will be administered by the nursing staff. If you have any questions, please call [address] [place] [address] [phone] or [facility] [phone]. Copy of above instructions given to patient.

## 2022-02-17 NOTE — ANESTHESIA PREPROCEDURE EVALUATION
Relevant Problems   RESPIRATORY SYSTEM   (+) CARDOZA (dyspnea on exertion)   (+) Dyspnea   (+) Shortness of breath      CARDIOVASCULAR   (+) Chronic combined systolic and diastolic congestive heart failure (HCC)   (+) Essential hypertension      GASTROINTESTINAL   (+) Esophageal reflux   (+) Ulcer of esophagus without bleeding      RENAL FAILURE   (+) JENNY (acute kidney injury) (HCC)   (+) CKD (chronic kidney disease) stage 3, GFR 30-59 ml/min (HCC)      ENDOCRINE   (+) Arthritis of right hip   (+) Arthritis of right shoulder region      HEMATOLOGY   (+) Iron deficiency anemia   (+) Vitamin B12 deficiency anemia due to intrinsic factor deficiency      PERSONAL HX & FAMILY HX OF CANCER   (+) Malignant neoplasm of stomach (HCC)   (+) Primary malignant neoplasm of body of stomach ([address]       Anesthetic History               Review of Systems / Medical History  Patient summary reviewed and pertinent labs reviewed    Pulmonary          Shortness of breath (chronic) and smoker (Former)         Neuro/Psych              Cardiovascular    Hypertension      CHF (Nonischemic cardiomyopathy): dyspnea on exertion    Hyperlipidemia    Exercise tolerance: >4 METS  Comments: GRAHAM: Echo (6/9/21)   · There is concentric left ventricular hypertrophy present. · LV function is mildly decreased with estimated LVEF 45%. · Left ventricular global hypokinesis. · Grade II (moderate) left ventricular diastolic dysfunction present,   consistent with pseudonormalization. · The right ventricular systolic function is normal.   · The left atrium is mildly dilated. · Mild tricuspid valve regurgitation. · Mild mitral valve regurgitation.       Cath (5/13/21) normal coronary arteriogram   GI/Hepatic/Renal     GERD    Renal disease: CRI      Comments: Dysphagia chronic, intermittent esophageal dilation  Endo/Other        Obesity and arthritis     Other Findings            Physical Exam    Airway  Mallampati: II  TM Distance: > 6 cm  Neck ROM: decreased range of motion   Mouth opening: Normal     Cardiovascular  Regular rate and rhythm,  S1 and S2 normal,  no murmur, click, rub, or gallop             Dental    Dentition: Caps/crowns     Pulmonary  Breath sounds clear to auscultation               Abdominal         Other Findings            Anesthetic Plan    ASA: 4  Anesthesia type: general    Monitoring Plan: Arterial line  Post-op pain plan if not by surgeon: peripheral nerve block single      Anesthetic plan and risks discussed with: Patient and Spouse      Pt is urgent per surgeon due to recurrent dislocation. Seen 2/16 by Cardiology for chronic CARDOZA/fatigue. Last Echo 6/21 with EF 45%, no sig valve dz. Last Cath 5/21 showed normal coronaries. Pt deemed acceptable risk from cardiac standpoint but was recommended for f/u with pulmonary rehab.

## 2022-02-17 NOTE — PERIOP NOTES
Patient verified name and . Order for consent found in EHR and matches case posting; patient verified. Type 3 surgery, walk-in assessment complete. Labs per surgeon: HgbA1c; results pending. T&S DOS; order signed and held in EHR. Labs per anesthesia protocol: CBC, BMP; results pending. EKG: Completed on 22 during cardiology pre-op visit. Tracing located under Media tab. Results within anesthesia limits. Cardiology note with clearance (22), Chest x-ray (21), PFT's (21), Echo (21), and cath (21) sent down for anesthesia review due to EF <50% and pt with c/o of worsening SOB and fatigue; pt has been referred by cardiologist for pulmonary rehab. Charge nurse to f/u. Case is \"urgent,\" no COVID test required. MRSA/MSSA swab collected; pharmacy to review and dose antibiotic as appropriate. Hospital approved surgical skin cleanser and instructions to return bottle on DOS given per hospital policy. Patient provided with handouts including Guide to Surgery, Pain Management, Hand Hygiene, Blood Transfusion Education, and [place] [facility] Brochure. Patient answered medical/surgical history questions at their best of ability. All prior to admission medications documented in [facility]. Original medication prescription bottle NOT visualized during patient appointment. Patient instructed to hold all vitamins, supplements, herbals, NSAIDs starting now. Patient instructed to follow surgeon's instructions regarding Xarelto. Patient teach back successful and patient demonstrates knowledge of instruction.

## 2022-02-17 NOTE — PERIOP NOTES
The below lab results are within anesthesia limits.      Recent Results (from the past 12 hour(s))   CBC W/O DIFF    Collection Time: 02/17/22 11:30 AM   Result Value Ref Range    WBC 8.2 4.3 - 11.1 K/uL    RBC 4.34 4.23 - 5.6 M/uL    HGB 12.5 (L) 13.6 - 17.2 g/dL    HCT 38.9 (L) 41.1 - 50.3 %    MCV 89.6 79.6 - 97.8 FL    MCH 28.8 26.1 - 32.9 PG    MCHC 32.1 31.4 - 35.0 g/dL    RDW 15.9 (H) 11.9 - 14.6 %    PLATELET 585 889 - 276 K/uL    MPV 9.9 9.4 - 12.3 FL    ABSOLUTE NRBC 0.00 0.0 - 0.2 K/uL   METABOLIC PANEL, BASIC    Collection Time: 02/17/22 11:30 AM   Result Value Ref Range    Sodium 139 136 - 145 mmol/L    Potassium 4.7 3.5 - 5.1 mmol/L    Chloride 112 (H) 98 - 107 mmol/L    CO2 21 21 - 32 mmol/L    Anion gap 6 (L) 7 - 16 mmol/L    Glucose 93 65 - 100 mg/dL    BUN 22 8 - 23 MG/DL    Creatinine 1.65 (H) 0.8 - 1.5 MG/DL    GFR est AA 51 (L) >60 ml/min/1.73m2    GFR est non-AA 43 (L) >60 ml/min/1.73m2    Calcium 9.0 8.3 - 10.4 MG/DL   HEMOGLOBIN A1C WITH EAG    Collection Time: 02/17/22 11:30 AM   Result Value Ref Range    Hemoglobin A1c 5.7 4.20 - 6.30 %    Est. average glucose 117 mg/dL

## 2022-02-17 NOTE — PERIOP NOTES
The following records have been requested from [facility]:       [address]    Please send most recent EKG tracing via fax to [phone]. Thank you!

## 2022-02-18 ENCOUNTER — HOSPITAL ENCOUNTER (INPATIENT)
Age: 82
LOS: 1 days | Discharge: HOME HEALTH | DRG: 483 | End: 2022-02-19
Attending: ORTHOPAEDIC SURGERY | Admitting: PHYSICIAN ASSISTANT
Payer: MEDICARE

## 2022-02-18 ENCOUNTER — APPOINTMENT (OUTPATIENT)
Dept: GENERAL RADIOLOGY | Age: 82
DRG: 483 | End: 2022-02-18
Attending: ORTHOPAEDIC SURGERY
Payer: MEDICARE

## 2022-02-18 ENCOUNTER — ANESTHESIA (OUTPATIENT)
Dept: SURGERY | Age: 82
DRG: 483 | End: 2022-02-18
Payer: MEDICARE

## 2022-02-18 PROBLEM — Z96.619 INSTABILITY OF REVERSE TOTAL SHOULDER ARTHROPLASTY (HCC): Status: ACTIVE | Noted: 2022-02-18

## 2022-02-18 PROBLEM — M19.019 SHOULDER ARTHRITIS: Status: ACTIVE | Noted: 2022-02-18

## 2022-02-18 PROBLEM — T84.028A INSTABILITY OF REVERSE TOTAL SHOULDER ARTHROPLASTY (HCC): Status: ACTIVE | Noted: 2022-02-18

## 2022-02-18 LAB
ABO + RH BLD: NORMAL
BLOOD GROUP ANTIBODIES SERPL: NORMAL
COVID-19 RAPID TEST, COVR: NOT DETECTED
POTASSIUM BLD-SCNC: 5.1 MMOL/L (ref 3.5–5.1)
SOURCE, COVRS: NORMAL
SPECIMEN EXP DATE BLD: NORMAL

## 2022-02-18 PROCEDURE — 76010000173 HC OR TIME 3 TO 3.5 HR INTENSV-TIER 1: Performed by: ORTHOPAEDIC SURGERY

## 2022-02-18 PROCEDURE — 88331 PATH CONSLTJ SURG 1 BLK 1SPC: CPT

## 2022-02-18 PROCEDURE — 77030037088 HC TUBE ENDOTRACH ORAL NSL COVD-A: Performed by: ANESTHESIOLOGY

## 2022-02-18 PROCEDURE — 76942 ECHO GUIDE FOR BIOPSY: CPT | Performed by: ORTHOPAEDIC SURGERY

## 2022-02-18 PROCEDURE — C1713 ANCHOR/SCREW BN/BN,TIS/BN: HCPCS | Performed by: ORTHOPAEDIC SURGERY

## 2022-02-18 PROCEDURE — 87176 TISSUE HOMOGENIZATION CULTR: CPT

## 2022-02-18 PROCEDURE — 87635 SARS-COV-2 COVID-19 AMP PRB: CPT

## 2022-02-18 PROCEDURE — 2709999900 HC NON-CHARGEABLE SUPPLY: Performed by: ORTHOPAEDIC SURGERY

## 2022-02-18 PROCEDURE — 94762 N-INVAS EAR/PLS OXIMTRY CONT: CPT

## 2022-02-18 PROCEDURE — 0RRJ0JZ REPLACEMENT OF RIGHT SHOULDER JOINT WITH SYNTHETIC SUBSTITUTE, OPEN APPROACH: ICD-10-PCS | Performed by: ORTHOPAEDIC SURGERY

## 2022-02-18 PROCEDURE — 87186 SC STD MICRODIL/AGAR DIL: CPT

## 2022-02-18 PROCEDURE — C1776 JOINT DEVICE (IMPLANTABLE): HCPCS | Performed by: ORTHOPAEDIC SURGERY

## 2022-02-18 PROCEDURE — 86900 BLOOD TYPING SEROLOGIC ABO: CPT

## 2022-02-18 PROCEDURE — 76010010054 HC POST OP PAIN BLOCK: Performed by: ORTHOPAEDIC SURGERY

## 2022-02-18 PROCEDURE — 76210000006 HC OR PH I REC 0.5 TO 1 HR: Performed by: ORTHOPAEDIC SURGERY

## 2022-02-18 PROCEDURE — 77030039147 HC PWDR HEMSTS SURGICEL JNJ -D: Performed by: ORTHOPAEDIC SURGERY

## 2022-02-18 PROCEDURE — 77030008542 HC TBNG MON PRSS EDWD -A: Performed by: ANESTHESIOLOGY

## 2022-02-18 PROCEDURE — 77030013794 HC KT TRNSDUC BLD EDWD -B: Performed by: ANESTHESIOLOGY

## 2022-02-18 PROCEDURE — 84132 ASSAY OF SERUM POTASSIUM: CPT

## 2022-02-18 PROCEDURE — 87185 SC STD ENZYME DETCJ PER NZM: CPT

## 2022-02-18 PROCEDURE — 77030040830 HC CATH URETH FOL MDII -A: Performed by: ORTHOPAEDIC SURGERY

## 2022-02-18 PROCEDURE — 77030011283 HC ELECTRD NDL COVD -A: Performed by: ORTHOPAEDIC SURGERY

## 2022-02-18 PROCEDURE — 0RPJ0JZ REMOVAL OF SYNTHETIC SUBSTITUTE FROM RIGHT SHOULDER JOINT, OPEN APPROACH: ICD-10-PCS | Performed by: ORTHOPAEDIC SURGERY

## 2022-02-18 PROCEDURE — 74011250636 HC RX REV CODE- 250/636: Performed by: PHYSICIAN ASSISTANT

## 2022-02-18 PROCEDURE — 77030002934 HC SUT MCRYL J&J -B: Performed by: ORTHOPAEDIC SURGERY

## 2022-02-18 PROCEDURE — 74011250636 HC RX REV CODE- 250/636: Performed by: ANESTHESIOLOGY

## 2022-02-18 PROCEDURE — 77030003602 HC NDL NRV BLK BBMI -B: Performed by: ANESTHESIOLOGY

## 2022-02-18 PROCEDURE — 74011250636 HC RX REV CODE- 250/636: Performed by: ORTHOPAEDIC SURGERY

## 2022-02-18 PROCEDURE — 74011000250 HC RX REV CODE- 250: Performed by: ANESTHESIOLOGY

## 2022-02-18 PROCEDURE — 65270000029 HC RM PRIVATE

## 2022-02-18 PROCEDURE — 0RRJ00Z REPLACEMENT OF RIGHT SHOULDER JOINT WITH REVERSE BALL AND SOCKET SYNTHETIC SUBSTITUTE, OPEN APPROACH: ICD-10-PCS | Performed by: ORTHOPAEDIC SURGERY

## 2022-02-18 PROCEDURE — 77030013708 HC HNDPC SUC IRR PULS STRY –B: Performed by: ORTHOPAEDIC SURGERY

## 2022-02-18 PROCEDURE — 76060000037 HC ANESTHESIA 3 TO 3.5 HR: Performed by: ORTHOPAEDIC SURGERY

## 2022-02-18 PROCEDURE — 77030018547 HC SUT ETHBND1 J&J -B: Performed by: ORTHOPAEDIC SURGERY

## 2022-02-18 PROCEDURE — 77030039425 HC BLD LARYNG TRULITE DISP TELE -A: Performed by: ANESTHESIOLOGY

## 2022-02-18 PROCEDURE — 77030040922 HC BLNKT HYPOTHRM STRY -A: Performed by: ANESTHESIOLOGY

## 2022-02-18 PROCEDURE — 77030018836 HC SOL IRR NACL ICUM -A: Performed by: ORTHOPAEDIC SURGERY

## 2022-02-18 PROCEDURE — 87205 SMEAR GRAM STAIN: CPT

## 2022-02-18 PROCEDURE — 74011250637 HC RX REV CODE- 250/637: Performed by: ANESTHESIOLOGY

## 2022-02-18 PROCEDURE — 88305 TISSUE EXAM BY PATHOLOGIST: CPT

## 2022-02-18 PROCEDURE — 74011250637 HC RX REV CODE- 250/637: Performed by: PHYSICIAN ASSISTANT

## 2022-02-18 PROCEDURE — 87075 CULTR BACTERIA EXCEPT BLOOD: CPT

## 2022-02-18 PROCEDURE — 87076 CULTURE ANAEROBE IDENT EACH: CPT

## 2022-02-18 PROCEDURE — 77030006835 HC BLD SAW SAG STRY -B: Performed by: ORTHOPAEDIC SURGERY

## 2022-02-18 PROCEDURE — 74011250637 HC RX REV CODE- 250/637: Performed by: ORTHOPAEDIC SURGERY

## 2022-02-18 PROCEDURE — 74011000250 HC RX REV CODE- 250: Performed by: ORTHOPAEDIC SURGERY

## 2022-02-18 PROCEDURE — 23474 REVIS RECONST SHOULDER JOINT: CPT | Performed by: ORTHOPAEDIC SURGERY

## 2022-02-18 PROCEDURE — 73030 X-RAY EXAM OF SHOULDER: CPT

## 2022-02-18 DEVICE — IMPLANTABLE DEVICE: Type: IMPLANTABLE DEVICE | Site: SHOULDER | Status: FUNCTIONAL

## 2022-02-18 DEVICE — IMPLANTABLE DEVICE
Type: IMPLANTABLE DEVICE | Site: SHOULDER | Status: FUNCTIONAL
Brand: EQUINOXE

## 2022-02-18 RX ORDER — AMOXICILLIN 250 MG
1 CAPSULE ORAL
Status: DISCONTINUED | OUTPATIENT
Start: 2022-02-18 | End: 2022-02-19 | Stop reason: HOSPADM

## 2022-02-18 RX ORDER — FENTANYL CITRATE 50 UG/ML
100 INJECTION, SOLUTION INTRAMUSCULAR; INTRAVENOUS AS NEEDED
Status: COMPLETED | OUTPATIENT
Start: 2022-02-18 | End: 2022-02-18

## 2022-02-18 RX ORDER — PANTOPRAZOLE SODIUM 40 MG/1
40 TABLET, DELAYED RELEASE ORAL
Status: DISCONTINUED | OUTPATIENT
Start: 2022-02-19 | End: 2022-02-19 | Stop reason: HOSPADM

## 2022-02-18 RX ORDER — ACETAMINOPHEN 650 MG/1
650 SUPPOSITORY RECTAL ONCE
Status: DISCONTINUED | OUTPATIENT
Start: 2022-02-18 | End: 2022-02-18 | Stop reason: SDUPTHER

## 2022-02-18 RX ORDER — CEFAZOLIN SODIUM/WATER 2 G/20 ML
2 SYRINGE (ML) INTRAVENOUS EVERY 8 HOURS
Status: COMPLETED | OUTPATIENT
Start: 2022-02-18 | End: 2022-02-19

## 2022-02-18 RX ORDER — ACETAMINOPHEN 500 MG
1000 TABLET ORAL ONCE
Status: COMPLETED | OUTPATIENT
Start: 2022-02-18 | End: 2022-02-18

## 2022-02-18 RX ORDER — MIDAZOLAM HYDROCHLORIDE 1 MG/ML
2 INJECTION, SOLUTION INTRAMUSCULAR; INTRAVENOUS ONCE
Status: COMPLETED | OUTPATIENT
Start: 2022-02-18 | End: 2022-02-18

## 2022-02-18 RX ORDER — METOPROLOL TARTRATE 25 MG/1
12.5 TABLET, FILM COATED ORAL ONCE
Status: COMPLETED | OUTPATIENT
Start: 2022-02-18 | End: 2022-02-18

## 2022-02-18 RX ORDER — ZOLPIDEM TARTRATE 5 MG/1
5 TABLET ORAL
Status: DISCONTINUED | OUTPATIENT
Start: 2022-02-18 | End: 2022-02-19 | Stop reason: HOSPADM

## 2022-02-18 RX ORDER — OXYCODONE AND ACETAMINOPHEN 7.5; 325 MG/1; MG/1
1-2 TABLET ORAL
Status: DISCONTINUED | OUTPATIENT
Start: 2022-02-18 | End: 2022-02-19 | Stop reason: HOSPADM

## 2022-02-18 RX ORDER — GLYCOPYRROLATE 0.2 MG/ML
INJECTION INTRAMUSCULAR; INTRAVENOUS AS NEEDED
Status: DISCONTINUED | OUTPATIENT
Start: 2022-02-18 | End: 2022-02-18 | Stop reason: HOSPADM

## 2022-02-18 RX ORDER — DEXAMETHASONE SODIUM PHOSPHATE 4 MG/ML
INJECTION, SOLUTION INTRA-ARTICULAR; INTRALESIONAL; INTRAMUSCULAR; INTRAVENOUS; SOFT TISSUE AS NEEDED
Status: DISCONTINUED | OUTPATIENT
Start: 2022-02-18 | End: 2022-02-18 | Stop reason: HOSPADM

## 2022-02-18 RX ORDER — ROCURONIUM BROMIDE 10 MG/ML
INJECTION, SOLUTION INTRAVENOUS AS NEEDED
Status: DISCONTINUED | OUTPATIENT
Start: 2022-02-18 | End: 2022-02-18 | Stop reason: HOSPADM

## 2022-02-18 RX ORDER — AZELASTINE 1 MG/ML
1 SPRAY, METERED NASAL 2 TIMES DAILY
Status: DISCONTINUED | OUTPATIENT
Start: 2022-02-18 | End: 2022-02-19 | Stop reason: HOSPADM

## 2022-02-18 RX ORDER — VANCOMYCIN HYDROCHLORIDE 1 G/20ML
INJECTION, POWDER, LYOPHILIZED, FOR SOLUTION INTRAVENOUS AS NEEDED
Status: DISCONTINUED | OUTPATIENT
Start: 2022-02-18 | End: 2022-02-18 | Stop reason: HOSPADM

## 2022-02-18 RX ORDER — SODIUM CHLORIDE, SODIUM LACTATE, POTASSIUM CHLORIDE, CALCIUM CHLORIDE 600; 310; 30; 20 MG/100ML; MG/100ML; MG/100ML; MG/100ML
75 INJECTION, SOLUTION INTRAVENOUS CONTINUOUS
Status: DISCONTINUED | OUTPATIENT
Start: 2022-02-18 | End: 2022-02-18 | Stop reason: HOSPADM

## 2022-02-18 RX ORDER — OXYCODONE HYDROCHLORIDE 5 MG/1
5 TABLET ORAL
Status: DISCONTINUED | OUTPATIENT
Start: 2022-02-18 | End: 2022-02-18 | Stop reason: HOSPADM

## 2022-02-18 RX ORDER — HYDROMORPHONE HYDROCHLORIDE 1 MG/ML
.5-1 INJECTION, SOLUTION INTRAMUSCULAR; INTRAVENOUS; SUBCUTANEOUS
Status: DISCONTINUED | OUTPATIENT
Start: 2022-02-18 | End: 2022-02-19 | Stop reason: HOSPADM

## 2022-02-18 RX ORDER — PROPOFOL 10 MG/ML
INJECTION, EMULSION INTRAVENOUS AS NEEDED
Status: DISCONTINUED | OUTPATIENT
Start: 2022-02-18 | End: 2022-02-18 | Stop reason: HOSPADM

## 2022-02-18 RX ORDER — ACETAMINOPHEN 325 MG/1
975 TABLET ORAL ONCE
Status: DISCONTINUED | OUTPATIENT
Start: 2022-02-18 | End: 2022-02-18 | Stop reason: SDUPTHER

## 2022-02-18 RX ORDER — LIDOCAINE HYDROCHLORIDE 10 MG/ML
0.1 INJECTION INFILTRATION; PERINEURAL AS NEEDED
Status: DISCONTINUED | OUTPATIENT
Start: 2022-02-18 | End: 2022-02-18 | Stop reason: HOSPADM

## 2022-02-18 RX ORDER — ONDANSETRON 2 MG/ML
INJECTION INTRAMUSCULAR; INTRAVENOUS AS NEEDED
Status: DISCONTINUED | OUTPATIENT
Start: 2022-02-18 | End: 2022-02-18 | Stop reason: HOSPADM

## 2022-02-18 RX ORDER — ROPIVACAINE HYDROCHLORIDE 5 MG/ML
INJECTION, SOLUTION EPIDURAL; INFILTRATION; PERINEURAL
Status: COMPLETED | OUTPATIENT
Start: 2022-02-18 | End: 2022-02-18

## 2022-02-18 RX ORDER — NALOXONE HYDROCHLORIDE 0.4 MG/ML
0.4 INJECTION, SOLUTION INTRAMUSCULAR; INTRAVENOUS; SUBCUTANEOUS
Status: DISCONTINUED | OUTPATIENT
Start: 2022-02-18 | End: 2022-02-19 | Stop reason: HOSPADM

## 2022-02-18 RX ORDER — NEOSTIGMINE METHYLSULFATE 1 MG/ML
INJECTION, SOLUTION INTRAVENOUS AS NEEDED
Status: DISCONTINUED | OUTPATIENT
Start: 2022-02-18 | End: 2022-02-18 | Stop reason: HOSPADM

## 2022-02-18 RX ORDER — SODIUM CHLORIDE 9 MG/ML
100 INJECTION, SOLUTION INTRAVENOUS CONTINUOUS
Status: DISCONTINUED | OUTPATIENT
Start: 2022-02-18 | End: 2022-02-19 | Stop reason: HOSPADM

## 2022-02-18 RX ORDER — SODIUM CHLORIDE 0.9 % (FLUSH) 0.9 %
5-40 SYRINGE (ML) INJECTION EVERY 8 HOURS
Status: DISCONTINUED | OUTPATIENT
Start: 2022-02-18 | End: 2022-02-19 | Stop reason: HOSPADM

## 2022-02-18 RX ORDER — EPINEPHRINE 1 MG/ML
INJECTION, SOLUTION, CONCENTRATE INTRAVENOUS AS NEEDED
Status: DISCONTINUED | OUTPATIENT
Start: 2022-02-18 | End: 2022-02-18 | Stop reason: HOSPADM

## 2022-02-18 RX ORDER — SODIUM CHLORIDE, SODIUM LACTATE, POTASSIUM CHLORIDE, CALCIUM CHLORIDE 600; 310; 30; 20 MG/100ML; MG/100ML; MG/100ML; MG/100ML
100 INJECTION, SOLUTION INTRAVENOUS CONTINUOUS
Status: DISCONTINUED | OUTPATIENT
Start: 2022-02-18 | End: 2022-02-18 | Stop reason: HOSPADM

## 2022-02-18 RX ORDER — TRANEXAMIC ACID 100 MG/ML
INJECTION, SOLUTION INTRAVENOUS AS NEEDED
Status: DISCONTINUED | OUTPATIENT
Start: 2022-02-18 | End: 2022-02-18 | Stop reason: HOSPADM

## 2022-02-18 RX ORDER — LISINOPRIL 5 MG/1
5 TABLET ORAL DAILY
Status: DISCONTINUED | OUTPATIENT
Start: 2022-02-19 | End: 2022-02-19 | Stop reason: HOSPADM

## 2022-02-18 RX ORDER — ATORVASTATIN CALCIUM 10 MG/1
20 TABLET, FILM COATED ORAL
Status: DISCONTINUED | OUTPATIENT
Start: 2022-02-18 | End: 2022-02-19 | Stop reason: HOSPADM

## 2022-02-18 RX ORDER — CETIRIZINE HYDROCHLORIDE 5 MG/1
5 TABLET ORAL DAILY
Status: DISCONTINUED | OUTPATIENT
Start: 2022-02-19 | End: 2022-02-19 | Stop reason: HOSPADM

## 2022-02-18 RX ORDER — METOPROLOL TARTRATE 25 MG/1
12.5 TABLET, FILM COATED ORAL 2 TIMES DAILY
Status: DISCONTINUED | OUTPATIENT
Start: 2022-02-18 | End: 2022-02-19 | Stop reason: HOSPADM

## 2022-02-18 RX ORDER — ROPIVACAINE HYDROCHLORIDE 2 MG/ML
INJECTION, SOLUTION EPIDURAL; INFILTRATION; PERINEURAL AS NEEDED
Status: DISCONTINUED | OUTPATIENT
Start: 2022-02-18 | End: 2022-02-18 | Stop reason: HOSPADM

## 2022-02-18 RX ORDER — LIDOCAINE HYDROCHLORIDE 20 MG/ML
INJECTION, SOLUTION EPIDURAL; INFILTRATION; INTRACAUDAL; PERINEURAL AS NEEDED
Status: DISCONTINUED | OUTPATIENT
Start: 2022-02-18 | End: 2022-02-18 | Stop reason: HOSPADM

## 2022-02-18 RX ORDER — DOXYCYCLINE 100 MG/1
100 CAPSULE ORAL EVERY 12 HOURS
Status: DISCONTINUED | OUTPATIENT
Start: 2022-02-18 | End: 2022-02-19 | Stop reason: HOSPADM

## 2022-02-18 RX ORDER — SODIUM CHLORIDE 0.9 % (FLUSH) 0.9 %
5-40 SYRINGE (ML) INJECTION AS NEEDED
Status: DISCONTINUED | OUTPATIENT
Start: 2022-02-18 | End: 2022-02-19 | Stop reason: HOSPADM

## 2022-02-18 RX ORDER — DIPHENHYDRAMINE HYDROCHLORIDE 50 MG/ML
12.5 INJECTION, SOLUTION INTRAMUSCULAR; INTRAVENOUS
Status: DISCONTINUED | OUTPATIENT
Start: 2022-02-18 | End: 2022-02-18 | Stop reason: HOSPADM

## 2022-02-18 RX ORDER — HYDROMORPHONE HYDROCHLORIDE 2 MG/ML
0.5 INJECTION, SOLUTION INTRAMUSCULAR; INTRAVENOUS; SUBCUTANEOUS
Status: DISCONTINUED | OUTPATIENT
Start: 2022-02-18 | End: 2022-02-18 | Stop reason: HOSPADM

## 2022-02-18 RX ORDER — FLUTICASONE PROPIONATE 50 MCG
2 SPRAY, SUSPENSION (ML) NASAL
Status: DISCONTINUED | OUTPATIENT
Start: 2022-02-18 | End: 2022-02-19 | Stop reason: HOSPADM

## 2022-02-18 RX ORDER — NALOXONE HYDROCHLORIDE 0.4 MG/ML
0.1 INJECTION, SOLUTION INTRAMUSCULAR; INTRAVENOUS; SUBCUTANEOUS AS NEEDED
Status: DISCONTINUED | OUTPATIENT
Start: 2022-02-18 | End: 2022-02-18 | Stop reason: HOSPADM

## 2022-02-18 RX ORDER — FLUMAZENIL 0.1 MG/ML
0.2 INJECTION INTRAVENOUS
Status: DISCONTINUED | OUTPATIENT
Start: 2022-02-18 | End: 2022-02-18 | Stop reason: HOSPADM

## 2022-02-18 RX ORDER — CEFAZOLIN SODIUM/WATER 2 G/20 ML
2 SYRINGE (ML) INTRAVENOUS ONCE
Status: COMPLETED | OUTPATIENT
Start: 2022-02-18 | End: 2022-02-18

## 2022-02-18 RX ORDER — SPIRONOLACTONE 25 MG/1
25 TABLET ORAL DAILY
Status: DISCONTINUED | OUTPATIENT
Start: 2022-02-19 | End: 2022-02-19 | Stop reason: HOSPADM

## 2022-02-18 RX ADMIN — RIVAROXABAN 20 MG: 20 TABLET, FILM COATED ORAL at 22:00

## 2022-02-18 RX ADMIN — Medication 1 MG: at 16:46

## 2022-02-18 RX ADMIN — DOXYCYCLINE HYCLATE 100 MG: 100 CAPSULE ORAL at 20:33

## 2022-02-18 RX ADMIN — PHENYLEPHRINE HYDROCHLORIDE 100 MCG: 10 INJECTION INTRAVENOUS at 14:43

## 2022-02-18 RX ADMIN — PROPOFOL 150 MG: 10 INJECTION, EMULSION INTRAVENOUS at 14:09

## 2022-02-18 RX ADMIN — Medication 3 AMPULE: at 11:54

## 2022-02-18 RX ADMIN — ROCURONIUM BROMIDE 40 MG: 50 INJECTION, SOLUTION INTRAVENOUS at 14:09

## 2022-02-18 RX ADMIN — GLYCOPYRROLATE 0.4 MG: 0.2 INJECTION, SOLUTION INTRAMUSCULAR; INTRAVENOUS at 16:43

## 2022-02-18 RX ADMIN — ROPIVACAINE HYDROCHLORIDE 20 ML: 5 INJECTION, SOLUTION EPIDURAL; INFILTRATION; PERINEURAL at 13:38

## 2022-02-18 RX ADMIN — PHENYLEPHRINE HYDROCHLORIDE 50 MCG: 10 INJECTION INTRAVENOUS at 16:11

## 2022-02-18 RX ADMIN — FENTANYL CITRATE 50 MCG: 50 INJECTION INTRAMUSCULAR; INTRAVENOUS at 13:32

## 2022-02-18 RX ADMIN — ACETAMINOPHEN 1000 MG: 500 TABLET, FILM COATED ORAL at 11:54

## 2022-02-18 RX ADMIN — ONDANSETRON 4 MG: 2 INJECTION INTRAMUSCULAR; INTRAVENOUS at 15:06

## 2022-02-18 RX ADMIN — Medication 2 G: at 13:58

## 2022-02-18 RX ADMIN — PHENYLEPHRINE HYDROCHLORIDE 100 MCG: 10 INJECTION INTRAVENOUS at 16:06

## 2022-02-18 RX ADMIN — Medication 30 MCG/MIN: at 14:45

## 2022-02-18 RX ADMIN — LIDOCAINE HYDROCHLORIDE 80 MG: 20 INJECTION, SOLUTION EPIDURAL; INFILTRATION; INTRACAUDAL; PERINEURAL at 14:09

## 2022-02-18 RX ADMIN — GLYCOPYRROLATE 0.2 MG: 0.2 INJECTION, SOLUTION INTRAMUSCULAR; INTRAVENOUS at 16:46

## 2022-02-18 RX ADMIN — DEXAMETHASONE SODIUM PHOSPHATE 10 MG: 4 INJECTION, SOLUTION INTRAMUSCULAR; INTRAVENOUS at 14:52

## 2022-02-18 RX ADMIN — ROCURONIUM BROMIDE 10 MG: 50 INJECTION, SOLUTION INTRAVENOUS at 15:18

## 2022-02-18 RX ADMIN — CEFAZOLIN SODIUM 2 G: 100 INJECTION, POWDER, LYOPHILIZED, FOR SOLUTION INTRAVENOUS at 21:53

## 2022-02-18 RX ADMIN — TRANEXAMIC ACID 1 G: 100 INJECTION, SOLUTION INTRAVENOUS at 14:52

## 2022-02-18 RX ADMIN — SODIUM CHLORIDE, SODIUM LACTATE, POTASSIUM CHLORIDE, AND CALCIUM CHLORIDE 100 ML/HR: 600; 310; 30; 20 INJECTION, SOLUTION INTRAVENOUS at 11:56

## 2022-02-18 RX ADMIN — Medication 2 MG: at 16:43

## 2022-02-18 RX ADMIN — SODIUM CHLORIDE, SODIUM LACTATE, POTASSIUM CHLORIDE, AND CALCIUM CHLORIDE: 600; 310; 30; 20 INJECTION, SOLUTION INTRAVENOUS at 14:20

## 2022-02-18 RX ADMIN — SODIUM CHLORIDE, PRESERVATIVE FREE 10 ML: 5 INJECTION INTRAVENOUS at 20:33

## 2022-02-18 RX ADMIN — METOPROLOL TARTRATE 12.5 MG: 25 TABLET, FILM COATED ORAL at 11:54

## 2022-02-18 RX ADMIN — METOPROLOL TARTRATE 12.5 MG: 25 TABLET, FILM COATED ORAL at 20:31

## 2022-02-18 RX ADMIN — SODIUM CHLORIDE 100 ML/HR: 900 INJECTION, SOLUTION INTRAVENOUS at 20:32

## 2022-02-18 RX ADMIN — MIDAZOLAM 1 MG: 1 INJECTION INTRAMUSCULAR; INTRAVENOUS at 13:32

## 2022-02-18 NOTE — ANESTHESIA PROCEDURE NOTES
Peripheral Block    Start time: 2/18/2022 1:31 PM  End time: 2/18/2022 1:38 PM  Performed by: Keysha Moffett MD  Authorized by: Keysha Moffett MD       Pre-procedure: Indications: at surgeon's request and post-op pain management    Preanesthetic Checklist: patient identified, risks and benefits discussed, site marked, timeout performed, anesthesia consent given and patient being monitored    Timeout Time: 13:31 EST          Block Type:   Block Type:   Interscalene  Laterality:  Right  Monitoring:  Continuous pulse ox, frequent vital sign checks, heart rate, oxygen and responsive to questions  Injection Technique:  Single shot  Procedures: ultrasound guided and nerve stimulator    Patient Position: supine  Prep: DuraPrep    Location:  Interscalene  Needle Type:  Stimuplex  Needle Gauge:  20 G  Needle Localization:  Nerve stimulator and ultrasound guidance  Motor Response comment:   Motor Response: minimal motor response >0.4 mA   Medication Injected:  Ropivacaine (PF) (NAROPIN)(0.5%) 5 mg/mL injection, 20 mL  Med Admin Time: 2/18/2022 1:38 PM    Assessment:  Number of attempts:  1  Injection Assessment:  Incremental injection every 5 mL, local visualized surrounding nerve on ultrasound, negative aspiration for CSF, negative aspiration for blood, no paresthesia, no intravascular symptoms and ultrasound image on chart  Patient tolerance:  Patient tolerated the procedure well with no immediate complications  Epi 8:727N

## 2022-02-18 NOTE — H&P
History and Physical    Patient: Michael Foote MRN: [id number]  SSN: [id number]    YOB: 1940  Age: 78 y.o. Sex: male      Subjective:      Michael Foote is a 78 y.o. male who underwent right reverse total shoulder replacement 2015. Postoperatively he had an episode of instability but was treated conservatively and did well for several years. More recently this past year he has had 3 other episodes of instability that were reducible. No other complaints really. More recent episodes of instability have been noted to be related to simple everyday tasks. After in-depth discussion and review with CT evaluation patient elects to proceed with revision reverse shoulder replacement to include potential enlargement of the glenosphere as well as exchange of the polyethylene component and baseplate component of the humeral stem. He understands the possibility of hardware removal and antibiotic cement spacer placement depending upon the results of intraoperative findings. He has exhausted all conservative measures and is here to proceed today with a revision. .     Past Medical History:   Diagnosis Date    Basal cell carcinoma     LLE    Chronic combined systolic and diastolic congestive heart failure (HCC)     Followed by [facility]; Echo (6/9/21) EF45%, Grade II (moderate) left ventricular diastolic dysfunction present    Chronic kidney disease     pt denies on 2/17/22    Chronic pain     right shoulder    Current use of long term anticoagulation     Xarelto    CARDOZA (dyspnea on exertion)     Pt has been referred to pulmonary     Dysphagia     chronic, for which intermittent esophageal dilation    Erectile dysfunction     Fatigue     Former smoker     GERD (gastroesophageal reflux disease)     controlled with medication    High cholesterol     History of pulmonary embolism 09/18/2018    History of stomach cancer 2010    Hypertension     controlled with medication    Iron deficiency anemia 05/2015    Macular degeneration     Nausea & vomiting     post-op nausea  states from morphine    Nonischemic cardiomyopathy (HCC)     with mild systolic dysfunction, followed by [facility]--Cath (5/13/21) normal coronary arteriogram    Osteoarthritis     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    Unspecified adverse effect of anesthesia     wife states slow to wake     Past Surgical History:   Procedure Laterality Date    HX APPENDECTOMY      HX CHOLECYSTECTOMY  2015    HX COLONOSCOPY      HX GI  2010    gastrectomy 2/8/10-has 30% stomach left    HX KNEE ARTHROSCOPY Right     HX OTHER SURGICAL      attempted tracheal dilation- stopped due to inflammation    HX SHOULDER ARTHROSCOPY Right 2015    NE TOTAL HIP ARTHROPLASTY Bilateral 2004    NE TOTAL KNEE ARTHROPLASTY Right 2012      Family History   Problem Relation Age of Onset    Kidney Disease Mother     Dementia Father     Alcohol abuse Brother     OSTEOARTHRITIS Brother      Social History     Tobacco Use    Smoking status: Former Smoker     Packs/day: 0.50     Years: 5.00     Pack years: 2.50    Smokeless tobacco: Never Used    Tobacco comment: quit age 20; continuing cessation   Substance Use Topics    Alcohol use: Yes     Comment: 3 drinks per night; wine and liquor      Prior to Admission medications    Medication Sig Start Date End Date Taking? Authorizing Provider   azelastine (ASTELIN) 137 mcg (0.1 %) nasal spray 1 Spray two (2) times a day. Use in each nostril as directed    Provider, Historical   fexofenadine (ALLEGRA) 180 mg tablet Take  by mouth. Provider, Historical   SPIRONOLACTONE PO Take  by mouth. Provider, Historical   oxyCODONE-acetaminophen (PERCOCET 7.5) 7.5-325 mg per tablet Take 1-2 Tablets by mouth every four to six (4-6) hours as needed for Pain for up to 3 days. Max Daily Amount: 12 Tablets.  2/16/22 2/19/22  Jonathan Oliver, PA   senna-docusate (PERICOLACE) 8.6-50 mg per tablet Take 1 Tablet by mouth daily. To start after surgery  Indications: constipation  Patient not taking: Reported on 2/17/2022 2/16/22   Jonathan Oliver PA   ondansetron (ZOFRAN ODT) 4 mg disintegrating tablet 1 Tablet by SubLINGual route every six (6) hours as needed for Nausea or Nausea or Vomiting. To start after surgery  Indications: prevent nausea and vomiting after surgery  Patient not taking: Reported on 2/17/2022 2/16/22   Jonathan Oliver PA   naloxegoL (MOVANTIK) 25 mg tab tablet Take 1 Tablet by mouth Daily (before breakfast) for 7 days. To start after surgery  Patient not taking: Reported on 2/17/2022 2/16/22 2/23/22  Jonathan Oliver PA   lisinopriL (PRINIVIL, ZESTRIL) 5 mg tablet Take 5 mg by mouth daily. 1/18/22 1/18/23  Provider, Historical   metoprolol tartrate (LOPRESSOR) 25 mg tablet Take 12.5 mg by mouth two (2) times a day. 7/1/21 7/1/22  Provider, Historical   enoxaparin (LOVENOX) 80 mg/0.8 mL injection 80 mg by SubCUTAneous route two (2) times a day. Patient not taking: Reported on 7/15/2021 5/3/21   Provider, Historical   vit C/vit E ac/lut/copper/zinc (PRESERVISION LUTEIN PO) Take  by mouth daily. Provider, Historical   rivaroxaban (Xarelto) 20 mg tab tablet Take 20 mg by mouth nightly. Provider, Historical   omeprazole (PRILOSEC) 20 mg capsule Take 20 mg by mouth Daily (before breakfast). Take DOS per anesthesia protocol. Indications: gastroesophageal reflux disease    Provider, Historical   zolpidem (AMBIEN) 10 mg tablet Take 10 mg by mouth nightly as needed for Sleep. Indications: SLEEP-ONSET INSOMNIA    Provider, Historical   cyanocobalamin (VITAMIN B-12) 1,000 mcg/mL injection 1,000 mcg by IntraMUSCular route every twenty-eight (28) days. Provider, Historical   simvastatin (ZOCOR) 40 mg tablet Take 40 mg by mouth nightly. 3/10/10   Provider, Historical   fluticasone (FLONASE) 50 mcg/Actuation nasal spray 2 Sprays by Nasal route nightly.  Indications: ALLERGIC RHINITIS    Provider, Historical        Allergies   Allergen Reactions    Morphine Nausea and Vomiting     Other reaction(s): Nausea and/or vomiting-Intolerance    Other Medication Nausea and Vomiting     Dust and pollen causes runny nose. Mollusks/clams- N/V    Other Plant, Animal, Environmental Nausea and Vomiting     Dust and pollen causes runny nose. Mollusks/clams- N/V    Shellfish Derived Nausea and Vomiting    Clams Nausea and Vomiting       Review of Systems:  Pertinent items are noted in the History of Present Illness. Objective:     Visit Vitals  /69   Pulse 85   Temp 97.9 °F (36.6 °C)   Resp 18   Ht 5' 4\" (1.626 m)   Wt 166 lb 4.8 oz (75.4 kg)   SpO2 95%   BMI 28.55 kg/m²       Physical Exam:  General: Alert and Oriented x3 and appears to be of stated age. Head and Face: Normocephalic, atraumatic. Neck: Supple, normal range of motion. Lungs: Normal Respiratory rate. No dyspnea. Skin: No rash or erythema. Skin is cool to the touch. Surgical incisions appear to be healing well and there is no sign of infection. Psychiatric: Normal mood and affect. Answers questions appropriately. Cervical spine: No tenderness. Age-appropriate active motion. SHOULDER    right (Involved)  left   Skin Intact, well-healed anterior skin incision Intact   Radial Pulses 2+ Symmetrical 2+ Symmetrical   Deformity None Normal   Myotomes Normal Normal   Dermatomes  Normal Normal    ROM  limited Full   Strength  5-/5 throughout No weakness   Atrophy None noted None noted   Effusion/Swelling  None noted None noted   Palpation  mild anterior tenderness No tenderness   Bicep Tendon Rupture   Negative signs   Bear Hug, Belly Press  not tested Negative   Crossed Arm Adduction Test  not tested    Instability/Ant. Apprehension Test  not tested None noted   Impingement Not tested Negative        CT right shoulder from 2/2/22:  FINDINGS: There is a reverse arthroplasty of the right shoulder.  The humeral component appears to be intact. The globoid component demonstrates a small amount of lucency along the superior margin of the bone implant interface suggests on sagittal series 201, images 27 to 28. Less well visualized on the axial images or the coronal images due to beam hardening artifact. No evidence of humeral or glenoid fracture. AC joint demonstrates no significant bony hypertrophy. Mild degenerative changes noted. There is evidence of bony hypertrophy or dystrophic calcification along the superior margin of the coracoid base narrowing the acromiohumeral soft tissue interval. There is bony hypertrophy along the inferior margin of the coracoid. There is also bony hypertrophy of the undersurface of the acromion which likely adds to impingement . There is atrophy of the muscle bellies of the rotator cuff without full-thickness tear. The bulk of the deltoid muscle appears to be intact. No significant notching of the scapula is discerned. The remainder of the bony shoulder girdle and bony thorax demonstrates no acute bony injury. Scattered areas of ground-glass opacity noted throughout the lungs.     CONCLUSION: Reverse arthroplasty appears to be radiographically intact. Bony hypertrophy along the acromiohumeral interval involving the base the coracoid and the undersurface of the acromion narrowing the soft tissues spacing of the interval primarily at the coracoid base. Atrophy of the rotator cuff musculature with evidence of impingement. Nonspecific lung disease possibly small airways disease or interstitial lung disease.     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 I talked with them extensively about the risks, benefits, reasonable expectations and expected recovery time as well as possible complications including but not limited to bleeding, infection, wear of the components requiring revision, neurovascular injury, instability/dislocations, cosmetic deformity, stiffness, pain, continued problems, DVT, PE, hardware failure,  fracture, heterotopic ossification, MI and other anesthesia related risks, etc.   In the office I gave them education literature and answered their questions. They seem to understand and wish to proceed. The patient was counseled at length about the risks of christie Covid-19 during their perioperative period and any recovery window from their procedure. The patient was made aware that christie Covid-19  may worsen their prognosis for recovering from their procedure and lend to a higher morbidity and/or mortality risk. All material risks, benefits, and reasonable alternatives including postponing the procedure were discussed. The patient does  wish to proceed with the procedure at this time.       Signed By: James Bowers MD     February 18, 2022

## 2022-02-18 NOTE — ANESTHESIA POSTPROCEDURE EVALUATION
Procedure(s):  RIGHT REVISION TOTAL SHOULDER ARTHROPLASTY GEN/REG BEACH CHAIR, 5 EXTRA PITUITARY RONGEUR FOR SPECIMEN, EXACTECH 42 LARGE PLATE, CEMENT SPACER, C-ARM. general    Anesthesia Post Evaluation      Multimodal analgesia: multimodal analgesia used between 6 hours prior to anesthesia start to PACU discharge  Patient location during evaluation: PACU  Patient participation: complete - patient participated  Level of consciousness: awake and alert  Pain score: 0  Pain management: satisfactory to patient  Airway patency: patent  Anesthetic complications: no  Cardiovascular status: acceptable and hemodynamically stable  Respiratory status: acceptable and spontaneous ventilation  Hydration status: acceptable  Post anesthesia nausea and vomiting:  none  Final Post Anesthesia Temperature Assessment:  Normothermia (36.0-37.5 degrees C)      INITIAL Post-op Vital signs:   Vitals Value Taken Time   /80 02/18/22 1735   Temp 36.3 °C (97.3 °F) 02/18/22 1708   Pulse 78 02/18/22 1737   Resp 16 02/18/22 1733   SpO2 97 % 02/18/22 1737   Vitals shown include unvalidated device data.

## 2022-02-18 NOTE — ANESTHESIA PROCEDURE NOTES
Arterial Line Placement    Start time: 2/18/2022 2:15 PM  End time: 2/18/2022 2:20 PM  Performed by: Chano Huerta MD  Authorized by:  Chano Huerta MD     Pre-Procedure  Indications:  Arterial pressure monitoring and blood sampling  Preanesthetic Checklist: patient identified, risks and benefits discussed, anesthesia consent, site marked, patient being monitored, timeout performed and patient being monitored    Timeout Time: 14:15 EST        Procedure:   Prep:  Chlorhexidine  Seldinger Technique?: Yes    Orientation:  Right  Location:  Radial artery  Catheter size:  20 G  Number of attempts:  1    Assessment:   Post-procedure:  Line secured and sterile dressing applied  Patient Tolerance:  Patient tolerated the procedure well with no immediate complications

## 2022-02-18 NOTE — PERIOP NOTES
TRANSFER - OUT REPORT:    Verbal report given to Juliane RN (name) on Ann Schwartz  being transferred to [facility] (unit) for routine post - op       Report consisted of patients Situation, Background, Assessment and   Recommendations(SBAR). Information from the following report(s) SBAR was reviewed with the receiving nurse. Lines:   Peripheral IV 02/18/22 Left;Posterior Hand (Active)   Site Assessment Clean, dry, & intact 02/18/22 1744   Phlebitis Assessment 0 02/18/22 1744   Infiltration Assessment 0 02/18/22 1744   Dressing Status Clean, dry, & intact 02/18/22 1744   Dressing Type Tape;Transparent 02/18/22 1744   Hub Color/Line Status Infusing 02/18/22 1744       Arterial Line 02/18/22 Right Radial artery (Active)        Opportunity for questions and clarification was provided.       Patient transported with:   O2 @ 3 liters

## 2022-02-18 NOTE — BRIEF OP NOTE
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 Procedure(s):  RIGHT REVISION TOTAL SHOULDER ARTHROPLASTY GEN/REG BEACH CHAIR, 5 EXTRA PITUITARY RONGEUR FOR SPECIMEN, EXACTECH 42 LARGE PLATE, CEMENT SPACER, C-ARM    Surgeon(s):  Sue Mota MD    Surgical Assistant: Surg Asst-1: Georgette Lugo    Anesthesia: General     Estimated Blood Loss (mL): 762 ml    Complications: None    Specimens:   ID Type Source Tests Collected by Time Destination   1 : soft tissue humerus Frozen Section   Sue Mota MD 2/18/2022 1509 Pathology   2 : humeral tray 1 Frozen Section   Sue Mota MD 2/18/2022 1510 Pathology   3 : humeral tray 2 Frozen Section   Sue Mota MD 2/18/2022 1510 Pathology   1 : Joint fluid right shoulder Joint Fluid Joint, Shoulder CULTURE, ANAEROBIC, Milana Adalid Sue Mota MD 2/18/2022 1502 Microbiology   2 : Humeral base plate Tissue  CULTURE, ANAEROBIC, Carisa Bronson MD 2/18/2022 1522 Microbiology   3 : Glenoid soft tissue Tissue  CULTURE, ANAEROBIC, Carisa Bronson MD 2/18/2022 1525 Microbiology   4 : Glenoid 1 Tissue  CULTURE, ANAEROBIC, Carisa Bronson MD 2/18/2022 1536 Microbiology   5 : Glenoid 2 Tissue  CULTURE, ANAEROBIC, Carisa Bronson MD 2/18/2022 1540 Microbiology        Implants:   Implant Name Type Inv.  Item Serial No.  Lot No. LRB No. Used Action   SCR BNE LCK GLENOSPHERE -- EQUINOXE - [id number]  SCR BNE LCK GLENOSPHERE -- [place] [id number] [employer]  Right 1 Implanted   TRAY HUM ADPT + 10MM EQUINOXE [place] RVS SHLDR SYS - [id number]  TRAY HUM ADPT + 10MM EQUINOXE [place] RVS SHLDR SYS [id number] [employer]_WD  Right 1 Implanted SCR TORQUE DEFINING KIT -- EQUINOXE - [id number]  SCR TORQUE DEFINING KIT -- Pattie Pry [id number] [employer]  Right 1 Implanted   SPHERE KAYLAH [id number] +4MM LAT OFFSET EXP Floridalma Zapata - [id number]  SPHERE KAYLAH [id number] +4MM LAT OFFSET EXP Floridamla Zapata [id number] [employer]_WD  Right 1 Implanted   LINER HUM [id number] +2.5MM OFFSET STD POLYETH SHLDR RVS - [id number]  LINER HUM [id number] +2.5MM OFFSET STD Zenia Shoulder RVS [id number] [employer]_modulR  Right 1 Implanted       Drains: * No LDAs found *    Findings: See operative note    Electronically Signed by Kyaw Vigil MD on 2/18/2022 at 5:01 PM

## 2022-02-18 NOTE — PROGRESS NOTES
TRANSFER - IN REPORT:    Verbal report received from Jaimee RN(name) on [facility]  being received from PACU(unit) for routine post - op      Report consisted of patients Situation, Background, Assessment and   Recommendations(SBAR). Information from the following report(s) SBAR, Procedure Summary, Intake/Output and MAR was reviewed with the receiving nurse. Opportunity for questions and clarification was provided.

## 2022-02-19 ENCOUNTER — HOME HEALTH ADMISSION (OUTPATIENT)
Dept: HOME HEALTH SERVICES | Facility: HOME HEALTH | Age: 82
End: 2022-02-19
Payer: MEDICARE

## 2022-02-19 VITALS
DIASTOLIC BLOOD PRESSURE: 61 MMHG | HEART RATE: 88 BPM | BODY MASS INDEX: 28.39 KG/M2 | RESPIRATION RATE: 17 BRPM | TEMPERATURE: 98.6 F | HEIGHT: 64 IN | OXYGEN SATURATION: 100 % | WEIGHT: 166.3 LBS | SYSTOLIC BLOOD PRESSURE: 103 MMHG

## 2022-02-19 LAB
HCT VFR BLD AUTO: 36.7 % (ref 41.1–50.3)
HGB BLD-MCNC: 11.7 G/DL (ref 13.6–17.2)

## 2022-02-19 PROCEDURE — 97535 SELF CARE MNGMENT TRAINING: CPT

## 2022-02-19 PROCEDURE — 51798 US URINE CAPACITY MEASURE: CPT

## 2022-02-19 PROCEDURE — 74011250636 HC RX REV CODE- 250/636: Performed by: ORTHOPAEDIC SURGERY

## 2022-02-19 PROCEDURE — 36415 COLL VENOUS BLD VENIPUNCTURE: CPT

## 2022-02-19 PROCEDURE — 97530 THERAPEUTIC ACTIVITIES: CPT

## 2022-02-19 PROCEDURE — 97165 OT EVAL LOW COMPLEX 30 MIN: CPT

## 2022-02-19 PROCEDURE — 97162 PT EVAL MOD COMPLEX 30 MIN: CPT

## 2022-02-19 PROCEDURE — 74011000250 HC RX REV CODE- 250: Performed by: ORTHOPAEDIC SURGERY

## 2022-02-19 PROCEDURE — 85018 HEMOGLOBIN: CPT

## 2022-02-19 PROCEDURE — 74011250637 HC RX REV CODE- 250/637: Performed by: ORTHOPAEDIC SURGERY

## 2022-02-19 RX ADMIN — PANTOPRAZOLE SODIUM 40 MG: 40 TABLET, DELAYED RELEASE ORAL at 09:36

## 2022-02-19 RX ADMIN — SPIRONOLACTONE 25 MG: 25 TABLET ORAL at 09:36

## 2022-02-19 RX ADMIN — CEFAZOLIN SODIUM 2 G: 100 INJECTION, POWDER, LYOPHILIZED, FOR SOLUTION INTRAVENOUS at 05:30

## 2022-02-19 RX ADMIN — OXYCODONE AND ACETAMINOPHEN 1 TABLET: 7.5; 325 TABLET ORAL at 09:36

## 2022-02-19 RX ADMIN — DOXYCYCLINE HYCLATE 100 MG: 100 CAPSULE ORAL at 09:37

## 2022-02-19 RX ADMIN — METOPROLOL TARTRATE 12.5 MG: 25 TABLET, FILM COATED ORAL at 09:37

## 2022-02-19 RX ADMIN — CETIRIZINE HYDROCHLORIDE 5 MG: 5 TABLET ORAL at 09:36

## 2022-02-19 RX ADMIN — SODIUM CHLORIDE, PRESERVATIVE FREE 10 ML: 5 INJECTION INTRAVENOUS at 05:29

## 2022-02-19 RX ADMIN — LISINOPRIL 5 MG: 5 TABLET ORAL at 09:37

## 2022-02-19 RX ADMIN — AZELASTINE 1 SPRAY: 1 SPRAY, METERED NASAL at 09:00

## 2022-02-19 RX ADMIN — NALOXEGOL OXALATE 25 MG: 25 TABLET, FILM COATED ORAL at 09:36

## 2022-02-19 NOTE — PROGRESS NOTES
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 Objective:     PT/OT:     PATIENT MOBILITY                           Vital Signs:    Blood pressure 111/76, pulse 83, temperature 98.9 °F (37.2 °C), resp. rate 16, height 5' 4\" (1.626 m), weight 166 lb 4.8 oz (75.4 kg), SpO2 98 %.   Temp (24hrs), Av.2 °F (36.8 °C), Min:97.3 °F (36.3 °C), Max:98.9 °F (37.2 °C)      Pain Control:   Pain Assessment  Pain Scale 1: Numeric (0 - 10)  Pain Intensity 1: 0    Meds:  Current Facility-Administered Medications   Medication Dose Route Frequency    naloxegoL (MOVANTIK) tablet 25 mg  25 mg Oral ACB    rivaroxaban (XARELTO) tablet 20 mg  20 mg Oral DAILY WITH DINNER    fluticasone propionate (FLONASE) 50 mcg/actuation nasal spray 2 Spray  2 Spray Both Nostrils QHS PRN    zolpidem (AMBIEN) tablet 5 mg  5 mg Oral QHS PRN    metoprolol tartrate (LOPRESSOR) tablet 12.5 mg  12.5 mg Oral BID    lisinopriL (PRINIVIL, ZESTRIL) tablet 5 mg  5 mg Oral DAILY    azelastine (ASTELIN) 137mcg/spray nasal spray  (Patient Supplied)  1 Spray Both Nostrils BID    cetirizine (ZYRTEC) tablet 5 mg  5 mg Oral DAILY    pantoprazole (PROTONIX) tablet 40 mg  40 mg Oral ACB    atorvastatin (LIPITOR) tablet 20 mg  20 mg Oral QHS    0.9% sodium chloride infusion  100 mL/hr IntraVENous CONTINUOUS    sodium chloride (NS) flush 5-40 mL  5-40 mL IntraVENous Q8H    sodium chloride (NS) flush 5-40 mL  5-40 mL IntraVENous PRN    oxyCODONE-acetaminophen (PERCOCET 7.5) 7.5-325 mg per tablet 1-2 Tablet  1-2 Tablet Oral Q4H PRN    HYDROmorphone (DILAUDID) injection 0.5-1 mg  0.5-1 mg IntraVENous Q3H PRN    naloxone (NARCAN) injection 0.4 mg  0.4 mg IntraVENous Q10MIN PRN    senna-docusate (PERICOLACE) 8.6-50 mg per tablet 1 Tablet  1 Tablet Oral BID PRN    doxycycline (VIBRAMYCIN) capsule 100 mg  100 mg Oral Q12H    spironolactone (ALDACTONE) tablet 25 mg  25 mg Oral DAILY        LAB:    Lab Results   Component Value Date/Time    INR 1.0 08/20/2018 11:19 AM    INR 0.9 02/06/2012 11:06 AM    INR 1.0 02/02/2010 04:20 PM     Lab Results   Component Value Date/Time    HGB 11.7 (L) 02/19/2022 04:18 AM    HGB 12.5 (L) 02/17/2022 11:30 AM    HGB 9.6 (L) 09/19/2018 05:37 AM       Incision 02/18/22 Shoulder Right (Active)   Dressing Status Clean;Dry; Intact 02/19/22 0104   Dressing/Treatment Other (Comment) 02/19/22 0104   Drainage Amount None 02/18/22 1810   Number of days: 1       [REMOVED] Read-Only, Retired. ZZZ DO NOT USE OLD WOUND LDA Knee Right (Removed)   Number of days: 7324       [REMOVED] Read-Only, Retired. ZZZ DO NOT USE OLD WOUND LDA Knee Right (Removed)   Number of days: 4934       [REMOVED] Read-Only, Retired. ZZZ DO NOT USE OLD WOUND LDA Shoulder Right (Removed)   Number of days: 2104       [REMOVED] Read-Only, Retired. ZZZ DO NOT USE OLD WOUND LDA Abdomen (Removed)   Number of days: 1995       [REMOVED] Read-Only, Retired. ZZZ DO NOT USE OLD WOUND LDA Hip Right (Removed)   Number of days: 943       [REMOVED] Wound Leg lower Left;Medial 11/27/20 (Removed)   Number of days: 56       [REMOVED] Wound Leg Lower; Anterior Post Skin Graft-4. 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 ([facility] (2/14/2022)    Active Problems:    Status post reverse total arthroplasty of right shoulder (1/13/2022)      Instability of reverse total shoulder arthroplasty ([facility] (2/18/2022)      Shoulder arthritis (2/18/2022)         Plan:     Continue PT/OT/Rehab  Cultures pending no growth on doxycycline  Discussed activity modification and the need to be careful with right shoulder motion  Discussed post op urinary retention will keep dietz in place and arrange home health   will require outpatient urology follow up  Discharge home

## 2022-02-19 NOTE — PROGRESS NOTES
Problem: Falls - Risk of  Goal: *Absence of Falls  Description: Document Nancy Marking Fall Risk and appropriate interventions in the flowsheet.   Outcome: Resolved/Met  Note: Fall Risk Interventions:  Mobility Interventions: OT consult for ADLs,Patient to call before getting OOB,PT Consult for mobility concerns,Strengthening exercises (ROM-active/passive),PT Consult for assist device competence,Utilize walker, cane, or other assistive device         Medication Interventions: Patient to call before getting OOB,Teach patient to arise slowly    Elimination Interventions: Call light in reach,Elevated toilet seat,Patient to call for help with toileting needs,Urinal in reach              Problem: Patient Education: Go to Patient Education Activity  Goal: Patient/Family Education  Outcome: Resolved/Met     Problem: Patient Education: Go to Patient Education Activity  Goal: Patient/Family Education  Outcome: Resolved/Met     Problem: Upper Extremity Surgical Pathway: Day of Surgery  Goal: Off Pathway (Use only if patient is Off Pathway)  Outcome: Resolved/Met  Goal: Activity/Safety  Outcome: Resolved/Met  Goal: Consults, if ordered  Outcome: Resolved/Met  Goal: Diagnostic Test/Procedures  Outcome: Resolved/Met  Goal: Nutrition/Diet  Outcome: Resolved/Met  Goal: Medications  Outcome: Resolved/Met  Goal: Respiratory  Outcome: Resolved/Met  Goal: Treatments/Interventions/Procedures  Outcome: Resolved/Met  Goal: Psychosocial  Outcome: Resolved/Met  Goal: *Demonstrates progressive activity  Outcome: Resolved/Met  Goal: *Optimal pain control at patient's stated goal  Outcome: Resolved/Met  Goal: *Hemodynamically stable  Outcome: Resolved/Met  Goal: *Labs within defined limits  Outcome: Resolved/Met     Problem: Upper Extremity Surgical Pathway: POD 1  Goal: Off Pathway (Use only if patient is Off Pathway)  Outcome: Resolved/Met  Goal: Activity/Safety  Outcome: Resolved/Met  Goal: Diagnostic Test/Procedures  Outcome: Resolved/Met  Goal: Nutrition/Diet  Outcome: Resolved/Met  Goal: Discharge Planning  Outcome: Resolved/Met  Goal: Medications  Outcome: Resolved/Met  Goal: Respiratory  Outcome: Resolved/Met  Goal: Treatments/Interventions/Procedures  Outcome: Resolved/Met  Goal: Psychosocial  Outcome: Resolved/Met     Problem: Upper Extremity Surgical Pathway: Discharge Outcomes  Goal: *Verbalizes name, dosage, time, side effects, and number of days to continue medications  Outcome: Resolved/Met  Goal: *Describes available resources and support systems  Outcome: Resolved/Met  Goal: *Describes follow-up/return visits to physicians  Outcome: Resolved/Met  Goal: *Tolerating diet  Outcome: Resolved/Met  Goal: *Optimal pain control at patient's stated goal  Outcome: Resolved/Met  Goal: *Lungs clear or at baseline  Outcome: Resolved/Met  Goal: *Afebrile  Outcome: Resolved/Met  Goal: *Incision intact without signs of infection, redness, warmth  Outcome: Resolved/Met  Goal: *Absence of deep venous thrombosis signs and symptoms(Stroke Metric)  Outcome: Resolved/Met  Goal: *Active bowel function  Outcome: Resolved/Met  Goal: *Adequate urinary output  Outcome: Resolved/Met  Goal: *Discharge anxiety minimal  Outcome: Resolved/Met  Goal: *Describes available resources and support systems  Outcome: Resolved/Met  Goal: *Labs within defined limits  Outcome: Resolved/Met  Goal: *Hemodynamically stable  Outcome: Resolved/Met  Goal: *Demonstrates ability to don and doff sling/swath/positioning aid  Outcome: Resolved/Met  Goal: *Modified independence with transfers, ambulation on levels with assistance devices, stair climbing, ADL's  Outcome: Resolved/Met  Goal: *Demonstrates independence with home exercise program  Outcome: Resolved/Met

## 2022-02-19 NOTE — PROGRESS NOTES
Problem: Mobility Impaired (Adult and Pediatric)  Goal: *Acute Goals and Plan of Care (Insert Text)  Note: DISCHARGE GOALS :  (1.)Mr. Steffi Burch will move from supine to sit and sit to supine  with STAND BY ASSIST.  (2.)Mr. Steffi Burch will transfer from bed to chair and chair to bed with STAND BY ASSIST using the least restrictive device. (3.)Mr. Steffi Burch will ambulate with STAND BY ASSIST for 200 feet with the least restrictive device. 4) pt able to go up & down 1 step with CGA. Met 2/19  5) pt able to safely perform HEP per MD RX with written guidelines & spouses help. Met 2/19       PHYSICAL THERAPY: Daily Note, Treatment Day: Day of Assessment, AM and 2nd session 2/19/2022  INPATIENT: Hospital Day: 2  [place] / [place] / Product Type: Medicare /      NAME/AGE/GENDER: Mathieu Mejias is a 78 y.o. 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 (HCC)  Procedure(s) (LRB):  RIGHT REVISION TOTAL SHOULDER ARTHROPLASTY GEN/REG BEACH CHAIR, 5 EXTRA PITUITARY RONGEUR FOR SPECIMEN, EXACTECH 42 LARGE PLATE, CEMENT SPACER, C-ARM (Right)  1 Day Post-Op  ICD-10: Treatment Diagnosis:   · Pain in Right Shoulder (M25.511)  · Stiffness of Right Shoulder, Not elsewhere classified (M25.611)  · Generalized Muscle Weakness (M62.81)  · Other lack of cordination (R27.8)  · Difficulty in walking, Not elsewhere classified (R26.2)  · Other abnormalities of gait and mobility (R26.89)   Precaution/Allergies:  Morphine; Other medication;  Other plant, animal, environmental; Shellfish derived; and Clams      ASSESSMENT: Mr. Eleno Miller presents with stiff & sore right UE along with instability with gait & transfers s/p revision of reverse TSA right. This pt will benefit from follow up protocol exercises per MD RX & to establish safe gait & transfers prior to returning home with caregiver. In follow up 2nd session, PT reviewed with spouse HEP & precautions along with home safety. PT & spouse seemed to have a good understanding of all instructions & recommendations. This section established at most recent assessment   PROBLEM LIST (Impairments causing functional limitations):  1. Decreased [place] with Bed Mobility  2. Decreased [place] with Transfers  3. Decreased [place] with Ambulation   4. Decreased [place] with shoulder HEP   INTERVENTIONS PLANNED: (Benefits and precautions of physical therapy have been discussed with the patient.)  1. Bed Mobility Training  2. Transfer Training  3. Gait Training  4. Therapeutic Exercises per MD orders  5. Modalities for Pain     TREATMENT PLAN: Frequency/Duration: twice daily for duration of hospital stay  Rehabilitation Potential For Stated Goals: Caralee Spurling REHABILITATION/EQUIPMENT: (at time of discharge pending progress): Continue Skilled Therapy and out pt PT when MD determines appropriate .               HISTORY:   History of Present Injury/Illness (Reason for Referral):  Reverse revision right TSA  Past Medical History/Comorbidities:   Mr. Eleno Miller  has a past medical history of Basal cell carcinoma, Chronic combined systolic and diastolic congestive heart failure ([address] Chronic kidney disease, Chronic pain, Current use of long term anticoagulation, CARDOZA (dyspnea on exertion), Dysphagia, Erectile dysfunction, Fatigue, Former smoker, GERD (gastroesophageal reflux disease), High cholesterol, History of pulmonary embolism (09/18/2018), History of stomach cancer (2010), Hypertension, Iron deficiency anemia (05/2015), Macular degeneration, Nausea & vomiting, Nonischemic cardiomyopathy ([facility] Osteoarthritis, 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 He has no past medical history of Coagulation defects, COPD, Nicotine vapor product user, or Non-nicotine vapor product user. Mr. Garrett Fitch  has a past surgical history that includes hx appendectomy; hx colonoscopy; hx cholecystectomy (2015); hx other surgical; hx gi (2010); pr total hip arthroplasty (Bilateral, 2004); hx shoulder arthroscopy (Right, 2015); hx knee arthroscopy (Right); and pr total knee arthroplasty (Right, 2012). Social History/Living Environment:   Home Environment: Private residence  # Steps to Enter: 1  Rails to Enter: No  One/Two Story Residence: Two story  # of Interior Steps: 12  Interior Rails: Left  Support Systems: Spouse/Significant Other  Patient Expects to be Discharged to[de-identified] Home with family assistance  Current DME Used/Available at Home: None  Prior Level of Function/Work/Activity:  Pt was functioning without an device at least supervision a home   Number of Personal Factors/Comorbidities that affect the Plan of Care: 3+: HIGH COMPLEXITY   EXAMINATION:   Most Recent Physical Functioning:   Gross Assessment:  AROM: Generally decreased, functional (left UE & both LE's)  Strength: Generally decreased, functional (left UE & both LE's)  Coordination: Generally decreased, functional (left UE & both LE's)                    Balance:  Sitting: Intact; Without support  Standing: Impaired; Without support Bed Mobility:  Supine to Sit:  (NT)  Sit to Supine:  (NT)  Scooting: Contact guard assistance       Transfers:  Sit to Stand: Contact guard assistance  Stand to Sit: Contact guard assistance  Bed to Chair: Contact guard assistance  Duration: 30 Minutes (extra time to work through activity noted)  Gait:     Speed/Jaz: Delayed  Step Length: Left shortened;Right shortened  Gait Abnormalities: Decreased step clearance;Trunk sway increased  Distance (ft): 260 Feet (ft)  Assistive Device:  (None)  Ambulation - Level of Assistance: Contact guard assistance  Number of Stairs Trained: 1  Stairs - Level of Assistance: Contact guard assistance  Rail Use:  (none)   Functional Mobility:         Gait/Ambulation:  cga        Transfers:  cga        Bed Mobility:  NT   Body Structures Involved:  1. Joints  2. Muscles Body Functions Affected:  1. Sensory/Pain  2. Movement Related Activities and Participation Affected:  1. General Tasks and Demands  2. Mobility   Number of elements that affect the Plan of Care: 4+: HIGH COMPLEXITY   CLINICAL PRESENTATION:   Presentation: Evolving clinical presentation with changing clinical characteristics: MODERATE COMPLEXITY   CLINICAL DECISION MAKIN [facility] Mobility Inpatient Short Form  How much difficulty does the patient currently have. .. Unable A Lot A Little None   1. Turning over in bed (including adjusting bedclothes, sheets and blankets)? [] 1   [] 2   [x] 3   [] 4   2. Sitting down on and standing up from a chair with arms ( e.g., wheelchair, bedside commode, etc.)   [] 1   [] 2   [x] 3   [] 4   3. Moving from lying on back to sitting on the side of the bed? [] 1   [] 2   [x] 3   [] 4   How much help from another person does the patient currently need. .. Total A Lot A Little None   4. Moving to and from a bed to a chair (including a wheelchair)? [] 1   [] 2   [x] 3   [] 4   5. Need to walk in hospital room? [] 1   [] 2   [x] 3   [] 4   6. Climbing 3-5 steps with a railing? [x] 1   [] 2   [] 3   [] 4   © , Trustees of [address] [place] [address] [address] [address], under license to [employer]. All rights reserved      Score:  Initial: 16 Most Recent: X (Date: -- )    Interpretation of Tool:  Represents activities that are increasingly more difficult (i.e. Bed mobility, Transfers, Gait).     Medical Necessity:     · Patient is expected to demonstrate progress in   · strength, balance, coordination, and functional technique  ·  to   · decrease assistance required with bed mobility, transfers, gait & HEP  · .  Reason for Services/Other Comments:  · Patient continues to require skilled intervention due to   · Pt not safe with functional mobility & HEP  · . Use of outcome tool(s) and clinical judgement create a POC that gives a: Questionable prediction of patient's progress: MODERATE COMPLEXITY            TREATMENT:   (In addition to Assessment/Re-Assessment sessions the following treatments were rendered)   Pre-treatment Symptoms/Complaints: Pt expressed strong desire to return home  Pain: Initial: numeric scale  Pain Intensity 1: 3  Pain Location 1: Shoulder  Pain Orientation 1: Right  Pain Intervention(s) 1: Repositioned  Post Session:  3/10     Therapeutic Activity: (  30 Minutes (extra time to work through activity noted) 30): Therapeutic activities including protocol exercises Rx'ed with written guidelines provided, also review of home safety & PT expectations, progressive gait training & single step ambulation to improve mobility, strength, balance, coordination and dynamic movement of arm - bilateral and leg - bilateral to improve functional endurance & stability.             Date:  2/19 Date:   Date:     ACTIVITY/EXERCISE AM PM AM PM AM PM   Gripping 20 20       Wrist Flexion/Extension 20 20       Wrist Ulnar/Radial Deviation         Pronation/Supination 20aa 20aa       Elbow Flexion/Extension 20aa 20aa       Shoulder Flexion/Extension         Shoulder AB/ADduction         Shoulder IR/ER         Pulleys         Pendulums 20p CW & CCW 20P CW & CCW       Shrugs 20 20       Isometric:                 Flexion         Extension         ABduction         ADduction         Biceps/Triceps                  B = bilateral; AA = active assistive; A = active; P = passive  Education:  [x]  Home Exercises  [x]  Sling Application   [x]  Movement Precautions   []  Pulleys   [x]  Use of Ice   []  Other:   Treatment/Session Assessment:    · Response to Treatment:  Spouse aware on how to assist pt with exercises & also to guard pt's balance with transfers & gait until improved stability is achieved  · Interdisciplinary Collaboration:   o Registered Nurse  · After treatment position/precautions:   o Up in chair  o Bed/Chair-wheels locked  o Call light within reach  o RN notified   · Compliance with Program/Exercises: Will assess as treatment progresses. · Recommendations/Intent for next treatment session:  Treatment next visit will focus on increasing Mr. Radha Castro independence with bed mobility, transfers, gait training, strength/ROM exercises, modalities for pain, and patient education.    Total Treatment Duration:  PT Patient Time In/Time Out  Time In: 1102  Time Out: [address] Jeanna Santiago PT

## 2022-02-19 NOTE — PROGRESS NOTES
Problem: Mobility Impaired (Adult and Pediatric)  Goal: *Acute Goals and Plan of Care (Insert Text)  Note: DISCHARGE GOALS :  (1.)Mr. Mary Noble will move from supine to sit and sit to supine  with STAND BY ASSIST.  (2.)Mr. Mary Noble will transfer from bed to chair and chair to bed with STAND BY ASSIST using the least restrictive device. (3.)Mr. Mary Noble will ambulate with STAND BY ASSIST for 200 feet with the least restrictive device. 4) pt able to go up & down 1 step with CGA also pt able to go up & down 12 steps with a rail & CGA.  5) pt able to safely perform HEP per MD RX with written guidelines & spouses help. PHYSICAL THERAPY: Initial Assessment, Treatment Day: Day of Assessment, and AM 2/19/2022  INPATIENT: Hospital Day: 2  [place] / [place] / Product Type: Medicare /      NAME/AGE/GENDER: Celi Zhang is a 78 y.o. 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 (HCC)  Procedure(s) (LRB):  RIGHT REVISION TOTAL SHOULDER ARTHROPLASTY GEN/REG BEACH CHAIR, 5 EXTRA PITUITARY RONGEUR FOR SPECIMEN, EXACTECH 42 LARGE PLATE, CEMENT SPACER, C-ARM (Right)  1 Day Post-Op  ICD-10: Treatment Diagnosis:   · Pain in Right Shoulder (M25.511)  · Stiffness of Right Shoulder, Not elsewhere classified (M25.611)  · Generalized Muscle Weakness (M62.81)  · Other lack of cordination (R27.8)  · Difficulty in walking, Not elsewhere classified (R26.2)  · Other abnormalities of gait and mobility (R26.89)   Precaution/Allergies:  Morphine; Other medication;  Other plant, animal, environmental; Shellfish derived; and Clams      ASSESSMENT:     Mr. Vinny Arreguin presents with stiff & sore right UE along with instability with gait & transfers s/p revision of reverse TSA right. This pt will benefit from follow up protocol exercises per MD RX & to establish safe gait & transfers prior to returning home with caregiver. This section established at most recent assessment   PROBLEM LIST (Impairments causing functional limitations):  1. Decreased [place] with Bed Mobility  2. Decreased [place] with Transfers  3. Decreased [place] with Ambulation   4. Decreased [place] with shoulder HEP   INTERVENTIONS PLANNED: (Benefits and precautions of physical therapy have been discussed with the patient.)  1. Bed Mobility Training  2. Transfer Training  3. Gait Training  4. Therapeutic Exercises per MD orders  5. Modalities for Pain     TREATMENT PLAN: Frequency/Duration: twice daily for duration of hospital stay  Rehabilitation Potential For Stated Goals: [facility] REHABILITATION/EQUIPMENT: (at time of discharge pending progress): Continue Skilled Therapy and out pt PT when MD determines appropriate . HISTORY:   History of Present Injury/Illness (Reason for Referral):  Reverse revision right TSA  Past Medical History/Comorbidities:   Mr. Vinny Arreguin  has a past medical history of Basal cell carcinoma, Chronic combined systolic and diastolic congestive heart failure ([address] Chronic kidney disease, Chronic pain, Current use of long term anticoagulation, CARDOZA (dyspnea on exertion), Dysphagia, Erectile dysfunction, Fatigue, Former smoker, GERD (gastroesophageal reflux disease), High cholesterol, History of pulmonary embolism (09/18/2018), History of stomach cancer (2010), Hypertension, Iron deficiency anemia (05/2015), Macular degeneration, Nausea & vomiting, Nonischemic cardiomyopathy ([address] Osteoarthritis, 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 He has no past medical history of Coagulation defects, COPD, Nicotine vapor product user, or Non-nicotine vapor product user. Mr. Patricia Deutsch  has a past surgical history that includes hx appendectomy; hx colonoscopy; hx cholecystectomy (2015); hx other surgical; hx gi (2010); pr total hip arthroplasty (Bilateral, 2004); hx shoulder arthroscopy (Right, 2015); hx knee arthroscopy (Right); and pr total knee arthroplasty (Right, 2012). Social History/Living Environment:   Home Environment: Private residence  # Steps to Enter: 1  Rails to Enter: No  One/Two Story Residence: Two story  # of Interior Steps: 12  Interior Rails: Left  Support Systems: Spouse/Significant Other  Patient Expects to be Discharged to[de-identified] Home with family assistance  Current DME Used/Available at Home: None  Prior Level of Function/Work/Activity:  Pt was functioning without an device at least supervision a home   Number of Personal Factors/Comorbidities that affect the Plan of Care: 3+: HIGH COMPLEXITY   EXAMINATION:   Most Recent Physical Functioning:   Gross Assessment:  AROM: Generally decreased, functional (left UE & both LE's)  Strength: Generally decreased, functional (left UE & both LE's)  Coordination: Generally decreased, functional (left UE & both LE's)                    Balance:  Sitting: Intact; Without support  Standing: Impaired; Without support Bed Mobility:  Supine to Sit: Contact guard assistance  Sit to Supine:  (NT)  Scooting: Contact guard assistance       Transfers:  Sit to Stand: Contact guard assistance  Stand to Sit: Contact guard assistance  Bed to Chair: Contact guard assistance  Gait:     Speed/Jaz: Delayed  Step Length: Left shortened;Right shortened  Gait Abnormalities: Decreased step clearance;Trunk sway increased  Distance (ft):  (additional 100ft after an initial 40ft gait assessment)  Assistive Device:  (none)  Ambulation - Level of Assistance: Contact guard assistance   Functional Mobility:         Gait/Ambulation: cga        Transfers:  cga        Bed Mobility:  cga   Body Structures Involved:  1. Joints  2. Muscles Body Functions Affected:  1. Sensory/Pain  2. Movement Related Activities and Participation Affected:  1. General Tasks and Demands  2. Mobility   Number of elements that affect the Plan of Care: 4+: HIGH COMPLEXITY   CLINICAL PRESENTATION:   Presentation: Evolving clinical presentation with changing clinical characteristics: MODERATE COMPLEXITY   CLINICAL DECISION MAKIN [facility] Inpatient Short Form  How much difficulty does the patient currently have. .. Unable A Lot A Little None   1. Turning over in bed (including adjusting bedclothes, sheets and blankets)? [] 1   [] 2   [x] 3   [] 4   2. Sitting down on and standing up from a chair with arms ( e.g., wheelchair, bedside commode, etc.)   [] 1   [] 2   [x] 3   [] 4   3. Moving from lying on back to sitting on the side of the bed? [] 1   [] 2   [x] 3   [] 4   How much help from another person does the patient currently need. .. Total A Lot A Little None   4. Moving to and from a bed to a chair (including a wheelchair)? [] 1   [] 2   [x] 3   [] 4   5. Need to walk in hospital room? [] 1   [] 2   [x] 3   [] 4   6. Climbing 3-5 steps with a railing? [x] 1   [] 2   [] 3   [] 4   © , Trustees of [facility] [employer], under license to [employer]. All rights reserved      Score:  Initial: 16 Most Recent: X (Date: -- )    Interpretation of Tool:  Represents activities that are increasingly more difficult (i.e. Bed mobility, Transfers, Gait). Medical Necessity:     · Patient is expected to demonstrate progress in   · strength, balance, coordination, and functional technique  ·  to   · decrease assistance required with bed mobility, transfers, gait & HEP  · .  Reason for Services/Other Comments:  · Patient continues to require skilled intervention due to   · Pt not safe with functional mobility & HEP  · . Use of outcome tool(s) and clinical judgement create a POC that gives a: Questionable prediction of patient's progress: MODERATE COMPLEXITY            TREATMENT:   (In addition to Assessment/Re-Assessment sessions the following treatments were rendered)   Pre-treatment Symptoms/Complaints:  pt agreeable  Pain: Initial: numeric scale  Pain Intensity 1: 3  Pain Location 1: Shoulder  Pain Orientation 1: Right  Post Session:  3/10     Therapeutic Activity: (    38 min (extra time to work through activity noted): Therapeutic activities including exercises per MD MALONEY reverse protocol, review of precautions & safety, progressive gait training an additional 100 ft after an initial 40 ft gait assessment to improve mobility, strength, balance, coordination, and dynamic movement of arm - bilateral and leg - bilateral to improve functional endurance & stability . Assessment   Date:  2/19 Date:   Date:     ACTIVITY/EXERCISE AM PM AM PM AM PM   Gripping 20        Wrist Flexion/Extension 20        Wrist Ulnar/Radial Deviation         Pronation/Supination 20aa        Elbow Flexion/Extension 20aa        Shoulder Flexion/Extension         Shoulder AB/ADduction         Shoulder IR/ER         Pulleys         Pendulums 20p CW & CCW        Shrugs 20        Isometric:                 Flexion         Extension         ABduction         ADduction         Biceps/Triceps                  B = bilateral; AA = active assistive; A = active; P = passive  Education:  [x]  Home Exercises  [x]  Sling Application   [x]  Movement Precautions   []  Pulleys   []  Use of Ice   []  Other:   Treatment/Session Assessment:    · Response to Treatment:  tolerated well, need to review exercises, sling application  & home safety / precautions.   · Interdisciplinary Collaboration:   o Registered Nurse  o Physician  o   · After treatment position/precautions:   o Up in chair  o Bed/Chair-wheels locked  o Call light within reach  o RN notified · Compliance with Program/Exercises: Will assess as treatment progresses. · Recommendations/Intent for next treatment session:  Treatment next visit will focus on increasing Mr. Hillary Barnard independence with bed mobility, transfers, gait training, strength/ROM exercises, modalities for pain, and patient education.    Total Treatment Duration:  PT Patient Time In/Time Out  Time In: 0402  Time Out: 97 [facility],

## 2022-02-19 NOTE — PROGRESS NOTES
Care Management Interventions  PCP Verified by CM: Yes  Transition of Care Consult (CM Consult): [address]: Yes  Support Systems: Spouse/Significant Other  The Plan for Transition of Care is Related to the Following Treatment Goals :  Increase strength  The Patient and/or Patient Representative was Provided with a Choice of Provider and Agrees with the Discharge Plan?: Yes  Freedom of Choice List was Provided with Basic Dialogue that Supports the Patient's Individualized Plan of Care/Goals, Treatment Preferences and Shares the Quality Data Associated with the Providers?: Yes  Discharge Location  Patient Expects to be Discharged to[de-identified] Home with home health

## 2022-02-19 NOTE — DISCHARGE INSTRUCTIONS
Total Shoulder Replacement Postoperative Instructions    Returning Home    Care of your incisions: You have absorbable stitches which do not need to be removed. Your incision will be checked at your first visit. 1) Moderate bleeding may occur at the incision sites. This should decrease quickly over time. 2) Leave the dressings in place for 5-7 days. Then dressings may be removed and replaced with fresh gauze if there is any drainage. You may bath or shower but the incisions must be kept clean and dry. You may take your arm out of the sling for showering or bathing but being careful to avoid use of the arm. You may let your arm also hang at your side during showering or at your side during bathing. 3) Watch the wound for increasing redness, tenderness, swelling and pus drainage daily. These can be early signs of infection. Please communicate any concerns. 4) A slight temperature the first few days after surgery is not uncommon. This can be treated with deep breathing and coughing to clear the lungs. However, fevers (anything over 101°F), increasing pain, and swelling at the incisions should be reported immediately. 5) Keep the wounds dry until your first follow-up visit. 6) It is fine to loosen the sling and do elbow straightening and bending with the arm at the side. It is also good to move the wrist and hand. This can be done as much as you desire, but at least three times a day. You are to wear sling at all times except when doing the exercises as above and showers. 7) Deep vein thrombosis (DVT) is a condition in which a blood clot has formed in a deep vein of the leg or arm. This may result in redness, swelling, warmth and/or pain of the leg/arm. Although these are very rare, there are things that can be done to lessen the risk.   It is recommended by your surgeon unless indicated otherwise, after arthroscopy; take an adult aspirin once a day for three weeks after surgery to help prevent the formation of blood clots. (Do not take aspirin against the advice of your medical doctor). Pain Management:    Pain after the surgery will vary from patient to patient. A certain amount of discomfort is normal and should not be alarming. We do recommend starting your prescription pain medications once you begin to experience a moderate amount of pain. If no relief is obtained call the office number on this sheet. Many pain medications contain Tylenol. Do not take additional Tylenol while taking the prescribed pain medication. Pain medications can cause constipation, so keep hydrated and consider over the counter additions like Metamucil or stool softener. Pain medications and antibiotics that are given during the faisal-operative time can cause itching and hives; over the counter Benadryl (25mg every 6 hours) can be helpful in treating this. If your pain is not adequately controlled, contact your physician at the numbers on this sheet. For the first week:  1) Icing for 15 minutes at a time may reduce your pain. Allow at least 30 minutes between ice applications. Some patients may have a cryotherapy cooler which can be used as it was during your stay at the hospital. It is fine to wear a light shirt underneath if needed to prevent skin irritation. 2) Sleeping might be difficult. Some individuals find sleeping in a recliner or inclined with pillows or a foam wedge helpful. 3) Putting a mitten on the hand of the affected shoulder can be helpful since some individuals find that warm hands help decrease pain. During this time nausea and light-headedness are common and should improve in 2-5 days. Drinking fluids and eating may help. If nausea persists, medicine can be prescribed by calling your doctor on the number(s) listed. Follow-up visits  Doctor  Plan on seeing your surgeon 10 days or so after surgery.     Physical Therapy   Physical therapy will be set up on an individualized basis. ? You will want to line up a helper to assist you with your home therapy program for approximately the first 6 weeks. Optimally, this person can come to your first physical therapy visit to have the physical therapist show them how to do the home therapy. The home therapy exercises will need to be done daily for approximately 20 minutes. Your helper doesnt need any medical training; the therapist will show them what they need to know. If you call the office please have us paged instead of leaving a voice mail so that we can immediately take care of your needs. [id number]                 Phone [phone]  [facility]                 Fax [phone]            Danitza Fuentes[address]  Patient Education        Shoulder Replacement Surgery: What to Expect at [facility]     Shoulder replacement surgery replaces the worn parts of your shoulder joint. When you leave the hospital, your arm will be in a sling. It will be helpful if there is someone to help you at home for the next few weeks or until you have more energy and can move around better. You will go home with a bandage and stitches, staples, skin glue, or tape strips. You can remove the bandage when your doctor tells you to. If you have staples, your doctor will remove them in 10 to 21 days. If you have stitches that are not the type that dissolve, your doctor will remove them in 10 to 14 days. Glue or tape strips will fall off on their own over time. You may still have some mild pain, and the area may be swollen for several months after surgery. Your doctor will give you medicine for the pain. A physical therapist will show you what exercises to do at home. You will continue the rehabilitation program (rehab) you started in the hospital. The better you do with your rehab exercises, the sooner you will get your strength and movement back.  Depending on your job, you may be able to go back to work as early as 2 to 3 weeks after surgery, as long as you avoid certain arm movements, such as lifting. It takes at least 6 months to return to full activity. In the future, make sure to let all health professionals know about your artificial shoulder so they will know how to care for you. This care sheet gives you a general idea about how long it will take for you to recover. But each person recovers at a different pace. Follow the steps below to get better as quickly as possible. How can you care for yourself at home? Activity    · Rest when you feel tired. You may take a nap, but don't stay in bed all day.     · Work with your physical therapist to learn the best way to exercise.     · You will have a sling to wear at night. And it's a good idea to also put a small stack of folded sheets or towels under your upper arm while you are in bed to keep your arm from dropping too far back.     · Your arm should stay next to your body or in front of it for several weeks, both while you are up and during sleep.     · Don't lift anything with the affected arm for 6 weeks.     · Ask your doctor when you can drive again.     · Ask your doctor when it is okay for you to have sex.     · Your doctor may advise you to give up activities that put stress on that shoulder. This includes sports such as weight lifting or tennis, unless your tennis arm was not the one affected. Diet    · By the time you leave the hospital, you will probably be eating your normal diet. If your stomach is upset, try bland, low-fat foods like plain rice, broiled chicken, toast, and yogurt. Your doctor may recommend that you take iron and vitamin supplements.     · Drink plenty of fluids (unless your doctor tells you not to).     · You may notice that your bowel movements are not regular right after your surgery. This is common. Try to avoid constipation and straining with bowel movements. You may want to take a fiber supplement every day.  If you have not had a bowel movement after a couple of days, ask your doctor about taking a mild laxative. Medicines    · Your doctor will tell you if and when you can restart your medicines. You will also get instructions about taking any new medicines.     · If you take aspirin or some other blood thinner, ask your doctor if and when to start taking it again. Make sure that you understand exactly what your doctor wants you to do.     · Be safe with medicines. Take pain medicines exactly as directed. ? If the doctor gave you a prescription medicine for pain, take it as prescribed. ? If you are not taking a prescription pain medicine, ask your doctor if you can take an over-the-counter medicine.     · If you think your pain medicine is making you sick to your stomach:  ? Take your medicine after meals (unless your doctor has told you not to). ? Ask your doctor for a different pain medicine.     · If your doctor prescribed antibiotics, take them as directed. Don't stop taking them just because you feel better. You need to take the full course of antibiotics.     · If you take a blood thinner, be sure you get instructions about how to take your medicine safely. Blood thinners can cause serious bleeding problems. Incision care    · If your doctor told you how to care for your cut (incision), follow your doctor's instructions. You will have a dressing over the cut. A dressing helps the incision heal and protects it. Your doctor will tell you how to take care of this.     · If you did not get instructions, follow this general advice:  ? If you have strips of tape on the cut the doctor made, leave the tape on for a week or until it falls off.  ? If you have stitches or staples, your doctor will tell you when to come back to have them removed. ? If you have skin glue on the cut, leave it on until it falls off. Skin glue is also called skin adhesive or liquid stitches. ? Change the bandage every day. ?  Wash the area daily with warm water, and pat it dry. Don't use hydrogen peroxide or alcohol. They can slow healing. ? You may cover the area with a gauze bandage if it oozes fluid or rubs against clothing. ? You may shower 24 to 48 hours after surgery. Pat the incision dry. Don't swim or take a bath for the first 2 weeks, or until your doctor tells you it is okay. Exercise    · Shoulder rehabilitation is a series of exercises you do after your surgery. This helps you get back your shoulder's range of motion and strength. You will work with your doctor and physical therapist to plan this exercise program. To get the best results, you need to do the exercises correctly and as often and as long as your doctor tells you. Ice    · For pain, put ice or a cold pack on the area for 10 to 20 minutes at a time. Put a thin cloth between the ice and your skin. Follow-up care is a key part of your treatment and safety. Be sure to make and go to all appointments, and call your doctor if you are having problems. It's also a good idea to know your test results and keep a list of the medicines you take. When should you call for help? Call 911 anytime you think you may need emergency care. For example, call if:    · You have severe trouble breathing.     · You have symptoms of a blood clot in your lung (called a pulmonary embolism). These may include:  ? Sudden chest pain. ? Trouble breathing. ? Coughing up blood. Call your doctor now or seek immediate medical care if:    · You have severe or increasing pain.     · You have symptoms of infection, such as:  ? Increased pain, swelling, warmth, or redness. ? Red streaks or pus. ? A fever.     · You have tingling, weakness, or numbness in your arm.     · Your arm turns cold or changes color.     · You have symptoms of a blood clot in your leg (called a deep vein thrombosis). These may include:  ? Pain in the calf, back of the knee, thigh, or groin. ?  Redness and swelling in the leg or groin. Watch closely for changes in your health, and be sure to contact your doctor if:    · You do not get better as expected. Where can you learn more? Go to http://www.gray.com/  Enter V783 in the search box to learn more about \"Shoulder Replacement Surgery: What to Expect at Home. \"  Current as of: July 1, 2021               Content Version: 13.0  © 2006-2021 [employer]. Care instructions adapted under license by [employer] (which disclaims liability or warranty for this information). If you have questions about a medical condition or this instruction, always ask your healthcare professional. [place] [id number] any warranty or liability for your use of this information. Patient Education        Learning About How to Care for a Person's Indwelling Urinary Catheter  Introduction     A urinary catheter is a flexible plastic tube that's used to drain urine from the bladder when a person can't urinate. The catheter is placed into the bladder by inserting it through the urethra. The urethra is the opening that carries urine from the bladder to the outside of the body. When the catheter is in the bladder, a small balloon is used to keep the catheter in place. The catheter lets urine drain from the bladder into a collection bag. Urinary catheters can be used in both men and women. A catheter that stays in place for a longer period of time is called an indwelling catheter. A catheter may be needed because of certain medical conditions. These include an enlarged prostate or problems controlling urine. It may be used after surgery on the pelvis or urinary tract. Urinary catheters are also used when the lower part of the body is paralyzed. When helping a loved one with a catheter, try to be relaxed. Caring for a catheter can be embarrassing for both of you. If you are calm and don't seem embarrassed, the person may feel more comfortable.   How do you take care of the catheter? Wear disposable gloves when handling someone's catheter. Make sure to follow all of the instructions the doctor has given. And always wash your hands before and after you're done. Here are some other things to remember when caring for someone's catheter:  · Make sure that urine is running out of the catheter into the urine collection bag. And make sure that the catheter tubing does not get twisted or bent. · Keep the urine collection bag below the level of the bladder. At night it may be helpful to hang the bag on the side of the bed. · Make sure that the urine collection bag does not drag and pull on the catheter. · It's okay to shower with a catheter and urine collection bag in place, unless the doctor says not to. · Check for swelling or signs of infection in the area around the catheter. Signs of infection include pus and irritated, swollen, red, or tender skin. · Clean the area around the catheter daily with soap and water. Dry with a clean towel afterward. · Do not apply powder or lotion to the skin around the catheter. · Do not tug or pull on the catheter. · Sexual intercourse may still be possible for people who wear a catheter. It's best to talk with a doctor about options. How do you empty the bag? The urine collection bag needs to be emptied regularly. It's best to empty the bag when it's about half full or at bedtime. If the doctor has asked you to measure the amount of urine, do that before you empty the urine into the toilet. When you are ready to empty the bag, follow these steps:  1. Put on disposable gloves. 2. Remove the drain spout from its sleeve at the bottom of the collection bag. Open the valve on the spout. 3. Let the urine flow out of the bag and into the toilet or a container. Do not let the tubing or drain spout touch anything. 4. After you empty the bag, close the valve and put the drain spout back into its sleeve.   5. Remove your gloves, and throw them away. 6. Wash your hands with soap and water. How do you care for someone after the catheter is removed? After the catheter is taken out, the person may have trouble urinating. If this happens, try helping them sit in a few inches of warm water (sitz bath). If the urge to urinate comes during the sitz bath, it may be easier for them to urinate while still in the bath. Some burning may happen the first few times the person urinates. If the burning lasts longer, it may be a sign of an infection. If the catheter causes irritation or a rash, wearing loose, cotton underwear may help. Watch closely for changes in the person's health. Be sure to contact their doctor if you notice any problems or if they are unable to urinate at all. Where can you learn more? Go to http://www.gray.com/  Enter X535 in the search box to learn more about \"Learning About How to Care for a Person's Indwelling Urinary Catheter. \"  Current as of: March 17, 2021               Content Version: 13.0  © 2006-2021 [employer]. Care instructions adapted under license by [employer] (which disclaims liability or warranty for this information). If you have questions about a medical condition or this instruction, always ask your healthcare professional. Laurent Heaton any warranty or liability for your use of this information.

## 2022-02-19 NOTE — PROGRESS NOTES
Pt unable to void post op. Ambulated patient to the bathroom without success. Bladder scanned for 400 cc. Pt reported some pressure that he felt he needed to void. 2 different attempts placed by two different RNs without success. Spoke with Hannah Ruggiero to place coude catheter and leave in. 16 fr coude dietz placed by outreach rn from ICU. Pt denies complaints at this time.

## 2022-02-19 NOTE — PROGRESS NOTES
Problem: Self Care Deficits Care Plan (Adult)  Goal: *Acute Goals and Plan of Care (Insert Text)  Outcome: Progressing Towards Goal  Note: GOALS:   DISCHARGE GOALS (in preparation for going home/rehab):  3 days  1. Mr. Steffi Burch will perform upper body dressing activity with minimal assist required to demonstrate improved functional mobility and safety. 2.  Mr. Steffi Burch will perform bathing activity with minimal assist required to demonstrate improved functional mobility and safety. 3.  Mr. Steffi Burch will perform toileting/toilet transfer with minimal assist to demonstrate improved functional mobility and safety. 4.  Mr. Steffi Burch will perform shower transfer with minimal assist to demonstrate improved functional mobility and safety. JOINT CAMP OCCUPATIONAL THERAPY TKA: Initial Assessment and Daily Note 2/19/2022  INPATIENT: Hospital Day: 2  [place] / [place] / Product Type: Medicare /      NAME/AGE/GENDER: Mathieu Mejias is a 78 y.o. 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 [address] [T84.028A, Z96.611]   Procedure(s) and Anesthesia Type:     * RIGHT REVISION TOTAL SHOULDER ARTHROPLASTY GEN/REG BEACH CHAIR, 5 EXTRA PITUITARY RONGEUR FOR SPECIMEN, EXACTECH 42 LARGE PLATE, CEMENT SPACER, C-ARM - General (Right)  ICD-10: Treatment Diagnosis:    Pain in Right Shoulder (M25.511)  Stiffness of Right Shoulder, Not elsewhere classified (M25.611)      ASSESSMENT:     Mr. Steffi Burch is s/p Right reverse TSA and presents sitting up with ultra sling on RUE at recliner. Patient impulsive during ADL training and needed physical cues to not use RUE when bathing. Compensated with patient holding soap lid with right hand to allow patient to not use RUE. Wife present at middle and end of session and able to complete family training.  She understands shoulder restrictions and she will need to assist with ADL's at home. Continue OT. Recommending HHOT. This section established at most recent assessment   PROBLEM LIST (Impairments causing functional limitations):  Decreased Strength  Decreased ADL/Functional Activities  Decreased Transfer Abilities  Increased Pain  Increased Fatigue  Decreased Flexibility/Joint Mobility  Decreased Knowledge of Precautions   INTERVENTIONS PLANNED: (Benefits and precautions of occupational therapy have been discussed with the patient.)  Activities of daily living training  Adaptive equipment training  Balance training  Clothing management  Donning&doffing training  Theraputic activity     TREATMENT PLAN: Frequency/Duration: Follow patient 1-2tx to address above goals. Rehabilitation Potential For Stated Goals: Good     RECOMMENDED REHABILITATION/EQUIPMENT: (at time of discharge pending progress): Continue Skilled Therapy. OCCUPATIONAL PROFILE AND HISTORY:   History of Present Injury/Illness (Reason for Referral): Pt presents this date s/p (Right) TSA. Past Medical History/Comorbidities:   Mr. Usama Hall  has a past medical history of Basal cell carcinoma, Chronic combined systolic and diastolic congestive heart failure ([address] Chronic kidney disease, Chronic pain, Current use of long term anticoagulation, CARDOZA (dyspnea on exertion), Dysphagia, Erectile dysfunction, Fatigue, Former smoker, GERD (gastroesophageal reflux disease), High cholesterol, History of pulmonary embolism (09/18/2018), History of stomach cancer (2010), Hypertension, Iron deficiency anemia (05/2015), Macular degeneration, Nausea & vomiting, Nonischemic cardiomyopathy ([address] Osteoarthritis, 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 He has no past medical history of Coagulation defects, COPD, Nicotine vapor product user, or Non-nicotine vapor product user.   Mr. Usama Hall  has a past surgical history that includes hx appendectomy; hx colonoscopy; hx cholecystectomy (2015); hx other surgical; hx gi (2010); pr total hip arthroplasty (Bilateral, 2004); hx shoulder arthroscopy (Right, 2015); hx knee arthroscopy (Right); and pr total knee arthroplasty (Right, 2012). Social History/Living Environment:   Home Environment: Private residence  # Steps to Enter: 1  Rails to Enter: No  One/Two Story Residence: Two story  # of Interior Steps: 12  Interior Rails: Left  Support Systems: Spouse/Significant Other  Patient Expects to be Discharged to[de-identified] Home with family assistance  Current DME Used/Available at Home: None    Prior Level of Function/Work/Activity:  Independent prior. Number of Personal Factors/Comorbidities that affect the Plan of Care: Brief history (0):  LOW COMPLEXITY   ASSESSMENT OF OCCUPATIONAL PERFORMANCE[de-identified]   Most Recent Physical Functioning:   Balance  Sitting: Intact; Without support  Standing: Impaired; Without support       Gross Assessment: Yes  Gross Assessment  AROM: Generally decreased, functional (left UE & both LE's)  Strength: Generally decreased, functional (left UE & both LE's)  Coordination: Generally decreased, functional (left UE & both LE's)          LLE Assessment  LLE Assessment (WDL): Exception to [facility] Coordination  Fine Motor Skills-Upper: Right Impaired;Left Intact  Gross Motor Skills-Upper: Right Impaired;Left Intact         Mental Status  Neurologic State: Alert  Orientation Level: Oriented X4  Cognition: Appropriate decision making;Decreased attention/concentration  Perception: Appears intact  Safety/Judgement: Awareness of environment; Fall prevention          RLE Assessment  RLE Assessment (WDL): Exceptions to WDL     Basic ADLs (From Assessment) Complex ADLs (From Assessment)   Basic ADL  Feeding: Setup  Oral Facial Hygiene/Grooming: Stand-by assistance  Bathing: Minimum assistance  Upper Body Dressing: Minimum assistance  Lower Body Dressing:  Moderate assistance  Toileting: Contact guard assistance     Grooming/Bathing/Dressing Activities of Daily Living     Cognitive Retraining  Safety/Judgement: Awareness of environment; Fall prevention                 Functional Transfers  Bathroom Mobility: Contact guard assistance  Toilet Transfer : Contact guard assistance  Shower Transfer: Contact guard assistance     Bed/Mat Mobility  Supine to Sit: Contact guard assistance  Sit to Supine:  (NT)  Sit to Stand: Contact guard assistance  Stand to Sit: Contact guard assistance  Bed to Chair: Contact guard assistance  Scooting: Contact guard assistance         Physical Skills Involved:  Range of Motion  Balance  Strength  Activity Tolerance  Fine Motor Control  Gross Motor Control  Pain (acute) Cognitive Skills Affected (resulting in the inability to perform in a timely and safe manner): Short Term Recall Psychosocial Skills Affected:  Emotional Regulation  Self-Awareness   Number of elements that affect the Plan of Care: 3-5:  MODERATE COMPLEXITY   CLINICAL DECISION MAKIN[address] [place] [id number] AM-PAC 6 Clicks   Daily Activity Inpatient Short Form  How much help from another person does the patient currently need. .. Total A Lot A Little None   1. Putting on and taking off regular lower body clothing? [] 1   [] 2   [x] 3   [] 4   2. Bathing (including washing, rinsing, drying)? [] 1   [] 2   [x] 3   [] 4   3. Toileting, which includes using toilet, bedpan or urinal?   [] 1   [] 2   [x] 3   [] 4   4. Putting on and taking off regular upper body clothing? [] 1   [] 2   [x] 3   [] 4   5. Taking care of personal grooming such as brushing teeth? [] 1   [] 2   [] 3   [x] 4   6. Eating meals? [] 1   [] 2   [] 3   [x] 4   © , Trustees of [address] [place] [id number], under license to [employer]. All rights reserved     Score:  Initial: 20 Most Recent: X (Date: -- )    Interpretation of Tool:  Represents activities that are increasingly more difficult (i.e. Bed mobility, Transfers, Gait).     Medical Necessity:     Skilled intervention continues to be required due to Deficits noted above. Reason for Services/Other Comments:  Patient continues to require skilled intervention due to   Dx above  . Use of outcome tool(s) and clinical judgement create a POC that gives a: MODERATE COMPLEXITY            TREATMENT:   (In addition to Assessment/Re-Assessment sessions the following treatments were rendered)     Pre-treatment Symptoms/Complaints:    Pain: Initial:   Pain Intensity 1: 4  Post Session:  4     Self Care: (40): Procedure(s) (per grid) utilized to improve and/or restore self-care/home management as related to dressing, bathing, cooking, toileting, grooming, and self feeding. Required minimal verbal and tactile cueing to facilitate activities of daily living skills. Initial evaluation 5 mintues. Treatment/Session Assessment:     Response to Treatment:  Good, sitting up in recliner. Education:  [] Home Exercises  [x] Fall Precautions  [] Hip Precautions [] Going Home Video  [x] Knee/Hip Prosthesis Review  [x] Walker Management/Safety [x] Adaptive Equipment as Needed       Interdisciplinary Collaboration:   Physical Therapist  Occupational Therapist  Registered Nurse    After treatment position/precautions:   Up in chair  Bed/Chair-wheels locked  Caregiver at bedside  Call light within reach  RN notified     Compliance with Program/Exercises: Compliant all of the time, Will assess as treatment progresses. Recommendations/Intent for next treatment session:  Treatment next visit will focus on increasing Mr. Hilario Vazquez independence with bed mobility, transfers, self care, functional mobility, modalities for pain, and patient education.       Total Treatment Duration:  OT Patient Time In/Time Out  Time In: 0845  Time Out: AIDAN Gibson

## 2022-02-20 ENCOUNTER — HOME CARE VISIT (OUTPATIENT)
Dept: SCHEDULING | Facility: HOME HEALTH | Age: 82
End: 2022-02-20
Payer: MEDICARE

## 2022-02-20 VITALS
HEART RATE: 82 BPM | OXYGEN SATURATION: 97 % | SYSTOLIC BLOOD PRESSURE: 136 MMHG | DIASTOLIC BLOOD PRESSURE: 74 MMHG | RESPIRATION RATE: 17 BRPM | TEMPERATURE: 98 F

## 2022-02-20 PROCEDURE — 3331090002 HH PPS REVENUE DEBIT

## 2022-02-20 PROCEDURE — 400013 HH SOC

## 2022-02-20 PROCEDURE — G0299 HHS/HOSPICE OF RN EA 15 MIN: HCPCS

## 2022-02-20 PROCEDURE — 3331090001 HH PPS REVENUE CREDIT

## 2022-02-20 PROCEDURE — 400018 HH-NO PAY CLAIM PROCEDURE

## 2022-02-21 LAB
BACTERIA SPEC CULT: NORMAL
GRAM STN SPEC: NORMAL
SERVICE CMNT-IMP: NORMAL

## 2022-02-21 PROCEDURE — 3331090001 HH PPS REVENUE CREDIT

## 2022-02-21 PROCEDURE — 3331090002 HH PPS REVENUE DEBIT

## 2022-02-22 ENCOUNTER — HOME CARE VISIT (OUTPATIENT)
Dept: SCHEDULING | Facility: HOME HEALTH | Age: 82
End: 2022-02-22
Payer: MEDICARE

## 2022-02-22 VITALS
DIASTOLIC BLOOD PRESSURE: 78 MMHG | RESPIRATION RATE: 18 BRPM | HEART RATE: 86 BPM | TEMPERATURE: 98.3 F | OXYGEN SATURATION: 98 % | SYSTOLIC BLOOD PRESSURE: 132 MMHG

## 2022-02-22 PROCEDURE — G0299 HHS/HOSPICE OF RN EA 15 MIN: HCPCS

## 2022-02-22 PROCEDURE — 3331090002 HH PPS REVENUE DEBIT

## 2022-02-22 PROCEDURE — 3331090001 HH PPS REVENUE CREDIT

## 2022-02-23 ENCOUNTER — HOME CARE VISIT (OUTPATIENT)
Dept: SCHEDULING | Facility: HOME HEALTH | Age: 82
End: 2022-02-23
Payer: MEDICARE

## 2022-02-23 PROCEDURE — 3331090001 HH PPS REVENUE CREDIT

## 2022-02-23 PROCEDURE — 3331090002 HH PPS REVENUE DEBIT

## 2022-02-23 PROCEDURE — G0152 HHCP-SERV OF OT,EA 15 MIN: HCPCS

## 2022-02-24 ENCOUNTER — HOME CARE VISIT (OUTPATIENT)
Dept: SCHEDULING | Facility: HOME HEALTH | Age: 82
End: 2022-02-24
Payer: MEDICARE

## 2022-02-24 VITALS
RESPIRATION RATE: 17 BRPM | SYSTOLIC BLOOD PRESSURE: 118 MMHG | DIASTOLIC BLOOD PRESSURE: 70 MMHG | TEMPERATURE: 97.6 F | HEART RATE: 80 BPM | OXYGEN SATURATION: 98 %

## 2022-02-24 VITALS
SYSTOLIC BLOOD PRESSURE: 104 MMHG | HEART RATE: 95 BPM | RESPIRATION RATE: 16 BRPM | DIASTOLIC BLOOD PRESSURE: 62 MMHG | OXYGEN SATURATION: 97 % | TEMPERATURE: 97.1 F

## 2022-02-24 PROCEDURE — 3331090001 HH PPS REVENUE CREDIT

## 2022-02-24 PROCEDURE — G0299 HHS/HOSPICE OF RN EA 15 MIN: HCPCS

## 2022-02-24 PROCEDURE — 3331090002 HH PPS REVENUE DEBIT

## 2022-02-25 PROCEDURE — 3331090001 HH PPS REVENUE CREDIT

## 2022-02-25 PROCEDURE — 3331090002 HH PPS REVENUE DEBIT

## 2022-02-26 PROCEDURE — 3331090001 HH PPS REVENUE CREDIT

## 2022-02-26 PROCEDURE — 3331090002 HH PPS REVENUE DEBIT

## 2022-02-27 PROCEDURE — 3331090002 HH PPS REVENUE DEBIT

## 2022-02-27 PROCEDURE — 3331090001 HH PPS REVENUE CREDIT

## 2022-02-28 ENCOUNTER — HOME CARE VISIT (OUTPATIENT)
Dept: SCHEDULING | Facility: HOME HEALTH | Age: 82
End: 2022-02-28
Payer: MEDICARE

## 2022-02-28 VITALS
DIASTOLIC BLOOD PRESSURE: 74 MMHG | HEART RATE: 82 BPM | RESPIRATION RATE: 17 BRPM | OXYGEN SATURATION: 98 % | TEMPERATURE: 97.3 F | SYSTOLIC BLOOD PRESSURE: 134 MMHG

## 2022-02-28 PROBLEM — Z09 SURGERY FOLLOW-UP EXAMINATION: Status: ACTIVE | Noted: 2022-02-28

## 2022-02-28 PROCEDURE — 3331090001 HH PPS REVENUE CREDIT

## 2022-02-28 PROCEDURE — 3331090002 HH PPS REVENUE DEBIT

## 2022-02-28 PROCEDURE — G0299 HHS/HOSPICE OF RN EA 15 MIN: HCPCS

## 2022-03-01 PROCEDURE — 3331090002 HH PPS REVENUE DEBIT

## 2022-03-01 PROCEDURE — 3331090001 HH PPS REVENUE CREDIT

## 2022-03-02 ENCOUNTER — HOME CARE VISIT (OUTPATIENT)
Dept: SCHEDULING | Facility: HOME HEALTH | Age: 82
End: 2022-03-02
Payer: MEDICARE

## 2022-03-02 ENCOUNTER — APPOINTMENT (OUTPATIENT)
Dept: GENERAL RADIOLOGY | Age: 82
End: 2022-03-02
Attending: EMERGENCY MEDICINE
Payer: MEDICARE

## 2022-03-02 ENCOUNTER — HOSPITAL ENCOUNTER (EMERGENCY)
Age: 82
Discharge: HOME | End: 2022-03-02
Attending: EMERGENCY MEDICINE | Admitting: FAMILY MEDICINE
Payer: MEDICARE

## 2022-03-02 ENCOUNTER — APPOINTMENT (OUTPATIENT)
Dept: CT IMAGING | Age: 82
End: 2022-03-02
Attending: EMERGENCY MEDICINE
Payer: MEDICARE

## 2022-03-02 VITALS
HEART RATE: 98 BPM | RESPIRATION RATE: 16 BRPM | SYSTOLIC BLOOD PRESSURE: 78 MMHG | TEMPERATURE: 97.2 F | OXYGEN SATURATION: 97 % | DIASTOLIC BLOOD PRESSURE: 52 MMHG

## 2022-03-02 VITALS
DIASTOLIC BLOOD PRESSURE: 67 MMHG | HEART RATE: 87 BPM | BODY MASS INDEX: 27.31 KG/M2 | SYSTOLIC BLOOD PRESSURE: 104 MMHG | OXYGEN SATURATION: 93 % | TEMPERATURE: 98.3 F | HEIGHT: 64 IN | RESPIRATION RATE: 16 BRPM | WEIGHT: 160 LBS

## 2022-03-02 DIAGNOSIS — I95.1 ORTHOSTATIC HYPOTENSION: ICD-10-CM

## 2022-03-02 DIAGNOSIS — N17.9 AKI (ACUTE KIDNEY INJURY) (HCC): ICD-10-CM

## 2022-03-02 DIAGNOSIS — N30.00 ACUTE CYSTITIS WITHOUT HEMATURIA: Primary | ICD-10-CM

## 2022-03-02 DIAGNOSIS — R55 SYNCOPE, UNSPECIFIED SYNCOPE TYPE: ICD-10-CM

## 2022-03-02 PROBLEM — N39.0 UTI (URINARY TRACT INFECTION): Status: ACTIVE | Noted: 2022-03-02

## 2022-03-02 PROBLEM — I95.9 HYPOTENSION: Status: ACTIVE | Noted: 2022-03-02

## 2022-03-02 LAB
ALBUMIN SERPL-MCNC: 2.9 G/DL (ref 3.2–4.6)
ALBUMIN/GLOB SERPL: 0.6 {RATIO} (ref 1.2–3.5)
ALP SERPL-CCNC: 175 U/L (ref 50–136)
ALT SERPL-CCNC: 14 U/L (ref 12–65)
ANION GAP SERPL CALC-SCNC: 8 MMOL/L (ref 7–16)
APPEARANCE UR: ABNORMAL
AST SERPL-CCNC: 19 U/L (ref 15–37)
ATRIAL RATE: 99 BPM
BACTERIA URNS QL MICRO: ABNORMAL /HPF
BASOPHILS # BLD: 0.1 K/UL (ref 0–0.2)
BASOPHILS NFR BLD: 1 % (ref 0–2)
BILIRUB SERPL-MCNC: 0.5 MG/DL (ref 0.2–1.1)
BILIRUB UR QL: NEGATIVE
BNP SERPL-MCNC: 805 PG/ML
BUN SERPL-MCNC: 21 MG/DL (ref 8–23)
CALCIUM SERPL-MCNC: 9.1 MG/DL (ref 8.3–10.4)
CALCULATED P AXIS, ECG09: 49 DEGREES
CALCULATED R AXIS, ECG10: 39 DEGREES
CALCULATED T AXIS, ECG11: -52 DEGREES
CASTS URNS QL MICRO: ABNORMAL /LPF
CHLORIDE SERPL-SCNC: 107 MMOL/L (ref 98–107)
CO2 SERPL-SCNC: 24 MMOL/L (ref 21–32)
COLOR UR: YELLOW
CREAT SERPL-MCNC: 1.92 MG/DL (ref 0.8–1.5)
DIAGNOSIS, 93000: NORMAL
DIFFERENTIAL METHOD BLD: ABNORMAL
EOSINOPHIL # BLD: 0.1 K/UL (ref 0–0.8)
EOSINOPHIL NFR BLD: 1 % (ref 0.5–7.8)
EPI CELLS #/AREA URNS HPF: 0 /HPF
ERYTHROCYTE [DISTWIDTH] IN BLOOD BY AUTOMATED COUNT: 16 % (ref 11.9–14.6)
GLOBULIN SER CALC-MCNC: 4.5 G/DL (ref 2.3–3.5)
GLUCOSE SERPL-MCNC: 100 MG/DL (ref 65–100)
GLUCOSE UR STRIP.AUTO-MCNC: NEGATIVE MG/DL
HCT VFR BLD AUTO: 36.3 % (ref 41.1–50.3)
HGB BLD-MCNC: 12.1 G/DL (ref 13.6–17.2)
HGB UR QL STRIP: ABNORMAL
IMM GRANULOCYTES # BLD AUTO: 0 K/UL (ref 0–0.5)
IMM GRANULOCYTES NFR BLD AUTO: 0 % (ref 0–5)
KETONES UR QL STRIP.AUTO: NEGATIVE MG/DL
LACTATE SERPL-SCNC: 1.1 MMOL/L (ref 0.4–2)
LACTATE SERPL-SCNC: 1.1 MMOL/L (ref 0.4–2)
LEUKOCYTE ESTERASE UR QL STRIP.AUTO: ABNORMAL
LYMPHOCYTES # BLD: 2.3 K/UL (ref 0.5–4.6)
LYMPHOCYTES NFR BLD: 18 % (ref 13–44)
MAGNESIUM SERPL-MCNC: 2 MG/DL (ref 1.8–2.4)
MCH RBC QN AUTO: 29.2 PG (ref 26.1–32.9)
MCHC RBC AUTO-ENTMCNC: 33.3 G/DL (ref 31.4–35)
MCV RBC AUTO: 87.7 FL (ref 79.6–97.8)
MONOCYTES # BLD: 1.4 K/UL (ref 0.1–1.3)
MONOCYTES NFR BLD: 11 % (ref 4–12)
NEUTS SEG # BLD: 8.7 K/UL (ref 1.7–8.2)
NEUTS SEG NFR BLD: 69 % (ref 43–78)
NITRITE UR QL STRIP.AUTO: NEGATIVE
NRBC # BLD: 0 K/UL (ref 0–0.2)
P-R INTERVAL, ECG05: 180 MS
PH UR STRIP: 5.5 [PH] (ref 5–9)
PLATELET # BLD AUTO: 426 K/UL (ref 150–450)
PMV BLD AUTO: 9 FL (ref 9.4–12.3)
POTASSIUM SERPL-SCNC: 4.1 MMOL/L (ref 3.5–5.1)
PROCALCITONIN SERPL-MCNC: 0.05 NG/ML (ref 0–0.49)
PROT SERPL-MCNC: 7.4 G/DL (ref 6.3–8.2)
PROT UR STRIP-MCNC: NEGATIVE MG/DL
Q-T INTERVAL, ECG07: 356 MS
QRS DURATION, ECG06: 94 MS
QTC CALCULATION (BEZET), ECG08: 456 MS
RBC # BLD AUTO: 4.14 M/UL (ref 4.23–5.6)
RBC #/AREA URNS HPF: ABNORMAL /HPF
SODIUM SERPL-SCNC: 139 MMOL/L (ref 136–145)
SP GR UR REFRACTOMETRY: 1.01 (ref 1–1.02)
TROPONIN-HIGH SENSITIVITY: 27.4 PG/ML (ref 0–14)
UROBILINOGEN UR QL STRIP.AUTO: 0.2 EU/DL (ref 0.2–1)
VENTRICULAR RATE, ECG03: 99 BPM
WBC # BLD AUTO: 12.5 K/UL (ref 4.3–11.1)
WBC URNS QL MICRO: >100 /HPF

## 2022-03-02 PROCEDURE — 71045 X-RAY EXAM CHEST 1 VIEW: CPT

## 2022-03-02 PROCEDURE — 84145 PROCALCITONIN (PCT): CPT

## 2022-03-02 PROCEDURE — 99285 EMERGENCY DEPT VISIT HI MDM: CPT

## 2022-03-02 PROCEDURE — 70450 CT HEAD/BRAIN W/O DYE: CPT

## 2022-03-02 PROCEDURE — 87088 URINE BACTERIA CULTURE: CPT

## 2022-03-02 PROCEDURE — 83605 ASSAY OF LACTIC ACID: CPT

## 2022-03-02 PROCEDURE — 87186 SC STD MICRODIL/AGAR DIL: CPT

## 2022-03-02 PROCEDURE — 83735 ASSAY OF MAGNESIUM: CPT

## 2022-03-02 PROCEDURE — G0299 HHS/HOSPICE OF RN EA 15 MIN: HCPCS

## 2022-03-02 PROCEDURE — 80053 COMPREHEN METABOLIC PANEL: CPT

## 2022-03-02 PROCEDURE — 85025 COMPLETE CBC W/AUTO DIFF WBC: CPT

## 2022-03-02 PROCEDURE — 83880 ASSAY OF NATRIURETIC PEPTIDE: CPT

## 2022-03-02 PROCEDURE — 84484 ASSAY OF TROPONIN QUANT: CPT

## 2022-03-02 PROCEDURE — 96365 THER/PROPH/DIAG IV INF INIT: CPT

## 2022-03-02 PROCEDURE — 87086 URINE CULTURE/COLONY COUNT: CPT

## 2022-03-02 PROCEDURE — 74011000258 HC RX REV CODE- 258: Performed by: EMERGENCY MEDICINE

## 2022-03-02 PROCEDURE — 3331090001 HH PPS REVENUE CREDIT

## 2022-03-02 PROCEDURE — 74011250636 HC RX REV CODE- 250/636: Performed by: EMERGENCY MEDICINE

## 2022-03-02 PROCEDURE — 93005 ELECTROCARDIOGRAM TRACING: CPT | Performed by: PHYSICIAN ASSISTANT

## 2022-03-02 PROCEDURE — 87040 BLOOD CULTURE FOR BACTERIA: CPT

## 2022-03-02 PROCEDURE — 96366 THER/PROPH/DIAG IV INF ADDON: CPT

## 2022-03-02 PROCEDURE — 3331090002 HH PPS REVENUE DEBIT

## 2022-03-02 PROCEDURE — 81001 URINALYSIS AUTO W/SCOPE: CPT

## 2022-03-02 RX ORDER — ACETAMINOPHEN 325 MG/1
650 TABLET ORAL
Status: DISCONTINUED | OUTPATIENT
Start: 2022-03-02 | End: 2022-03-02 | Stop reason: HOSPADM

## 2022-03-02 RX ORDER — SODIUM CHLORIDE 0.9 % (FLUSH) 0.9 %
5-40 SYRINGE (ML) INJECTION EVERY 8 HOURS
Status: DISCONTINUED | OUTPATIENT
Start: 2022-03-02 | End: 2022-03-02 | Stop reason: HOSPADM

## 2022-03-02 RX ORDER — TRAZODONE HYDROCHLORIDE 50 MG/1
50 TABLET ORAL
Status: DISCONTINUED | OUTPATIENT
Start: 2022-03-02 | End: 2022-03-02 | Stop reason: HOSPADM

## 2022-03-02 RX ORDER — ATORVASTATIN CALCIUM 10 MG/1
20 TABLET, FILM COATED ORAL
Status: DISCONTINUED | OUTPATIENT
Start: 2022-03-02 | End: 2022-03-02 | Stop reason: HOSPADM

## 2022-03-02 RX ORDER — SODIUM CHLORIDE 9 MG/ML
75 INJECTION, SOLUTION INTRAVENOUS CONTINUOUS
Status: DISCONTINUED | OUTPATIENT
Start: 2022-03-02 | End: 2022-03-02 | Stop reason: HOSPADM

## 2022-03-02 RX ORDER — OXYCODONE AND ACETAMINOPHEN 7.5; 325 MG/1; MG/1
1-2 TABLET ORAL
Status: DISCONTINUED | OUTPATIENT
Start: 2022-03-02 | End: 2022-03-02 | Stop reason: HOSPADM

## 2022-03-02 RX ORDER — CEFPODOXIME PROXETIL 100 MG/1
100 TABLET, FILM COATED ORAL 2 TIMES DAILY
Qty: 14 TABLET | Refills: 0 | Status: SHIPPED | OUTPATIENT
Start: 2022-03-02 | End: 2022-04-07

## 2022-03-02 RX ORDER — POLYETHYLENE GLYCOL 3350 17 G/17G
17 POWDER, FOR SOLUTION ORAL
Status: DISCONTINUED | OUTPATIENT
Start: 2022-03-02 | End: 2022-03-02 | Stop reason: HOSPADM

## 2022-03-02 RX ORDER — MAG HYDROX/ALUMINUM HYD/SIMETH 200-200-20
30 SUSPENSION, ORAL (FINAL DOSE FORM) ORAL
Status: DISCONTINUED | OUTPATIENT
Start: 2022-03-02 | End: 2022-03-02 | Stop reason: HOSPADM

## 2022-03-02 RX ORDER — MEGESTROL ACETATE 40 MG/ML
400 SUSPENSION ORAL DAILY
Status: DISCONTINUED | OUTPATIENT
Start: 2022-03-03 | End: 2022-03-02 | Stop reason: HOSPADM

## 2022-03-02 RX ORDER — CALCIUM CARBONATE 200(500)MG
400 TABLET,CHEWABLE ORAL
Status: DISCONTINUED | OUTPATIENT
Start: 2022-03-02 | End: 2022-03-02 | Stop reason: HOSPADM

## 2022-03-02 RX ORDER — SODIUM CHLORIDE 0.9 % (FLUSH) 0.9 %
5-40 SYRINGE (ML) INJECTION AS NEEDED
Status: DISCONTINUED | OUTPATIENT
Start: 2022-03-02 | End: 2022-03-02 | Stop reason: HOSPADM

## 2022-03-02 RX ORDER — TAMSULOSIN HYDROCHLORIDE 0.4 MG/1
0.4 CAPSULE ORAL DAILY
Status: DISCONTINUED | OUTPATIENT
Start: 2022-03-03 | End: 2022-03-02 | Stop reason: HOSPADM

## 2022-03-02 RX ADMIN — SODIUM CHLORIDE 500 ML: 9 INJECTION, SOLUTION INTRAVENOUS at 16:08

## 2022-03-02 RX ADMIN — CEFTRIAXONE 1 G: 1 INJECTION, POWDER, FOR SOLUTION INTRAMUSCULAR; INTRAVENOUS at 15:08

## 2022-03-02 RX ADMIN — SODIUM CHLORIDE 1000 ML: 900 INJECTION, SOLUTION INTRAVENOUS at 12:46

## 2022-03-02 NOTE — ED NOTES
Pt seen by home mack nurse this am due to blood pressure low, pt has fallen several times due to being light headed, no injury from fall, recent rt shoulder replacement, pt on bp meds  Patient evaluated initially in triage. Rapid Medical Evaluation was conducted and necessary orders have been placed. I have performed a medical screening exam.  Care will now be transferred to the provider in the back of the emergency department.   NANCY Adams 12:17 PM

## 2022-03-02 NOTE — CONSULTS
[facility]ist Consult   Admit Date:  3/2/2022 12:35 PM   Name:  Oanh Michael   Age:  78 y.o. Sex:  male  :  1940   MRN:  [id number]   Room:  [facility]/    Presenting Complaint: Hypotension    Reason(s) for Admission: Hypotension [I95.9]  JENNY (acute kidney injury) ([facility] [address] [N17.9]  UTI (urinary tract infection) [N39.0]     Hospitalists consulted by Kathi Magallon DO for: admission for JENNY, UTI    History of Presenting Illness:   Oanh Michael is a 78 y.o. male with history of PE, DVT, HTN, CHF, recent shoulder surgery 20 who presented with c/o syncope. Was found to be hypotensive by [facility] nurse. He reports he accidentally took an extra dose of BP medicine this morning. Found to have JENNY with creatinine 1.92 (last creatinine 22 was 1.65). Also found to have UTI. Given rocephin in ED. Pt given 1500 ml IVF. BP improved. Pt declines to be admitted. He states he feels better and wishes to go home at this time. He understands risks of leaving including worsening BP, sepsis, death and understands to call 911 if symptoms persist.  Also advised to follow up with PCP tomorrow. PT verbalized understanding. Denies CP, SOB, n/v/d, abd pain, dizziness upon standing. He did walk to the bathroom while I was in the room without dizziness. Review of Systems:  10 systems reviewed and negative except as noted in HPI. Assessment & Plan:    Active Problems:    JENNY (acute kidney injury) ([facility] [address] (2020)    Received IVF  Educated pt to drink oral fluids  Will need f/u with PCP tomorrow to have creatinine rechecked  Holding home antihypertensives    UTI (urinary tract infection) (3/2/2022)  Will DC on vantin BID X7 days  PCP to follow urine cx     Hypotension (3/2/2022)  Resolved after IVF  Encouraged po fluid intake  Holding home antihypertensives               Hospital Problems as of 3/2/2022 Date Reviewed: 2/15/2022          Codes Class Noted - Resolved POA    UTI (urinary tract infection) ICD-10-CM: N39.0  ICD-9-CM: 599.0  3/2/2022 - Present Unknown        Hypotension ICD-10-CM: I95.9  ICD-9-CM: 458.9  3/2/2022 - Present Unknown        JENNY (acute kidney injury) ([facility] [address] ICD-10-CM: N17.9  ICD-9-CM: 584.9  2/5/2020 - Present Unknown              Past History:  Past Medical History:   Diagnosis Date    Basal cell carcinoma     LLE    Chronic combined systolic and diastolic congestive heart failure (HCC)     Followed by [facility]; Echo (6/9/21) EF45%, Grade II (moderate) left ventricular diastolic dysfunction present    Chronic kidney disease     pt denies on 2/17/22    Chronic pain     right shoulder    Current use of long term anticoagulation     Xarelto    CARDOZA (dyspnea on exertion)     Pt has been referred to pulmonary     Dysphagia     chronic, for which intermittent esophageal dilation    Erectile dysfunction     Fatigue     Former smoker     GERD (gastroesophageal reflux disease)     controlled with medication    High cholesterol     History of pulmonary embolism 09/18/2018    History of stomach cancer 2010    Hypertension     controlled with medication    Iron deficiency anemia 05/2015    Macular degeneration     Nausea & vomiting     post-op nausea  states from morphine    Nonischemic cardiomyopathy ([facility] [address]     with mild systolic dysfunction, followed by [facility]--Cath (5/13/21) normal coronary arteriogram    Osteoarthritis     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    Unspecified adverse effect of anesthesia     wife states slow to wake      Past Surgical History:   Procedure Laterality Date    HX APPENDECTOMY      HX CHOLECYSTECTOMY  2015    HX COLONOSCOPY      HX GI  2010    gastrectomy 2/8/10-has 30% stomach left    HX KNEE ARTHROSCOPY Right     HX OTHER SURGICAL      attempted tracheal dilation- stopped due to inflammation    HX SHOULDER ARTHROSCOPY Right 2015    [facility] TOTAL HIP ARTHROPLASTY Bilateral 2004    VA TOTAL KNEE ARTHROPLASTY Right 2012      Allergies   Allergen Reactions    Morphine Nausea and Vomiting     Other reaction(s): Nausea and/or vomiting-Intolerance    Other Medication Nausea and Vomiting     Dust and pollen causes runny nose. Mollusks/clams- N/V    Other Plant, Animal, Environmental Nausea and Vomiting     Dust and pollen causes runny nose. Mollusks/clams- N/V    Shellfish Derived Nausea and Vomiting    Clams Nausea and Vomiting      Social History     Tobacco Use    Smoking status: Former Smoker     Packs/day: 0.50     Years: 5.00     Pack years: 2.50    Smokeless tobacco: Never Used    Tobacco comment: quit age 20; continuing cessation   Substance Use Topics    Alcohol use: Yes     Comment: 3 drinks per night; wine and liquor      Family History   Problem Relation Age of Onset    Kidney Disease Mother     Dementia Father     Alcohol abuse Brother     OSTEOARTHRITIS Brother       Family history reviewed and negative except as otherwise noted.     Immunization History   Administered Date(s) Administered    COVID-19, Pfizer Purple top, DILUTE for use, 12+ yrs, 30mcg/0.3mL dose 01/26/2021, 02/12/2021, 10/02/2021    Hep A Vaccine 03/06/2002    IPV 03/06/2002    Influenza High Dose Vaccine PF 09/15/2015, 10/11/2016, 10/09/2017, 09/11/2018, 09/09/2019, 09/09/2020    Influenza Vaccine 10/16/2017, 09/18/2019, 09/01/2020, 09/01/2021    Influenza, High-dose, Quadrivalent (>65 Yrs Fluzone High Dose Quad 47327) 09/09/2021    Novel Influenza-H1N1-09, All Formulations 12/30/2009    Pneumococcal Conjugate (PCV-13) 09/15/2015    Pneumococcal Polysaccharide (PPSV-23) 10/13/2014    TB Skin Test (PPD) Intradermal 09/04/2018    Td 11/13/2019    Typhoid Vaccine, Live, Oral 03/06/2002    Zoster Recombinant 12/09/2020, 03/01/2021     Current Facility-Administered Medications   Medication Dose Route Frequency    [START ON 3/3/2022] tamsulosin (FLOMAX) capsule 0.4 mg  0.4 mg Oral DAILY    atorvastatin (LIPITOR) tablet 20 mg  20 mg Oral QHS    [START ON 3/3/2022] rivaroxaban (XARELTO) tablet 20 mg  20 mg Oral DAILY WITH BREAKFAST    oxyCODONE-acetaminophen (PERCOCET 7.5) 7.5-325 mg per tablet 1-2 Tablet  1-2 Tablet Oral Q4H PRN    [START ON 3/3/2022] megestroL (MEGACE) 400 mg/10 mL (10 mL) oral suspension 400 mg  400 mg Oral DAILY    sodium chloride (NS) flush 5-40 mL  5-40 mL IntraVENous Q8H    sodium chloride (NS) flush 5-40 mL  5-40 mL IntraVENous PRN    0.9% sodium chloride infusion  75 mL/hr IntraVENous CONTINUOUS    tuberculin injection 5 Units  5 Units IntraDERMal ONCE    [START ON 3/3/2022] cefTRIAXone (ROCEPHIN) 1 g in 0.9% sodium chloride (MBP/ADV) 50 mL MBP  1 g IntraVENous Q24H    acetaminophen (TYLENOL) tablet 650 mg  650 mg Oral Q4H PRN    alum-mag hydroxide-simeth (MYLANTA) oral suspension 30 mL  30 mL Oral Q4H PRN    polyethylene glycol (MIRALAX) packet 17 g  17 g Oral DAILY PRN    calcium carbonate (TUMS) chewable tablet 400 mg [elemental]  400 mg Oral QID PRN    traZODone (DESYREL) tablet 50 mg  50 mg Oral QHS PRN     Current Outpatient Medications   Medication Sig    tamsulosin (FLOMAX) 0.4 mg capsule Take 1 Capsule by mouth daily.  traMADoL (ULTRAM) 50 mg tablet     SUCRALFATE PO     MELOXICAM PO     atorvastatin calcium (LIPITOR PO)     vardenafil HCl (LEVITRA PO)     guaiFENesin ER (Mucinex) 600 mg ER tablet 1 tablet    BENAZEPRIL HCL, BULK,     ANTIFUNGAL, CLOTRIMAZOLE, EX     megestroL (MEGACE) 400 mg/10 mL (10 mL) suspension Take 400 mg by mouth daily.  senna-docusate (Stimulant Laxative Plus) 8.6-50 mg per tablet Take 1 Tablet by mouth daily.  cetirizine (Allergy Relief, cetirizine,) 10 mg tablet Take 10 mg by mouth daily.  vit C/E/Zn/coppr/lutein/zeaxan (PRESERVISION AREDS-2 PO) Take 1 Capsule by mouth two (2) times a day.  oxyCODONE-acetaminophen (PERCOCET 7.5) 7.5-325 mg per tablet Take 1-2 Tablets by mouth every four (4) hours as needed for Pain.     azelastine (ASTELIN) 137 mcg (0.1 %) nasal spray 1 Spray two (2) times a day. Use in each nostril as directed    fexofenadine (ALLEGRA) 180 mg tablet Take  by mouth.  SPIRONOLACTONE PO Take 25 mg by mouth daily.  senna-docusate (PERICOLACE) 8.6-50 mg per tablet Take 1 Tablet by mouth daily. To start after surgery  Indications: constipation    ondansetron (ZOFRAN ODT) 4 mg disintegrating tablet 1 Tablet by SubLINGual route every six (6) hours as needed for Nausea or Nausea or Vomiting. To start after surgery  Indications: prevent nausea and vomiting after surgery    lisinopriL (PRINIVIL, ZESTRIL) 5 mg tablet Take 5 mg by mouth daily.  metoprolol tartrate (LOPRESSOR) 25 mg tablet Take 12.5 mg by mouth two (2) times a day.  enoxaparin (LOVENOX) 80 mg/0.8 mL injection 80 mg by SubCUTAneous route two (2) times a day.  vit C/vit E ac/lut/copper/zinc (PRESERVISION LUTEIN PO) Take  by mouth daily.  rivaroxaban (Xarelto) 20 mg tab tablet Take 20 mg by mouth nightly.  omeprazole (PRILOSEC) 20 mg capsule Take 20 mg by mouth Daily (before breakfast). Take DOS per anesthesia protocol. Indications: gastroesophageal reflux disease    zolpidem (AMBIEN) 10 mg tablet Take 10 mg by mouth nightly as needed for Sleep. Indications: difficulty falling asleep    cyanocobalamin (VITAMIN B-12) 1,000 mcg/mL injection 1,000 mcg by IntraMUSCular route every twenty-eight (28) days.  simvastatin (ZOCOR) 40 mg tablet Take 40 mg by mouth nightly.  fluticasone (FLONASE) 50 mcg/Actuation nasal spray 2 Sprays by Nasal route nightly.  Indications: ALLERGIC RHINITIS       Objective:     Patient Vitals for the past 24 hrs:   Temp Pulse Resp BP SpO2   03/02/22 1609    104/67 93 %   03/02/22 1525    (!) 101/58 94 %   03/02/22 1524    (!) 101/58 94 %   03/02/22 1523    (!) 104/59 97 %   03/02/22 1522    122/65 99 %   03/02/22 1510    131/81    03/02/22 1259     93 %   03/02/22 1242    129/67    03/02/22 1218  87  (!) 88/51    03/02/22 1215 98.3 °F (36.8 °C) 97 16 (!) 94/52 97 %     Oxygen Therapy  O2 Sat (%): 93 % (03/02/22 1609)  Pulse via Oximetry: 84 beats per minute (03/02/22 1609)  O2 Device: None (Room air) (03/02/22 1215)    Estimated body mass index is 27.46 kg/m² as calculated from the following:    Height as of this encounter: 5' 4\" (1.626 m). Weight as of this encounter: 72.6 kg (160 lb). No intake or output data in the 24 hours ending 03/02/22 1802      Physical Exam:    Blood pressure 104/67, pulse 87, temperature 98.3 °F (36.8 °C), resp. rate 16, height 5' 4\" (1.626 m), weight 72.6 kg (160 lb), SpO2 93 %. General:    Well nourished. No overt distress  Head:  Normocephalic, atraumatic  Eyes:  Sclerae appear normal.  Pupils equally round. ENT:  Nares appear normal, no drainage. Moist oral mucosa  Neck:  No restricted ROM. Trachea midline   CV:   RRR. No m/r/g. No jugular venous distension. Lungs:   CTAB. No wheezing, rhonchi, or rales. Respirations even, unlabored  Abdomen: Bowel sounds present. Soft, nontender, nondistended. Extremities: No cyanosis or clubbing. No edema  Skin:     No rashes and normal coloration. Warm and dry. Neuro:  CN II-XII grossly intact. Sensation intact. A&Ox3  Psych:  Normal mood and affect. I have reviewed ordered lab tests and independently visualized imaging below:    Recent Labs:  Recent Results (from the past 48 hour(s))   EKG, 12 LEAD, INITIAL    Collection Time: 03/02/22 12:22 PM   Result Value Ref Range    Ventricular Rate 99 BPM    Atrial Rate 99 BPM    P-R Interval 180 ms    QRS Duration 94 ms    Q-T Interval 356 ms    QTC Calculation (Bezet) 456 ms    Calculated P Axis 49 degrees    Calculated R Axis 39 degrees    Calculated T Axis -52 degrees    Diagnosis       Sinus rhythm  Possible Anterior infarct , age undetermined  Abnormal ECG  When compared with ECG of 23-JUL-2015 10:00,  Premature atrial complexes are now Present  Vent.  rate has increased BY  49 BPM  Nonspecific T wave abnormality, worse in Inferior leads  Confirmed by [facility] ANTONIO ZABALA [facility]  MD ()PREETHI ([id number]) on 3/2/2022 4:30:23 PM     CBC WITH AUTOMATED DIFF    Collection Time: 03/02/22 12:36 PM   Result Value Ref Range    WBC 12.5 (H) 4.3 - 11.1 K/uL    RBC 4.14 (L) 4.23 - 5.6 M/uL    HGB 12.1 (L) 13.6 - 17.2 g/dL    HCT 36.3 (L) 41.1 - 50.3 %    MCV 87.7 79.6 - 97.8 FL    MCH 29.2 26.1 - 32.9 PG    MCHC 33.3 31.4 - 35.0 g/dL    RDW 16.0 (H) 11.9 - 14.6 %    PLATELET 877 317 - 588 K/uL    MPV 9.0 (L) 9.4 - 12.3 FL    ABSOLUTE NRBC 0.00 0.0 - 0.2 K/uL    DF AUTOMATED      NEUTROPHILS 69 43 - 78 %    LYMPHOCYTES 18 13 - 44 %    MONOCYTES 11 4.0 - 12.0 %    EOSINOPHILS 1 0.5 - 7.8 %    BASOPHILS 1 0.0 - 2.0 %    IMMATURE GRANULOCYTES 0 0.0 - 5.0 %    ABS. NEUTROPHILS 8.7 (H) 1.7 - 8.2 K/UL    ABS. LYMPHOCYTES 2.3 0.5 - 4.6 K/UL    ABS. MONOCYTES 1.4 (H) 0.1 - 1.3 K/UL    ABS. EOSINOPHILS 0.1 0.0 - 0.8 K/UL    ABS. BASOPHILS 0.1 0.0 - 0.2 K/UL    ABS. IMM. GRANS. 0.0 0.0 - 0.5 K/UL   METABOLIC PANEL, COMPREHENSIVE    Collection Time: 03/02/22 12:36 PM   Result Value Ref Range    Sodium 139 136 - 145 mmol/L    Potassium 4.1 3.5 - 5.1 mmol/L    Chloride 107 98 - 107 mmol/L    CO2 24 21 - 32 mmol/L    Anion gap 8 7 - 16 mmol/L    Glucose 100 65 - 100 mg/dL    BUN 21 8 - 23 MG/DL    Creatinine 1.92 (H) 0.8 - 1.5 MG/DL    GFR est AA 43 (L) >60 ml/min/1.73m2    GFR est non-AA 36 (L) >60 ml/min/1.73m2    Calcium 9.1 8.3 - 10.4 MG/DL    Bilirubin, total 0.5 0.2 - 1.1 MG/DL    ALT (SGPT) 14 12 - 65 U/L    AST (SGOT) 19 15 - 37 U/L    Alk.  phosphatase 175 (H) 50 - 136 U/L    Protein, total 7.4 6.3 - 8.2 g/dL    Albumin 2.9 (L) 3.2 - 4.6 g/dL    Globulin 4.5 (H) 2.3 - 3.5 g/dL    A-G Ratio 0.6 (L) 1.2 - 3.5     TROPONIN-HIGH SENSITIVITY    Collection Time: 03/02/22 12:36 PM   Result Value Ref Range    Troponin-High Sensitivity 27.4 (H) 0 - 14 pg/mL   NT-PRO BNP    Collection Time: 03/02/22 12:36 PM   Result Value Ref Range    NT pro- (H) <450 PG/ML   PROCALCITONIN    Collection Time: 03/02/22 12:36 PM   Result Value Ref Range    Procalcitonin 0.05 0.00 - 0.49 ng/mL   MAGNESIUM    Collection Time: 03/02/22 12:36 PM   Result Value Ref Range    Magnesium 2.0 1.8 - 2.4 mg/dL   URINALYSIS W/ RFLX MICROSCOPIC    Collection Time: 03/02/22  1:36 PM   Result Value Ref Range    Color YELLOW      Appearance CLOUDY      Specific gravity 1.010 1.001 - 1.023      pH (UA) 5.5 5.0 - 9.0      Protein Negative NEG mg/dL    Glucose Negative mg/dL    Ketone Negative NEG mg/dL    Bilirubin Negative NEG      Blood SMALL (A) NEG      Urobilinogen 0.2 0.2 - 1.0 EU/dL    Nitrites Negative NEG      Leukocyte Esterase LARGE (A) NEG      WBC >100 (H) 0 /hpf    RBC 3-5 0 /hpf    Epithelial cells 0 0 /hpf    Bacteria 1+ (H) 0 /hpf    Casts 0-3 0 /lpf   LACTIC ACID    Collection Time: 03/02/22  2:42 PM   Result Value Ref Range    Lactic acid 1.1 0.4 - 2.0 MMOL/L       All Micro Results     Procedure Component Value Units Date/Time    BLOOD CULTURE [[id number]] Collected: 03/02/22 1442    Order Status: Completed Specimen: Blood Updated: 03/02/22 1509    BLOOD CULTURE [[id number]] Collected: 03/02/22 1454    Order Status: Completed Specimen: Blood Updated: 03/02/22 1508    CULTURE, URINE [[id number]] Collected: 03/02/22 1336    Order Status: Completed Specimen: Urine from Clean catch Updated: 03/02/22 1441          Other Studies:  CT HEAD WO CONT    Result Date: 3/2/2022  CT of the head without contrast. CLINICAL INDICATION: Syncope PROCEDURE: Serial thin section axial images are obtained from the cranial vertex through the skull base without the administration of intravenous contrast. Radiation dose reduction techniques were used for this study. Our CT scanners use one or all of the following: Automated exposure control, adjusted of the mA and/or kV according to patient size, iterative reconstruction COMPARISON: No prior.  FINDINGS: There is no acute intracranial hemorrhage, mass, or mass effect. No abnormal extra-axial fluid collections identified. There is no hydrocephalus. The basilar cisterns are widely patent. Moderate periventricular white matter hypoattenuation is present. There is moderate cerebral atrophy. No skull fracture or aggressive osseous lesion noted. The mastoid air cells and included paranasal sinuses are clear. 1. No acute intracranial abnormality. 2. Moderate bilateral white matter hypoattenuation most in keeping with chronic microangiopathic disease with moderate atrophy. XR CHEST PORT    Result Date: 3/2/2022  Portable chest x-ray CLINICAL INDICATION: Syncope FINDINGS: Single AP view the chest compared to a similar exam dated 9/18/2018 show the lungs to be expanded and clear. No pleural effusion or pneumothorax. The cardiac silhouette and mediastinum are unremarkable. The patient has had a prior reverse shoulder arthroplasty. No acute cardiopulmonary abnormality.       Signed:  Claudia Lim NP    Notes, labs, VS, diagnostic testing reviewed  Case discussed with pt, care team, Dr. Sola Fuentes, wife at bedside

## 2022-03-02 NOTE — ED TRIAGE NOTES
Patient with right shoulder replacement performed on 2/18 and today with home health  his blood pressure was 70/50 and he had a fall this morning, states he felt \"funny   before fall. Patient advises some nausea as well this morning. Masked. Patient with some right shoulder pain.

## 2022-03-02 NOTE — ED PROVIDER NOTES
68-year-old male with history of CKD, CHF, GERD, hypertension, history of right shoulder placement on 2/18/22, history of previous pulmonary embolism who presents with recurrent syncopal episodes this past week. States that he is passed out while standing at least 5 times. When home health nurse was present today blood pressure was noted to be 70 systolic. Patient states he been compliant with his blood pressure blood pressure medication as well as diuretic. Denies hitting head or loss of conscious. Denies neck pain, back pain, numbness, tingling, weakness, dysuria, hematuria, fever, chills. Patient denies any significant chest pain or shortness of breath. Patient denies any significant pain localized to right shoulder    The history is provided by the patient. No  was used. Hypotension   This is a new problem. The current episode started more than 2 days ago. The problem has not changed since onset. Pertinent negatives include no confusion, no somnolence, no seizures, no unresponsiveness, no weakness, no agitation, no delusions, no hallucinations, no self-injury, no violence, no tingling and no numbness.  Mental status baseline is normal.         Past Medical History:   Diagnosis Date    Basal cell carcinoma     LLE    Chronic combined systolic and diastolic congestive heart failure (HCC)     Followed by [facility]; Echo (6/9/21) EF45%, Grade II (moderate) left ventricular diastolic dysfunction present    Chronic kidney disease     pt denies on 2/17/22    Chronic pain     right shoulder    Current use of long term anticoagulation     Xarelto    CARDOZA (dyspnea on exertion)     Pt has been referred to pulmonary     Dysphagia     chronic, for which intermittent esophageal dilation    Erectile dysfunction     Fatigue     Former smoker     GERD (gastroesophageal reflux disease)     controlled with medication    High cholesterol     History of pulmonary embolism 09/18/2018  History of stomach cancer 2010    Hypertension     controlled with medication    Iron deficiency anemia 05/2015    Macular degeneration     Nausea & vomiting     post-op nausea  states from morphine    Nonischemic cardiomyopathy (HCC)     with mild systolic dysfunction, followed by [facility]--Cath (5/13/21) normal coronary arteriogram    Osteoarthritis     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    Unspecified adverse effect of anesthesia     wife states slow to wake       Past Surgical History:   Procedure Laterality Date    HX APPENDECTOMY      HX CHOLECYSTECTOMY  2015    HX COLONOSCOPY      HX GI  2010    gastrectomy 2/8/10-has 30% stomach left    HX KNEE ARTHROSCOPY Right     HX OTHER SURGICAL      attempted tracheal dilation- stopped due to inflammation    HX SHOULDER ARTHROSCOPY Right 2015    AR TOTAL HIP ARTHROPLASTY Bilateral 2004    AR TOTAL KNEE ARTHROPLASTY Right 2012         Family History:   Problem Relation Age of Onset    Kidney Disease Mother     Dementia Father     Alcohol abuse Brother     OSTEOARTHRITIS Brother        Social History     Socioeconomic History    Marital status:      Spouse name: Not on file    Number of children: Not on file    Years of education: Not on file    Highest education level: Not on file   Occupational History    Not on file   Tobacco Use    Smoking status: Former Smoker     Packs/day: 0.50     Years: 5.00     Pack years: 2.50    Smokeless tobacco: Never Used    Tobacco comment: quit age 20; continuing cessation   Vaping Use    Vaping Use: Never used   Substance and Sexual Activity    Alcohol use: Yes     Comment: 3 drinks per night; wine and liquor    Drug use: No    Sexual activity: Not on file   Other Topics Concern     Service Not Asked    Blood Transfusions Not Asked    Caffeine Concern Not Asked    Occupational Exposure Not Asked    Hobby Hazards Not Asked    Sleep Concern Not Asked    Stress Concern Not Asked    Weight Concern Not Asked    Special Diet Not Asked    Back Care Not Asked    Exercise Not Asked    Bike Helmet Not Asked   [address] Not Asked    Self-Exams Not Asked   Social History Narrative    Not on file     Social Determinants of Health     Financial Resource Strain:     Difficulty of Paying Living Expenses: Not on file   Food Insecurity:     Worried About Running Out of Food in the Last Year: Not on file    Grady of Food in the Last Year: Not on file   Transportation Needs:     Lack of Transportation (Medical): Not on file    Lack of Transportation (Non-Medical): Not on file   Physical Activity:     Days of Exercise per Week: Not on file    Minutes of Exercise per Session: Not on file   Stress:     Feeling of Stress : Not on file   Social Connections:     Frequency of Communication with Friends and Family: Not on file    Frequency of Social Gatherings with Friends and Family: Not on file    Attends Christianity Services: Not on file    Active Member of [address] [place] [address] or Organizations: Not on file    Attends Club or Organization Meetings: Not on file    Marital Status: Not on file   Intimate Partner Violence:     Fear of Current or Ex-Partner: Not on file    Emotionally Abused: Not on file    Physically Abused: Not on file    Sexually Abused: Not on file   Housing Stability:     Unable to Pay for Housing in the Last Year: Not on file    Number of [place] in the Last Year: Not on file    Unstable Housing in the Last Year: Not on file         ALLERGIES: Morphine; Other medication; Other plant, animal, environmental; Shellfish derived; and Clams    Review of Systems   Constitutional: Positive for fatigue. Negative for chills, diaphoresis and fever. HENT: Negative for congestion, rhinorrhea, sore throat, trouble swallowing and voice change. Respiratory: Negative for cough, shortness of breath and wheezing.     Cardiovascular: Negative for chest pain, palpitations and leg swelling. Gastrointestinal: Negative for abdominal pain, anal bleeding, diarrhea, nausea and vomiting. Genitourinary: Negative for dysuria, flank pain and hematuria. Musculoskeletal: Negative for arthralgias, back pain, neck pain and neck stiffness. Skin: Negative for pallor, rash and wound. Neurological: Positive for light-headedness. Negative for dizziness, tingling, seizures, facial asymmetry, speech difficulty, weakness, numbness and headaches. Hematological: Does not bruise/bleed easily. Psychiatric/Behavioral: Negative for agitation, confusion, hallucinations and self-injury. Vitals:    03/02/22 1215 03/02/22 1218   BP: (!) 94/52 (!) 88/51   Pulse: 97 87   Resp: 16    Temp: 98.3 °F (36.8 °C)    SpO2: 97%    Weight: 72.6 kg (160 lb)    Height: 5' 4\" (1.626 m)             Physical Exam  Vitals and nursing note reviewed. Constitutional:       Appearance: Normal appearance. HENT:      Head: Normocephalic and atraumatic. Comments: Atraumatic     Nose: Nose normal.      Mouth/Throat:      Mouth: Mucous membranes are moist.   Eyes:      Extraocular Movements: Extraocular movements intact. Conjunctiva/sclera: Conjunctivae normal.      Pupils: Pupils are equal, round, and reactive to light. Neck:      Comments: Full range of motion. No step-off. No midline C-spine tenderness to palpation  Cardiovascular:      Rate and Rhythm: Normal rate. Pulses: Normal pulses. Heart sounds: Normal heart sounds. Pulmonary:      Effort: Pulmonary effort is normal.      Breath sounds: Normal breath sounds. Abdominal:      General: Bowel sounds are normal. There is no distension. Palpations: Abdomen is soft. There is no mass. Tenderness: There is no abdominal tenderness. There is no right CVA tenderness, left CVA tenderness, guarding or rebound. Comments: Soft, NTND. No CVAT. Musculoskeletal:         General: No swelling. Normal range of motion.       Cervical back: Normal range of motion. No rigidity. Right lower leg: No edema. Left lower leg: No edema. Comments: No deformity or bruising noted to lower extremities. Status post right shoulder replacement. Surgical site c/d/i. No surrounding erythema or purulent drainage from site   Skin:     General: Skin is warm. Findings: No erythema or rash. Neurological:      General: No focal deficit present. Mental Status: He is alert and oriented to person, place, and time. Cranial Nerves: No cranial nerve deficit. Sensory: No sensory deficit. Motor: No weakness. Comments: No focal deficits. Strength 5/5 throughout. MDM  Number of Diagnoses or Management Options  Acute cystitis without hematuria: new and requires workup  JENNY (acute kidney injury) ([facility] [address]): new and requires workup  Orthostatic hypotension: new and requires workup  Syncope, unspecified syncope type: new and requires workup  Diagnosis management comments: Initial blood pressure was hypotensive on arrival.  Patient given IV fluid hydration. Patient with history of CHF. Patient also with history of hypertension and states he is on blood pressure medication. Creatinine 1.92. White blood cell count 12.5. UA with greater than 100 WBCs and 1+ bacteria. Urine culture added on. Patient states that he recently had a [place] catheter present for 1 week. Blood cultures, lactic acid ordered. Rocephin 1 g IV ordered. After given IV fluid hydration, blood pressure recheck. Laying blood pressure 1131/81. Upon standing blood pressure 91/55 patient complaining of dizziness. Will give additional IV fluid hydration and admit to hospitalist; given hx of CHF, will give 500 cc of NS.         Amount and/or Complexity of Data Reviewed  Clinical lab tests: ordered and reviewed  Tests in the radiology section of CPT®: ordered and reviewed  Tests in the medicine section of CPT®: ordered and reviewed  Review and summarize past medical records: yes  Independent visualization of images, tracings, or specimens: yes    Risk of Complications, Morbidity, and/or Mortality  Presenting problems: high  Diagnostic procedures: moderate  Management options: high    Patient Progress  Patient progress: stable    ED Course as of 03/02/22 1517   Wed Mar 02, 2022   1411 WBC(!): 12.5 [DF]   1411 WBC(!): >100 [DF]   1411 Bacteria(!): 1+ [DF]   1411 Creatinine(!): 1.92 [DF]   1513 CT head   IMPRESSION  1. No acute intracranial abnormality. 2. Moderate bilateral white matter hypoattenuation most in keeping with chronic  microangiopathic disease with moderate atrophy.    [DF]   1513 CXR   FINDINGS: Single AP view the chest compared to a similar exam dated 9/18/2018  show the lungs to be expanded and clear. No pleural effusion or pneumothorax. The cardiac silhouette and mediastinum are unremarkable. The patient has had a  prior reverse shoulder arthroplasty.     IMPRESSION  No acute cardiopulmonary abnormality. [DF]      ED Course User Index  [DF] Raúl Leger MD       EKG    Date/Time: 3/2/2022 3:20 PM  Performed by: Raúl Leger MD  Authorized by:  Raúl Leger MD     ECG reviewed by ED Physician in the absence of a cardiologist: yes    Rate:     ECG rate:  99    ECG rate assessment: normal    Rhythm:     Rhythm: sinus rhythm    Ectopy:     Ectopy: PAC    QRS:     QRS axis:  Normal    QRS intervals:  Normal  Conduction:     Conduction: normal    ST segments:     ST segments:  Normal  T waves:     T waves: normal        Results Include:    Recent Results (from the past 24 hour(s))   EKG, 12 LEAD, INITIAL    Collection Time: 03/02/22 12:22 PM   Result Value Ref Range    Ventricular Rate 99 BPM    Atrial Rate 99 BPM    P-R Interval 180 ms    QRS Duration 94 ms    Q-T Interval 356 ms    QTC Calculation (Bezet) 456 ms    Calculated P Axis 49 degrees    Calculated R Axis 39 degrees    Calculated T Axis -52 degrees Diagnosis       Sinus rhythm with Premature atrial complexes  Possible Anterior infarct , age undetermined  Abnormal ECG  When compared with ECG of 23-JUL-2015 10:00,  Premature atrial complexes are now Present  Vent. rate has increased BY  49 BPM  Nonspecific T wave abnormality, worse in Inferior leads     CBC WITH AUTOMATED DIFF    Collection Time: 03/02/22 12:36 PM   Result Value Ref Range    WBC 12.5 (H) 4.3 - 11.1 K/uL    RBC 4.14 (L) 4.23 - 5.6 M/uL    HGB 12.1 (L) 13.6 - 17.2 g/dL    HCT 36.3 (L) 41.1 - 50.3 %    MCV 87.7 79.6 - 97.8 FL    MCH 29.2 26.1 - 32.9 PG    MCHC 33.3 31.4 - 35.0 g/dL    RDW 16.0 (H) 11.9 - 14.6 %    PLATELET 559 215 - 319 K/uL    MPV 9.0 (L) 9.4 - 12.3 FL    ABSOLUTE NRBC 0.00 0.0 - 0.2 K/uL    DF AUTOMATED      NEUTROPHILS 69 43 - 78 %    LYMPHOCYTES 18 13 - 44 %    MONOCYTES 11 4.0 - 12.0 %    EOSINOPHILS 1 0.5 - 7.8 %    BASOPHILS 1 0.0 - 2.0 %    IMMATURE GRANULOCYTES 0 0.0 - 5.0 %    ABS. NEUTROPHILS 8.7 (H) 1.7 - 8.2 K/UL    ABS. LYMPHOCYTES 2.3 0.5 - 4.6 K/UL    ABS. MONOCYTES 1.4 (H) 0.1 - 1.3 K/UL    ABS. EOSINOPHILS 0.1 0.0 - 0.8 K/UL    ABS. BASOPHILS 0.1 0.0 - 0.2 K/UL    ABS. IMM. GRANS. 0.0 0.0 - 0.5 K/UL   METABOLIC PANEL, COMPREHENSIVE    Collection Time: 03/02/22 12:36 PM   Result Value Ref Range    Sodium 139 136 - 145 mmol/L    Potassium 4.1 3.5 - 5.1 mmol/L    Chloride 107 98 - 107 mmol/L    CO2 24 21 - 32 mmol/L    Anion gap 8 7 - 16 mmol/L    Glucose 100 65 - 100 mg/dL    BUN 21 8 - 23 MG/DL    Creatinine 1.92 (H) 0.8 - 1.5 MG/DL    GFR est AA 43 (L) >60 ml/min/1.73m2    GFR est non-AA 36 (L) >60 ml/min/1.73m2    Calcium 9.1 8.3 - 10.4 MG/DL    Bilirubin, total 0.5 0.2 - 1.1 MG/DL    ALT (SGPT) 14 12 - 65 U/L    AST (SGOT) 19 15 - 37 U/L    Alk.  phosphatase 175 (H) 50 - 136 U/L    Protein, total 7.4 6.3 - 8.2 g/dL    Albumin 2.9 (L) 3.2 - 4.6 g/dL    Globulin 4.5 (H) 2.3 - 3.5 g/dL    A-G Ratio 0.6 (L) 1.2 - 3.5     TROPONIN-HIGH SENSITIVITY    Collection Time: 03/02/22 12:36 PM   Result Value Ref Range    Troponin-High Sensitivity 27.4 (H) 0 - 14 pg/mL   NT-PRO BNP    Collection Time: 03/02/22 12:36 PM   Result Value Ref Range    NT pro- (H) <450 PG/ML   URINALYSIS W/ RFLX MICROSCOPIC    Collection Time: 03/02/22  1:36 PM   Result Value Ref Range    Color YELLOW      Appearance CLOUDY      Specific gravity 1.010 1.001 - 1.023      pH (UA) 5.5 5.0 - 9.0      Protein Negative NEG mg/dL    Glucose Negative mg/dL    Ketone Negative NEG mg/dL    Bilirubin Negative NEG      Blood SMALL (A) NEG      Urobilinogen 0.2 0.2 - 1.0 EU/dL    Nitrites Negative NEG      Leukocyte Esterase LARGE (A) NEG      WBC >100 (H) 0 /hpf    RBC 3-5 0 /hpf    Epithelial cells 0 0 /hpf    Bacteria 1+ (H) 0 /hpf    Casts 0-3 0 /lpf   Despite having UTI/hypotension, a standard fluid resuscitation of 30 mL/kg would harm the patient because the patient has CHF/ HF NYHA class III/IV or symptoms with minimal activity. Therefore,  ordering a   1,500 mL bolus of fluids instead to replace 30 mL/kg. Kalee Song MD; 3/2/2022 @12:48 PM Voice dictation software was used during the making of this note. This software is not perfect and grammatical and other typographical errors may be present.   This note has not been proofread for errors.  ===================================================================

## 2022-03-03 ENCOUNTER — HOME CARE VISIT (OUTPATIENT)
Dept: SCHEDULING | Facility: HOME HEALTH | Age: 82
End: 2022-03-03
Payer: MEDICARE

## 2022-03-03 VITALS
SYSTOLIC BLOOD PRESSURE: 98 MMHG | HEART RATE: 58 BPM | OXYGEN SATURATION: 98 % | RESPIRATION RATE: 16 BRPM | DIASTOLIC BLOOD PRESSURE: 58 MMHG | TEMPERATURE: 97.4 F

## 2022-03-03 PROCEDURE — G0299 HHS/HOSPICE OF RN EA 15 MIN: HCPCS

## 2022-03-03 PROCEDURE — 3331090001 HH PPS REVENUE CREDIT

## 2022-03-03 PROCEDURE — 3331090002 HH PPS REVENUE DEBIT

## 2022-03-03 NOTE — PROGRESS NOTES
Was given Rocephin in ER. According to note it looks like he was admitted, and will be on Cefpodoxime. Will follow final culture results.

## 2022-03-04 ENCOUNTER — HOME CARE VISIT (OUTPATIENT)
Dept: SCHEDULING | Facility: HOME HEALTH | Age: 82
End: 2022-03-04
Payer: MEDICARE

## 2022-03-04 PROCEDURE — G0152 HHCP-SERV OF OT,EA 15 MIN: HCPCS

## 2022-03-04 PROCEDURE — 3331090002 HH PPS REVENUE DEBIT

## 2022-03-04 PROCEDURE — 3331090001 HH PPS REVENUE CREDIT

## 2022-03-05 LAB
BACTERIA SPEC CULT: ABNORMAL
SERVICE CMNT-IMP: ABNORMAL

## 2022-03-05 PROCEDURE — 3331090001 HH PPS REVENUE CREDIT

## 2022-03-05 PROCEDURE — 3331090002 HH PPS REVENUE DEBIT

## 2022-03-06 PROCEDURE — 3331090002 HH PPS REVENUE DEBIT

## 2022-03-06 PROCEDURE — 3331090001 HH PPS REVENUE CREDIT

## 2022-03-07 LAB
BACTERIA SPEC CULT: NORMAL
BACTERIA SPEC CULT: NORMAL
SERVICE CMNT-IMP: NORMAL
SERVICE CMNT-IMP: NORMAL

## 2022-03-07 PROCEDURE — 3331090002 HH PPS REVENUE DEBIT

## 2022-03-07 PROCEDURE — 3331090001 HH PPS REVENUE CREDIT

## 2022-03-08 ENCOUNTER — HOME CARE VISIT (OUTPATIENT)
Dept: SCHEDULING | Facility: HOME HEALTH | Age: 82
End: 2022-03-08
Payer: MEDICARE

## 2022-03-08 VITALS
TEMPERATURE: 97.5 F | DIASTOLIC BLOOD PRESSURE: 62 MMHG | RESPIRATION RATE: 16 BRPM | OXYGEN SATURATION: 98 % | SYSTOLIC BLOOD PRESSURE: 118 MMHG | HEART RATE: 74 BPM

## 2022-03-08 PROCEDURE — 3331090001 HH PPS REVENUE CREDIT

## 2022-03-08 PROCEDURE — 3331090002 HH PPS REVENUE DEBIT

## 2022-03-09 ENCOUNTER — HOME CARE VISIT (OUTPATIENT)
Dept: SCHEDULING | Facility: HOME HEALTH | Age: 82
End: 2022-03-09
Payer: MEDICARE

## 2022-03-09 VITALS
SYSTOLIC BLOOD PRESSURE: 110 MMHG | DIASTOLIC BLOOD PRESSURE: 62 MMHG | HEART RATE: 82 BPM | TEMPERATURE: 97.3 F | RESPIRATION RATE: 16 BRPM | OXYGEN SATURATION: 96 %

## 2022-03-09 LAB
BACTERIA SPEC CULT: ABNORMAL
BACTERIA SPEC CULT: ABNORMAL
Lab: NORMAL
REFERENCE LAB,REFLB: NORMAL
SERVICE CMNT-IMP: ABNORMAL
TEST DESCRIPTION:,ATST: NORMAL

## 2022-03-09 PROCEDURE — G0158 HHC OT ASSISTANT EA 15: HCPCS

## 2022-03-09 PROCEDURE — 3331090002 HH PPS REVENUE DEBIT

## 2022-03-09 PROCEDURE — 3331090001 HH PPS REVENUE CREDIT

## 2022-03-10 ENCOUNTER — HOME CARE VISIT (OUTPATIENT)
Dept: SCHEDULING | Facility: HOME HEALTH | Age: 82
End: 2022-03-10
Payer: MEDICARE

## 2022-03-10 VITALS
SYSTOLIC BLOOD PRESSURE: 114 MMHG | OXYGEN SATURATION: 100 % | HEART RATE: 81 BPM | TEMPERATURE: 97.4 F | DIASTOLIC BLOOD PRESSURE: 62 MMHG | RESPIRATION RATE: 16 BRPM

## 2022-03-10 PROCEDURE — G0299 HHS/HOSPICE OF RN EA 15 MIN: HCPCS

## 2022-03-10 PROCEDURE — 3331090002 HH PPS REVENUE DEBIT

## 2022-03-10 PROCEDURE — 3331090001 HH PPS REVENUE CREDIT

## 2022-03-11 ENCOUNTER — HOME CARE VISIT (OUTPATIENT)
Dept: SCHEDULING | Facility: HOME HEALTH | Age: 82
End: 2022-03-11
Payer: MEDICARE

## 2022-03-11 VITALS — TEMPERATURE: 98.4 F | DIASTOLIC BLOOD PRESSURE: 55 MMHG | SYSTOLIC BLOOD PRESSURE: 95 MMHG

## 2022-03-11 LAB
BACTERIA SPEC CULT: NORMAL
SERVICE CMNT-IMP: NORMAL

## 2022-03-11 PROCEDURE — G0158 HHC OT ASSISTANT EA 15: HCPCS

## 2022-03-11 PROCEDURE — 3331090002 HH PPS REVENUE DEBIT

## 2022-03-11 PROCEDURE — 3331090001 HH PPS REVENUE CREDIT

## 2022-03-12 PROCEDURE — 3331090002 HH PPS REVENUE DEBIT

## 2022-03-12 PROCEDURE — 3331090001 HH PPS REVENUE CREDIT

## 2022-03-13 PROCEDURE — 3331090001 HH PPS REVENUE CREDIT

## 2022-03-13 PROCEDURE — 3331090002 HH PPS REVENUE DEBIT

## 2022-03-14 ENCOUNTER — HOME CARE VISIT (OUTPATIENT)
Dept: SCHEDULING | Facility: HOME HEALTH | Age: 82
End: 2022-03-14
Payer: MEDICARE

## 2022-03-14 PROCEDURE — 3331090001 HH PPS REVENUE CREDIT

## 2022-03-14 PROCEDURE — G0152 HHCP-SERV OF OT,EA 15 MIN: HCPCS

## 2022-03-14 PROCEDURE — 3331090002 HH PPS REVENUE DEBIT

## 2022-03-15 ENCOUNTER — HOME CARE VISIT (OUTPATIENT)
Dept: SCHEDULING | Facility: HOME HEALTH | Age: 82
End: 2022-03-15
Payer: MEDICARE

## 2022-03-15 VITALS
OXYGEN SATURATION: 98 % | DIASTOLIC BLOOD PRESSURE: 62 MMHG | TEMPERATURE: 98 F | SYSTOLIC BLOOD PRESSURE: 118 MMHG | RESPIRATION RATE: 16 BRPM | HEART RATE: 64 BPM

## 2022-03-15 VITALS
RESPIRATION RATE: 16 BRPM | SYSTOLIC BLOOD PRESSURE: 114 MMHG | HEART RATE: 70 BPM | TEMPERATURE: 98.2 F | DIASTOLIC BLOOD PRESSURE: 62 MMHG | OXYGEN SATURATION: 99 %

## 2022-03-15 PROCEDURE — G0299 HHS/HOSPICE OF RN EA 15 MIN: HCPCS

## 2022-03-15 PROCEDURE — 3331090002 HH PPS REVENUE DEBIT

## 2022-03-15 PROCEDURE — 3331090001 HH PPS REVENUE CREDIT

## 2022-03-16 PROCEDURE — 3331090001 HH PPS REVENUE CREDIT

## 2022-03-16 PROCEDURE — 3331090002 HH PPS REVENUE DEBIT

## 2022-03-17 ENCOUNTER — HOSPITAL ENCOUNTER (EMERGENCY)
Age: 82
Discharge: HOME | End: 2022-03-17
Attending: EMERGENCY MEDICINE
Payer: MEDICARE

## 2022-03-17 ENCOUNTER — APPOINTMENT (OUTPATIENT)
Dept: GENERAL RADIOLOGY | Age: 82
End: 2022-03-17
Attending: EMERGENCY MEDICINE
Payer: MEDICARE

## 2022-03-17 ENCOUNTER — APPOINTMENT (OUTPATIENT)
Dept: CT IMAGING | Age: 82
End: 2022-03-17
Attending: EMERGENCY MEDICINE
Payer: MEDICARE

## 2022-03-17 ENCOUNTER — HOME CARE VISIT (OUTPATIENT)
Dept: SCHEDULING | Facility: HOME HEALTH | Age: 82
End: 2022-03-17
Payer: MEDICARE

## 2022-03-17 VITALS
SYSTOLIC BLOOD PRESSURE: 96 MMHG | DIASTOLIC BLOOD PRESSURE: 52 MMHG | HEART RATE: 90 BPM | RESPIRATION RATE: 16 BRPM | OXYGEN SATURATION: 97 % | TEMPERATURE: 97.2 F

## 2022-03-17 VITALS
DIASTOLIC BLOOD PRESSURE: 61 MMHG | BODY MASS INDEX: 27.31 KG/M2 | HEIGHT: 64 IN | WEIGHT: 160 LBS | RESPIRATION RATE: 15 BRPM | HEART RATE: 81 BPM | OXYGEN SATURATION: 96 % | TEMPERATURE: 97.7 F | SYSTOLIC BLOOD PRESSURE: 128 MMHG

## 2022-03-17 DIAGNOSIS — R42 LIGHTHEADED: ICD-10-CM

## 2022-03-17 DIAGNOSIS — E86.0 DEHYDRATION: Primary | ICD-10-CM

## 2022-03-17 LAB
ALBUMIN SERPL-MCNC: 2.5 G/DL (ref 3.2–4.6)
ALBUMIN/GLOB SERPL: 0.6 {RATIO} (ref 1.2–3.5)
ALP SERPL-CCNC: 221 U/L (ref 50–136)
ALT SERPL-CCNC: 14 U/L (ref 12–65)
ANION GAP SERPL CALC-SCNC: 6 MMOL/L (ref 7–16)
APPEARANCE UR: CLEAR
AST SERPL-CCNC: 19 U/L (ref 15–37)
BASOPHILS # BLD: 0.1 K/UL (ref 0–0.2)
BASOPHILS NFR BLD: 1 % (ref 0–2)
BILIRUB SERPL-MCNC: 0.5 MG/DL (ref 0.2–1.1)
BILIRUB UR QL: NEGATIVE
BNP SERPL-MCNC: 895 PG/ML
BUN SERPL-MCNC: 11 MG/DL (ref 8–23)
CALCIUM SERPL-MCNC: 8.6 MG/DL (ref 8.3–10.4)
CHLORIDE SERPL-SCNC: 107 MMOL/L (ref 98–107)
CO2 SERPL-SCNC: 24 MMOL/L (ref 21–32)
COLOR UR: YELLOW
CREAT SERPL-MCNC: 1.3 MG/DL (ref 0.8–1.5)
DIFFERENTIAL METHOD BLD: ABNORMAL
EOSINOPHIL # BLD: 0.2 K/UL (ref 0–0.8)
EOSINOPHIL NFR BLD: 3 % (ref 0.5–7.8)
ERYTHROCYTE [DISTWIDTH] IN BLOOD BY AUTOMATED COUNT: 15.6 % (ref 11.9–14.6)
GLOBULIN SER CALC-MCNC: 4.2 G/DL (ref 2.3–3.5)
GLUCOSE SERPL-MCNC: 107 MG/DL (ref 65–100)
GLUCOSE UR STRIP.AUTO-MCNC: NEGATIVE MG/DL
HCT VFR BLD AUTO: 34.3 % (ref 41.1–50.3)
HGB BLD-MCNC: 11.2 G/DL (ref 13.6–17.2)
HGB UR QL STRIP: NEGATIVE
IMM GRANULOCYTES # BLD AUTO: 0 K/UL (ref 0–0.5)
IMM GRANULOCYTES NFR BLD AUTO: 0 % (ref 0–5)
KETONES UR QL STRIP.AUTO: NEGATIVE MG/DL
LACTATE SERPL-SCNC: 1.5 MMOL/L (ref 0.4–2)
LEUKOCYTE ESTERASE UR QL STRIP.AUTO: NEGATIVE
LIPASE SERPL-CCNC: 71 U/L (ref 73–393)
LYMPHOCYTES # BLD: 2.4 K/UL (ref 0.5–4.6)
LYMPHOCYTES NFR BLD: 30 % (ref 13–44)
MCH RBC QN AUTO: 29 PG (ref 26.1–32.9)
MCHC RBC AUTO-ENTMCNC: 32.7 G/DL (ref 31.4–35)
MCV RBC AUTO: 88.9 FL (ref 79.6–97.8)
MONOCYTES # BLD: 1.6 K/UL (ref 0.1–1.3)
MONOCYTES NFR BLD: 20 % (ref 4–12)
NEUTS SEG # BLD: 3.7 K/UL (ref 1.7–8.2)
NEUTS SEG NFR BLD: 46 % (ref 43–78)
NITRITE UR QL STRIP.AUTO: NEGATIVE
NRBC # BLD: 0 K/UL (ref 0–0.2)
PH UR STRIP: 6.5 [PH] (ref 5–9)
PLATELET # BLD AUTO: 450 K/UL (ref 150–450)
PMV BLD AUTO: 8.9 FL (ref 9.4–12.3)
POTASSIUM SERPL-SCNC: 3.8 MMOL/L (ref 3.5–5.1)
PROT SERPL-MCNC: 6.7 G/DL (ref 6.3–8.2)
PROT UR STRIP-MCNC: NEGATIVE MG/DL
RBC # BLD AUTO: 3.86 M/UL (ref 4.23–5.6)
SODIUM SERPL-SCNC: 137 MMOL/L (ref 136–145)
SP GR UR REFRACTOMETRY: 1.01 (ref 1–1.02)
UROBILINOGEN UR QL STRIP.AUTO: 0.2 EU/DL (ref 0.2–1)
WBC # BLD AUTO: 8.1 K/UL (ref 4.3–11.1)

## 2022-03-17 PROCEDURE — G0299 HHS/HOSPICE OF RN EA 15 MIN: HCPCS

## 2022-03-17 PROCEDURE — 74011000258 HC RX REV CODE- 258: Performed by: EMERGENCY MEDICINE

## 2022-03-17 PROCEDURE — 83880 ASSAY OF NATRIURETIC PEPTIDE: CPT

## 2022-03-17 PROCEDURE — 80053 COMPREHEN METABOLIC PANEL: CPT

## 2022-03-17 PROCEDURE — 81003 URINALYSIS AUTO W/O SCOPE: CPT

## 2022-03-17 PROCEDURE — 74011000636 HC RX REV CODE- 636: Performed by: EMERGENCY MEDICINE

## 2022-03-17 PROCEDURE — 85025 COMPLETE CBC W/AUTO DIFF WBC: CPT

## 2022-03-17 PROCEDURE — 93005 ELECTROCARDIOGRAM TRACING: CPT | Performed by: EMERGENCY MEDICINE

## 2022-03-17 PROCEDURE — 83690 ASSAY OF LIPASE: CPT

## 2022-03-17 PROCEDURE — 74011250636 HC RX REV CODE- 250/636: Performed by: EMERGENCY MEDICINE

## 2022-03-17 PROCEDURE — 96374 THER/PROPH/DIAG INJ IV PUSH: CPT

## 2022-03-17 PROCEDURE — 99285 EMERGENCY DEPT VISIT HI MDM: CPT

## 2022-03-17 PROCEDURE — 71045 X-RAY EXAM CHEST 1 VIEW: CPT

## 2022-03-17 PROCEDURE — 74177 CT ABD & PELVIS W/CONTRAST: CPT

## 2022-03-17 PROCEDURE — 96361 HYDRATE IV INFUSION ADD-ON: CPT

## 2022-03-17 PROCEDURE — 3331090001 HH PPS REVENUE CREDIT

## 2022-03-17 PROCEDURE — 3331090002 HH PPS REVENUE DEBIT

## 2022-03-17 PROCEDURE — 83605 ASSAY OF LACTIC ACID: CPT

## 2022-03-17 RX ORDER — ONDANSETRON 4 MG/1
8 TABLET, ORALLY DISINTEGRATING ORAL
Qty: 20 TABLET | Refills: 0 | Status: SHIPPED | OUTPATIENT
Start: 2022-03-17

## 2022-03-17 RX ORDER — SODIUM CHLORIDE 0.9 % (FLUSH) 0.9 %
10 SYRINGE (ML) INJECTION
Status: COMPLETED | OUTPATIENT
Start: 2022-03-17 | End: 2022-03-17

## 2022-03-17 RX ORDER — ONDANSETRON 2 MG/ML
4 INJECTION INTRAMUSCULAR; INTRAVENOUS
Status: COMPLETED | OUTPATIENT
Start: 2022-03-17 | End: 2022-03-17

## 2022-03-17 RX ADMIN — SODIUM CHLORIDE 100 ML: 9 INJECTION, SOLUTION INTRAVENOUS at 16:48

## 2022-03-17 RX ADMIN — ONDANSETRON 4 MG: 2 INJECTION INTRAMUSCULAR; INTRAVENOUS at 14:48

## 2022-03-17 RX ADMIN — IOPAMIDOL 100 ML: 755 INJECTION, SOLUTION INTRAVENOUS at 16:47

## 2022-03-17 RX ADMIN — SODIUM CHLORIDE 1000 ML: 900 INJECTION, SOLUTION INTRAVENOUS at 14:48

## 2022-03-17 RX ADMIN — Medication 10 ML: at 16:48

## 2022-03-17 NOTE — ED PROVIDER NOTES
Pt with history of CHF and chronic kidney disease. Also with HTN. Has surgery on his right shoulder and subsequent infection with repeat surgery. Currently on doxy for this. Has had some nausea recently and orthostatic hypotension. Still eating and drinking although small amounts. Today while getting out of the car had lightheadedness. Has stopped his BP meds without help. Still feeling bad call his doctors who told him to come in. The history is provided by the patient. No  was used. Dizziness  This is a new problem. The current episode started more than 2 days ago. The problem has been gradually worsening. There was no focality noted. Pertinent negatives include no focal weakness, no loss of sensation, no slurred speech, no speech difficulty, no movement disorder, no visual change, no mental status change, no unresponsiveness and no disorientation. There has been no fever. Associated symptoms include nausea. Pertinent negatives include no shortness of breath, no chest pain, no vomiting, no altered mental status, no confusion, no headaches, no bowel incontinence and no bladder incontinence.         Past Medical History:   Diagnosis Date    Basal cell carcinoma     LLE    Chronic combined systolic and diastolic congestive heart failure (HCC)     Follow[facility] by [facility]; Echo (6/9/21) EF45%, Grade II (moderate) left ventricular diastolic dysfunction present    Chronic kidney disease     pt denies on 2/17/22    Chronic pain     right shoulder    Current use of long term anticoagulation     Xarelto    CARDOZA (dyspnea on exertion)     Pt has been referred to pulmonary     Dysphagia     chronic, for which intermittent esophageal dilation    Erectile dysfunction     Fatigue     Former smoker     GERD (gastroesophageal reflux disease)     controlled with medication    High cholesterol     History of pulmonary embolism 09/18/2018    History of stomach cancer 2010    Hypertension controlled with medication    Iron deficiency anemia 05/2015    Macular degeneration     Nausea & vomiting     post-op nausea  states from morphine    Nonischemic cardiomyopathy (HCC)     with mild systolic dysfunction, followed by [facility]--Cath (5/13/21) normal coronary arteriogram    Osteoarthritis     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    Unspecified adverse effect of anesthesia     wife states slow to wake       Past Surgical History:   Procedure Laterality Date    HX APPENDECTOMY      HX CHOLECYSTECTOMY  2015    HX COLONOSCOPY      HX GI  2010    gastrectomy 2/8/10-has 30% stomach left    HX KNEE ARTHROSCOPY Right     HX OTHER SURGICAL      attempted tracheal dilation- stopped due to inflammation    HX SHOULDER ARTHROSCOPY Right 2015    AK TOTAL HIP ARTHROPLASTY Bilateral 2004    AK TOTAL KNEE ARTHROPLASTY Right 2012         Family History:   Problem Relation Age of Onset    Kidney Disease Mother     Dementia Father     Alcohol abuse Brother     OSTEOARTHRITIS Brother        Social History     Socioeconomic History    Marital status:      Spouse name: Not on file    Number of children: Not on file    Years of education: Not on file    Highest education level: Not on file   Occupational History    Not on file   Tobacco Use    Smoking status: Former Smoker     Packs/day: 0.50     Years: 5.00     Pack years: 2.50    Smokeless tobacco: Never Used    Tobacco comment: quit age 20; continuing cessation   Vaping Use    Vaping Use: Never used   Substance and Sexual Activity    Alcohol use: Yes     Comment: 3 drinks per night; wine and liquor    Drug use: No    Sexual activity: Not on file   Other Topics Concern     Service Not Asked    Blood Transfusions Not Asked    Caffeine Concern Not Asked    Occupational Exposure Not Asked    Hobby Hazards Not Asked    Sleep Concern Not Asked    Stress Concern Not Asked    Weight Concern Not Asked    Special Diet Not Asked    Back Care Not Asked    Exercise Not Asked    Bike Helmet Not Asked   [address] Not Asked    Self-Exams Not Asked   Social History Narrative    Not on file     Social Determinants of Health     Financial Resource Strain:     Difficulty of Paying Living Expenses: Not on file   Food Insecurity:     Worried About Running Out of Food in the Last Year: Not on file    Grady of Food in the Last Year: Not on file   Transportation Needs:     Lack of Transportation (Medical): Not on file    Lack of Transportation (Non-Medical): Not on file   Physical Activity:     Days of Exercise per Week: Not on file    Minutes of Exercise per Session: Not on file   Stress:     Feeling of Stress : Not on file   Social Connections:     Frequency of Communication with Friends and Family: Not on file    Frequency of Social Gatherings with Friends and Family: Not on file    Attends Caodaism Services: Not on file    Active Member of [address] [place] [address] or Organizations: Not on file    Attends Club or Organization Meetings: Not on file    Marital Status: Not on file   Intimate Partner Violence:     Fear of Current or Ex-Partner: Not on file    Emotionally Abused: Not on file    Physically Abused: Not on file    Sexually Abused: Not on file   Housing Stability:     Unable to Pay for Housing in the Last Year: Not on file    Number of [place] in the Last Year: Not on file    Unstable Housing in the Last Year: Not on file         ALLERGIES: Morphine; Other medication; Other plant, animal, environmental; Shellfish derived; and Clams    Review of Systems   Constitutional: Negative for chills and fever. HENT: Negative for rhinorrhea and sore throat. Eyes: Negative for pain and redness. Respiratory: Negative for chest tightness, shortness of breath and wheezing. Cardiovascular: Negative for chest pain and leg swelling. Gastrointestinal: Positive for nausea.  Negative for abdominal pain, bowel incontinence, diarrhea and vomiting. Genitourinary: Negative for bladder incontinence, dysuria and hematuria. Musculoskeletal: Negative for back pain, gait problem, neck pain and neck stiffness. Skin: Negative for color change and rash. Neurological: Positive for dizziness and light-headedness. Negative for focal weakness, speech difficulty, weakness, numbness and headaches. Psychiatric/Behavioral: Negative for confusion. Vitals:    03/17/22 1414   BP: (!) 97/59   Pulse: 95   Resp: 18   Temp: 97.7 °F (36.5 °C)   SpO2: 98%   Weight: 72.6 kg (160 lb)   Height: 5' 4\" (1.626 m)            Physical Exam  Constitutional:       Appearance: Normal appearance. He is well-developed. HENT:      Head: Normocephalic and atraumatic. Eyes:      Extraocular Movements: Extraocular movements intact. Pupils: Pupils are equal, round, and reactive to light. Cardiovascular:      Rate and Rhythm: Normal rate and regular rhythm. Pulmonary:      Effort: Pulmonary effort is normal. No respiratory distress. Breath sounds: Normal breath sounds. Abdominal:      General: Bowel sounds are normal.      Palpations: Abdomen is soft. Tenderness: There is no abdominal tenderness. Musculoskeletal:         General: Tenderness present. Cervical back: Normal range of motion and neck supple. Comments: Right shoulder in sling. LROM. Distal pulse and sensation intact. Skin:     General: Skin is warm and dry. Neurological:      Mental Status: He is alert and oriented to person, place, and time. MDM  Number of Diagnoses or Management Options  Diagnosis management comments: Patient feels much better after fluids. Has had increased nausea since starting the doxycycline. We will have patient speak with his orthopedist about switching antibiotics potentially.   We will increase his Zofran strength and attempt to stay hydrated       Amount and/or Complexity of Data Reviewed  Clinical lab tests: ordered and reviewed  Tests in the radiology section of CPT®: ordered and reviewed  Tests in the medicine section of CPT®: ordered and reviewed    Patient Progress  Patient progress: stable         Procedures      EKG: normal sinus rhythm, nonspecific ST and T waves changes. Rate 82. Results Include:    Recent Results (from the past 24 hour(s))   CBC WITH AUTOMATED DIFF    Collection Time: 03/17/22  2:33 PM   Result Value Ref Range    WBC 8.1 4.3 - 11.1 K/uL    RBC 3.86 (L) 4.23 - 5.6 M/uL    HGB 11.2 (L) 13.6 - 17.2 g/dL    HCT 34.3 (L) 41.1 - 50.3 %    MCV 88.9 79.6 - 97.8 FL    MCH 29.0 26.1 - 32.9 PG    MCHC 32.7 31.4 - 35.0 g/dL    RDW 15.6 (H) 11.9 - 14.6 %    PLATELET 499 571 - 621 K/uL    MPV 8.9 (L) 9.4 - 12.3 FL    ABSOLUTE NRBC 0.00 0.0 - 0.2 K/uL    DF AUTOMATED      NEUTROPHILS 46 43 - 78 %    LYMPHOCYTES 30 13 - 44 %    MONOCYTES 20 (H) 4.0 - 12.0 %    EOSINOPHILS 3 0.5 - 7.8 %    BASOPHILS 1 0.0 - 2.0 %    IMMATURE GRANULOCYTES 0 0.0 - 5.0 %    ABS. NEUTROPHILS 3.7 1.7 - 8.2 K/UL    ABS. LYMPHOCYTES 2.4 0.5 - 4.6 K/UL    ABS. MONOCYTES 1.6 (H) 0.1 - 1.3 K/UL    ABS. EOSINOPHILS 0.2 0.0 - 0.8 K/UL    ABS. BASOPHILS 0.1 0.0 - 0.2 K/UL    ABS. IMM. GRANS. 0.0 0.0 - 0.5 K/UL   METABOLIC PANEL, COMPREHENSIVE    Collection Time: 03/17/22  2:33 PM   Result Value Ref Range    Sodium 137 136 - 145 mmol/L    Potassium 3.8 3.5 - 5.1 mmol/L    Chloride 107 98 - 107 mmol/L    CO2 24 21 - 32 mmol/L    Anion gap 6 (L) 7 - 16 mmol/L    Glucose 107 (H) 65 - 100 mg/dL    BUN 11 8 - 23 MG/DL    Creatinine 1.30 0.8 - 1.5 MG/DL    GFR est AA >60 >60 ml/min/1.73m2    GFR est non-AA 56 (L) >60 ml/min/1.73m2    Calcium 8.6 8.3 - 10.4 MG/DL    Bilirubin, total 0.5 0.2 - 1.1 MG/DL    ALT (SGPT) 14 12 - 65 U/L    AST (SGOT) 19 15 - 37 U/L    Alk.  phosphatase 221 (H) 50 - 136 U/L    Protein, total 6.7 6.3 - 8.2 g/dL    Albumin 2.5 (L) 3.2 - 4.6 g/dL    Globulin 4.2 (H) 2.3 - 3.5 g/dL    A-G Ratio 0.6 (L) 1.2 - 3.5 LIPASE    Collection Time: 03/17/22  2:33 PM   Result Value Ref Range    Lipase 71 (L) 73 - 393 U/L   LACTIC ACID    Collection Time: 03/17/22  2:33 PM   Result Value Ref Range    Lactic acid 1.5 0.4 - 2.0 MMOL/L   NT-PRO BNP    Collection Time: 03/17/22  2:33 PM   Result Value Ref Range    NT pro- (H) <450 PG/ML   URINALYSIS W/ RFLX MICROSCOPIC    Collection Time: 03/17/22  4:03 PM   Result Value Ref Range    Color YELLOW      Appearance CLEAR      Specific gravity 1.010 1.001 - 1.023      pH (UA) 6.5 5.0 - 9.0      Protein Negative NEG mg/dL    Glucose Negative mg/dL    Ketone Negative NEG mg/dL    Bilirubin Negative NEG      Blood Negative NEG      Urobilinogen 0.2 0.2 - 1.0 EU/dL    Nitrites Negative NEG      Leukocyte Esterase Negative NEG                CT ABD PELV W CONT (Final result)  Result time 03/17/22 17:34:18  Final result by Thierry Mcintyre MD (03/17/22 17:34:18)                Impression:      1. Small left pleural effusion with left basilar atelectasis. 2. Rectum distended with stool. 3. Diverticulosis. No CT evidence of diverticulitis. 4. New midline ventral abdominal wall hernia, containing fat. Narrative:    HISTORY:  Abd pain, nausea     COMPARISON: 7/23/2015     EXAM: CT abdomen and pelvis with iv contrast     TECHNIQUE:   Thin section axial CT was performed from the lung bases through the symphysis   pubis during uneventful rapid bolus intravenous administration of 125 mL of   Isovue 370. Oral contrast was also administered. Radiation dose reduction   techniques were used for this study.  Our CT scanners use one or all of the   following: Automated exposure control, adjustment of the mA and/or kV according   to patient size, use of iterative reconstruction. FINDINGS:     There is a trace left pleural effusion present with left basilar atelectasis. CT abdomen: The liver and spleen enhances homogeneously without discrete   lesions.  There is no biliary ductal dilatation. The patient is status post   cholecystectomy. The pancreas and adrenal glands are normal. The kidneys enhance   symmetrically. There is atrophy of the left kidney. Bowel loops in the upper   abdomen are normal. No definite upper abdominal lymphadenopathy seen. There is a   new midline fat-containing ventral abdominal wall hernia present, at the level   of the kidneys, with 4.5 cm diastases of the rectus abdominis muscles. Postsurgical change present within the stomach. CT pelvis: There is diverticulosis. No CT evidence of diverticulitis. Stool   distends the rectum. There is streak artifact from bilateral total hip   arthroplasty. No inflammatory change in the right lower quadrant. The bladder   and rectum are normal. No pelvic adenopathy seen. No free air or free fluid seen   within the pelvis. Bone window evaluation demonstrates no aggressive osseous lesions.                         XR CHEST PORT (Final result)  Result time 03/17/22 15:22:41  Final result by Elliott Potter MD (03/17/22 15:22:41)                Impression:    No acute findings in the chest             Narrative:    Chest X-ray     INDICATION: Persistent cough. COMPARISON: Chest x-ray 03/02/2022. A portable AP view of the chest was obtained. FINDINGS: The lungs are clear. There are no infiltrates or effusions.  The heart   size is normal.  The bony thorax is intact.  Right shoulder joint replacement   hardware are again noted.

## 2022-03-17 NOTE — ED TRIAGE NOTES
Patient presents with near syncope. Spouse reports + orthostatic hypotension with home health nurse and nausea.

## 2022-03-17 NOTE — ED NOTES
I have reviewed discharge instructions with the patient. The patient verbalized understanding. Patient left ED via Discharge Method: ambulatory to Home with wife. Opportunity for questions and clarification provided. Patient given 1 scripts. To continue your aftercare when you leave the hospital, you may receive an automated call from our care team to check in on how you are doing. This is a free service and part of our promise to provide the best care and service to meet your aftercare needs.  If you have questions, or wish to unsubscribe from this service please call [phone]. Thank you for Choosing our [facility] Emergency Department.

## 2022-03-18 PROBLEM — K21.9 ESOPHAGEAL REFLUX: Status: ACTIVE | Noted: 2022-01-19

## 2022-03-18 PROBLEM — K22.2 STRICTURE AND STENOSIS OF ESOPHAGUS: Status: ACTIVE | Noted: 2022-01-19

## 2022-03-18 PROBLEM — R06.09 DOE (DYSPNEA ON EXERTION): Status: ACTIVE | Noted: 2021-05-03

## 2022-03-18 PROBLEM — D51.0 VITAMIN B12 DEFICIENCY ANEMIA DUE TO INTRINSIC FACTOR DEFICIENCY: Status: ACTIVE | Noted: 2022-01-20

## 2022-03-18 PROBLEM — Z96.611 INSTABILITY OF REVERSE TOTAL ARTHROPLASTY OF RIGHT SHOULDER (HCC): Status: ACTIVE | Noted: 2022-02-14

## 2022-03-18 PROBLEM — K22.2 ESOPHAGEAL OBSTRUCTION: Status: ACTIVE | Noted: 2022-01-19

## 2022-03-18 PROBLEM — R06.02 SHORTNESS OF BREATH: Status: ACTIVE | Noted: 2018-12-05

## 2022-03-18 PROBLEM — I82.4Z2 ACUTE DEEP VEIN THROMBOSIS (DVT) OF DISTAL VEIN OF LEFT LOWER EXTREMITY (HCC): Status: ACTIVE | Noted: 2020-02-05

## 2022-03-18 PROBLEM — I50.42 CHRONIC COMBINED SYSTOLIC AND DIASTOLIC CONGESTIVE HEART FAILURE (HCC): Status: ACTIVE | Noted: 2021-11-05

## 2022-03-18 PROBLEM — T84.028A INSTABILITY OF REVERSE TOTAL ARTHROPLASTY OF RIGHT SHOULDER (HCC): Status: ACTIVE | Noted: 2022-02-14

## 2022-03-18 PROBLEM — R19.7 DIARRHEA: Status: ACTIVE | Noted: 2022-01-19

## 2022-03-18 PROBLEM — T84.028A INSTABILITY OF REVERSE TOTAL SHOULDER ARTHROPLASTY (HCC): Status: ACTIVE | Noted: 2022-02-18

## 2022-03-18 PROBLEM — R14.2 BELCHING: Status: ACTIVE | Noted: 2018-12-31

## 2022-03-18 PROBLEM — R63.4 WEIGHT LOSS: Status: ACTIVE | Noted: 2022-01-19

## 2022-03-18 PROBLEM — Z96.619 INSTABILITY OF REVERSE TOTAL SHOULDER ARTHROPLASTY (HCC): Status: ACTIVE | Noted: 2022-02-18

## 2022-03-18 PROBLEM — K22.10 ULCER OF ESOPHAGUS WITHOUT BLEEDING: Status: ACTIVE | Noted: 2022-01-19

## 2022-03-18 PROBLEM — J01.90 ACUTE SINUSITIS: Status: ACTIVE | Noted: 2022-01-19

## 2022-03-18 PROBLEM — I42.8 NONISCHEMIC CARDIOMYOPATHY (HCC): Status: ACTIVE | Noted: 2021-08-18

## 2022-03-18 PROBLEM — Z90.81 S/P SPLENECTOMY: Status: ACTIVE | Noted: 2022-01-19

## 2022-03-18 LAB
ATRIAL RATE: 72 BPM
CALCULATED R AXIS, ECG10: 30 DEGREES
CALCULATED T AXIS, ECG11: -37 DEGREES
DIAGNOSIS, 93000: NORMAL
Q-T INTERVAL, ECG07: 364 MS
QRS DURATION, ECG06: 82 MS
QTC CALCULATION (BEZET), ECG08: 425 MS
VENTRICULAR RATE, ECG03: 82 BPM

## 2022-03-18 PROCEDURE — 3331090002 HH PPS REVENUE DEBIT

## 2022-03-18 PROCEDURE — 3331090001 HH PPS REVENUE CREDIT

## 2022-03-19 PROBLEM — J30.9 ALLERGIC RHINITIS: Status: ACTIVE | Noted: 2022-01-19

## 2022-03-19 PROBLEM — K92.0 HEMATEMESIS: Status: ACTIVE | Noted: 2022-01-19

## 2022-03-19 PROBLEM — K29.70 GASTRITIS WITHOUT BLEEDING: Status: ACTIVE | Noted: 2022-01-19

## 2022-03-19 PROBLEM — J96.01 ACUTE RESPIRATORY FAILURE WITH HYPOXEMIA (HCC): Status: ACTIVE | Noted: 2018-09-18

## 2022-03-19 PROBLEM — N17.9 AKI (ACUTE KIDNEY INJURY) (HCC): Status: ACTIVE | Noted: 2020-02-05

## 2022-03-19 PROBLEM — Z96.611 STATUS POST REVERSE TOTAL ARTHROPLASTY OF RIGHT SHOULDER: Status: ACTIVE | Noted: 2022-01-13

## 2022-03-19 PROBLEM — N18.30 CKD (CHRONIC KIDNEY DISEASE) STAGE 3, GFR 30-59 ML/MIN (HCC): Status: ACTIVE | Noted: 2018-08-21

## 2022-03-19 PROBLEM — R10.9 ABDOMINAL PAIN: Status: ACTIVE | Noted: 2022-01-19

## 2022-03-19 PROBLEM — K13.70 DISEASE OF THE ORAL SOFT TISSUES: Status: ACTIVE | Noted: 2022-01-19

## 2022-03-19 PROBLEM — Q39.1 CONGENITAL TRACHEOESOPHAGEAL FISTULA, ESOPHAGEAL ATRESIA AND STENOSIS: Status: ACTIVE | Noted: 2022-01-19

## 2022-03-19 PROBLEM — I95.9 HYPOTENSION: Status: ACTIVE | Noted: 2022-03-02

## 2022-03-19 PROBLEM — Q39.3 CONGENITAL TRACHEOESOPHAGEAL FISTULA, ESOPHAGEAL ATRESIA AND STENOSIS: Status: ACTIVE | Noted: 2022-01-19

## 2022-03-19 PROBLEM — K21.00 REFLUX ESOPHAGITIS: Status: ACTIVE | Noted: 2022-01-19

## 2022-03-19 PROBLEM — R63.0 ANOREXIA: Status: ACTIVE | Noted: 2017-11-03

## 2022-03-19 PROBLEM — R06.00 DYSPNEA: Status: ACTIVE | Noted: 2021-05-03

## 2022-03-19 PROBLEM — M19.019 SHOULDER ARTHRITIS: Status: ACTIVE | Noted: 2022-02-18

## 2022-03-19 PROBLEM — E78.5 DYSLIPIDEMIA: Status: ACTIVE | Noted: 2018-12-05

## 2022-03-19 PROBLEM — R94.39 ABNORMAL NUCLEAR STRESS TEST: Status: ACTIVE | Noted: 2021-05-03

## 2022-03-19 PROBLEM — I26.99 ACUTE PULMONARY EMBOLUS (HCC): Status: ACTIVE | Noted: 2018-09-18

## 2022-03-19 PROBLEM — I10 ESSENTIAL HYPERTENSION: Status: ACTIVE | Noted: 2018-12-05

## 2022-03-19 PROBLEM — Z87.891 HISTORY OF TOBACCO ABUSE: Status: ACTIVE | Noted: 2018-12-05

## 2022-03-19 PROBLEM — K22.4 DYSKINESIA OF ESOPHAGUS: Status: ACTIVE | Noted: 2022-01-19

## 2022-03-19 PROBLEM — R60.0 LOWER EXTREMITY EDEMA: Status: ACTIVE | Noted: 2018-12-05

## 2022-03-19 PROCEDURE — 3331090001 HH PPS REVENUE CREDIT

## 2022-03-19 PROCEDURE — 3331090002 HH PPS REVENUE DEBIT

## 2022-03-20 PROBLEM — M16.11 ARTHRITIS OF RIGHT HIP: Status: ACTIVE | Noted: 2018-09-04

## 2022-03-20 PROBLEM — E87.20 METABOLIC ACIDOSIS: Status: ACTIVE | Noted: 2020-02-07

## 2022-03-20 PROBLEM — N39.0 UTI (URINARY TRACT INFECTION): Status: ACTIVE | Noted: 2022-03-02

## 2022-03-20 PROBLEM — R05.9 COUGH: Status: ACTIVE | Noted: 2022-01-19

## 2022-03-20 PROBLEM — Z09 SURGERY FOLLOW-UP EXAMINATION: Status: ACTIVE | Noted: 2022-02-28

## 2022-03-20 PROCEDURE — 3331090002 HH PPS REVENUE DEBIT

## 2022-03-20 PROCEDURE — 3331090001 HH PPS REVENUE CREDIT

## 2022-03-21 PROCEDURE — 3331090001 HH PPS REVENUE CREDIT

## 2022-03-21 PROCEDURE — 3331090002 HH PPS REVENUE DEBIT

## 2022-03-22 ENCOUNTER — HOME CARE VISIT (OUTPATIENT)
Dept: SCHEDULING | Facility: HOME HEALTH | Age: 82
End: 2022-03-22
Payer: MEDICARE

## 2022-03-22 VITALS
HEART RATE: 65 BPM | DIASTOLIC BLOOD PRESSURE: 50 MMHG | TEMPERATURE: 97.5 F | SYSTOLIC BLOOD PRESSURE: 72 MMHG | OXYGEN SATURATION: 100 % | RESPIRATION RATE: 16 BRPM

## 2022-03-22 PROCEDURE — 400013 HH SOC

## 2022-03-22 PROCEDURE — 3331090001 HH PPS REVENUE CREDIT

## 2022-03-22 PROCEDURE — G0299 HHS/HOSPICE OF RN EA 15 MIN: HCPCS

## 2022-03-22 PROCEDURE — 3331090002 HH PPS REVENUE DEBIT

## 2022-03-23 PROCEDURE — 3331090001 HH PPS REVENUE CREDIT

## 2022-03-23 PROCEDURE — 3331090002 HH PPS REVENUE DEBIT

## 2022-03-24 ENCOUNTER — HOME CARE VISIT (OUTPATIENT)
Dept: SCHEDULING | Facility: HOME HEALTH | Age: 82
End: 2022-03-24
Payer: MEDICARE

## 2022-03-24 VITALS
HEART RATE: 68 BPM | SYSTOLIC BLOOD PRESSURE: 62 MMHG | TEMPERATURE: 97.2 F | OXYGEN SATURATION: 98 % | DIASTOLIC BLOOD PRESSURE: 40 MMHG | RESPIRATION RATE: 16 BRPM

## 2022-03-24 PROCEDURE — 3331090002 HH PPS REVENUE DEBIT

## 2022-03-24 PROCEDURE — 3331090001 HH PPS REVENUE CREDIT

## 2022-03-24 PROCEDURE — G0299 HHS/HOSPICE OF RN EA 15 MIN: HCPCS

## 2022-03-25 PROCEDURE — 3331090001 HH PPS REVENUE CREDIT

## 2022-03-25 PROCEDURE — 3331090002 HH PPS REVENUE DEBIT

## 2022-03-26 PROCEDURE — 3331090001 HH PPS REVENUE CREDIT

## 2022-03-26 PROCEDURE — 3331090002 HH PPS REVENUE DEBIT

## 2022-03-27 PROCEDURE — 3331090002 HH PPS REVENUE DEBIT

## 2022-03-27 PROCEDURE — 3331090001 HH PPS REVENUE CREDIT

## 2022-03-28 PROCEDURE — 3331090002 HH PPS REVENUE DEBIT

## 2022-03-28 PROCEDURE — 3331090001 HH PPS REVENUE CREDIT

## 2022-03-29 ENCOUNTER — HOME CARE VISIT (OUTPATIENT)
Dept: HOME HEALTH SERVICES | Facility: HOME HEALTH | Age: 82
End: 2022-03-29
Payer: MEDICARE

## 2022-03-29 PROCEDURE — 3331090002 HH PPS REVENUE DEBIT

## 2022-03-29 PROCEDURE — 3331090001 HH PPS REVENUE CREDIT

## 2022-03-30 ENCOUNTER — HOME CARE VISIT (OUTPATIENT)
Dept: SCHEDULING | Facility: HOME HEALTH | Age: 82
End: 2022-03-30
Payer: MEDICARE

## 2022-03-30 VITALS
TEMPERATURE: 97.2 F | OXYGEN SATURATION: 97 % | SYSTOLIC BLOOD PRESSURE: 102 MMHG | DIASTOLIC BLOOD PRESSURE: 60 MMHG | HEART RATE: 72 BPM | RESPIRATION RATE: 16 BRPM

## 2022-03-30 PROBLEM — Z09 SURGERY FOLLOW-UP EXAMINATION: Status: RESOLVED | Noted: 2022-02-28 | Resolved: 2022-03-30

## 2022-03-30 PROCEDURE — 3331090001 HH PPS REVENUE CREDIT

## 2022-03-30 PROCEDURE — G0299 HHS/HOSPICE OF RN EA 15 MIN: HCPCS

## 2022-03-30 PROCEDURE — 3331090002 HH PPS REVENUE DEBIT

## 2022-03-31 PROCEDURE — 3331090002 HH PPS REVENUE DEBIT

## 2022-03-31 PROCEDURE — 3331090001 HH PPS REVENUE CREDIT

## 2022-04-01 ENCOUNTER — HOME CARE VISIT (OUTPATIENT)
Dept: SCHEDULING | Facility: HOME HEALTH | Age: 82
End: 2022-04-01
Payer: MEDICARE

## 2022-04-01 PROBLEM — N39.0 UTI (URINARY TRACT INFECTION): Status: RESOLVED | Noted: 2022-03-02 | Resolved: 2022-04-01

## 2022-04-01 PROCEDURE — G0299 HHS/HOSPICE OF RN EA 15 MIN: HCPCS

## 2022-04-01 PROCEDURE — 3331090002 HH PPS REVENUE DEBIT

## 2022-04-01 PROCEDURE — 3331090001 HH PPS REVENUE CREDIT

## 2022-04-02 PROCEDURE — 3331090001 HH PPS REVENUE CREDIT

## 2022-04-02 PROCEDURE — 3331090002 HH PPS REVENUE DEBIT

## 2022-04-03 VITALS
TEMPERATURE: 97.1 F | OXYGEN SATURATION: 97 % | SYSTOLIC BLOOD PRESSURE: 88 MMHG | RESPIRATION RATE: 16 BRPM | DIASTOLIC BLOOD PRESSURE: 60 MMHG | HEART RATE: 76 BPM

## 2022-04-03 PROCEDURE — 3331090001 HH PPS REVENUE CREDIT

## 2022-04-03 PROCEDURE — 3331090002 HH PPS REVENUE DEBIT

## 2022-04-04 ENCOUNTER — HOSPITAL ENCOUNTER (OUTPATIENT)
Dept: PHYSICAL THERAPY | Age: 82
Discharge: HOME | End: 2022-04-04
Attending: ORTHOPAEDIC SURGERY
Payer: MEDICARE

## 2022-04-04 DIAGNOSIS — Z96.611 STATUS POST REVERSE ARTHROPLASTY OF RIGHT SHOULDER: ICD-10-CM

## 2022-04-04 DIAGNOSIS — Z96.611 INSTABILITY OF REVERSE TOTAL ARTHROPLASTY OF RIGHT SHOULDER (HCC): ICD-10-CM

## 2022-04-04 DIAGNOSIS — T84.028A INSTABILITY OF REVERSE TOTAL ARTHROPLASTY OF RIGHT SHOULDER (HCC): ICD-10-CM

## 2022-04-04 DIAGNOSIS — Z09 SURGERY FOLLOW-UP: ICD-10-CM

## 2022-04-04 PROCEDURE — 97161 PT EVAL LOW COMPLEX 20 MIN: CPT

## 2022-04-04 PROCEDURE — 97140 MANUAL THERAPY 1/> REGIONS: CPT

## 2022-04-04 PROCEDURE — 97110 THERAPEUTIC EXERCISES: CPT

## 2022-04-04 NOTE — THERAPY EVALUATION
Andrews Ashley  : 1940  [place]  Secondary: [address] [place] [address], [facility]  [phone]   [phone]              OUTPATIENT PHYSICAL THERAPY:Initial Assessment 2022   Rt Reverse TSA 22   ICD-10: Treatment Diagnosis:   Pain in right shoulder (M25.511)  Stiffness of right shoulder, not elsewhere classified (M25.611)     Muscle weakness (generalized) (M62.81)    PRECAUTIONS/ALLERGIES:   Morphine; Other medication; Other plant, animal, environmental; Shellfish derived; and Clams  MEDICAL/REFERRING DIAGNOSIS:  Status post reverse arthroplasty of right shoulder [Z96.611]  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 Frequency/Duration: 2 times a week for 90 Day(s)   [place] / [place] / Product Type: Medicare /      INITIAL ASSESSMENT:  Mr. Andrews Ashley has attended 1 physical therapy session including initial evaluation as of today 2022. Andrews Ashley presents with expected decreased Rt shoulder ROM, decreased strength, decreased tolerance for functional activities, following Rt reverse TSA 22, roughly 6 weeks ago. According to their responses on the Disabilities of the Arm, Shoulder, and Hand (DASH) Questionnaire- Quick Version, Andrews Ashley is very limited by their shoulder pain and dysfunction in their ability to fully participate in ADLs. Andrews Ashley will benefit from skilled PT (medically necessary) to address above deficits affecting participation in basic ADLs and overall functional tolerance.          PROBLEM LIST (Impacting functional limitations):  Decreased Strength  Decreased ADL/Functional Activities  Decreased Transfer Abilities  Increased Pain  Decreased Activity Tolerance  Increased Fatigue  Increased Shortness of Breath  Decreased Flexibility/Joint Mobility  Decreased [place] with Home Exercise Program INTERVENTIONS PLANNED:  Balance Exercise  Bed Mobility  Cold  Cryotherapy  Family Education  Gait Training  Home Exercise Program (HEP)  Manual Therapy  Neuromuscular Re-education/Strengthening  Range of Motion (ROM)  Therapeutic Activites  Therapeutic Exercise/Strengthening  Transfer Training  Ultrasound  Electrical stimulation  Vasopneumatic compression   Dry Needling for Pain control       GOALS: (Goals have been discussed and agreed upon with patient.)  FUNCTIONAL GOALS: Time Frame: 90 days  1. Carrie Hubbard will report <=2/10 pain with shaving his face as well as minimal/no difficulty. 2. Sissy Bridge will demonstrate improvement in active shoulder flexion to >90 degrees to increase UE function and participation in ADLs. 3. Sissy Bridge will demonstrate improvement in active shoulder abduction to >90 degrees to increase UE function and participation in ADLs. 3. Jiesy Bridge will show a >= 10 point decrease on the DASH in order to show an increase in upper extremity function. [address] will be independent and compliant with HEP and not require assistance from PT.       OUTCOME MEASURE USED:   Tool Used: Tool Used: Disabilities of the Arm, Shoulder and Hand (DASH) Questionnaire - Quick Version  Score:  Initial: 30/55  Most Recent: X/55 (Date: -- )   Interpretation of Score: The DASH is designed to measure the activities of daily living in person's with upper extremity dysfunction or pain. Each section is scored on a 1-5 scale, 5 representing the greatest disability. The scores of each section are added together for a total score of 55.       MEDICAL NECESSITY:  Skilled intervention continues to be required due to above deficits affecting participation in basic ADLs and overall functional tolerance. REASON FOR SERVICES/OTHER COMMENTS:  Patient continues to require skilled intervention due to  above deficits affecting participation in basic ADLs and overall functional tolerance. TOTAL DURATION:  PT Patient Time In/Time Out  Time In: 2640  Time Out: 1540    REHABILITATION POTENTIAL FOR STATED GOALS: GOOD    Regarding Lorena Granger's therapy, I certify that the treatment plan above will be carried out by a therapist or under their direction. Thank you for this referral,  Marah Gomes, PT, DPT       Referring Physician Signature: Eva Harper MD                  PAIN/SUBJECTIVE:   Initial: 3/10 Post Session:  3/10     HISTORY:   HISTORY OF PRESENT INJURY/ILLNESS (REASON FOR REFERRAL):  Pt states he had a reverse Rt total shoulder many years ago and did well with it however notes he had 4 dislocations in the years that followed. States he had a revision 2/18/22 to change the size of ball and socket to prevent further dislocations. States he had home PT and was told he was not allowed to do any exercises which included pendulums and wrist/elbow strengthening per Dr. Terrell Unger request. Notes he is no longer wearing shoulder sling after last visit with MD.   Pt notes he is not using ice at this time and has not been taking medication for pain. States he is having the most difficulty shaving his face, combing his hair, brushing teeth, driving, getting dressed, utilizing his arm.         PAIN AND NATURE OF CONDITION:   Date:   4/4/2022   Highest pain level 7/10   Lowest pain level 2/10   Aggravating factors movement   Alleviating factors/positions/motions Rest and no movement   Depression, fear, anxiety no   Arm pain Yes down to elbow      LIFESTYLE: Date: 4/4/2022   Occupation: Retired   Vigorous Activity: no   Sedentary lifestyle: no          SOCIAL HISTORY/LIVING ENVIRONMENT:    Pt lives at home in a 1.5 story home with his wife. Notes no difficulty with stairs since he has a handrail. Social History     Socioeconomic History    Marital status:      Spouse name: Not on file    Number of children: Not on file    Years of education: Not on file    Highest education level: Not on file   Occupational History    Not on file   Tobacco Use    Smoking status: Former Smoker     Packs/day: 0.50     Years: 5.00     Pack years: 2.50    Smokeless tobacco: Never Used    Tobacco comment: quit age 20; continuing cessation   Vaping Use    Vaping Use: Never used   Substance and Sexual Activity    Alcohol use: Yes     Comment: 3 drinks per night; wine and liquor    Drug use: No    Sexual activity: Not on file   Other Topics Concern     Service Not Asked    Blood Transfusions Not Asked    Caffeine Concern Not Asked    Occupational Exposure Not Asked    Hobby Hazards Not Asked    Sleep Concern Not Asked    Stress Concern Not Asked    Weight Concern Not Asked    Special Diet Not Asked    Back Care Not Asked    Exercise Not Asked    Bike Helmet Not Asked   [address] Not Asked    Self-Exams Not Asked   Social History Narrative    Not on file     Social Determinants of Health     Financial Resource Strain:     Difficulty of Paying Living Expenses: Not on file   Food Insecurity:     Worried About Running Out of Food in the Last Year: Not on file    Grady of Food in the Last Year: Not on file   Transportation Needs:     Lack of Transportation (Medical): Not on file    Lack of Transportation (Non-Medical):  Not on file   Physical Activity:     Days of Exercise per Week: Not on file    Minutes of Exercise per Session: Not on file   Stress:     Feeling of Stress : Not on file   Social Connections:     Frequency of Communication with Friends and Family: Not on file    Frequency of Social Gatherings with Friends and Family: Not on file    Attends Buddhist Services: Not on file    Active Member of Clubs or Organizations: Not on file    Attends Club or Organization Meetings: Not on file    Marital Status: Not on file   Intimate Partner Violence:     Fear of Current or Ex-Partner: Not on file    Emotionally Abused: Not on file    Physically Abused: Not on file    Sexually Abused: Not on file   Housing Stability:     Unable to Pay for Housing in the Last Year: Not on file    Number of [place] in the Last Year: Not on file    Unstable Housing in the Last Year: Not on file       DOMINANT SIDE:   Right    PAST MEDICAL HISTORY/COMORBIDITIES:   Mr. Burgess Jerez  has a past medical history of Basal cell carcinoma, Chronic combined systolic and diastolic congestive heart failure ([facility] [address] Chronic kidney disease, Chronic pain, Current use of long term anticoagulation, CARDOZA (dyspnea on exertion), Dysphagia, Erectile dysfunction, Fatigue, Former smoker, GERD (gastroesophageal reflux disease), High cholesterol, History of pulmonary embolism (09/18/2018), History of stomach cancer (2010), Hypertension, Iron deficiency anemia (05/2015), Macular degeneration, Nausea & vomiting, Nonischemic cardiomyopathy ([facility] [address] Osteoarthritis, 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 He has no past medical history of Coagulation defects, COPD, Nicotine vapor product user, or Non-nicotine vapor product user. Mr. Burgess Jerez  has a past surgical history that includes hx appendectomy; hx colonoscopy; hx cholecystectomy (2015); hx other surgical; hx gi (2010); pr total hip arthroplasty (Bilateral, 2004); hx shoulder arthroscopy (Right, 2015); hx knee arthroscopy (Right); and pr total knee arthroplasty (Right, 2012).     PRIOR LEVEL OF FUNCTION/WORK/ACTIVITY:   -Independent with all ADLs  Active Ambulatory Problems     Diagnosis Date Noted    Osteoarthritis of right knee 02/21/2012    Total knee replacement status 02/21/2012    Arthritis of right shoulder region 04/06/2015    Status post shoulder replacement 04/06/2015    Cholecystitis, acute 07/23/2015    S/P laparoscopic cholecystectomy 08/03/2015    CKD (chronic kidney disease) stage 3, GFR 30-59 ml/min ([facility]) 08/21/2018    Arthritis of right hip 09/04/2018    Acute pulmonary embolus ([address] 09/18/2018    Acute respiratory failure with hypoxemia ([facility]) 09/18/2018    Acute deep vein thrombosis (DVT) of distal vein of left lower extremity ([address] 02/05/2020    JENNY (acute kidney injury) ([address] 02/05/2020    Anorexia 11/03/2017    Belching 12/31/2018    Dyslipidemia 12/05/2018    Dysphagia 04/19/2016    Essential hypertension 12/05/2018    History of tobacco abuse 12/05/2018    Lower extremity edema 12/05/2018    Malignant neoplasm of stomach ([address] 37/40/3288    Metabolic acidosis 08/48/3770    Shortness of breath 12/05/2018    Sialorrhea 05/20/2016    Sinusitis, maxillary, chronic 05/20/2016    Abnormal nuclear stress test 05/03/2021    Dyspnea 05/03/2021    Status post reverse total arthroplasty of right shoulder 01/13/2022    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 ([facility]) 02/14/2022    Abdominal pain 01/19/2022    Acute sinusitis 01/19/2022    Allergic rhinitis 01/19/2022    Chronic combined systolic and diastolic congestive heart failure ([address] 11/05/2021    Congenital tracheoesophageal fistula, 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    Cough 01/19/2022    Dehydration 03/24/2010    Diarrhea 01/19/2022    Disease of the oral soft tissues 01/19/2022    Exposure to radiation 01/19/2022    Hematemesis 01/19/2022    Hypovolemia 07/13/2010    Iron deficiency anemia 05/29/2015    Nausea without vomiting 03/26/2010    Nonischemic cardiomyopathy ([address] 08/18/2021    Overweight 07/28/2015    Primary malignant neoplasm of body of stomach ([address] 03/01/2010    Reflux esophagitis 01/19/2022    Dyskinesia of esophagus 01/19/2022    Esophageal obstruction 01/19/2022    Esophageal reflux 01/19/2022    Gastritis without bleeding 01/19/2022    Stricture and stenosis of esophagus 01/19/2022    Ulcer of esophagus without bleeding 01/19/2022    Vitamin B deficiency 01/20/2012    Vitamin B12 deficiency anemia due to intrinsic factor deficiency 01/20/2022    Weight loss 01/19/2022    CARDOZA (dyspnea on exertion) 05/03/2021    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    Instability of reverse total shoulder arthroplasty ([address] 02/18/2022    Shoulder arthritis 02/18/2022    Surgery follow-up examination 02/28/2022    UTI (urinary tract infection) 03/02/2022    Hypotension 03/02/2022     Resolved Ambulatory Problems     Diagnosis Date Noted    Screen for colon cancer 04/19/2016     Past Medical History:   Diagnosis Date    Basal cell carcinoma     Chronic kidney disease     Chronic pain     Current use of long term anticoagulation     Erectile dysfunction     Fatigue     Former smoker     GERD (gastroesophageal reflux disease)     High cholesterol     History of pulmonary embolism 09/18/2018    History of stomach cancer 2010    Hypertension     Macular degeneration     Nausea & vomiting     Osteoarthritis     Unspecified adverse effect of anesthesia         Ambulatory/Rehab Services H2 Model Falls Risk Assessment    Risk Factors:       (1)  Gender [Male] Ability to Rise from Chair:       (1)  Pushes up, successful in one attempt    Falls Prevention Plan:       No modifications necessary   Total: (5 or greater = High Risk): 2    ©2010 [facility] [place]. All Rights Reserved. [facility] Patent #[id number]. Federal Law prohibits the replication, distribution or use without written permission from [facility] of [address] [place] [address] Ave:    Current Outpatient Medications:     pantoprazole (PROTONIX) 20 mg tablet, Take 20 mg by mouth every Monday and Friday., Disp: , Rfl:     torsemide (DEMADEX) 10 mg tablet, Take 10 mg by mouth daily. , Disp: , Rfl:     ondansetron (ZOFRAN ODT) 4 mg disintegrating tablet, 2 Tablets by SubLINGual route every six (6) hours as needed for Nausea or Nausea or Vomiting. To start after surgery  Indications: prevent nausea and vomiting after surgery, Disp: 20 Tablet, Rfl: 0    doxycycline (MONODOX) 100 mg capsule, Take 1 Capsule by mouth two (2) times a day., Disp: 56 Capsule, Rfl: 0    cefpodoxime (VANTIN) 100 mg tablet, Take 100 mg by mouth two (2) times a day., Disp: , Rfl:     doxycycline (ADOXA) 100 mg tablet, Take 100 mg by mouth two (2) times a day., Disp: , Rfl:     spironolactone (ALDACTONE) 25 mg tablet, Take 25 mg by mouth daily. , Disp: , Rfl:     oxyCODONE-acetaminophen (PERCOCET 7.5) 7.5-325 mg per tablet, Take 1 Tablet by mouth every four (4) hours as needed for Pain., Disp: , Rfl:     metoprolol tartrate (LOPRESSOR) 25 mg tablet, Take 12.5 mg by mouth two (2) times a day., Disp: , Rfl:     torsemide (DEMADEX) 10 mg tablet, Take 10 mg by mouth daily. , Disp: , Rfl:     cefpodoxime (VANTIN) 100 mg tablet, Take 1 Tablet by mouth two (2) times a day., Disp: 14 Tablet, Rfl: 0    tamsulosin (FLOMAX) 0.4 mg capsule, Take 1 Capsule by mouth daily. , Disp: 90 Capsule, Rfl: 3    SUCRALFATE PO, , Disp: , Rfl:     MELOXICAM PO, , Disp: , Rfl:     atorvastatin calcium (LIPITOR PO), , Disp: , Rfl:     guaiFENesin ER (Mucinex) 600 mg ER tablet, 1 tablet, Disp: , Rfl:     BENAZEPRIL HCL, BULK,, , Disp: , Rfl:     ANTIFUNGAL, CLOTRIMAZOLE, EX, , Disp: , Rfl:     megestroL (MEGACE) 400 mg/10 mL (10 mL) suspension, Take 400 mg by mouth daily. , Disp: , Rfl:     senna-docusate (Stimulant Laxative Plus) 8.6-50 mg per tablet, Take 1 Tablet by mouth daily. , Disp: , Rfl:     cetirizine (Allergy Relief, cetirizine,) 10 mg tablet, Take 10 mg by mouth daily. , Disp: , Rfl:     vit C/E/Zn/coppr/lutein/zeaxan (PRESERVISION AREDS-2 PO), Take 1 Capsule by mouth two (2) times a day., Disp: , Rfl:     azelastine (ASTELIN) 137 mcg (0.1 %) nasal spray, 1 Spray two (2) times a day. Use in each nostril as directed, Disp: , Rfl:     fexofenadine (ALLEGRA) 180 mg tablet, Take  by mouth., Disp: , Rfl:     senna-docusate (PERICOLACE) 8.6-50 mg per tablet, Take 1 Tablet by mouth daily. To start after surgery  Indications: constipation, Disp: 21 Tablet, Rfl: 0    vit C/vit E ac/lut/copper/zinc (PRESERVISION LUTEIN PO), Take  by mouth daily. , Disp: , Rfl:     rivaroxaban (Xarelto) 20 mg tab tablet, Take 20 mg by mouth nightly., Disp: , Rfl:     omeprazole (PRILOSEC) 20 mg capsule, Take 20 mg by mouth Daily (before breakfast). Indications: gastroesophageal reflux disease, Disp: , Rfl:     cyanocobalamin (VITAMIN B-12) 1,000 mcg/mL injection, 1,000 mcg by IntraMUSCular route every twenty-eight (28) days. , Disp: , Rfl:     simvastatin (ZOCOR) 40 mg tablet, Take 40 mg by mouth nightly., Disp: , Rfl:     fluticasone (FLONASE) 50 mcg/Actuation nasal spray, 2 Sprays by Nasal route nightly. Indications: ALLERGIC RHINITIS, Disp: , Rfl:      Date Last Reviewed:  4/4/2022  Number of Personal Factors/Comorbidities that affect the Plan of Care: 0: LOW COMPLEXITY        EXAMINATION:   ORTHOSTATIC/POSTURE OBSERVATION:   Date:   4/4/2022   SITTING RESTING POSTURE -Pt sits with forward head and rounded shoulders which indicate tight anterior chest musculature, upper trapezius, and levator scapula and weak posterior scapula musculature and deep cervical flexors. STANDING RESTING POSTURE -Pt displays decreased core motor control indicating weak core and low back musculature.        ROM Date:  4/4/2022    RIGHT LEFT   UEROM   Flexion 90 degrees PROM WFL   Extension NT WFL   Abd 45 degrees WFL   ER (Apley's) NT WFL   IR (Apley's)  NT WFL   Elbow flexion [facility] WFL   Elbow extension [facility] WFL     PALPATION/TONE/TISSUE TEXTURE:   Date:   4/4/2022   SOFT TISSUE:   Upper traps Increased tone and tenderness Rt   Levatore scapulae Increased tone and tenderness Rt     SHOULDER STRENGTH: Date:  4/4/2022    RIGHT LEFT   Shoulder Flexion NT 4/5   Shoulder Extension NT 4/5   Shoulder Abduction NT 4-/5   Shoulder IR NT 4/5   Shoulder ER NT 4-/5     FUNCTIONAL MOBILITY   Date:   4/4/2022   Transfers Difficulty rolling and pushing off with sit to stand due to inability to do so with Rt UE   Posture Thoracic kyphosis, rounded shoulders, forward head   Gait deviations Decreased B hip extension, knee flexion, Rt arm swing   Assistive device None   Stairs No difficulty   Bed mobility Difficulty rolling and supine to sit     SPECIAL TESTS:     Pt status post op                NEUROLOGICAL SCREEN: Assessed @ Initial Visit    -RADIATING SYMPTOMS: Yes Pain radiates into arm down to elbow   -DERMATOMES: Normal and equal B   -NERVE TENSION TESTS: NT  Note: Patient denies any increase of symptoms with cough, sneeze or valsalva. Patient denies any saddle paresthesia or bowel/bladder deficits.  Denies dysarthria, diplopia, drop attacks, dizziness, dysphagia     -REFLEXES Date: 4/4/2022     Right Left   C5  (Biceps Brachii) NT NT   C6  (Brachioradialis) NT NT   C7  (Triceps) NT NT    -MYOTOMES  Date: 4/4/2022     Right Left   C1 & C2   (Neck Flx) NT NT   C3   (Neck SB) NT NT   C4  (Shldrelevation) NT NT   C5  (Shldr ABD) NT 4/5   C6   (Elbow Flx and wrist Ext) NT 5/5   C7  (Elbow Ext and wrist Flx) NT 5/5   C8  (Thumb ABD) NT 5/5   T1  (Digit ADD) NT 5/5         Body Structures Involved:  Bones  Joints  Muscles  Ligaments Body Functions Affected:  Sensory/Pain  Neuromusculoskeletal  Movement Related Activities and Participation Affected:  General Tasks and Demands  Mobility  Self Care  Domestic Life  Interpersonal Interactions and Relationships  Community, Social and Civic Life   Number of elements that affect the Plan of Care: 4+: HIGH COMPLEXITY        CLINICAL PRESENTATION:   Presentation: Stable and uncomplicated: LOW COMPLEXITY   CLINICAL DECISION MAKING:   Use of outcome tool(s) and clinical judgement create a POC that gives a: Clear prediction of patient's progress: LOW COMPLEXITY

## 2022-04-04 NOTE — PROGRESS NOTES
Andrews Ashley  : 1940  [place]  Secondary: [facility] [place] [address], [address] [place] [address]  [phone]   [phone]      OUTPATIENT PHYSICAL THERAPY: Daily Treatment Note 2022  Visit Count:  1   ICD-10: Treatment Diagnosis:   Pain in right shoulder (M25.511)  Stiffness of right shoulder, not elsewhere classified (M25.611)     Muscle weakness (generalized) (M62.81)    TREATMENT PLAN:  Effective Dates: 2022 TO 7/3/2022 (90 days). Frequency/Duration: 2 times a week for 90 Days    PRE-TREATMENT SYMPTOMS/COMPLAINTS:  See eval    MEDICATIONS REVIEWED:  2022   TREATMENT:   (In addition to Assessment/Re-Assessment sessions the following treatments were rendered)    THERAPEUTIC EXERCISE: (15 minutes):  Exercises per grid below to improve mobility, strength and balance. Required minimal visual and verbal cues to promote proper body alignment and promote proper body posture. Progressed resistance, range and complexity of movement as indicated. Date:  2022 Date:   Date:     Activity/Exercise Parameters Parameters Parameters   Pt education POC, anatomy, surgical procedure pertaining to pt shoulder, HEP, posture, sleeping position     Scapular retraction 1x10 reps  Hold 3s     Shoulder shrug 1x10 reps     Shoulder abduction isometric 1x10 reps  Hold 2s     Shoulder ER isometric 1x10 reps  Hold 2s                   HEP:  [employer]  Access Code: [email or web address]  URL: [email or web address]  Date: 2022  Prepared by: Merlene Pascual    Exercises  Seated Scapular Retraction - 2 x daily - 7 x weekly - 2 sets - 10 reps  Seated Shoulder Shrugs - 2 x daily - 7 x weekly - 2 sets - 10 reps  Isometric Shoulder External Rotation at Wall - 2 x daily - 7 x weekly - 2 sets - 10 reps  Isometric Shoulder Abduction at Wall - 2 x daily - 7 x weekly - 2 sets - 10 reps  Seated Elbow Flexion and Extension AROM - 2 x daily - 7 x weekly - 2 sets - 10 reps  Putty Squeezes - 2 x daily - 7 x weekly - 2 sets - 10 reps      MANUAL THERAPY: (13 minutes): Joint mobilization, Soft tissue mobilization and Manipulation was utilized and necessary because of the patient's restricted joint motion, painful spasm, loss of articular motion and restricted motion of soft tissue. PROM Rt shoulder flexion to 90 degrees, ER at 45 degrees, abduction to 45 degrees, biceps flexion extension with stretch to biceps soft tissue  (Used abbreviations: MET - muscle energy technique; PNF - proprioceptive neuromuscular facilitation; NMR - neuromuscular re-education; AP - anterior to posterior; PA - posterior to anterior)    MODALITIES: (0 minutes):      Not today      TREATMENT/SESSION ASSESSMENT:  Alfredo Barba verbalized understanding of role of PT and POC. Good mobility in shoulder with minimal guarding. Some pinching pain reported at end range ER and flexion. RECOMMENDATIONS/INTENT FOR NEXT TREATMENT SESSION: \"Next visit will focus on advancements to more challenging activities\".     PAIN: Initial: 3/10 Post Session:  4/10       Total Treatment Billable Duration: 28min  PT Patient Time In/Time Out  Time In: 7828  Time Out: [facility] [address], PT, DPT    Future Appointments   Date Time Provider Chiquita Fang   5/10/2022  2:00 PM MD FELY Tucker

## 2022-04-05 NOTE — H&P
Patient:   Roxana Mata  YOB: 1940   Date:                        03/30/2022 10:30 AM   Visit Type:                  Office Visit  Provider:   Sue Hartman PA-C  Referring Provider:  Sudha Khan MD [facility]  Primary Care Provider: Garry Carlos MD [address] [place] [address]    This 78year old  patient was referred by Sudha Khan MD.  This 78year old male presents for Anemia and. History of Present Illness:  1. Anemia,   77yo M known to Dr. Alyce Perea with PMH of HTN, DVT/PE on Xarelto, BPH, B12 deficiency, CKD3, dCHF, GERD,  gastric cancer s/p partial gastrectomy (BR II), hx esophageal stricture, diverticular disease, and colon polyps seen for anemia. Last seen in 7/2020 for dysphagia. He had right shoulder surgery 2/18/22 and has since experienced significant recurrent hypotension with frequent falls despite discontinuation of lisinopril, torsemide, spironolactone, and metoprolol with multiple ER visits. Flomax was recently discontinued by ER MD 3/23/22. He has also had recurrent N/V and persistent KAMERON (taking intermittent oral iron supplement). Hgb 11 with iron 44. He denies melena or hematochezia. He was on doxycycline post shoulder surgery and was seen in the ER 3/2/22 where he was dx with UTI treated with Vantin x 7d. In addition, TSH and Alk phos have been elevated. Patient is accompanied by his wife today. He continues to have orthostatic hypotension despite medication adjustments. He has hx of B12 deficiency on B12 injections q6 weeks and KAMERON. He has been relatively intolerant to oral iron supplementation due to side effects but is managing to take one iron pill daily. He denies any BRBPR or melena and has not vomited but continues to have severe nausea unrelieved with Zofran ODT 8mg. He states that Zofran given IV in the ER did seem to be helpful.  His omeprazole which he had been on for years was switched to Pantoprazole 20mg daily. He has been having normal stools despite iron supplementation but uses stool softeners prn. He has no abdominal pain and denies significant dysphagia despite hx of esophageal strictures. He chews his food well and eats slowly and with this is having no issues. He is able to swallow his pills- even large ones without difficulty. CT A/P 3/17/22: small left pleural effusion with left basilar atelectasis, rectum distended with stool, diverticulosis, midline ventral hernia. CXR 3/17: normal.   Echo 6/9/21: LVH, ER 45%, LVDD gr II, mild dilation of left atrium, Mild TR, mild MR.       EGD 9/8/20: BR II, esophageal stricture w dilation to 52Fr Denson. Colonoscopy 4/19/16: IH, diverticulosis. PCP note from 3/28, multiple ER notes/labs/CT/CXR, cardiology note 3/24/22 and recent echo reviewed with at least 30min spent in records review/documentation alone.         Past Medical/Surgical History:   (Detailed)  Disease/disorder Onset Date Management Date Comments   Diverticular disease 04/19/2016 Colonoscopy     esophageal stricture 04/19/2016 egd-46 denson  52 passed through UES   Cancer, gastric 2010 partial gastrectomy 2010    BILLROTH [address] ANASTOMOSIS 2010      Colonic polyps 2008 colonoscopy with polypectomy 2008    Esophageal stricture 2006 EGD with dilation     Gallstones  Cholecystectomy 2015      Right shoulder surgery 2015 [facility] 07/28/2015 -     Knee replacement 2012    Esophageal stricture  EGD with dilation 2011 feb 24   Esophageal stricture  dilation 2011 may 5   Esophageal stricture  dilation 12/2010      RIGHT SHOULDER SURGERY 2006      hip replacement LEFT 2003      tonsillectomy       RIGHT KNEE SURGERY       Appendectomy       right hip replacement 11/2019  [facility] 05/29/2020 -     skin cancer excision       Right shoulder surgery 2022  [facility] 03/30/2022 -   CKD stage 3    [facility] 03/30/2022 -   BPH    [facility] 03/30/2022 -   b12 deficiency injections q6 weeks  [facility] 03/30/2022 -   bilateral PE 2/2020  xarelto  [facility] 05/29/2020 -   DYSLIPIDEMIA       Esophageal stricture  EGD with dilation 8/2011    Gastroesophageal reflux disease  Drug therapy     HIATAL HERNIA       hx of appendicitis       Hypertension  Drug therapy     KAMERON    [facility] 03/30/2022 -   Left PE 11/2019 following hip surgery  short term Xarelto  [facility] 05/29/2020 -   LLE DVT 2/2020    TARIQ 38/87/2833 -   systolic/ diastolic CHF  ECHO 7/7542 LVH, LVDD2, mild TR/MR, EF 45%  [facility] 03/30/2022 -   High cholesterol       Arthritis         Additional Medical History  recent fatigue 2013  SLEEP APNEA  MACULAR DEGENERATION  PE in Sept. 2018 -- on Xarelto    Additional Surgical History  left hip replaced   R knee replacement 2/2012  gastric cancer surgery 2010  GALLBLADDER REMOVED    Procedure History  Test Date Results Interp   EGD 09/08/2020 Esophageal Stricture, Gastritis, unspec. - w/o bleeding, H/O Billroth II operation    EGD 04/11/2019 Esophageal Stricture, Bile reflux gastritis, History of Karin-en-Y gastric bypass    EGD 02/05/2018 Esophageal Stricture    EGD 06/29/2017 Esophageal Stricture    EGD 02/16/2017 Esophagitis - Other, Esophageal Stricture, Gastritis, unspec. - w/o bleeding    EGD 11/21/2016 Esophageal stricture    EGD 08/30/2016 Esophageal stricture, Bile reflux gastritis, H/O Billroth II operation    EGD 03/18/2016 Esophageal stricture, Gastritis    EGD 10/12/2015 Esophageal stricture    EGD 10/12/2015 Esophageal stricture    EGD 09/01/2015 Esophageal stricture, Gastritis, w/o bleeding    EGD 08/28/2014 Esophageal stricture    EGD 04/03/2014 Esophageal stricture    EGD 11/12/2013 Esophageal stricture, Gastritis, w/o bleeding    EGD 02/26/2013 Esophageal stricture, Esophageal ulcer w/o bleeding, Gastritis, w/o bleeding    EGD 07/13/2012 Esophageal stricture, Esophageal ulcer w/o bleeding, Hx of Billroth II operation    EGD 01/18/2012 Esophageal stricture, Gastritis, w/o bleeding    EGD 2011 Esophageal stricture      Esophageal manometry overall shows normal LES and UES but with abnormal esophageal motility in the form of inadequate peristalsis. 24 hr pH analysis showed a normal impedence pH without symptoms- patient has had previous gastric resection  demester score was 14.0    Family History:  (Detailed)  Relationship Family Member Name  Age at Death Condition Onset Age Cause of Death       No family history of Cancer, colon  N   Brother    Alcoholism  N   Father    Alzheimer's disease 80 N   Mother    Renal disease 68 N     Additional Family History  No known Fhx PUD, Colon polyps/Colon cancer or other GI malignancy. Social History:  (Detailed)  Tobacco use reviewed. Preferred language is [place]. MARITAL STATUS/FAMILY/SOCIAL SUPPORT  Currently . Smoking status: Former smoker. SMOKING STATUS  Type Smoking Status Usage Per Day Years Used Total Pack Years    Former smoker        ALCOHOL  There is a history of alcohol use. Type: Wine. consumed daily. HOME ENVIRONMENT/SAFETY  The patient has fallen 5 times in the last year.      Current Medications:  Medication Generic Name Directions   Aller-Raquel 10 mg tablet cetirizine HCl take 1 tablet by oral route  every day   Vitamin B-12 1,000 mcg/mL injection solution cyanocobalamin (vitamin B-12) inject 1 milliliter by intramuscular route  every month   Eye Health Plus Lutein 1,000 unit-200 mg-60 unit-2mg tablet vits A,C,E/lutein/minerals daily   Flonase Allergy Relief 50 mcg/actuation nasal spray,suspension fluticasone propionate inhale 1 spray by intranasal route  every day in each nostril   iron 325 mg (65 mg iron) tablet ferrous sulfate take 1 tablet by oral route  every day   metoprolol tartrate 25 mg tablet metoprolol tartrate take 1 tablet by oral route  every day   omeprazole 20 mg capsule,delayed release omeprazole take 1 capsule by oral route 2 times every day before a meal   oxycodone oxycodone HCl take 1 tablet by oral route  every 8 hours   Protonix 20 mg tablet,delayed release pantoprazole sodium take 1 tablet by oral route 2 times every day 30min before breakfast and dinner   SIMVASTATIN ORAL TABLET 40 MG SIMVASTATIN 1 PO DAILY   sucralfate 1 gram tablet sucralfate take 1 tablet by oral route 2 times every day on an empty stomach 1 hour before lunch and at bedtime   Xarelto rivaroxaban take 1 tablet by oral route  every day   zolpidem 10 mg tablet zolpidem tartrate take 1 tablet as needed     Allergies:  Ingredient Reaction (Severity) Medication Name Comment   MORPHINE NAUSEA & VOMITING  (severe)     SHELLFISH DERIVED NAUSEA & VOMITING  (severe)     Reviewed, no changes. Review of Systems:  System Neg/Pos Details   Constitutional Positive Fatigue. Constitutional Negative Fever and Weight loss. ENMT Negative Hearing deficit, Hoarseness and Sleep apnea. Eyes Negative Vision changes. Respiratory Negative Cough and Dyspnea. Cardio Negative Chest pain and Irregular heartbeat/palpitations. GI Positive Nausea, Reflux. GI Negative Abdominal pain, Constipation, Diarrhea, Dysphagia, Heartburn, Jaundice, Rectal bleeding and Vomiting.  Negative Dysuria, Urinary difficulty and Urinary frequency. Endocrine Negative Cold intolerance, Heat intolerance and Weight gain. Neuro Positive Dizziness. Neuro Negative Confusion/disorientation, Headache and Seizures. Psych Negative Anxiety, Depression and Panic attacks. Integumentary Negative Itching skin and Rash. MS Negative Joint pain and Myalgia. Hema/Lymph Positive Anemia. Hema/Lymph Negative Easy bleeding. Reproductive Negative Breast lumps. Vital Signs:  BP mm/Hg Pulse Resp Pulse Ox Temp F Ht (Total in.) Weight (lbs.) Weight (oz.) BMI   104/78 86 18 98 97.00 63.00 161.20  28.55     Physical Exam:  GENERAL: well nourished, well hydrated , and appears stated age. SKIN: Skin is warm and dry and anicteric without evidence of cyanosis.  No significant rashes. HEENT: Head is NC/AT. Sclerae anicteric and Pupils equal.  Oral mucosa normal.   CARDIOVASCULAR: RRR without murmurs, gallops, rubs. No Carotid bruits. No peripheral edema. RESPIRATORY: No accessory muscle use. Clear breath sounds. GI: The abdomen is soft, nondistended, and nontender. Normal active bowel sounds without audible bruit. No enlargement of the liver or spleen. No masses palpable. RECTAL: Deferred. MUSCULOSKELETAL: Gait is steady. Extremities with good range of motion. NEUROLOGICAL: Patient is alert and oriented and gross memory intact  PSYCHIATRIC: Mood appears appropriate with judgement intact. Medications Prescribed/Renewed (this encounter):  Medication Generic Name Directions   Protonix 20 mg tablet,delayed release pantoprazole sodium take 1 tablet by oral route 2 times every day 30min before breakfast and dinner   sucralfate 1 gram tablet sucralfate take 1 tablet by oral route 2 times every day on an empty stomach 1 hour before lunch and at bedtime     Assessment/Plan:  # Detail Type Description    1. Assessment KAMERON (iron deficiency anaemia) (D50.9). Patient Plan - continue Pantoprazole 20mg every morning 30min before breakfast. ADD Pantoprazole 20mg 30min before dinner  - START Carafate (sucralfate) 1g before lunch and at bedtime. Can dissolve in 2-3 ounces of water and drink as a slurry  - continue Zofran 8mg every 6 hours as needed. Provider Plan 73PO M known to Dr. Kimi Saenz with PMH of HTN, DVT/PE on Xarelto, BPH, B12 deficiency, CKD3, mixed CHF (EF 45%), GERD,  gastric cancer s/p partial gastrectomy (BR II), hx esophageal stricture, diverticular disease, and colon polyps seen for KAMERON in presence of significant orthostatic hypotension despite discontinuation of numerous BP meds and Flomax. He has no overt bleeding and Hgb levels appear relatively stable in the 11 range so I feel anemia is less likely contributing to his hypotension.  However, he does have hx of B2 anatomy due to hx of gastric cancer with complaint of chronic nausea  despite daily PPI unrelieved with Zofran. This should be evaluated for possible ulceration / GIB/ malignancy. - continue Pantoprazole 20mg every morning 30min before breakfast. ADD Pantoprazole 20mg 30min before dinner  - START Carafate (sucralfate) 1g before lunch and at bedtime. Can dissolve in 2-3 ounces of water and drink as a slurry  - continue Zofran 8mg every 6 hours as needed. -  Schedule diagnostic EGD to assess for esophagitis, gastritis, duodenitis, ulceration, or H. Pylori    - Risks of the procedures were discussed with patient included bleeding, perforation, and adverse cardiopulmonary effects of sedation. Patient understands that a  will be needed the day of the procedure. - procedure to be scheduled in the hospital setting due to recent hypotensive episodes and cardiac clearance to be obtained to hold Xarelto 48 hrs prior. ASA PS 3/4    Plan Orders He is to schedule a follow-up visit with William Ambrocio MD to be determined after testing/procedure. 2. Assessment Nausea (R11.0). Plan Orders Further evaluations ordered today include EGD W/WO Cytology to be performed. 3. Assessment History of gastric cancer (Z85.028). 4. Assessment B12 deficiency (E53.8). 5. Assessment History of Billroth II operation (Z98.0). 6. Assessment Idiopathic hypotension (I95.0). 7. Assessment Chronic diastolic heart failure (D19.56). 8. Assessment Hx pulmonary embolism (Z86.711). 9. Assessment Chronic anticoagulation (Z79.01). Fall Risk Plan:  The patient has fallen 5 times in the last year. The patient was checked out at 11:20 AM by Sandi Deshpande. Elements of this note may have been dictated using speech recognition software. As a result, errors of speech recognition may have occurred.       Provider:   Rainer Healy 03/30/2022 3:22 PM Document generated by: Ivy Villegas 03/30/2022    CC Providers:  Marah Dempsey MD, MD  [facility]  [address] [place] [address],  [address] [place] [address], [phone] [facility] MD   [facility]  [address], [address] [place] [address]-      Electronically signed by Macie Mendoza.  Wendy Suarez PA-C on 03/30/2022 03:23 PM

## 2022-04-07 ENCOUNTER — ANESTHESIA EVENT (OUTPATIENT)
Dept: ENDOSCOPY | Age: 82
End: 2022-04-07
Payer: MEDICARE

## 2022-04-07 ENCOUNTER — HOSPITAL ENCOUNTER (OUTPATIENT)
Dept: PHYSICAL THERAPY | Age: 82
Discharge: HOME | End: 2022-04-07
Attending: ORTHOPAEDIC SURGERY
Payer: MEDICARE

## 2022-04-07 PROCEDURE — 97140 MANUAL THERAPY 1/> REGIONS: CPT

## 2022-04-07 PROCEDURE — 97110 THERAPEUTIC EXERCISES: CPT

## 2022-04-07 RX ORDER — SODIUM CHLORIDE, SODIUM LACTATE, POTASSIUM CHLORIDE, CALCIUM CHLORIDE 600; 310; 30; 20 MG/100ML; MG/100ML; MG/100ML; MG/100ML
100 INJECTION, SOLUTION INTRAVENOUS CONTINUOUS
Status: CANCELLED | OUTPATIENT
Start: 2022-04-07

## 2022-04-07 NOTE — PROGRESS NOTES
Marion Miller  : 1940  [place]  Secondary: [facility] [address], [address] [place] [address]  [phone]   [id number] [id number]      OUTPATIENT PHYSICAL THERAPY: Daily Treatment Note 2022  Visit Count:  2   ICD-10: Treatment Diagnosis:   Pain in right shoulder (M25.511)  Stiffness of right shoulder, not elsewhere classified (M25.611)     Muscle weakness (generalized) (M62.81)    TREATMENT PLAN:  Effective Dates: 2022 TO 7/3/2022 (90 days). Frequency/Duration: 2 times a week for 90 Days    PRE-TREATMENT SYMPTOMS/COMPLAINTS: Pt notes he is sore today from performing exercises at home. Admits he may be performing exercises a little too aggressively. MEDICATIONS REVIEWED:  2022   TREATMENT:   (In addition to Assessment/Re-Assessment sessions the following treatments were rendered)    THERAPEUTIC EXERCISE: (24 minutes):  Exercises per grid below to improve mobility, strength and balance. Required minimal visual and verbal cues to promote proper body alignment and promote proper body posture. Progressed resistance, range and complexity of movement as indicated.      Date:  2022 Date:  2022 Date:     Activity/Exercise Parameters Parameters Parameters   Pt education POC, anatomy, surgical procedure pertaining to pt shoulder, HEP, posture, sleeping position     Scapular retraction 1x10 reps  Hold 3s     Shoulder shrug 1x10 reps     Shoulder abduction isometric 1x10 reps  Hold 2s     Shoulder ER isometric 1x10 reps  Hold 2s     AAROM bicep flexion/extension  3x10 reps    AAROM shoulder ER  3x10 reps  45 degrees abduction    Rhythmic stabilization ER  3x20s  45 degrees abduction    Forearm supination/pronation  2x10 reps  1# hand weight    Wrist extension/flexion  2x10 reps  1# hand weight    Putty  squeezes  1x10 reps  Yellow putty    Putty alternating finger squeezes  1x10 reps  Yellow putty      HEP:  [employer]  Access Code: [email or web address]  URL: [email or web address]  Date: 04/04/2022  Prepared by: Santana Troy    Exercises  Seated Scapular Retraction - 2 x daily - 7 x weekly - 2 sets - 10 reps  Seated Shoulder Shrugs - 2 x daily - 7 x weekly - 2 sets - 10 reps  Isometric Shoulder External Rotation at Wall - 2 x daily - 7 x weekly - 2 sets - 10 reps  Isometric Shoulder Abduction at Wall - 2 x daily - 7 x weekly - 2 sets - 10 reps  Seated Elbow Flexion and Extension AROM - 2 x daily - 7 x weekly - 2 sets - 10 reps  Putty Squeezes - 2 x daily - 7 x weekly - 2 sets - 10 reps      MANUAL THERAPY: (31 minutes): Joint mobilization, Soft tissue mobilization and Manipulation was utilized and necessary because of the patient's restricted joint motion, painful spasm, loss of articular motion and restricted motion of soft tissue. -PROM Rt shoulder flexion to 110 degrees, ER at 45 degrees, abduction to 45 degrees, biceps flexion extension with stretch to biceps soft tissue  -STM to B upper trap, cervical paraspinal, rhomboids (emphasis on Rt)  (Used abbreviations: MET - muscle energy technique; PNF - proprioceptive neuromuscular facilitation; NMR - neuromuscular re-education; AP - anterior to posterior; PA - posterior to anterior)    MODALITIES: (0 minutes):      Not today      TREATMENT/SESSION ASSESSMENT:  Silvia Pop with increased tone and tenderness throughout Rt periscapular musculature. Adequate muscle activation during AAROM and rhythmic stabilization in ER. Multiple cues for posture when sitting and performing forearm exercises in sitting. RECOMMENDATIONS/INTENT FOR NEXT TREATMENT SESSION: \"Next visit will focus on advancements to more challenging activities\".     PAIN: Initial: 3/10 Post Session:  4/10       Total Treatment Billable Duration: 56min  PT Patient Time In/Time Out  Time In: 1030  Time Out: Shun 137, PT, DPT    Future Appointments   Date Time Provider Chiquita Fang   4/11/2022 10:30 AM Edra Altes, PT, DPT [facility]   4/14/2022 10:30 AM Edra Altes, PT, DPT [facility]   4/18/2022 10:30 AM Edra Altes, PT, DPT [facility]   4/21/2022 10:30 AM Edra Altes, PT, DPT [facility]   4/25/2022 10:30 AM Edra Altes, PT, DPT [facility]   4/28/2022 10:30 AM Edra Altes, PT, DPT [facility]   5/10/2022  2:00 PM Aminaat Stanley MD [facility] PO[facility]

## 2022-04-07 NOTE — PROGRESS NOTES
Called and confirmed scheduled procedure for patient. Informed on patients arrival time (1130), entry location ([address] and  policy. Opportunity for questions provided, no voiced concerns.

## 2022-04-08 ENCOUNTER — HOSPITAL ENCOUNTER (OUTPATIENT)
Age: 82
Setting detail: OUTPATIENT SURGERY
Discharge: HOME | End: 2022-04-08
Attending: INTERNAL MEDICINE | Admitting: INTERNAL MEDICINE
Payer: MEDICARE

## 2022-04-08 ENCOUNTER — ANESTHESIA (OUTPATIENT)
Dept: ENDOSCOPY | Age: 82
End: 2022-04-08
Payer: MEDICARE

## 2022-04-08 VITALS
RESPIRATION RATE: 18 BRPM | HEART RATE: 68 BPM | DIASTOLIC BLOOD PRESSURE: 62 MMHG | TEMPERATURE: 98.5 F | SYSTOLIC BLOOD PRESSURE: 147 MMHG | OXYGEN SATURATION: 90 %

## 2022-04-08 PROCEDURE — 2709999900 HC NON-CHARGEABLE SUPPLY: Performed by: INTERNAL MEDICINE

## 2022-04-08 PROCEDURE — 76060000031 HC ANESTHESIA FIRST 0.5 HR: Performed by: INTERNAL MEDICINE

## 2022-04-08 PROCEDURE — 74011250636 HC RX REV CODE- 250/636: Performed by: NURSE ANESTHETIST, CERTIFIED REGISTERED

## 2022-04-08 PROCEDURE — 74011250636 HC RX REV CODE- 250/636: Performed by: ANESTHESIOLOGY

## 2022-04-08 PROCEDURE — 77030009426 HC FCPS BIOP ENDOSC BSC -B: Performed by: INTERNAL MEDICINE

## 2022-04-08 PROCEDURE — 88305 TISSUE EXAM BY PATHOLOGIST: CPT

## 2022-04-08 PROCEDURE — 88312 SPECIAL STAINS GROUP 1: CPT

## 2022-04-08 PROCEDURE — 76040000025: Performed by: INTERNAL MEDICINE

## 2022-04-08 RX ORDER — SODIUM CHLORIDE, SODIUM LACTATE, POTASSIUM CHLORIDE, CALCIUM CHLORIDE 600; 310; 30; 20 MG/100ML; MG/100ML; MG/100ML; MG/100ML
100 INJECTION, SOLUTION INTRAVENOUS CONTINUOUS
Status: DISCONTINUED | OUTPATIENT
Start: 2022-04-08 | End: 2022-04-08 | Stop reason: HOSPADM

## 2022-04-08 RX ORDER — PROPOFOL 10 MG/ML
INJECTION, EMULSION INTRAVENOUS AS NEEDED
Status: DISCONTINUED | OUTPATIENT
Start: 2022-04-08 | End: 2022-04-08 | Stop reason: HOSPADM

## 2022-04-08 RX ORDER — PROPOFOL 10 MG/ML
INJECTION, EMULSION INTRAVENOUS
Status: DISCONTINUED | OUTPATIENT
Start: 2022-04-08 | End: 2022-04-08 | Stop reason: HOSPADM

## 2022-04-08 RX ADMIN — PROPOFOL 40 MG: 10 INJECTION, EMULSION INTRAVENOUS at 13:12

## 2022-04-08 RX ADMIN — PROPOFOL 75 MCG/KG/MIN: 10 INJECTION, EMULSION INTRAVENOUS at 13:13

## 2022-04-08 RX ADMIN — SODIUM CHLORIDE, SODIUM LACTATE, POTASSIUM CHLORIDE, AND CALCIUM CHLORIDE 100 ML/HR: 600; 310; 30; 20 INJECTION, SOLUTION INTRAVENOUS at 12:21

## 2022-04-08 NOTE — PROCEDURES
ESOPHAGOGASTRODUODENOSCOPY    DATE of PROCEDURE: 4/8/2022    PT NAME: Damion Lo     [id number]    MEDICATION:   MAC      INSTRUMENT: GIFH 190    SPECIAL PROCEDURE: bx  BLOOD LOSS- 0 or min. SPEC- yes  IMPLANT- none    PROCEDURE:  After informed consent, the patient was placed Under anesthesia in the left lateral decubitus position. The endoscope was passed visually without difficulty. The examination was performed to the gumaro en y anastomosis with the findings and treatments as outlined below. Patient's tolerated the procedure well. No apparent complications. ASSESSMENT:  1. Diffuse gastritis - bx  2. Bilroth II anatomy with distal gumaro en y anastomosis wher efferent and afferent limbs meet    PLAN:   1. Check path  2.  F/U 2 months    Basia Monteiro MD

## 2022-04-08 NOTE — DISCHARGE INSTRUCTIONS
Gastrointestinal Esophagogastroduodenoscopy (EGD) - Upper Exam Discharge Instructions    1. Call Dr. Oscar Porter at [phone] for any problems or questions. 2. Contact the doctor's office for follow up appointment as directed. 3. Medication may cause drowsiness for several hours, therefore:  · Do not drive or operate machinery for remainder of the day. · No alcohol today. · Do not make any important or legal decisions for 24 hours. · Do not sign any legal documents for 24 hours. 5. Ordinarily, you may resume regular diet and activity after exam unless otherwise specified by your physician. 6. For mild soreness in your throat you may use Cepacol throat lozenges or warm salt-water gargles as needed. Any additional instructions: Follow up on pathology and schedule an office visit in 2-3 months. Restart taking Xarelto, 4/9/22, tomorrow. Instructions given to López Griffith and other family members.

## 2022-04-08 NOTE — ANESTHESIA POSTPROCEDURE EVALUATION
Procedure(s):  ESOPHAGOGASTRODUODENOSCOPY (EGD)/BMI 29  ESOPHAGOGASTRODUODENAL (EGD) BIOPSY. total IV anesthesia    Anesthesia Post Evaluation      Multimodal analgesia: multimodal analgesia used between 6 hours prior to anesthesia start to PACU discharge  Patient location during evaluation: bedside  Patient participation: complete - patient participated  Level of consciousness: awake and responsive to light touch  Pain management: adequate  Airway patency: patent  Anesthetic complications: no  Cardiovascular status: acceptable, hemodynamically stable, blood pressure returned to baseline and stable  Respiratory status: acceptable, unassisted, spontaneous ventilation and nonlabored ventilation  Hydration status: acceptable  Post anesthesia nausea and vomiting:  controlled  Final Post Anesthesia Temperature Assessment:  Normothermia (36.0-37.5 degrees C)      INITIAL Post-op Vital signs:   Vitals Value Taken Time   /62 04/08/22 1356   Temp     Pulse 77 04/08/22 1402   Resp 18 04/08/22 1356   SpO2 100 % 04/08/22 1402   Vitals shown include unvalidated device data.

## 2022-04-08 NOTE — INTERVAL H&P NOTE
Update History & Physical    The Patient's History and Physical of March 30, 2022 was reviewed with the patient and I examined the patient. There was no change. The surgical site was confirmed by the patient and me. Plan:  The risk, benefits, expected outcome, and alternative to the recommended procedure have been discussed with the patient. Patient understands and wants to proceed with the procedure.     Electronically signed by Oscar Macdonald MD on 4/8/2022 at 12:50 PM

## 2022-04-08 NOTE — ROUTINE PROCESS
Pt discharged home with wife, wife driving, VSS, instructions reviewed with PT and wife, understanding verbalized. All belongings sent home with Pt. Pt transported out via wheelchair by Mikey Cannon, [address] [place] [address].

## 2022-04-08 NOTE — ANESTHESIA PREPROCEDURE EVALUATION
Relevant Problems   RESPIRATORY SYSTEM   (+) CARDOZA (dyspnea on exertion)   (+) Dyspnea   (+) Shortness of breath      CARDIOVASCULAR   (+) Chronic combined systolic and diastolic congestive heart failure (HCC)   (+) Essential hypertension      GASTROINTESTINAL   (+) Esophageal reflux   (+) Ulcer of esophagus without bleeding      RENAL FAILURE   (+) JENNY (acute kidney injury) (HCC)   (+) CKD (chronic kidney disease) stage 3, GFR 30-59 ml/min (HCC)      ENDOCRINE   (+) Arthritis of right hip   (+) Arthritis of right shoulder region   (+) Shoulder arthritis      HEMATOLOGY   (+) Iron deficiency anemia   (+) Vitamin B12 deficiency anemia due to intrinsic factor deficiency      PERSONAL HX & FAMILY HX OF CANCER   (+) Malignant neoplasm of stomach (HCC)   (+) Primary malignant neoplasm of body of stomach ([address]       Anesthetic History               Review of Systems / Medical History  Patient summary reviewed and pertinent labs reviewed    Pulmonary          Shortness of breath (chronic) and smoker (Former)         Neuro/Psych              Cardiovascular    Hypertension      CHF (Nonischemic cardiomyopathy): dyspnea on exertion    Hyperlipidemia    Exercise tolerance: >4 METS  Comments: GRAHAM: Echo (6/9/21)   · There is concentric left ventricular hypertrophy present with diastolic dysfunction. · LV function is mildly decreased with estimated LVEF 45%. · The left atrium is mildly dilated. · Mild tricuspid valve regurgitation. · Mild mitral valve regurgitation. Cath (5/13/21) normal coronary arteriogram   GI/Hepatic/Renal     GERD    Renal disease: CRI      Comments: Dysphagia chronic, intermittent esophageal dilation  Endo/Other        Arthritis     Other Findings   Comments: 3/2/22 Ed note: recurrent syncopal episodes this past week. States that he is passed out while standing at least 5 times. When home health nurse was present today blood pressure was noted to be 70 systolic. .......  IVF given, normal head CT and CXR, patient discharged from ED. CT A/P 3/17/22: small left pleural effusion with left basilar atelectasis, rectum distended with stool, diverticulosis, midline ventral hernia. EGD to eval for cause of chronic nausea in the setting of previous gastric CA.          Physical Exam    Airway  Mallampati: II  TM Distance: > 6 cm  Neck ROM: decreased range of motion   Mouth opening: Normal     Cardiovascular  Regular rate and rhythm,  S1 and S2 normal,  no murmur, click, rub, or gallop             Dental    Dentition: Caps/crowns     Pulmonary  Breath sounds clear to auscultation               Abdominal         Other Findings            Anesthetic Plan    ASA: 3  Anesthesia type: total IV anesthesia          Induction: Intravenous  Anesthetic plan and risks discussed with: Patient and Spouse

## 2022-04-11 ENCOUNTER — HOSPITAL ENCOUNTER (OUTPATIENT)
Dept: PHYSICAL THERAPY | Age: 82
Discharge: HOME | End: 2022-04-11
Attending: ORTHOPAEDIC SURGERY
Payer: MEDICARE

## 2022-04-11 PROCEDURE — 97110 THERAPEUTIC EXERCISES: CPT

## 2022-04-11 PROCEDURE — 97140 MANUAL THERAPY 1/> REGIONS: CPT

## 2022-04-11 NOTE — PROGRESS NOTES
Ventura Sanchez  : 1940  [place]  Secondary: [address] [facility] at [facility]  [address], [address] [place] [address]  [phone]   [phone]      OUTPATIENT PHYSICAL THERAPY: Daily Treatment Note 2022  Visit Count:  Visit count could not be calculated. Make sure you are using a visit which is associated with an episode. ICD-10: Treatment Diagnosis:   Pain in right shoulder (M25.511)  Stiffness of right shoulder, not elsewhere classified (M25.611)     Muscle weakness (generalized) (M62.81)    TREATMENT PLAN:  Effective Dates: 2022 TO 7/3/2022 (90 days). Frequency/Duration: 2 times a week for 90 Days    PRE-TREATMENT SYMPTOMS/COMPLAINTS: Pt notes he is still having pain after isometric wall exercises and believes he is performing them wrong. Notes this morning he had difficulty with gripping his cup for tea. States he is working with putty to improve  and finger strength. MEDICATIONS REVIEWED:  2022   TREATMENT:   (In addition to Assessment/Re-Assessment sessions the following treatments were rendered)    THERAPEUTIC EXERCISE: (24 minutes):  Exercises per grid below to improve mobility, strength and balance. Required minimal visual and verbal cues to promote proper body alignment and promote proper body posture. Progressed resistance, range and complexity of movement as indicated.      Date:  2022 Date:  2022 Date:  2022   Activity/Exercise Parameters Parameters Parameters   Pt education POC, anatomy, surgical procedure pertaining to pt shoulder, HEP, posture, sleeping position     Scapular retraction 1x10 reps  Hold 3s     Shoulder shrug 1x10 reps     Shoulder abduction isometric 1x10 reps  Hold 2s  1x10 reps  Hold 2s   Shoulder ER isometric 1x10 reps  Hold 2s  1x10 reps  Hold 2s   Scapular retraction against wall with arms extended by side for chest opening   2x5 reps   AAROM bicep flexion/extension  3x10 reps 2x10 reps  Gentle resistance provided   AAROM shoulder ER  3x10 reps  45 degrees abd 2x10 reps  45 degrees abd   Rhythmic stabilization ER  3x20s  45 degrees abd 3x20s  45 degrees abd   Forearm supination/pronation  2x10 reps  1# hand weight    Wrist extension/flexion  2x10 reps  1# hand weight    Putty  squeezes  1x10 reps  Yellow putty    Putty alternating finger squeezes  1x10 reps  Yellow putty      HEP:  [employer]  Access Code: [email or web address]  URL: [email or web address]  Date: 04/04/2022  Prepared by: Cleve Lay    Exercises  Seated Scapular Retraction - 2 x daily - 7 x weekly - 2 sets - 10 reps  Seated Shoulder Shrugs - 2 x daily - 7 x weekly - 2 sets - 10 reps  Isometric Shoulder External Rotation at Wall - 2 x daily - 7 x weekly - 2 sets - 10 reps  Isometric Shoulder Abduction at Wall - 2 x daily - 7 x weekly - 2 sets - 10 reps  Seated Elbow Flexion and Extension AROM - 2 x daily - 7 x weekly - 2 sets - 10 reps  Putty Squeezes - 2 x daily - 7 x weekly - 2 sets - 10 reps      MANUAL THERAPY: (29 minutes): Joint mobilization, Soft tissue mobilization and Manipulation was utilized and necessary because of the patient's restricted joint motion, painful spasm, loss of articular motion and restricted motion of soft tissue.    -PROM Rt shoulder flexion to 110 degrees, ER at 45 degrees, abduction to 45 degrees, biceps flexion extension with stretch to biceps soft tissue  -Gentle manual stretch to Rt lat during passive shoulder scaption and abduction  -STM to B upper trap, cervical paraspinal, rhomboids (emphasis on Rt)  -STM to Rt biceps, deltoid, triceps  (Used abbreviations: MET - muscle energy technique; PNF - proprioceptive neuromuscular facilitation; NMR - neuromuscular re-education; AP - anterior to posterior; PA - posterior to anterior)    MODALITIES: (0 minutes):      Not today      TREATMENT/SESSION ASSESSMENT:  Carlos Crump  Fatigues quickly with active exercises. Required break during standing scapular retraction with arms extended to promote opening chest. Little to no pain with PROM and able to achieve good range. Deltoid activation sufficient during AAROM. Able to perform elbow flexion and extension against minimal resistance with no discomfort and good muscle activation. Reviewed isometric wall exercises since they were causing him pain at home. RECOMMENDATIONS/INTENT FOR NEXT TREATMENT SESSION: \"Next visit will focus on advancements to more challenging activities\".     PAIN: Initial: 3/10 Post Session:  4/10       Total Treatment Billable Duration: 54min  PT Patient Time In/Time Out  Time In: 1030  Time Out: Shun 137, PT, DPT    Future Appointments   Date Time Provider Chiquita Fang   4/15/2022 11:30 AM Prince Lewis, PT, DPT [facility]   4/18/2022 10:30 AM Prince Lewis, PT, DPT [facility]   4/21/2022 10:30 AM Prince Lewis, PT, DPT [facility]   4/25/2022 10:30 AM Prince Lewis, PT, DPT [facility]   4/28/2022 10:30 AM Prince Lewis, PT, DPT [facility]   5/10/2022  2:00 PM MD FELY Toth

## 2022-04-15 ENCOUNTER — HOSPITAL ENCOUNTER (OUTPATIENT)
Dept: PHYSICAL THERAPY | Age: 82
Discharge: HOME | End: 2022-04-15
Attending: ORTHOPAEDIC SURGERY
Payer: MEDICARE

## 2022-04-15 PROCEDURE — 97110 THERAPEUTIC EXERCISES: CPT

## 2022-04-15 PROCEDURE — 97140 MANUAL THERAPY 1/> REGIONS: CPT

## 2022-04-15 NOTE — PROGRESS NOTES
Awilda Cee  : 1940  [place]  Secondary: [address] [facility] at [address]  [phone]   [phone]      OUTPATIENT PHYSICAL THERAPY: Daily Treatment Note 4/15/2022  Visit Count:  Visit count could not be calculated. Make sure you are using a visit which is associated with an episode. ICD-10: Treatment Diagnosis:   Pain in right shoulder (M25.511)  Stiffness of right shoulder, not elsewhere classified (M25.611)     Muscle weakness (generalized) (M62.81)    TREATMENT PLAN:  Effective Dates: 2022 TO 7/3/2022 (90 days). Frequency/Duration: 2 times a week for 90 Days    PRE-TREATMENT SYMPTOMS/COMPLAINTS: Pt notes he is still having significant pain after performing HEP. Notes he felt good after last session. MEDICATIONS REVIEWED:  4/15/2022   TREATMENT:   (In addition to Assessment/Re-Assessment sessions the following treatments were rendered)    THERAPEUTIC EXERCISE: (24 minutes):  Exercises per grid below to improve mobility, strength and balance. Required minimal visual and verbal cues to promote proper body alignment and promote proper body posture. Progressed resistance, range and complexity of movement as indicated.      Date:  2022 Date:  4/15/2022   Activity/Exercise Parameters    Pt education     Scapular retraction     Shoulder shrug     Shoulder abduction isometric 1x10 reps  Hold 2s             Shoulder ER isometric 1x10 reps  Hold 2s    Scapular retraction against wall with arms extended by side for chest opening 2x5 reps    AAROM bicep flexion/extension 2x10 reps  Gentle resistance provided 2x10 reps  Gentle resistance provided   AAROM shoulder ER 2x10 reps  45 degrees abd 2x10 reps  45 degrees abd   Rhythmic stabilization ER 3x20s  45 degrees abd    Forearm supination/pronation  2x10 reps  1# hand weight   Wrist extension/flexion  2x10 reps  1# hand weight   Biceps flexion extension  2x10 reps  45 degrees abd   Putty  squeezes     Putty alternating finger squeezes     Shoulder flexion with wand  2x10 reps  To 90 degrees                    HEP:  [employer]  Access Code: [email or web address]  URL: [email or web address]  Date: 04/04/2022  Prepared by: Chantal Houston    Exercises  Seated Scapular Retraction - 2 x daily - 7 x weekly - 2 sets - 10 reps  Seated Shoulder Shrugs - 2 x daily - 7 x weekly - 2 sets - 10 reps  Isometric Shoulder External Rotation at Wall - 2 x daily - 7 x weekly - 2 sets - 10 reps  Isometric Shoulder Abduction at Wall - 2 x daily - 7 x weekly - 2 sets - 10 reps  Seated Elbow Flexion and Extension AROM - 2 x daily - 7 x weekly - 2 sets - 10 reps  Putty Squeezes - 2 x daily - 7 x weekly - 2 sets - 10 reps      MANUAL THERAPY: (29 minutes): Joint mobilization, Soft tissue mobilization and Manipulation was utilized and necessary because of the patient's restricted joint motion, painful spasm, loss of articular motion and restricted motion of soft tissue. -PROM Rt shoulder flexion to 110 degrees, ER at 45 degrees, abduction to 45 degrees, biceps flexion extension with stretch to biceps soft tissue  -Gentle manual stretch to Rt lat during passive shoulder scaption and abduction  -STM to B upper trap, cervical paraspinal, rhomboids (emphasis on Rt)  -STM to Rt biceps, deltoid, triceps  (Used abbreviations: MET - muscle energy technique; PNF - proprioceptive neuromuscular facilitation; NMR - neuromuscular re-education; AP - anterior to posterior; PA - posterior to anterior)    MODALITIES: (0 minutes):      Not today      TREATMENT/SESSION ASSESSMENT:  Gagracien Castleton  With decreased tone to Rt upper trap today. Continued tenderness throughout Lt upper trap and levator. Good use of deltoid for AAROM flexion with dowel.      RECOMMENDATIONS/INTENT FOR NEXT TREATMENT SESSION: \"Next visit will focus on advancements to more challenging activities\".     PAIN: Initial: 3/10 Post Session:  3/10     Total Treatment Billable Duration: 54min  PT Patient Time In/Time Out  Time In: 1130  Time Out: [address], PT, DPT    Future Appointments   Date Time Provider Chiquita Fang   4/18/2022 10:30 AM Raquel Arizmendi PT, DPT [facility]   4/21/2022 10:30 AM Raquel Arizmendi PT, DPT [facility]   4/25/2022 10:30 AM Raquel Arizmendi PT, DPT [facility]   4/28/2022 10:30 AM Raquel Arizmendi PT, DPT [facility]   5/10/2022  2:00 PM MD FELY Wolf

## 2022-04-18 ENCOUNTER — HOSPITAL ENCOUNTER (OUTPATIENT)
Dept: PHYSICAL THERAPY | Age: 82
Discharge: HOME | End: 2022-04-18
Attending: ORTHOPAEDIC SURGERY
Payer: MEDICARE

## 2022-04-18 PROCEDURE — 97110 THERAPEUTIC EXERCISES: CPT

## 2022-04-18 PROCEDURE — 97140 MANUAL THERAPY 1/> REGIONS: CPT

## 2022-04-18 NOTE — PROGRESS NOTES
Ifeanyi Márquez  : 1940  [place]  Secondary: [address] [facility] at [address]  [phone]   [phone]      OUTPATIENT PHYSICAL THERAPY: Daily Treatment Note 2022  Visit Count:  Visit count could not be calculated. Make sure you are using a visit which is associated with an episode. ICD-10: Treatment Diagnosis:   Pain in right shoulder (M25.511)  Stiffness of right shoulder, not elsewhere classified (M25.611)     Muscle weakness (generalized) (M62.81)    TREATMENT PLAN:  Effective Dates: 2022 TO 7/3/2022 (90 days). Frequency/Duration: 2 times a week for 90 Days    PRE-TREATMENT SYMPTOMS/COMPLAINTS: Pt notes he continues to have pain with exercises at home. MEDICATIONS REVIEWED:  2022   TREATMENT:   (In addition to Assessment/Re-Assessment sessions the following treatments were rendered)    THERAPEUTIC EXERCISE: (29 minutes):  Exercises per grid below to improve mobility, strength and balance. Required minimal visual and verbal cues to promote proper body alignment and promote proper body posture. Progressed resistance, range and complexity of movement as indicated.      Date:  2022 Date:  4/15/2022 Date:  2022   Activity/Exercise Parameters     Pt education      Scapular retraction      Shoulder shrug      Shoulder abduction isometric 1x10 reps  Hold 2s              Shoulder ER isometric 1x10 reps  Hold 2s     Scapular retraction against wall with arms extended by side for chest opening 2x5 reps     AAROM bicep flexion/extension 2x10 reps  Gentle resistance provided 2x10 reps  Gentle resistance provided    AAROM shoulder ER 2x10 reps  45 degrees abd 2x10 reps  45 degrees abd    Rhythmic stabilization ER 3x20s  45 degrees abd     Hand circumduction   2x10 reps  1# hand weight   Forearm supination/pronation  2x10 reps  1# hand weight 2x10 reps  2# hand weight   Wrist extension/flexion  2x10 reps  1# hand weight 2x10 reps  2# hand weight   Biceps flexion extension  2x10 reps  45 degrees abd 2x10 reps  2# hand weight   Putty  squeezes      Putty alternating finger squeezes      Shoulder flexion with wand  2x10 reps  To 90 degrees 2x10 reps  To 90 degrees   Shoulder chest press with wand   2x10 reps                 HEP:  [email or web address]  Access Code: [email or web address]  URL: [email or web address]  Date: 04/04/2022  Prepared by: Veronaetta Corolla    Exercises  Seated Scapular Retraction - 2 x daily - 7 x weekly - 2 sets - 10 reps  Seated Shoulder Shrugs - 2 x daily - 7 x weekly - 2 sets - 10 reps  Isometric Shoulder External Rotation at Wall - 2 x daily - 7 x weekly - 2 sets - 10 reps  Isometric Shoulder Abduction at Wall - 2 x daily - 7 x weekly - 2 sets - 10 reps  Seated Elbow Flexion and Extension AROM - 2 x daily - 7 x weekly - 2 sets - 10 reps  Putty Squeezes - 2 x daily - 7 x weekly - 2 sets - 10 reps  Access Code: [email or web address]  URL: [email or web address]  Date: 04/18/2022  Prepared by: Jenetta Corolla    Exercises  Seated Elbow Flexion and Extension AROM - 2 x daily - 7 x weekly - 2 sets - 10 reps  Seated Forearm Pronation and Supination AROM - 2 x daily - 7 x weekly - 2 sets - 10 reps  Wrist Flexion Extension AROM - Palms Down - 2 x daily - 7 x weekly - 2 sets - 10 reps  Seated Wrist Circumduction with Dumbbell - 2 x daily - 7 x weekly - 2 sets - 10 reps      MANUAL THERAPY: (24 minutes): Joint mobilization, Soft tissue mobilization and Manipulation was utilized and necessary because of the patient's restricted joint motion, painful spasm, loss of articular motion and restricted motion of soft tissue.    -PROM Rt shoulder flexion to 110 degrees, ER at 45 degrees, abduction to 45 degrees, biceps flexion extension with stretch to biceps soft tissue  -Gentle manual stretch to Rt lat during passive shoulder scaption and abduction  -[facility] to B upper trap, cervical paraspinal, rhomboids (emphasis on Rt)  -STM to Rt biceps, deltoid, triceps  (Used abbreviations: MET - muscle energy technique; PNF - proprioceptive neuromuscular facilitation; NMR - neuromuscular re-education; AP - anterior to posterior; PA - posterior to anterior)    MODALITIES: (0 minutes):      Not today      TREATMENT/SESSION ASSESSMENT:  Verneice Herter  With slightly more guarding today during PROM. No pain during exercises. Forearm getting stronger. Cues for posture during sitting. RECOMMENDATIONS/INTENT FOR NEXT TREATMENT SESSION: \"Next visit will focus on advancements to more challenging activities\".     PAIN: Initial: 3/10 Post Session:  3/10     Total Treatment Billable Duration: 54min  PT Patient Time In/Time Out  Time In: 1035  Time Out: Shun Bolton PT, DPT    Future Appointments   Date Time Provider Chiquita Fang   4/21/2022 10:30 AM [facility]a PT, [facility]   4/25/2022 10:30 AM Azucena Olea PT, DPT [facility]   4/28/2022 10:30 AM [facility] Olea PT[facility]   5/10/2022  2:00 PM MD FELY Baxter

## 2022-04-21 ENCOUNTER — HOSPITAL ENCOUNTER (OUTPATIENT)
Dept: PHYSICAL THERAPY | Age: 82
Discharge: HOME | End: 2022-04-21
Attending: ORTHOPAEDIC SURGERY
Payer: MEDICARE

## 2022-04-21 PROCEDURE — 97110 THERAPEUTIC EXERCISES: CPT

## 2022-04-21 PROCEDURE — 97140 MANUAL THERAPY 1/> REGIONS: CPT

## 2022-04-21 NOTE — PROGRESS NOTES
Leandro Krishna  : 1940  [place]  Secondary: [address] [facility] at [address]  [phone]   [phone]      OUTPATIENT PHYSICAL THERAPY: Daily Treatment Note 2022  Visit Count:  Visit count could not be calculated. Make sure you are using a visit which is associated with an episode. ICD-10: Treatment Diagnosis:   Pain in right shoulder (M25.511)  Stiffness of right shoulder, not elsewhere classified (M25.611)     Muscle weakness (generalized) (M62.81)    TREATMENT PLAN:  Effective Dates: 2022 TO 7/3/2022 (90 days). Frequency/Duration: 2 times a week for 90 Days    PRE-TREATMENT SYMPTOMS/COMPLAINTS: Pt notes he is doing better than usual since modifying isometric exercises to only 1 set, 1x a day. MEDICATIONS REVIEWED:  2022   TREATMENT:   (In addition to Assessment/Re-Assessment sessions the following treatments were rendered)    THERAPEUTIC EXERCISE: (24 minutes):  Exercises per grid below to improve mobility, strength and balance. Required minimal visual and verbal cues to promote proper body alignment and promote proper body posture. Progressed resistance, range and complexity of movement as indicated.      Date:  2022 Date:  4/15/2022 Date:  2022 Date:  2022   Activity/Exercise Parameters      Pt education       Scapular retraction       Shoulder shrug       Shoulder abduction isometric 1x10 reps  Hold 2s               Shoulder ER isometric 1x10 reps  Hold 2s      Scapular retraction against wall with arms extended by side for chest opening 2x5 reps      AAROM bicep flexion/extension 2x10 reps  Gentle resistance provided 2x10 reps  Gentle resistance provided     AAROM shoulder ER 2x10 reps  45 degrees abd 2x10 reps  45 degrees abd     Rhythmic stabilization ER 3x20s  45 degrees abd      Hand circumduction   2x10 reps  1# hand weight Forearm supination/pronation  2x10 reps  1# hand weight 2x10 reps  2# hand weight    Wrist extension/flexion  2x10 reps  1# hand weight 2x10 reps  2# hand weight    Biceps flexion extension  2x10 reps  45 degrees abd 2x10 reps  2# hand weight 2x10 reps  2# hand weight   Putty  squeezes       Putty alternating finger squeezes       Shoulder flexion with wand  2x10 reps  To 90 degrees 2x10 reps  To 90 degrees 3x10 reps  To 125 degrees   Shoulder chest press with wand   2x10 reps 3x10 reps   Sidelying shoulder ER    3x10 reps   Sidelying shoulder flexion    3x10 reps     HEP:  [employer]  Access Code: [email or web address]  URL: [email or web address]  Date: 04/04/2022  Prepared by: Leodis [place]    Exercises  Seated Scapular Retraction - 2 x daily - 7 x weekly - 2 sets - 10 reps  Seated Shoulder Shrugs - 2 x daily - 7 x weekly - 2 sets - 10 reps  Isometric Shoulder External Rotation at Wall - 2 x daily - 7 x weekly - 2 sets - 10 reps  Isometric Shoulder Abduction at Wall - 2 x daily - 7 x weekly - 2 sets - 10 reps  Seated Elbow Flexion and Extension AROM - 2 x daily - 7 x weekly - 2 sets - 10 reps  Putty Squeezes - 2 x daily - 7 x weekly - 2 sets - 10 reps  Access Code: [email or web address]  URL: [email or web address]  Date: 04/18/2022  Prepared by: Leodis [place]  Exercises  Seated Elbow Flexion and Extension AROM - 2 x daily - 7 x weekly - 2 sets - 10 reps  Seated Forearm Pronation and Supination AROM - 2 x daily - 7 x weekly - 2 sets - 10 reps  Wrist Flexion Extension AROM - Palms Down - 2 x daily - 7 x weekly - 2 sets - 10 reps  Seated Wrist Circumduction with Dumbbell - 2 x daily - 7 x weekly - 2 sets - 10 reps    Access Code: [email or web address]  URL: [email or web address]  Date: 04/21/2022  Prepared by: Leodis [place]  Exercises  Sidelying Shoulder External Rotation - 2 x daily - 7 x weekly - 2 sets - 10 reps  Supine Shoulder Flexion with Dowel - 2 x daily - 7 x weekly - 2 sets - 10 reps  Supine Shoulder External Rotation with Dowel - 2 x daily - 7 x weekly - 2 sets - 10 reps    MANUAL THERAPY: (30 minutes): Joint mobilization, Soft tissue mobilization and Manipulation was utilized and necessary because of the patient's restricted joint motion, painful spasm, loss of articular motion and restricted motion of soft tissue. -PROM Rt shoulder flexion to 110 degrees, ER at 45 degrees, abduction to 45 degrees, biceps flexion extension with stretch to biceps soft tissue  -Gentle manual stretch to Rt lat during passive shoulder scaption and abduction  -STM to B upper trap, cervical paraspinal, rhomboids (emphasis on Rt)  -STM to Rt biceps, deltoid, triceps  (Used abbreviations: MET - muscle energy technique; PNF - proprioceptive neuromuscular facilitation; NMR - neuromuscular re-education; AP - anterior to posterior; PA - posterior to anterior)    MODALITIES: (0 minutes):      Not today      TREATMENT/SESSION ASSESSMENT:  Carlos Crump  Tolerated passive shoulder flexion much better today with little to no pain at end range. Able to achieve roughly 130 degrees flexion. RECOMMENDATIONS/INTENT FOR NEXT TREATMENT SESSION: \"Next visit will focus on advancements to more challenging activities\".     PAIN: Initial: 3/10 Post Session:  3/10     Total Treatment Billable Duration: 54min  PT Patient Time In/Time Out  Time In: 1030  Time Out: 1130  Sola Cornelius PT, DPT    Future Appointments   Date Time Provider Chiquita Fang   4/25/2022 10:30 AM Lety Chilel PT, DPT [facility]   4/28/2022 10:30 AM Lety Chilel PT, DPT [facility]   5/10/2022  2:00 PM MD FELY Kenney

## 2022-04-25 ENCOUNTER — HOSPITAL ENCOUNTER (OUTPATIENT)
Dept: PHYSICAL THERAPY | Age: 82
Discharge: HOME | End: 2022-04-25
Attending: ORTHOPAEDIC SURGERY
Payer: MEDICARE

## 2022-04-25 PROCEDURE — 97110 THERAPEUTIC EXERCISES: CPT

## 2022-04-25 PROCEDURE — 97140 MANUAL THERAPY 1/> REGIONS: CPT

## 2022-04-25 NOTE — PROGRESS NOTES
Carlos Crump  : 1940  [place]  Secondary: [address] [facility] at [address]  [phone]   [phone]      OUTPATIENT PHYSICAL THERAPY: Daily Treatment Note 2022  Visit Count:  Visit count could not be calculated. Make sure you are using a visit which is associated with an episode. ICD-10: Treatment Diagnosis:   Pain in right shoulder (M25.511)  Stiffness of right shoulder, not elsewhere classified (M25.611)     Muscle weakness (generalized) (M62.81)    TREATMENT PLAN:  Effective Dates: 2022 TO 7/3/2022 (90 days). Frequency/Duration: 2 times a week for 90 Days    PRE-TREATMENT SYMPTOMS/COMPLAINTS: Pt notes he continues to push too much when performing AAROM with shoulder at home. Notes he pushes ER too much and then is very sore afterwards. MEDICATIONS REVIEWED:  2022   TREATMENT:   (In addition to Assessment/Re-Assessment sessions the following treatments were rendered)    THERAPEUTIC EXERCISE: (39 minutes):  Exercises per grid below to improve mobility, strength and balance. Required minimal visual and verbal cues to promote proper body alignment and promote proper body posture. Progressed resistance, range and complexity of movement as indicated.      Date:  2022 Date:  2022 Date:  2022   Activity/Exercise      Scapular retraction      Shoulder shrug      Shoulder abduction isometric      Shoulder ER isometric      Scapular retraction against wall with arms extended by side for chest opening      AAROM bicep flexion/extension      AAROM shoulder ER      Rhythmic stabilization   3x15s  Red physioball held between both UE in standing   Hand circumduction 2x10 reps  1# hand weight  2x10 reps  1#   Forearm supination/pronation 2x10 reps  2# hand weight  2x10 reps  2#   Wrist extension/flexion 2x10 reps  2# hand weight  2x10 reps  2#   Biceps flexion extension 2x10 reps  2# hand weight 2x10 reps  2# hand weight 2x10 reps  3#   Shoulder flexion with wand 2x10 reps  To 90 degrees 3x10 reps  To 125 degrees    Shoulder chest press with wand 2x10 reps 3x10 reps 3x10 reps   Sidelying shoulder ER  3x10 reps    Sidelying shoulder flexion  3x10 reps    Scaption wall slides   2x10 reps   Shoulder flexion with wand standing   2x10 reps   Shoulder ER with wand standing   2x10 reps   Opening chest in chair   2x10 reps     HEP:  [employer]  Access Code: [email or web address]  URL: [email or web address]  Date: 04/04/2022  Prepared by: Noahny Bores    Exercises  Seated Scapular Retraction - 2 x daily - 7 x weekly - 2 sets - 10 reps  Seated Shoulder Shrugs - 2 x daily - 7 x weekly - 2 sets - 10 reps  Isometric Shoulder External Rotation at Wall - 2 x daily - 7 x weekly - 2 sets - 10 reps  Isometric Shoulder Abduction at Wall - 2 x daily - 7 x weekly - 2 sets - 10 reps  Seated Elbow Flexion and Extension AROM - 2 x daily - 7 x weekly - 2 sets - 10 reps  Putty Squeezes - 2 x daily - 7 x weekly - 2 sets - 10 reps  Access Code: [email or web address]  URL: [email or web address]  Date: 04/18/2022  Prepared by: Bunny Bores  Exercises  Seated Elbow Flexion and Extension AROM - 2 x daily - 7 x weekly - 2 sets - 10 reps  Seated Forearm Pronation and Supination AROM - 2 x daily - 7 x weekly - 2 sets - 10 reps  Wrist Flexion Extension AROM - Palms Down - 2 x daily - 7 x weekly - 2 sets - 10 reps  Seated Wrist Circumduction with Dumbbell - 2 x daily - 7 x weekly - 2 sets - 10 reps    Access Code: [email or web address]  URL: [email or web address]  Date: 04/21/2022  Prepared by: Bunny Bores  Exercises  Sidelying Shoulder External Rotation - 2 x daily - 7 x weekly - 2 sets - 10 reps  Supine Shoulder Flexion with Dowel - 2 x daily - 7 x weekly - 2 sets - 10 reps  Supine Shoulder External Rotation with Dowel - 2 x daily - 7 x weekly - 2 sets - 10 reps    MANUAL THERAPY: (15 minutes): Joint mobilization, Soft tissue mobilization and Manipulation was utilized and necessary because of the patient's restricted joint motion, painful spasm, loss of articular motion and restricted motion of soft tissue. -PROM Rt shoulder flexion to 110 degrees, ER at 45 degrees, abduction to 45 degrees, biceps flexion extension with stretch to biceps soft tissue  -Gentle manual stretch to Rt lat during passive shoulder scaption and abduction  -STM to B upper trap, cervical paraspinal, rhomboids (emphasis on Rt)  -STM to Rt biceps, deltoid, triceps  (Used abbreviations: MET - muscle energy technique; PNF - proprioceptive neuromuscular facilitation; NMR - neuromuscular re-education; AP - anterior to posterior; PA - posterior to anterior)    MODALITIES: (0 minutes):      Not today      TREATMENT/SESSION ASSESSMENT:  Melanie Bearded  With fatigue after AAROM exercises. Instructed on how to perform ER at home so as not to over rotate with ER.    RECOMMENDATIONS/INTENT FOR NEXT TREATMENT SESSION: \"Next visit will focus on advancements to more challenging activities\".     PAIN: Initial: 2/10 Post Session:  2/10     Total Treatment Billable Duration: 54min  PT Patient Time In/Time Out  Time In: 1040  Time Out: [facility], PT, DPT    Future Appointments   Date Time Provider Chiquita Fang   4/28/2022 10:30 AM Parish Alvarez, PT, DPT [facility]   5/10/2022  2:00 PM Aminata Stanley MD [facility]

## 2022-04-28 ENCOUNTER — HOSPITAL ENCOUNTER (OUTPATIENT)
Dept: PHYSICAL THERAPY | Age: 82
Discharge: HOME | End: 2022-04-28
Attending: ORTHOPAEDIC SURGERY
Payer: MEDICARE

## 2022-04-28 PROCEDURE — 97110 THERAPEUTIC EXERCISES: CPT

## 2022-04-28 PROCEDURE — 97140 MANUAL THERAPY 1/> REGIONS: CPT

## 2022-04-28 NOTE — PROGRESS NOTES
Yue Henry  : 1940  [place]  Secondary: [address] [facility] at [address]  [phone]   [phone]      OUTPATIENT PHYSICAL THERAPY: Daily Treatment Note 2022  Visit Count:  Visit count could not be calculated. Make sure you are using a visit which is associated with an episode. ICD-10: Treatment Diagnosis:   Pain in right shoulder (M25.511)  Stiffness of right shoulder, not elsewhere classified (M25.611)     Muscle weakness (generalized) (M62.81)    TREATMENT PLAN:  Effective Dates: 2022 TO 7/3/2022 (90 days). Frequency/Duration: 2 times a week for 90 Days    PRE-TREATMENT SYMPTOMS/COMPLAINTS: Pt notes he overdid it after last treatment session performing more exercises later that same day. Notes extreme muscle soreness, so much so that he was unable to activate UE muscles to move it. States he is feeling better today with decreased soreness. MEDICATIONS REVIEWED:  2022   TREATMENT:   (In addition to Assessment/Re-Assessment sessions the following treatments were rendered)    THERAPEUTIC EXERCISE: (39 minutes):  Exercises per grid below to improve mobility, strength and balance. Required minimal visual and verbal cues to promote proper body alignment and promote proper body posture. Progressed resistance, range and complexity of movement as indicated.      Date:  2022 Date:  2022 Date:  2022 Date:  2022   Activity/Exercise       Arm bike    2min/2min  Level 1   Scapular retraction    2x10 reps  Red thera cord   SA activation with ball against wall    2x10 reps  Hold 2s  Yellow semi deflated ball   Rhythmic stabilization   3x15s  Red physioball held between both UE in standing 3x30s   Red weighted ball  supine   Hand circumduction 2x10 reps  1# hand weight  2x10 reps  1#    Forearm supination/pronation 2x10 reps  2# hand weight 2x10 reps  2#    Wrist extension/flexion 2x10 reps  2# hand weight  2x10 reps  2#    Biceps flexion extension 2x10 reps  2# hand weight 2x10 reps  2# hand weight 2x10 reps  3# 2x10 reps  3#   Shoulder flexion with wand 2x10 reps  To 90 degrees 3x10 reps  To 125 degrees     Shoulder chest press with wand 2x10 reps 3x10 reps 3x10 reps    Sidelying shoulder ER  3x10 reps     Sidelying shoulder flexion  3x10 reps     Scaption wall slides   2x10 reps    Shoulder flexion with wand standing   2x10 reps 2x10 reps   Shoulder ER with wand standing   2x10 reps    Opening chest in chair   2x10 reps      HEP:  [employer]  Access Code: [email or web address]  URL: [email or web address]  Date: 04/04/2022  Prepared by: Leilani Dubin    Exercises  Seated Scapular Retraction - 2 x daily - 7 x weekly - 2 sets - 10 reps  Seated Shoulder Shrugs - 2 x daily - 7 x weekly - 2 sets - 10 reps  Isometric Shoulder External Rotation at Wall - 2 x daily - 7 x weekly - 2 sets - 10 reps  Isometric Shoulder Abduction at Wall - 2 x daily - 7 x weekly - 2 sets - 10 reps  Seated Elbow Flexion and Extension AROM - 2 x daily - 7 x weekly - 2 sets - 10 reps  Putty Squeezes - 2 x daily - 7 x weekly - 2 sets - 10 reps  Access Code: [email or web address]  URL: [email or web address]  Date: 04/18/2022  Prepared by: Katerina Dubin  Exercises  Seated Elbow Flexion and Extension AROM - 2 x daily - 7 x weekly - 2 sets - 10 reps  Seated Forearm Pronation and Supination AROM - 2 x daily - 7 x weekly - 2 sets - 10 reps  Wrist Flexion Extension AROM - Palms Down - 2 x daily - 7 x weekly - 2 sets - 10 reps  Seated Wrist Circumduction with Dumbbell - 2 x daily - 7 x weekly - 2 sets - 10 reps    Access Code: [email or web address]  URL: [email or web address]  Date: 04/21/2022  Prepared by: Katerina Agrawalin  Exercises  Sidelying Shoulder External Rotation - 2 x daily - 7 x weekly - 2 sets - 10 reps  Supine Shoulder Flexion with Dowel - 2 x daily - 7 x weekly - 2 sets - 10 reps  Supine Shoulder External Rotation with Dowel - 2 x daily - 7 x weekly - 2 sets - 10 reps    MANUAL THERAPY: (15 minutes): Joint mobilization, Soft tissue mobilization and Manipulation was utilized and necessary because of the patient's restricted joint motion, painful spasm, loss of articular motion and restricted motion of soft tissue. -PROM Rt shoulder flexion to 110 degrees, ER at 45 degrees, abduction to 45 degrees, biceps flexion extension with stretch to biceps soft tissue  -Gentle manual stretch to Rt lat during passive shoulder scaption and abduction  -STM to B upper trap, cervical paraspinal, rhomboids (emphasis on Rt)  -STM to Rt biceps, deltoid, triceps  (Used abbreviations: MET - muscle energy technique; PNF - proprioceptive neuromuscular facilitation; NMR - neuromuscular re-education; AP - anterior to posterior; PA - posterior to anterior)    MODALITIES: (0 minutes):      Not today      TREATMENT/SESSION ASSESSMENT:  Mitchell De Leon  With no pain following arm bike. Good activation with overhead exercises. Required increased sitting breaks due to fatigue with static standing. RECOMMENDATIONS/INTENT FOR NEXT TREATMENT SESSION: \"Next visit will focus on advancements to more challenging activities\".     PAIN: Initial: 2/10 Post Session:  2/10     Total Treatment Billable Duration: 54min  PT Patient Time In/Time Out  Time In: 1030  Time Out: Shun Bolton PT, DPT    Future Appointments   Date Time Provider Chiquita Fang   5/6/2022 10:30 AM Jaya Quick PT, DPT [facility]   5/9/2022  3:30 PM Jaya Quick PT DPT [facility]   5/10/2022  2:00 PM MD FELY Gramajo   5/11/2022 10:30 AM Jaya Quick PT, DPT [facility]   5/17/2022 10:30 AM Jaya Quick PT DPT [facility]   5/19/2022 10:30 AM Jaya Quick PT, DPT [facility]

## 2022-05-02 ENCOUNTER — HOSPITAL ENCOUNTER (OUTPATIENT)
Dept: PHYSICAL THERAPY | Age: 82
Setting detail: RECURRING SERIES
Discharge: HOME | End: 2022-05-05
Payer: MEDICARE

## 2022-05-06 ENCOUNTER — HOSPITAL ENCOUNTER (OUTPATIENT)
Dept: PHYSICAL THERAPY | Age: 82
Discharge: HOME | End: 2022-05-06
Attending: ORTHOPAEDIC SURGERY
Payer: MEDICARE

## 2022-05-06 PROCEDURE — 97140 MANUAL THERAPY 1/> REGIONS: CPT

## 2022-05-06 PROCEDURE — 97110 THERAPEUTIC EXERCISES: CPT

## 2022-05-06 NOTE — PROGRESS NOTES
Lee Bruno  : 1940  [place]  Secondary: [address] [facility] at [place] [address], [address] [facility]  [phone]   [phone]      OUTPATIENT PHYSICAL THERAPY: Daily Treatment Note 2022  Visit Count:  3   ICD-10: Treatment Diagnosis:   Pain in right shoulder (M25.511)  Stiffness of right shoulder, not elsewhere classified (M25.611)     Muscle weakness (generalized) (M62.81)    TREATMENT PLAN:  Effective Dates: 2022 TO 7/3/2022 (90 days). Frequency/Duration: 2 times a week for 90 Days    PRE-TREATMENT SYMPTOMS/COMPLAINTS: Pt notes he continues to have pain in Rt shoulder but is using it. States he has most difficulty and pain when driving especially with certain movements. MEDICATIONS REVIEWED:  2022   TREATMENT:   (In addition to Assessment/Re-Assessment sessions the following treatments were rendered)    THERAPEUTIC EXERCISE: (39 minutes):  Exercises per grid below to improve mobility, strength and balance. Required minimal visual and verbal cues to promote proper body alignment and promote proper body posture. Progressed resistance, range and complexity of movement as indicated.      Date:  2022 Date:  2022   Activity/Exercise     Arm bike 2min/2min  Level 1 2min/2min  Level 1   Shoulder press into flexion supine  2x10 reps  1# hand weight   Resisted scapular retraction 2x10 reps  Red thera cord 2x10 reps  Red thera cord   Resisted shoulder extension  2x10 reps  Red thera cord   Resisted shoulder adduction     SA activation punches in supine  2x10 reps B  1# hand weight   Target taps  2x10 reps  Rt UE only   SA activation with ball against wall 2x10 reps  Hold 2s  Yellow semi deflated ball    Rhythmic stabilization 3x30s   Red weighted ball  supine    Sidelying shoulder ER  3x10 reps  1# hand weight   Scaption wall slides     Opening chest in chair  2x10 reps HEP:  [email or web address]  Access Code: [email or web address]  URL: [email or web address]  Access Code: [email or web address]  URL: [email or web address]  Date: 05/06/2022  Prepared by: [place] Jessi  Exercises  Seated Scapular Retraction - 2 x daily - 7 x weekly - 2 sets - 10 reps  Seated Shoulder Shrugs - 2 x daily - 7 x weekly - 2 sets - 10 reps  Seated Elbow Flexion and Extension AROM - 2 x daily - 7 x weekly - 2 sets - 10 reps  Putty Squeezes - 2 x daily - 7 x weekly - 2 sets - 10 reps  Supine Scapular Protraction in Flexion with Dumbbells - 2 x daily - 7 x weekly - 2 sets - 10 reps  Supine Shoulder Flexion Extension AAROM with Dowel - 2 x daily - 7 x weekly - 2 sets - 10 reps  Scapular Retraction with Resistance - 2 x daily - 7 x weekly - 10 reps - 2 sets - 5s hold  Shoulder extension with resistance - Neutral - 2 x daily - 7 x weekly - 10 reps - 2 sets - 5s hold  Shoulder Adduction with Anchored Resistance - 2 x daily - 7 x weekly - 10 reps - 2 sets - 5s hold  Seated Thoracic Lumbar Extension with Pectoralis Stretch - 2 x daily - 7 x weekly - 2 sets - 10 reps      MANUAL THERAPY: (15 minutes): Joint mobilization, Soft tissue mobilization and Manipulation was utilized and necessary because of the patient's restricted joint motion, painful spasm, loss of articular motion and restricted motion of soft tissue.    -PROM Rt shoulder flexion to 110 degrees, ER at 45 degrees, abduction to 45 degrees, biceps flexion extension with stretch to biceps soft tissue  -Gentle manual stretch to Rt lat during passive shoulder scaption and abduction  -STM to B upper trap, cervical paraspinal, rhomboids (emphasis on Rt)  -STM to Rt biceps, deltoid, triceps  (Used abbreviations: MET - muscle energy technique; PNF - proprioceptive neuromuscular facilitation; NMR - neuromuscular re-education; AP - anterior to posterior; PA - posterior to anterior)    MODALITIES: (0 minutes):      Not today      TREATMENT/SESSION ASSESSMENT: Jennifer King  With increased shoulder mobility and tolerance for exercises today. Able to perform exercises with 1lb weight and improved posture. RECOMMENDATIONS/INTENT FOR NEXT TREATMENT SESSION: \"Next visit will focus on advancements to more challenging activities\".     PAIN: Initial: 2/10 Post Session:  2/10     Total Treatment Billable Duration: 54min  PT Patient Time In/Time Out  Time In: 1030  Time Out: [address], PT, DPT    Future Appointments   Date Time Provider Chiquita Fang   5/6/2022 10:30 AM Marilee Screen, PT, DPT [facility]   5/9/2022  3:30 PM Marilee Screen, PT, DPT [facility]   5/10/2022  2:00 PM Jaime Clark MD [facility]   5/11/2022 10:30 AM Marilee Screen, PT, DPT [facility]   5/17/2022 10:30 AM Marilee Screen, PT, DPT [facility]   5/19/2022 10:30 AM Marilee Screen, PT, DPT [facility]

## 2022-05-09 ENCOUNTER — HOSPITAL ENCOUNTER (OUTPATIENT)
Dept: PHYSICAL THERAPY | Age: 82
Discharge: HOME | End: 2022-05-09
Attending: ORTHOPAEDIC SURGERY
Payer: MEDICARE

## 2022-05-09 PROCEDURE — 97140 MANUAL THERAPY 1/> REGIONS: CPT

## 2022-05-09 PROCEDURE — 97110 THERAPEUTIC EXERCISES: CPT

## 2022-05-09 NOTE — PROGRESS NOTES
Yue Henry  : 1940  [place]  Secondary: 2830 [facility] at [place] [address], [address] [facility]  [phone]   [phone]              OUTPATIENT PHYSICAL THERAPY:Progress Report 2022   Rt Reverse TSA 22   ICD-10: Treatment Diagnosis:   Pain in right shoulder (M25.511)  Stiffness of right shoulder, not elsewhere classified (M25.611)     Muscle weakness (generalized) (M62.81)    PRECAUTIONS/ALLERGIES:   Morphine; Other medication; Other plant, animal, environmental; Shellfish derived; and Clams  MEDICAL/REFERRING DIAGNOSIS:  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 Frequency/Duration: 2 times a week for 90 Day(s)   [place] / [place] / Product Type: Medicare /    PROGRESS ASSESSMENT:  Mr. Yue Henry has attended 10 physical therapy session including initial evaluation as of today 2022. Yue Henry with improved Lt shoulder AROM and tolerance for functional activities. Decreased tenderness during palpation. Strength improving as well evident through ability to dry and perform household chores. Pt has met 3/5 goals. According to their responses on the Disabilities of the Arm, Shoulder, and Hand (DASH) Questionnaire- Quick Version, Yue Henry is very limited by their shoulder pain and dysfunction in their ability to fully participate in ADLs.  Yue Henry will benefit from skilled PT (medically necessary) to address above deficits affecting participation in basic ADLs and overall functional tolerance. PROBLEM LIST (Impacting functional limitations):  1. Decreased Strength  2. Decreased ADL/Functional Activities  3. Decreased Transfer Abilities  4. Increased Pain  5. Decreased Activity Tolerance  6. Increased Fatigue  7. Increased Shortness of Breath  8. Decreased Flexibility/Joint Mobility  9. Decreased [place] with Home Exercise Program INTERVENTIONS PLANNED:  1. Balance Exercise  2. Bed Mobility  3. Cold  4. Cryotherapy  5. Family Education  6. Gait Training  7. Home Exercise Program (HEP)  8. Manual Therapy  9. Neuromuscular Re-education/Strengthening  10. Range of Motion (ROM)  11. Therapeutic Activites  12. Therapeutic Exercise/Strengthening  13. Transfer Training  14. Ultrasound  15. Electrical stimulation  16. Vasopneumatic compression   17. Dry Needling for Pain control       GOALS: (Goals have been discussed and agreed upon with patient.)  FUNCTIONAL GOALS: Time Frame: 90 days  1. Mark Benson will report <=2/10 pain with shaving his face as well as minimal/no difficulty. -MET 5/9/2022  2. Mark Benson will demonstrate improvement in active shoulder flexion to >90 degrees to increase UE function and participation in ADLs. -MET 5/9/2022  3. Mark Benson will demonstrate improvement in active shoulder abduction to >90 degrees to increase UE function and participation in ADLs. -NOT MET 5/9/2022  3. Mark Benson will show a >= 10 point decrease on the DASH in order to show an increase in upper extremity function. -NOT MET 5/9/2022  4. Mark Benson will be independent and compliant with HEP and not require assistance from PT. -MET 5/9/2022      OUTCOME MEASURE USED:   Tool Used: Tool Used: Disabilities of the Arm, Shoulder and Hand (DASH) Questionnaire - Quick Version  Score:  Initial: 30/55  Most Recent: 24/55 (Date: 5/9/2022 )   Interpretation of Score:  The DASH is designed to measure the activities of daily living in person's with upper extremity dysfunction or pain. Each section is scored on a 1-5 scale, 5 representing the greatest disability. The scores of each section are added together for a total score of 55. MEDICAL NECESSITY:   Skilled intervention continues to be required due to above deficits affecting participation in basic ADLs and overall functional tolerance. REASON FOR SERVICES/OTHER COMMENTS:   Patient continues to require skilled intervention due to  above deficits affecting participation in basic ADLs and overall functional tolerance. TOTAL DURATION:  PT Patient Time In/Time Out  Time In: 4439  Time Out: 1630    REHABILITATION POTENTIAL FOR STATED GOALS: GOOD    Regarding Rubens Granger's therapy, I certify that the treatment plan above will be carried out by a therapist or under their direction. Thank you for this referral,  Bryant Melendrez, PT, DPT       Referring Physician Signature: Sarah Jerez MD No Signature is Required for this note. PAIN/SUBJECTIVE:   Initial: 2/10 Post Session:  2/10     EXAMINATION:   ORTHOSTATIC/POSTURE OBSERVATION:   Date:   4/4/2022, 5/9/2022   SITTING RESTING POSTURE -Pt sits with forward head and rounded shoulders which indicate tight anterior chest musculature, upper trapezius, and levator scapula and weak posterior scapula musculature and deep cervical flexors. STANDING RESTING POSTURE -Pt displays decreased core motor control indicating weak core and low back musculature.        ROM Date:  4/4/2022 Date:  5/9/2022     RIGHT LEFT RIGHT LEFT   UEROM     Flexion 90 degrees PROM  degrees 105 degrees   Extension NT WFL 50 degrees 65 degrees   Abd 45 degrees WFL 90 degrees  Compensatory strategies to get higher 115 degrees   ER (Apley's) NT WFL Difficult C2 C7   IR (Apley's)  NT WFL L1 L1     PALPATION/TONE/TISSUE TEXTURE:   Date:   4/4/2022   SOFT TISSUE:   Upper traps Increased tone Rt-slight   Levatore scapulae Increased tone Rt-slight     SHOULDER STRENGTH:   Date:  4/4/2022 DATE:  5/9/2022     RIGHT LEFT RIGHT LEFT   Shoulder Flexion NT 4/5 4/5 4/5   Shoulder Extension NT 4/5 4/5 4/5   Shoulder Abduction NT 4-/5 4-/5 4/5   Shoulder IR NT 4/5 4/5 4/5   Shoulder ER NT 4-/5 4-/5 4/5     FUNCTIONAL MOBILITY   Date:   4/4/2022, 5/9/2022   Transfers Difficulty rolling and pushing off with sit to stand due to inability to do so with Rt UE   Posture Thoracic kyphosis, rounded shoulders, forward head   Gait deviations Decreased B hip extension, knee flexion, Rt arm swing   Assistive device None   Stairs No difficulty   Bed mobility Difficulty rolling and supine to sit

## 2022-05-09 NOTE — PROGRESS NOTES
Lilia Fischer  : 1940  [place]  Secondary: [facility] at [place] [address], [address] [facility]  [phone]   [phone]      OUTPATIENT PHYSICAL THERAPY: Daily Treatment Note 2022  Visit Count:  4   ICD-10: Treatment Diagnosis:   Pain in right shoulder (M25.511)  Stiffness of right shoulder, not elsewhere classified (M25.611)     Muscle weakness (generalized) (M62.81)    TREATMENT PLAN:  Effective Dates: 2022 TO 7/3/2022 (90 days). Frequency/Duration: 2 times a week for 90 Days    PRE-TREATMENT SYMPTOMS/COMPLAINTS: Pt notes his shoulder has been very well lately. Notes he is able to drive. MEDICATIONS REVIEWED:  2022   TREATMENT:   (In addition to Assessment/Re-Assessment sessions the following treatments were rendered)    THERAPEUTIC EXERCISE: (39 minutes):  Exercises per grid below to improve mobility, strength and balance. Required minimal visual and verbal cues to promote proper body alignment and promote proper body posture. Progressed resistance, range and complexity of movement as indicated.      Date:  2022 Date:  2022 Date:  2022   Activity/Exercise         PN measurements   Arm bike 2min/2min  Level 1 2min/2min  Level 1 3min/3min  Level 2   Shoulder press into flexion supine  2x10 reps  1# hand weight 2x10 reps  2# hand weight   Resisted scapular retraction 2x10 reps  Red thera cord 2x10 reps  Red thera cord 1x10 reps  blue thera cord   Resisted shoulder extension  2x10 reps  Red thera cord 1x10 reps  blue thera cord   Resisted shoulder adduction      SA activation punches in supine  2x10 reps B  1# hand weight 2x10 reps B  2# hand weight   Target taps  2x10 reps  Rt UE only    SA activation with ball against wall 2x10 reps  Hold 2s  Yellow semi deflated ball     Rhythmic stabilization 3x30s   Red weighted ball  supine     Sidelying shoulder ER  3x10 reps  1# hand weight    Scaption wall slides      Opening chest in chair  2x10 reps      HEP:  Fin[employer]  Access Code: [email or web address]  URL: [email or web address]  Access Code: [email or web address]  URL: [email or web address]  Date: 05/06/2022  Prepared by: Chantal Houston  Exercises  Seated Scapular Retraction - 2 x daily - 7 x weekly - 2 sets - 10 reps  Seated Shoulder Shrugs - 2 x daily - 7 x weekly - 2 sets - 10 reps  Seated Elbow Flexion and Extension AROM - 2 x daily - 7 x weekly - 2 sets - 10 reps  Putty Squeezes - 2 x daily - 7 x weekly - 2 sets - 10 reps  Supine Scapular Protraction in Flexion with Dumbbells - 2 x daily - 7 x weekly - 2 sets - 10 reps  Supine Shoulder Flexion Extension AAROM with Dowel - 2 x daily - 7 x weekly - 2 sets - 10 reps  Scapular Retraction with Resistance - 2 x daily - 7 x weekly - 10 reps - 2 sets - 5s hold  Shoulder extension with resistance - Neutral - 2 x daily - 7 x weekly - 10 reps - 2 sets - 5s hold  Shoulder Adduction with Anchored Resistance - 2 x daily - 7 x weekly - 10 reps - 2 sets - 5s hold  Seated Thoracic Lumbar Extension with Pectoralis Stretch - 2 x daily - 7 x weekly - 2 sets - 10 reps      MANUAL THERAPY: (15 minutes): Joint mobilization, Soft tissue mobilization and Manipulation was utilized and necessary because of the patient's restricted joint motion, painful spasm, loss of articular motion and restricted motion of soft tissue.    -PROM Rt shoulder flexion to 110 degrees, ER at 45 degrees, abduction to 45 degrees, biceps flexion extension with stretch to biceps soft tissue  -Gentle manual stretch to Rt lat during passive shoulder scaption and abduction  -STM to B upper trap, cervical paraspinal, rhomboids (emphasis on Rt)  -STM to Rt biceps, deltoid, triceps  (Used abbreviations: MET - muscle energy technique; PNF - proprioceptive neuromuscular facilitation; NMR - neuromuscular re-education; AP - anterior to posterior; PA - posterior to anterior)    MODALITIES: (0 minutes):      Not today      TREATMENT/SESSION ASSESSMENT:  Carlos Crump  Unable to complete bike due to hand pain with gripping. RECOMMENDATIONS/INTENT FOR NEXT TREATMENT SESSION: \"Next visit will focus on advancements to more challenging activities\".     PAIN: Initial: 2/10 Post Session:  2/10     Total Treatment Billable Duration: 54min  PT Patient Time In/Time Out  Time In: 1530  Time Out: Nicole PT, DPT    Future Appointments   Date Time Provider Chiquita Barberi   5/10/2022  2:00 PM Og López MD [facility]   5/11/2022 10:30 AM Lety Chilel PT, DPT [facility]   5/17/2022 10:30 AM Lety Chilel PT, DPT OFF [facility]   5/19/2022 10:30 AM Lety Chilel PT, DPT [facility]

## 2022-05-10 PROBLEM — M65.332 TRIGGER FINGER, LEFT MIDDLE FINGER: Status: ACTIVE | Noted: 2022-03-30

## 2022-05-10 PROBLEM — R79.89 ELEVATED TSH: Status: ACTIVE | Noted: 2022-03-28

## 2022-05-10 PROBLEM — M79.642 LEFT HAND PAIN: Status: ACTIVE | Noted: 2022-04-28

## 2022-05-10 PROBLEM — R10.32 GROIN PAIN, LEFT: Status: ACTIVE | Noted: 2022-03-30

## 2022-05-10 PROBLEM — D64.9 ANEMIA: Status: ACTIVE | Noted: 2022-03-28

## 2022-05-10 PROBLEM — M79.89 SWELLING OF LOWER EXTREMITY: Status: ACTIVE | Noted: 2018-12-05

## 2022-05-11 ENCOUNTER — HOSPITAL ENCOUNTER (OUTPATIENT)
Dept: PHYSICAL THERAPY | Age: 82
Discharge: HOME | End: 2022-05-11
Attending: ORTHOPAEDIC SURGERY
Payer: MEDICARE

## 2022-05-11 PROCEDURE — 97110 THERAPEUTIC EXERCISES: CPT

## 2022-05-11 PROCEDURE — 97140 MANUAL THERAPY 1/> REGIONS: CPT

## 2022-05-17 ENCOUNTER — HOSPITAL ENCOUNTER (OUTPATIENT)
Dept: PHYSICAL THERAPY | Age: 82
Discharge: HOME | End: 2022-05-17
Attending: ORTHOPAEDIC SURGERY
Payer: MEDICARE

## 2022-05-17 PROCEDURE — 97110 THERAPEUTIC EXERCISES: CPT

## 2022-05-17 PROCEDURE — 97140 MANUAL THERAPY 1/> REGIONS: CPT

## 2022-05-17 NOTE — PROGRESS NOTES
Alfredo Barba  : 1940  [place]  Secondary: [address] [facility] at [place] [address], [address] [facility]  [phone]   [phone]      OUTPATIENT PHYSICAL THERAPY: Daily Treatment Note 2022  Visit Count:  6   ICD-10: Treatment Diagnosis:   Pain in right shoulder (M25.511)  Stiffness of right shoulder, not elsewhere classified (M25.611)     Muscle weakness (generalized) (M62.81)    TREATMENT PLAN:  Effective Dates: 2022 TO 7/3/2022 (90 days). Frequency/Duration: 2 times a week for 90 Days    PRE-TREATMENT SYMPTOMS/COMPLAINTS: Pt states he is feeling more sore and stiff today than usual. Denies having done anything out of the normal.     MEDICATIONS REVIEWED:  2022   TREATMENT:   (In addition to Assessment/Re-Assessment sessions the following treatments were rendered)    THERAPEUTIC EXERCISE: (39 minutes):  Exercises per grid below to improve mobility, strength and balance. Required minimal visual and verbal cues to promote proper body alignment and promote proper body posture. Progressed resistance, range and complexity of movement as indicated.      Date:  2022 Date:  2022 Date:  2022   Activity/Exercise       PN measurements     Arm bike 3min/3min  Level 2 3min/3min  Level 2 3min/3min  Level 3   Chest press   3x10 reps  3#   Shoulder press into flexion supine 2x10 reps  2# hand weight 2x10 reps  3# hand weight 3x10 reps  3# hand weight   Resisted scapular retraction 1x10 reps  blue thera cord 2x10 reps  red thera cord 3x10 reps  Orange bungee  heavier   Resisted shoulder extension 1x10 reps  blue thera cord 2x10 reps  Orange thera cord 3x10 reps  Orange bungee  lighter   Resisted shoulder adduction  2x10 reps  red thera cord 1x10 reps  Red bungee   SA activation punches in supine 2x10 reps B  2# hand weight 2x10 reps B  3# hand weight    Resisted shoulder flexion  2x10 reps  [place] theraband    Wall clocks  1x5 reps  Rt only  Red weighted ball    Target taps      SA activation with ball against wall      Rhythmic stabilization      Sidelying shoulder ER      Scaption wall slides      Opening chest in chair                    HEP:  [employer]  Access Code: [email or web address]  URL: [email or web address]  Access Code: [email or web address]  URL: [email or web address]  Date: 05/06/2022  Prepared by: Anibal Borja  Exercises  Seated Scapular Retraction - 2 x daily - 7 x weekly - 2 sets - 10 reps  Seated Shoulder Shrugs - 2 x daily - 7 x weekly - 2 sets - 10 reps  Seated Elbow Flexion and Extension AROM - 2 x daily - 7 x weekly - 2 sets - 10 reps  Putty Squeezes - 2 x daily - 7 x weekly - 2 sets - 10 reps  Supine Scapular Protraction in Flexion with Dumbbells - 2 x daily - 7 x weekly - 2 sets - 10 reps  Supine Shoulder Flexion Extension AAROM with Dowel - 2 x daily - 7 x weekly - 2 sets - 10 reps  Scapular Retraction with Resistance - 2 x daily - 7 x weekly - 10 reps - 2 sets - 5s hold  Shoulder extension with resistance - Neutral - 2 x daily - 7 x weekly - 10 reps - 2 sets - 5s hold  Shoulder Adduction with Anchored Resistance - 2 x daily - 7 x weekly - 10 reps - 2 sets - 5s hold  Seated Thoracic Lumbar Extension with Pectoralis Stretch - 2 x daily - 7 x weekly - 2 sets - 10 reps      MANUAL THERAPY: (15 minutes): Joint mobilization, Soft tissue mobilization and Manipulation was utilized and necessary because of the patient's restricted joint motion, painful spasm, loss of articular motion and restricted motion of soft tissue.    -PROM Rt shoulder flexion to 110 degrees, ER at 45 degrees, abduction to 45 degrees, biceps flexion extension with stretch to biceps soft tissue  -Gentle manual stretch to Rt lat during passive shoulder scaption and abduction-not today 5/17/2022  -STM to B upper trap, cervical paraspinal, rhomboids (emphasis on Rt)-not today 5/17/2022  -STM to Rt biceps, deltoid, triceps  (Used abbreviations: MET - muscle energy technique; PNF - proprioceptive neuromuscular facilitation; NMR - neuromuscular re-education; AP - anterior to posterior; PA - posterior to anterior)    MODALITIES: (0 minutes):      Not today      TREATMENT/SESSION ASSESSMENT:  Carlton Fallen with increased guarding during PROM today. Tenderness to Rt ant deltoid and pec mm. RECOMMENDATIONS/INTENT FOR NEXT TREATMENT SESSION: \"Next visit will focus on advancements to more challenging activities\".     PAIN: Initial: 2/10 Post Session:  1/10     Total Treatment Billable Duration: 54min  PT Patient Time In/Time Out  Time In: 1030  Time Out: 1130  Royal Luciano, PT, DPT    Future Appointments   Date Time Provider Chiquita Fang   5/19/2022 10:30 AM Grecia Davis, PT, DPT [facility]

## 2022-05-19 ENCOUNTER — HOSPITAL ENCOUNTER (OUTPATIENT)
Dept: PHYSICAL THERAPY | Age: 82
Discharge: HOME | End: 2022-05-19
Attending: ORTHOPAEDIC SURGERY
Payer: MEDICARE

## 2022-05-19 PROCEDURE — 97110 THERAPEUTIC EXERCISES: CPT

## 2022-05-19 PROCEDURE — 97140 MANUAL THERAPY 1/> REGIONS: CPT

## 2022-05-19 NOTE — PROGRESS NOTES
Carrie Hubbard  : 1940  [place]  Secondary: [address] [facility] at [place] [address], [address] [facility]  [phone]   [phone]      OUTPATIENT PHYSICAL THERAPY: Daily Treatment Note 2022  Visit Count:  7   ICD-10: Treatment Diagnosis:   Pain in right shoulder (M25.511)  Stiffness of right shoulder, not elsewhere classified (M25.611)     Muscle weakness (generalized) (M62.81)    TREATMENT PLAN:  Effective Dates: 2022 TO 7/3/2022 (90 days). Frequency/Duration: 2 times a week for 90 Days    PRE-TREATMENT SYMPTOMS/COMPLAINTS: Pt notes he tried to perform certain cable exercises in the gym but had pain in shoulder later that day due to weight that was too heavy. Notes he is feeling better today from it. MEDICATIONS REVIEWED:  2022   TREATMENT:   (In addition to Assessment/Re-Assessment sessions the following treatments were rendered)    THERAPEUTIC EXERCISE: (39 minutes):  Exercises per grid below to improve mobility, strength and balance. Required minimal visual and verbal cues to promote proper body alignment and promote proper body posture. Progressed resistance, range and complexity of movement as indicated.      Date:  2022 Date:  2022   Activity/Exercise          Arm bike 3min/3min  Level 3 3min/3min  Level 3   Chest press 3x10 reps  3# 3x10 reps  3#   Shoulder press into flexion supine 3x10 reps  3# hand weight 3x10 reps  3# hand weight   Resisted scapular retraction 3x10 reps  Orange bungee  heavier    Resisted shoulder extension 3x10 reps  Orange bungee  lighter    Resisted shoulder adduction 1x10 reps  Red bungee    Resisted lateral wall walks  1x5 reps  Yellow theraband   SA activation punches in supine     Resisted shoulder flexion     Wall clocks     Target taps     SA activation with ball against wall     Rhythmic stabilization     Sidelying shoulder ER Scaption wall slides     Opening chest in chair                 HEP:  [employer]  Access Code: [email or web address]  URL: [email or web address]  Access Code: [email or web address]  URL: [email or web address]  Date: 05/06/2022  Prepared by: Jonette Boeck  Exercises  Seated Scapular Retraction - 2 x daily - 7 x weekly - 2 sets - 10 reps  Seated Shoulder Shrugs - 2 x daily - 7 x weekly - 2 sets - 10 reps  Seated Elbow Flexion and Extension AROM - 2 x daily - 7 x weekly - 2 sets - 10 reps  Putty Squeezes - 2 x daily - 7 x weekly - 2 sets - 10 reps  Supine Scapular Protraction in Flexion with Dumbbells - 2 x daily - 7 x weekly - 2 sets - 10 reps  Supine Shoulder Flexion Extension AAROM with Dowel - 2 x daily - 7 x weekly - 2 sets - 10 reps  Scapular Retraction with Resistance - 2 x daily - 7 x weekly - 10 reps - 2 sets - 5s hold  Shoulder extension with resistance - Neutral - 2 x daily - 7 x weekly - 10 reps - 2 sets - 5s hold  Shoulder Adduction with Anchored Resistance - 2 x daily - 7 x weekly - 10 reps - 2 sets - 5s hold  Seated Thoracic Lumbar Extension with Pectoralis Stretch - 2 x daily - 7 x weekly - 2 sets - 10 reps      MANUAL THERAPY: (15 minutes): Joint mobilization, Soft tissue mobilization and Manipulation was utilized and necessary because of the patient's restricted joint motion, painful spasm, loss of articular motion and restricted motion of soft tissue. -PROM Rt shoulder flexion to 110 degrees, ER at 45 degrees, abduction to 45 degrees, biceps flexion extension with stretch to biceps soft tissue  -STM to Rt biceps, deltoid, triceps, subscap, pec  (Used abbreviations: MET - muscle energy technique; PNF - proprioceptive neuromuscular facilitation; NMR - neuromuscular re-education; AP - anterior to posterior; PA - posterior to anterior)    MODALITIES: (0 minutes):      Not today      TREATMENT/SESSION ASSESSMENT:  López Griffith unable to perform chest press with 4# hand weights. RECOMMENDATIONS/INTENT FOR NEXT TREATMENT SESSION: \"Next visit will focus on advancements to more challenging activities\". PAIN: Initial: 2/10 Post Session:  1/10     Total Treatment Billable Duration: 54min  PT Patient Time In/Time Out  Time In: 1030  Time Out: 1130  Sola Cornelius PT, DPT    No future appointments.

## 2022-05-20 PROBLEM — N39.0 URINARY TRACT INFECTION WITHOUT HEMATURIA: Status: ACTIVE | Noted: 2022-03-02

## 2022-05-23 ENCOUNTER — HOSPITAL ENCOUNTER (OUTPATIENT)
Dept: PHYSICAL THERAPY | Age: 82
Setting detail: RECURRING SERIES
Discharge: HOME | End: 2022-05-26
Payer: MEDICARE

## 2022-05-23 PROCEDURE — 97110 THERAPEUTIC EXERCISES: CPT

## 2022-05-23 PROCEDURE — 97140 MANUAL THERAPY 1/> REGIONS: CPT

## 2022-05-23 NOTE — PROGRESS NOTES
Gomez Braga  : 1940  Primary: Medicare Part A And B  Secondary: 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 Restrictions/Precautions:    No data recordedNo data recorded   Interventions Planned (Treatment may consist of any combination of the following):    No data recorded      Subjective Comments: Pt notes he over did it this weekend at home with exercises. Initial: 3/10 Post Session: 2/10  Medications Last Reviewed:  2022  Updated Objective Findings:  None Today  Treatment   THERAPEUTIC EXERCISE: (39 minutes):    Exercises per grid below to improve mobility, strength, balance and coordination. Required minimal visual, verbal, manual and tactile cues to promote proper body alignment, promote proper body posture, promote proper body mechanics and promote proper body breathing techniques. Progressed resistance, range, repetitions and complexity of movement as indicated.      Date:  22 Date:  22   Activity/Exercise             Arm bike 3min/3min  Level 3 3min/3min  Level 3   Chest press 3x10 reps  3# 3x10 reps  3#   Shoulder press into flexion supine 3x10 reps  3# hand weight 3x10 reps  3# hand weight   Resisted scapular retraction   2x10 reps  Orange bungee   Resisted shoulder extension   2x10 reps  Orange bungee   Resisted shoulder adduction   2x10 reps  Orange bungee   Resisted lateral wall walks 1x5 reps  Yellow theraband    Resisted shoulder abduction   2x10 reps  Yellow theraband   Resisted shoulder flexion      Wall clocks   2x8 reps B  No weight   Target taps      SA activation with ball against wall      Rhythmic stabilization      Sidelying shoulder ER      Scaption wall slides      Opening chest in chair                      MANUAL THERAPY: (15 minutes):    Joint mobilization and Soft tissue mobilization was utilized and necessary because of the patient's restricted joint motion, loss of articular motion and restricted motion of soft tissue. -PROM Rt shoulder flexion to 110 degrees, ER at 45 degrees, abduction to 45 degrees, biceps flexion extension with stretch to biceps soft tissue  -STM to Rt biceps, deltoid, triceps, subscap, pec  (Used abbreviations: MET - muscle energy technique; PNF - proprioceptive neuromuscular facilitation; NMR - neuromuscular re-education; AP - anterior to posterior; PA - posterior to anterior)    Treatment/Session Summary:    · Treatment Assessment: Pt with continued difficulty gripping theraband and tubing to perform exercises due to hand issues. No pain in shoulder reported during session. · Communication/Consultation:  Spoke with patient in regards to decreased coloring in finger tips and slow capillary refill. Discussed reaching out to MD. .  · Equipment provided today:  None  · Recommendations/Intent for next treatment session: \"Next visit will focus on advancements to more challenging activities\".     Total Treatment Billable Duration:  54 minutes  Time In: 1030  Time Out: Sukhi Smith 92 Portal  MD Guidelines  Scanned Media  Benefits  MyChart

## 2022-05-26 ENCOUNTER — HOSPITAL ENCOUNTER (OUTPATIENT)
Dept: PHYSICAL THERAPY | Age: 82
Setting detail: RECURRING SERIES
Discharge: HOME | End: 2022-05-29
Payer: MEDICARE

## 2022-05-26 ENCOUNTER — APPOINTMENT (OUTPATIENT)
Dept: PHYSICAL THERAPY | Age: 82
End: 2022-05-26
Payer: MEDICARE

## 2022-05-26 PROCEDURE — 97110 THERAPEUTIC EXERCISES: CPT

## 2022-05-26 PROCEDURE — 97140 MANUAL THERAPY 1/> REGIONS: CPT

## 2022-05-26 NOTE — PROGRESS NOTES
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 Restrictions/Precautions:    No data recordedNo data recorded   Interventions Planned (Treatment may consist of any combination of the following):    No data recorded      Subjective Comments: Pt notes he is doing well but continues to have numbness and pain in hands. Initial: 3/10 Post Session: 2/10  Medications Last Reviewed:  2022  Updated Objective Findings:  None Today  Treatment   THERAPEUTIC EXERCISE: (39 minutes):    Exercises per grid below to improve mobility, strength, balance and coordination. Required minimal visual, verbal, manual and tactile cues to promote proper body alignment, promote proper body posture, promote proper body mechanics and promote proper body breathing techniques. Progressed resistance, range, repetitions and complexity of movement as indicated.      Date:  2022   Activity/Exercise         Arm bike 3min/3min  Level 3   Chest press    Shoulder press into flexion supine 3x10 reps  3# hand weight   Resisted scapular retraction    Resisted shoulder extension 2x10 reps  Red bungee   Resisted shoulder adduction 2x10 reps  Orange bungee   Resisted ER walk outs 2x10 reps  Orange bungee   Resisted shoulder abduction 2x10 reps  Yellow theraband   Resisted shoulder flexion    Wall clocks 2x8 reps B  No weight   Target taps    SA activation with ball against wall    Rhythmic stabilization    Sidelying shoulder ER    Scaption wall slides    Opening chest in chair                MANUAL THERAPY: (15 minutes):    Joint mobilization and Soft tissue mobilization was utilized and necessary because of the patient's restricted joint motion, loss of articular motion and restricted motion of soft tissue. -PROM Rt shoulder flexion to 110 degrees, ER at 45 degrees, abduction to 45 degrees, biceps flexion extension with stretch to biceps soft tissue  -STM to Rt biceps, deltoid, triceps, subscap, pec  (Used abbreviations: MET - muscle energy technique; PNF - proprioceptive neuromuscular facilitation; NMR - neuromuscular re-education; AP - anterior to posterior; PA - posterior to anterior)    Treatment/Session Summary:    · Treatment Assessment: had no reports of pain during exercises. Difficulty with exercises due to  pain. Blanching of finger tips still present. · Communication/Consultation:  Spoke with patient in regards to decreased coloring in finger tips and slow capillary refill. Discussed reaching out to MD. .  · Equipment provided today:  None  · Recommendations/Intent for next treatment session: \"Next visit will focus on advancements to more challenging activities\".     Total Treatment Billable Duration:  54 minutes  Time In: 0930  Time Out: [address] [facility]. Portal  MD Guidelines  Scanned Media  Benefits  [employer]

## 2022-05-31 ENCOUNTER — HOSPITAL ENCOUNTER (OUTPATIENT)
Dept: PHYSICAL THERAPY | Age: 82
Setting detail: RECURRING SERIES
End: 2022-05-31
Payer: MEDICARE

## 2022-05-31 NOTE — PROGRESS NOTES
Marc Neighbours  : 1940  Primary: Medicare Part A And B  Secondary: [address] @ [address] [place] [address]  Phone: [phone]  Fax: [phone] No data recorded  Plan of Care/Certification Expiration Date: 22      PT Visit Info:  No data recorded       OUTPATIENT PHYSICAL THERAPY 2022     Appt Desk   Episode   MyChart      Mr. Madina Beckford cancelled appointment on previous date.      Diamantina Goldmann, PT    Future Appointments   Date Time Provider Arnoldo Hurtadoi   2022 10:30 AM Diamantina Goldmann, PT [facility]   2022  9:45 AM MD BARRIE Tran AMB

## 2022-06-02 ENCOUNTER — HOSPITAL ENCOUNTER (OUTPATIENT)
Dept: PHYSICAL THERAPY | Age: 82
Setting detail: RECURRING SERIES
Discharge: HOME | End: 2022-06-05
Payer: MEDICARE

## 2022-06-02 PROCEDURE — 97140 MANUAL THERAPY 1/> REGIONS: CPT

## 2022-06-02 PROCEDURE — 97110 THERAPEUTIC EXERCISES: CPT

## 2022-06-02 NOTE — PROGRESS NOTES
Batool Wilder  : 1940  Primary: Medicare Part A And B  Secondary: 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 Restrictions/Precautions:    No data recordedNo data recorded   Interventions Planned (Treatment may consist of any combination of the following):    No data recorded      Subjective Comments: Pt notes he did 3 sets of all exercises multiple times a day which caused pain in his shoulder and arm later that same day. Notes it was more soreness than excruciating pain. Initial: 3/10 Post Session: 2/10  Medications Last Reviewed:  2022  Updated Objective Findings:  None Today  Treatment   THERAPEUTIC EXERCISE: (39 minutes):    Exercises per grid below to improve mobility, strength, balance and coordination. Required minimal visual, verbal, manual and tactile cues to promote proper body alignment, promote proper body posture, promote proper body mechanics and promote proper body breathing techniques. Progressed resistance, range, repetitions and complexity of movement as indicated.      Date:  2022   Activity/Exercise         Arm bike 3min/3min  Level 3   Chest press    Shoulder press into flexion supine 3x10 reps  3# hand weight   Sidelying shoulder ER 2x10 reps  3# hand weight   Resisted scapular rows 2x10 reps  Red bungee   Resisted shoulder extension 2x10 reps  Red bungee   Resisted shoulder adduction 2x10 reps  Orange bungee   Resisted ER walk outs --   Resisted shoulder abduction --   Resisted shoulder flexion --   Wall clocks 1x8 reps B  Red weighted ball   D2 extension 1x10 reps B  2.5# cable  1/2 motion   Target taps    SA activation with ball against wall    Rhythmic stabilization    Sidelying shoulder ER    Scaption wall slides    Opening chest in chair                MANUAL THERAPY: (15 minutes):    Joint mobilization and Soft tissue mobilization was utilized and necessary because of the patient's restricted joint motion, loss of articular motion and restricted motion of soft tissue. -PROM Rt shoulder flexion to 110 degrees, ER at 45 degrees, abduction to 45 degrees, biceps flexion extension with stretch to biceps soft tissue  -STM to Rt biceps, deltoid, triceps, subscap, pec  (Used abbreviations: MET - muscle energy technique; PNF - proprioceptive neuromuscular facilitation; NMR - neuromuscular re-education; AP - anterior to posterior; PA - posterior to anterior)    Treatment/Session Summary:    · Treatment Assessment: with continued difficulty gripping objects due to hand issues. Progressed wall clocks to performing with weight in hand which went well. Try full  PNF patterns with cable next visit. · Communication/Consultation:  Spoke with patient in regards to decreased coloring in finger tips and slow capillary refill. Discussed reaching out to MD. .  · Equipment provided today:  None  · Recommendations/Intent for next treatment session: \"Next visit will focus on advancements to more challenging activities\".     Total Treatment Billable Duration:  54 minutes  Time In: 1030  Time Out: [facility] Session Pain  Charge Capture  Med[employer] Portal  MD Guidelines  Scanned Media  Benefits  MyChart

## 2022-06-06 ENCOUNTER — HOSPITAL ENCOUNTER (OUTPATIENT)
Dept: PHYSICAL THERAPY | Age: 82
Setting detail: RECURRING SERIES
Discharge: HOME | End: 2022-06-09
Payer: MEDICARE

## 2022-06-06 PROCEDURE — 97140 MANUAL THERAPY 1/> REGIONS: CPT

## 2022-06-06 PROCEDURE — 97110 THERAPEUTIC EXERCISES: CPT

## 2022-06-06 NOTE — PROGRESS NOTES
Shoulder press into flexion supine 3x10 reps  3# hand weight   Sidelying shoulder ER 3x10 reps  1# hand weight   Resisted scapular rows --   Resisted shoulder extension --   Resisted shoulder adduction --   Resisted ER walk outs --   Resisted shoulder abduction --   Resisted shoulder flexion --   Wall clocks 1x8 reps B  Red weighted ball   D2 flexion 2x10 reps B  1# hand weight   D1 extension 2x10 reps  5# cable   SA activation with ball against wall    Rhythmic stabilization    Sidelying shoulder ER    Scaption wall slides    Opening chest in chair                MANUAL THERAPY: (18 minutes):    Joint mobilization and Soft tissue mobilization was utilized and necessary because of the patient's restricted joint motion, loss of articular motion and restricted motion of soft tissue. -PROM Rt shoulder flexion to 150 degrees, ER, abd  -STM to Rt biceps, deltoid, triceps, subscap, pec, upper trap  (Used abbreviations: MET - muscle energy technique; PNF - proprioceptive neuromuscular facilitation; NMR - neuromuscular re-education; AP - anterior to posterior; PA - posterior to anterior)    Treatment/Session Summary:    · Treatment Assessment: with more tone and tenderness to Rt pec region today as well as upper trap. Unable to perform D2 extension with cable therefore performed with hand weight instead. · Communication/Consultation:  Spoke with patient in regards to decreased coloring in finger tips and slow capillary refill. Discussed reaching out to MD. .  · Equipment provided today:  None  · Recommendations/Intent for next treatment session: \"Next visit will focus on advancements to more challenging activities\".     Total Treatment Billable Duration:  56 minutes  Time In: 1030  Time Out: Sukhi Smith [address] Portal  MD Guidelines  Scanned Media  Benefits  MyChart

## 2022-06-10 ENCOUNTER — HOSPITAL ENCOUNTER (OUTPATIENT)
Dept: PHYSICAL THERAPY | Age: 82
Setting detail: RECURRING SERIES
Discharge: HOME | End: 2022-06-13
Payer: MEDICARE

## 2022-06-10 PROCEDURE — 97110 THERAPEUTIC EXERCISES: CPT

## 2022-06-10 PROCEDURE — 97140 MANUAL THERAPY 1/> REGIONS: CPT

## 2022-06-10 NOTE — PROGRESS NOTES
Gregoria Garza  : 1940  Primary: Medicare Part A And B  Secondary: 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 Restrictions/Precautions:    No data recordedNo data recorded   Interventions Planned (Treatment may consist of any combination of the following):    No data recorded      Subjective Comments: Pt reports      Initial: 3/10 Post Session: 2/10  Medications Last Reviewed:  6/10/2022  Updated Objective Findings:  None Today  Treatment   THERAPEUTIC EXERCISE: (30 minutes):    Exercises per grid below to improve mobility, strength, balance and coordination. Required minimal visual, verbal, manual and tactile cues to promote proper body alignment, promote proper body posture, promote proper body mechanics and promote proper body breathing techniques. Progressed resistance, range, repetitions and complexity of movement as indicated.      Date:  6/10/2022   Activity/Exercise         Arm bike 3min/3min  Level 3.5   Chest press --   Shoulder press into flexion supine 4x5 reps  5# hand weight   Sidelying shoulder ER --   Resisted scapular rows --   Resisted shoulder extension 2x10  Orange bungee   Resisted

## 2022-06-22 ENCOUNTER — HOSPITAL ENCOUNTER (OUTPATIENT)
Dept: PHYSICAL THERAPY | Age: 82
Setting detail: RECURRING SERIES
Discharge: HOME | End: 2022-06-25
Payer: MEDICARE

## 2022-06-22 PROCEDURE — 97140 MANUAL THERAPY 1/> REGIONS: CPT

## 2022-06-22 PROCEDURE — 97110 THERAPEUTIC EXERCISES: CPT

## 2022-06-22 NOTE — PROGRESS NOTES
King Naseem  : 1940  Primary: Medicare Part A And B  Secondary: 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 Restrictions/Precautions:    No data recordedNo data recorded   Interventions Planned (Treatment may consist of any combination of the following):    No data recorded      Subjective Comments: Pt reports he is doing well and independent with HEP. Initial: 3/10 Post Session: 2/10  Medications Last Reviewed:  2022  Updated Objective Findings:  None Today  Treatment   THERAPEUTIC EXERCISE: (39 minutes):    Exercises per grid below to improve mobility, strength, balance and coordination. Required minimal visual, verbal, manual and tactile cues to promote proper body alignment, promote proper body posture, promote proper body mechanics and promote proper body breathing techniques. Progressed resistance, range, repetitions and complexity of movement as indicated.      Date:  2022   Activity/Exercise      D/C measurements   Arm bike 3min/3min  Level 3.5   Chest press --   Shoulder press into flexion supine 2x10 reps  4# hand weight   Sidelying shoulder ER --   Resisted scapular rows -- Resisted shoulder extension 2x10  Orange bungee   Resisted shoulder adduction 2x10   orange bungee   Resisted ER walk outs --   Resisted shoulder abduction 2x10  Red theraband   Resisted shoulder flexion --   Wall clocks --   D2 flexion 2x10 reps  Orange bungee   D1 extension 2x10 reps  [place] bungee   SA activation with ball against wall    Rhythmic stabilization    Sidelying shoulder ER    Scaption wall slides    Opening chest in chair     UE alphabet         Access Code: [email or web address]  URL: [email or web address]  Date: 06/22/2022  Prepared by: Lella Hockey    Exercises  Standing Single Arm Shoulder PNF D1 Flexion with Anchored Resistance - 2 x daily - 7 x weekly - 2 sets - 10 reps  Standing Shoulder Single Arm PNF D2 Flexion with Anchored Resistance - 2 x daily - 7 x weekly - 2 sets - 10 reps  Scapular Retraction with Resistance - 2 x daily - 7 x weekly - 10 reps - 2 sets - 5s hold  Shoulder extension with resistance - Neutral - 2 x daily - 7 x weekly - 10 reps - 2 sets - 5s hold  Split Stance Chest Press with Resistance - 2 x daily - 7 x weekly - 2 sets - 10 reps  Shoulder Adduction with Anchored Resistance - 2 x daily - 7 x weekly - 10 reps - 2 sets - 5s hold  Sidelying Shoulder ER with Towel and Dumbbell - 2 x daily - 7 x weekly - 2 sets - 10 reps  Supine Chest Press with Dumbbells - 2 x daily - 7 x weekly - 2 sets - 10 reps  Supine Scapular Protraction in Flexion with Dumbbells - 2 x daily - 7 x weekly - 2 sets - 10 reps  Standing Shoulder Alphabet - 2 x daily - 7 x weekly - 2 sets - 10 reps  Standing Shoulder Horizontal Abduction with Resistance - 2 x daily - 7 x weekly - 2 sets - 10 reps  Standing Plank on Wall with Reaches and Resistance - 2 x daily - 7 x weekly - 2 sets - 10 reps    MANUAL THERAPY: (15 minutes):    Joint mobilization and Soft tissue mobilization was utilized and necessary because of the patient's restricted joint motion, loss of articular motion and restricted motion of soft tissue. -PROM Rt shoulder flexion to 150 degrees, ER, abd  -STM to Rt biceps, deltoid, triceps, subscap, pec, upper trap  (Used abbreviations: MET - muscle energy technique; PNF - proprioceptive neuromuscular facilitation; NMR - neuromuscular re-education; AP - anterior to posterior; PA - posterior to anterior)    Treatment/Session Summary:    · Treatment Assessment: with decreased tenderness and tone to Rt deltoid region today. See discharge note for objective measurements. · Communication/Consultation:  Spoke with patient in regards to decreased coloring in finger tips and slow capillary refill. Discussed reaching out to MD. .  · Equipment provided today:  None  · Recommendations/Intent for next treatment session: \"Next visit will focus on advancements to more challenging activities\".     Total Treatment Billable Duration:  54 minutes  Time In: 1030  Time Out: Sukhi Smith 92 Portal  MD Guidelines  Scanned Media  Benefits  MyChart

## 2022-06-22 NOTE — DISCHARGE SUMMARY
Anuj Ureña  : 1940  [place]  Secondary: [facility] [place] [address], [address] [facility]  [phone]   [phone]                       OUTPATIENT PHYSICAL THERAPY:DISCHARGE NOTE 2022  Rt Reverse TSA 22   ICD-10: Treatment Diagnosis:   Pain in right shoulder (M25.511)  Stiffness of right shoulder, not elsewhere classified (M25.611)     Muscle weakness (generalized) (M62.81)     PRECAUTIONS/ALLERGIES:   Morphine; Other medication; Other plant, animal, environmental; Shellfish derived; and Clams  MEDICAL/REFERRING DIAGNOSIS:  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 Frequency/Duration: 2 times a week for 90 Day(s)    [place] / [place] / Product Type: Medicare /     DISCHARGE ASSESSMENT:  Mr. Anuj Ureña has attended 19 physical therapy session including initial evaluation as of today 2022. Anuj Ureña has shown great improvements with Lt shoulder AROM and tolerance for functional activities. Little to no tenderness during palpation. Pt independent with HEP and appropriate for discharge at this time. Pt has met 3/5 goals.       GOALS: (Goals have been discussed and agreed upon with patient.)  FUNCTIONAL GOALS: Time Frame: 90 days  1. Anuj Ureña will report <=2/10 pain with shaving his face as well as minimal/no difficulty. -MET 2022  2.  Anuj Ureña will demonstrate improvement in active shoulder flexion to >90 degrees to increase UE function and participation in ADLs. -MET 5/9/2022  3. Jazmin Plant will demonstrate improvement in active shoulder abduction to >90 degrees to increase UE function and participation in ADLs. -MET 6/22/2022  3. Jazmin Plant will show a >= 10 point decrease on the DASH in order to show an increase in upper extremity function. - MET 6/22/2022  4. Jazmin Plant will be independent and compliant with HEP and not require assistance from PT. -MET 5/9/2022        OUTCOME MEASURE USED:   Tool Used: Tool Used: Disabilities of the Arm, Shoulder and Hand (DASH) Questionnaire - Quick Version  Score:  Initial: 30/55 24/55 (Date: 5/9/2022 ) Most Recent: 16/55 (Date:6/22/2022 )   Interpretation of Score: The DASH is designed to measure the activities of daily living in person's with upper extremity dysfunction or pain. Each section is scored on a 1-5 scale, 5 representing the greatest disability. The scores of each section are added together for a total score of 55.       Thank you for this referral,  Regina Le, PT, DPT        Referring Physician Signature: Giorgi Mejia MD No Signature is Required for this note.           PAIN/SUBJECTIVE:   Initial: 2/10 Post Session:  2/10      EXAMINATION:   ORTHOSTATIC/POSTURE OBSERVATION:    Date:   4/4/2022, 5/9/2022, 6/22/2022   SITTING RESTING POSTURE -Pt sits with forward head and rounded shoulders which indicate tight anterior chest musculature, upper trapezius, and levator scapula and weak posterior scapula musculature and deep cervical flexors.       STANDING RESTING POSTURE -Pt displays decreased core motor control indicating weak core and low back musculature.                  ROM Date:  4/4/2022 Date:  5/9/2022   Date:  6/22/2022     RIGHT LEFT RIGHT LEFT Right Left   UEROM        Flexion 90 degrees PROM  degrees 105 degrees 115 degrees 110 degrees   Extension NT WFL 50 degrees 65 degrees 50 degrees 65 degrees   Abd 45 degrees WFL 90 degrees  Compensatory strategies to get higher 115 degrees 100 degrees 115 degrees   ER (Apley's) NT WFL Difficult C2 C7 T3 T1   IR (Apley's)  NT WFL L1 L1 L1 L1      PALPATION/TONE/TISSUE TEXTURE:    Date:   4/4/2022, 6/22/2022   SOFT TISSUE:   Upper traps Increased tone Rt-slight   Levatore scapulae Increased tone Rt-slight             SHOULDER STRENGTH:    Date:  4/4/2022 DATE:  6/22/2022       RIGHT LEFT RIGHT LEFT   Shoulder Flexion NT 4/5 4/5 4/5   Shoulder Extension NT 4/5 4/5 4/5   Shoulder Abduction NT 4-/5 4/5 4/5   Shoulder IR NT 4/5 4/5 4/5   Shoulder ER NT 4-/5 4/5 4/5      FUNCTIONAL MOBILITY    Date:   4/4/2022, 5/9/2022   Transfers Independent   Posture Thoracic kyphosis, rounded shoulders, forward head   Gait deviations Decreased B hip extension, knee flexion   Assistive device None   Stairs No difficulty   Bed mobility Independent

## 2022-06-24 ENCOUNTER — HOSPITAL ENCOUNTER (OUTPATIENT)
Dept: PHYSICAL THERAPY | Age: 82
Setting detail: RECURRING SERIES
End: 2022-06-24
Payer: MEDICARE

## 2022-07-07 ENCOUNTER — APPOINTMENT (RX ONLY)
Dept: URBAN - METROPOLITAN AREA CLINIC 329 | Facility: CLINIC | Age: 82
Setting detail: DERMATOLOGY
End: 2022-07-07

## 2022-07-07 DIAGNOSIS — L57.0 ACTINIC KERATOSIS: ICD-10-CM

## 2022-07-07 DIAGNOSIS — L57.8 OTHER SKIN CHANGES DUE TO CHRONIC EXPOSURE TO NONIONIZING RADIATION: ICD-10-CM | Status: INADEQUATELY CONTROLLED

## 2022-07-07 PROBLEM — D48.5 NEOPLASM OF UNCERTAIN BEHAVIOR OF SKIN: Status: ACTIVE | Noted: 2022-07-07

## 2022-07-07 PROCEDURE — ? COUNSELING

## 2022-07-07 PROCEDURE — ? BIOPSY BY SHAVE METHOD

## 2022-07-07 PROCEDURE — ? OTHER

## 2022-07-07 PROCEDURE — 11103 TANGNTL BX SKIN EA SEP/ADDL: CPT

## 2022-07-07 PROCEDURE — 99214 OFFICE O/P EST MOD 30 MIN: CPT | Mod: 25

## 2022-07-07 PROCEDURE — 11102 TANGNTL BX SKIN SINGLE LES: CPT

## 2022-07-07 PROCEDURE — ? PRESCRIPTION MEDICATION MANAGEMENT

## 2022-07-07 PROCEDURE — ? OBSERVATION

## 2022-07-07 PROCEDURE — ? PHOTO-DOCUMENTATION

## 2022-07-07 PROCEDURE — 17000 DESTRUCT PREMALG LESION: CPT | Mod: 59

## 2022-07-07 PROCEDURE — ? LIQUID NITROGEN

## 2022-07-07 ASSESSMENT — LOCATION SIMPLE DESCRIPTION DERM
LOCATION SIMPLE: SCALP
LOCATION SIMPLE: RIGHT SCALP

## 2022-07-07 ASSESSMENT — LOCATION ZONE DERM: LOCATION ZONE: SCALP

## 2022-07-07 ASSESSMENT — LOCATION DETAILED DESCRIPTION DERM
LOCATION DETAILED: RIGHT SUPERIOR PARIETAL SCALP
LOCATION DETAILED: RIGHT MEDIAL FRONTAL SCALP

## 2022-07-07 NOTE — PROCEDURE: LIQUID NITROGEN
Post-Care Instructions: I reviewed with the patient in detail post-care instructions. Patient is to wear sunprotection, and avoid picking at any of the treated lesions. Pt may apply Vaseline to crusted or scabbing areas.
Render Note In Bullet Format When Appropriate: No
Show Aperture Variable?: Yes
Detail Level: Detailed
Duration Of Freeze Thaw-Cycle (Seconds): 2
Consent: The patient's consent was obtained including but not limited to risks of crusting, scabbing, blistering, scarring, darker or lighter pigmentary change, recurrence, incomplete removal and infection.
Number Of Freeze-Thaw Cycles: 3 freeze-thaw cycles

## 2022-07-07 NOTE — PROCEDURE: BIOPSY BY SHAVE METHOD
Detail Level: Detailed
Depth Of Biopsy: dermis
Was A Bandage Applied: Yes
Size Of Lesion In Cm: 0
Accession #: skin path
Biopsy Type: H and E
Biopsy Method: Dermablade
Anesthesia Type: 1% lidocaine with epinephrine
Anesthesia Volume In Cc (Will Not Render If 0): 0.5
Hemostasis: Drysol
Wound Care: Petrolatum
Dressing: bandage
Destruction After The Procedure: No
Type Of Destruction Used: Curettage
Curettage Text: The wound bed was treated with curettage after the biopsy was performed.
Cryotherapy Text: The wound bed was treated with cryotherapy after the biopsy was performed.
Electrodesiccation Text: The wound bed was treated with electrodesiccation after the biopsy was performed.
Electrodesiccation And Curettage Text: The wound bed was treated with electrodesiccation and curettage after the biopsy was performed.
Silver Nitrate Text: The wound bed was treated with silver nitrate after the biopsy was performed.
Lab: 6
Consent: Written consent was obtained and risks were reviewed including but not limited to scarring, infection, bleeding, scabbing, incomplete removal, nerve damage and allergy to anesthesia.
Post-Care Instructions: I reviewed with the patient in detail post-care instructions. Patient is to keep the biopsy site dry overnight, and then apply bacitracin twice daily until healed. Patient may apply hydrogen peroxide soaks to remove any crusting.
Notification Instructions: Patient will be notified of biopsy results. However, patient instructed to call the office if not contacted within 2 weeks.
Billing Type: Third-Party Bill
Information: Selecting Yes will display possible errors in your note based on the variables you have selected. This validation is only offered as a suggestion for you. PLEASE NOTE THAT THE VALIDATION TEXT WILL BE REMOVED WHEN YOU FINALIZE YOUR NOTE. IF YOU WANT TO FAX A PRELIMINARY NOTE YOU WILL NEED TO TOGGLE THIS TO 'NO' IF YOU DO NOT WANT IT IN YOUR FAXED NOTE.

## 2022-07-07 NOTE — PROCEDURE: PRESCRIPTION MEDICATION MANAGEMENT
Plan: Discussed use of Efudex cream in the fall/winter months.
Detail Level: Zone
Render In Strict Bullet Format?: No

## 2022-07-07 NOTE — PROCEDURE: MIPS QUALITY
Quality 47: Advance Care Plan: Advance care planning not documented, reason not otherwise specified.
Quality 111:Pneumonia Vaccination Status For Older Adults: Pneumococcal vaccine administered on or after patient’s 60th birthday and before the end of the measurement period
Detail Level: Detailed
Quality 110: Preventive Care And Screening: Influenza Immunization: Influenza Immunization Administered during Influenza season
Quality 431: Preventive Care And Screening: Unhealthy Alcohol Use - Screening: Patient not identified as an unhealthy alcohol user when screened for unhealthy alcohol use using a systematic screening method
Quality 402: Tobacco Use And Help With Quitting Among Adolescents: Patient screened for tobacco and never smoked
Quality 226: Preventive Care And Screening: Tobacco Use: Screening And Cessation Intervention: Patient screened for tobacco use and is an ex/non-smoker
Quality 130: Documentation Of Current Medications In The Medical Record: Current Medications Documented

## 2022-07-12 ENCOUNTER — OFFICE VISIT (OUTPATIENT)
Dept: ORTHOPEDIC SURGERY | Age: 82
End: 2022-07-12
Payer: MEDICARE

## 2022-07-12 DIAGNOSIS — Z96.611 INSTABILITY OF REVERSE TOTAL ARTHROPLASTY OF RIGHT SHOULDER (HCC): ICD-10-CM

## 2022-07-12 DIAGNOSIS — T84.028A INSTABILITY OF REVERSE TOTAL ARTHROPLASTY OF RIGHT SHOULDER (HCC): ICD-10-CM

## 2022-07-12 DIAGNOSIS — Z96.611 STATUS POST REVERSE ARTHROPLASTY OF RIGHT SHOULDER: Primary | ICD-10-CM

## 2022-07-12 DIAGNOSIS — Z09 SURGERY FOLLOW-UP: ICD-10-CM

## 2022-07-12 PROCEDURE — G8417 CALC BMI ABV UP PARAM F/U: HCPCS | Performed by: ORTHOPAEDIC SURGERY

## 2022-07-12 PROCEDURE — 1036F TOBACCO NON-USER: CPT | Performed by: ORTHOPAEDIC SURGERY

## 2022-07-12 PROCEDURE — G8427 DOCREV CUR MEDS BY ELIG CLIN: HCPCS | Performed by: ORTHOPAEDIC SURGERY

## 2022-07-12 PROCEDURE — 1123F ACP DISCUSS/DSCN MKR DOCD: CPT | Performed by: ORTHOPAEDIC SURGERY

## 2022-07-12 PROCEDURE — 99213 OFFICE O/P EST LOW 20 MIN: CPT | Performed by: ORTHOPAEDIC SURGERY

## 2022-07-12 NOTE — PROGRESS NOTES
Name: Pattie Hutchinson  YOB: 1940  Gender: male  MRN: [id number]    CC:   Chief Complaint   Patient presents with    Shoulder Pain     Right shoulder revison recheck. Reverse TSA on 2/18/22   , The patient follows up 5 months status post revision right reverse TSA. Right shoulder revision reverse TSA  DOS 2/18/22  HPI: The patient is doing well. They feel as if their pain has decreased. They have no complaints today, only questions on their progression. They feel as if they are progressing as they should. They are working on a home exercise program.   They feel as if they have improved from the surgery and pre-surgery state.   States the only thing that bothers him is when he does too much external rotation strengthening    Not on File  Past Medical History:   Diagnosis Date    Adverse effect of anesthesia     once briefly \"woke up\" during EGD    Basal cell carcinoma     LLE    Chronic combined systolic and diastolic congestive heart failure ([address]     Followed by [facility]; Echo (6/9/21) EF45%, Grade II (moderate) left ventricular diastolic dysfunction present    Chronic kidney disease     pt denies on 2/17/22    Chronic pain     right shoulder    Current use of long term anticoagulation     Xarelto    LEMUS (dyspnea on exertion)     Pt has been referred to pulmonary     Dysphagia     chronic, for which intermittent esophageal dilation    Erectile dysfunction     Fatigue     Former smoker     GERD (gastroesophageal reflux disease)     controlled with medication    High cholesterol     History of pulmonary embolism 09/18/2018    History of stomach cancer 2010    Hypertension     4/7/22 states not currently on BP medication    Iron deficiency anemia 05/2015    Macular degeneration     Nausea & vomiting     post-op nausea  states from morphine    Nonischemic cardiomyopathy ([address]     with mild systolic dysfunction, followed by [facility]--Cath (5/13/21) normal coronary arteriogram    Osteoarthritis     Thromboembolus ([address]     Xarelto    Thyroid disease     levothyroxine    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    Unspecified adverse effect of anesthesia     wife states slow to wake     Past Surgical History:   Procedure Laterality Date    APPENDECTOMY      CHOLECYSTECTOMY  2015    COLONOSCOPY      GI  2010    gastrectomy 2/8/10-has 30% stomach left    KNEE ARTHROSCOPY Right     OTHER SURGICAL HISTORY      attempted tracheal dilation- stopped due to inflammation    SHOULDER ARTHROPLASTY Right     second replacement    SHOULDER ARTHROSCOPY Right 2015    TOTAL HIP ARTHROPLASTY Bilateral 2004    TOTAL KNEE ARTHROPLASTY Right 2012    UPPER GASTROINTESTINAL ENDOSCOPY  4/8/2022          Family History   Problem Relation Age of Onset    Osteoarthritis Brother     Alcohol Abuse Brother     Dementia Father     Kidney Disease Mother      Social History     Socioeconomic History    Marital status:      Spouse name: Not on file    Number of children: Not on file    Years of education: Not on file    Highest education level: Not on file   Occupational History    Not on file   Tobacco Use    Smoking status: Former Smoker     Packs/day: 0.50    Smokeless tobacco: Never Used    Tobacco comment: Quit smoking: quit age 20; continuing cessation   Substance and Sexual Activity    Alcohol use: Yes    Drug use: No    Sexual activity: Not on file   Other Topics Concern    Not on file   Social History Narrative    Not on file     Social Determinants of Health     Financial Resource Strain:     Difficulty of Paying Living Expenses: Not on file   Food Insecurity:     Worried About Running Out of Food in the Last Year: Not on file    Phylicia of Food in the Last Year: Not on file   Transportation Needs:     Lack of Transportation (Medical): Not on file    Lack of Transportation (Non-Medical):  Not on file   Physical Activity:     Days of Exercise per Week: Not on file    Minutes of Exercise per Session: Not on file   Stress:     Feeling of Stress : Not on file   Social Connections:     Frequency of Communication with Friends and Family: Not on file    Frequency of Social Gatherings with Friends and Family: Not on file    Attends Jew Services: Not on file    Active Member of [address] [place] [address] [employer] or Organizations: Not on file    Attends Club or Organization Meetings: Not on file    Marital Status: Not on file   Intimate Partner Violence:     Fear of Current or Ex-Partner: Not on file    Emotionally Abused: Not on file    Physically Abused: Not on file    Sexually Abused: Not on file   Housing Stability:     Unable to Pay for Housing in the Last Year: Not on file    Number of [place] in the Last Year: Not on file    Unstable Housing in the Last Year: Not on file        No flowsheet data found. ,h    Review of Systems  Noncontributory     PE right shoulder:  General: Alert and Oriented x3 and appears to be of stated age. Head and Face: Normocephalic, atraumatic. Neck: Supple, normal range of motion. Lungs: Normal Respiratory rate. No dyspnea. Skin: No rash or erythema. Skin is cool to the touch. Surgical incisions appear to be healing well and there is no sign of infection. Psychiatric: Normal mood and affect. Answers questions appropriately. Musculoskeletal: The patient's Range of Motion today was measured at: Forward Elevation of 155. Abduction of 95. External Rotation of 10. The patient's strength today is:  External Rotation: 5-. Abduction: 5.  Belly Press Test: 5  The swelling is minimal. They are neurovascularly intact. X-rayJanel Ground and axillary lateral views of the operative right shoulder were obtained and reviewed today in the office. There has been no change in the hardware's alignment and the glenohumeral position is appropriate on all the films. No obvious gross changes.     A/P:     ICD-10-CM    1. Status post reverse arthroplasty of right shoulder  Z96.611 XR SHOULDER RIGHT (MIN 2 VIEWS)   2. 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 Surgery follow-up  Z09 XR SHOULDER RIGHT (MIN 2 VIEWS)     The patient is doing well status post the above mentioned procedure. They need to continue with Physical Therapy if they have not transitioned to a HEP. If they have any questions or concerns the patient knows that they can call our office at any time. Discussed I would limit some of the external rotation strengthening exercises he is doing it because discomfort. Otherwise he is doing very well. Return in about 3 months (around 10/12/2022) for TSA x-ray lynne Morley / Isamar.      Luz Elnea Hester MD  07/12/22

## 2022-07-14 ENCOUNTER — APPOINTMENT (RX ONLY)
Dept: URBAN - METROPOLITAN AREA CLINIC 330 | Facility: CLINIC | Age: 82
Setting detail: DERMATOLOGY
End: 2022-07-14

## 2022-07-14 DIAGNOSIS — L08.9 LOCAL INFECTION OF THE SKIN AND SUBCUTANEOUS TISSUE, UNSPECIFIED: ICD-10-CM

## 2022-07-14 DIAGNOSIS — L57.0 ACTINIC KERATOSIS: ICD-10-CM

## 2022-07-14 PROCEDURE — ? OTHER

## 2022-07-14 PROCEDURE — 99213 OFFICE O/P EST LOW 20 MIN: CPT | Mod: 24

## 2022-07-14 PROCEDURE — ? COUNSELING

## 2022-07-14 PROCEDURE — ? ORDER TESTS

## 2022-07-14 PROCEDURE — ? PRESCRIPTION

## 2022-07-14 RX ORDER — CEFDINIR 300 MG/1
CAPSULE ORAL
Qty: 20 | Refills: 0 | Status: ERX | COMMUNITY
Start: 2022-07-14

## 2022-07-14 RX ORDER — MUPIROCIN 20 MG/G
OINTMENT TOPICAL
Qty: 22 | Refills: 2 | Status: ERX | COMMUNITY
Start: 2022-07-14

## 2022-07-14 RX ADMIN — MUPIROCIN: 20 OINTMENT TOPICAL at 00:00

## 2022-07-14 RX ADMIN — CEFDINIR: 300 CAPSULE ORAL at 00:00

## 2022-07-14 ASSESSMENT — LOCATION DETAILED DESCRIPTION DERM
LOCATION DETAILED: RIGHT RADIAL DORSAL HAND
LOCATION DETAILED: LEFT SUPERIOR PARIETAL SCALP

## 2022-07-14 ASSESSMENT — LOCATION ZONE DERM
LOCATION ZONE: HAND
LOCATION ZONE: SCALP

## 2022-07-14 ASSESSMENT — LOCATION SIMPLE DESCRIPTION DERM
LOCATION SIMPLE: RIGHT HAND
LOCATION SIMPLE: SCALP

## 2022-07-14 NOTE — PROCEDURE: ORDER TESTS
Bill For Surgical Tray: no
Performing Laboratory: 0
Expected Date Of Service: 07/14/2022
Billing Type: Third-Party Bill

## 2022-07-14 NOTE — PROCEDURE: OTHER
Detail Level: Generalized
Render Risk Assessment In Note?: yes
Note Text (......Xxx Chief Complaint.): This diagnosis correlates with the
Other (Free Text): Patient consent was obtained to proceed with the visit and recommended plan of care after discussion of all risks and benefits, including the risks of COVID-19 exposure.
Other (Free Text): Patient declines LN2 offered today. Will treat next OV.

## 2022-08-12 ENCOUNTER — APPOINTMENT (RX ONLY)
Dept: URBAN - METROPOLITAN AREA CLINIC 330 | Facility: CLINIC | Age: 82
Setting detail: DERMATOLOGY
End: 2022-08-12

## 2022-08-12 PROBLEM — C44.719 BASAL CELL CARCINOMA OF SKIN OF LEFT LOWER LIMB, INCLUDING HIP: Status: ACTIVE | Noted: 2022-08-12

## 2022-08-12 PROCEDURE — ? EXCISION

## 2022-08-12 PROCEDURE — ? COUNSELING

## 2022-08-12 PROCEDURE — 12032 INTMD RPR S/A/T/EXT 2.6-7.5: CPT

## 2022-08-12 PROCEDURE — 11603 EXC TR-EXT MAL+MARG 2.1-3 CM: CPT

## 2022-08-12 NOTE — PROCEDURE: EXCISION
Admitted for septic workup and evaluation ,send blood and urine cx ,serial lactate levels ,monitor vitals closely hydration ,monitor urine output and renal profile ,iv abx initiated Estlander Flap (Upper To Lower Lip) Text: The defect of the lower lip was assessed and measured.  Given the location and size of the defect, an Estlander flap was deemed most appropriate.  Using a sterile surgical marker, an appropriate Estlander flap was measured and drawn on the upper lip. Local anesthesia was then infiltrated. A scalpel was then used to incise the lateral aspect of the flap, through skin, muscle and mucosa, leaving the flap pedicled medially.  The flap was then rotated and positioned to fill the lower lip defect.  The flap was then sutured into place with a three layer technique, closing the orbicularis oris muscle layer with subcutaneous buried sutures, followed by a mucosal layer and an epidermal layer.

## 2022-08-26 ENCOUNTER — RX ONLY (OUTPATIENT)
Age: 82
Setting detail: RX ONLY
End: 2022-08-26

## 2022-08-26 ENCOUNTER — APPOINTMENT (RX ONLY)
Dept: URBAN - METROPOLITAN AREA CLINIC 330 | Facility: CLINIC | Age: 82
Setting detail: DERMATOLOGY
End: 2022-08-26

## 2022-08-26 DIAGNOSIS — Z48.02 ENCOUNTER FOR REMOVAL OF SUTURES: ICD-10-CM

## 2022-08-26 DIAGNOSIS — L08.9 LOCAL INFECTION OF THE SKIN AND SUBCUTANEOUS TISSUE, UNSPECIFIED: ICD-10-CM

## 2022-08-26 PROBLEM — D04.4 CARCINOMA IN SITU OF SKIN OF SCALP AND NECK: Status: ACTIVE | Noted: 2022-08-26

## 2022-08-26 PROCEDURE — ? SUTURE REMOVAL (GLOBAL PERIOD)

## 2022-08-26 PROCEDURE — 17274 DSTR MAL LES S/N/H/F/G 3.1-4: CPT

## 2022-08-26 PROCEDURE — ? ORDER TESTS

## 2022-08-26 PROCEDURE — ? CURETTAGE AND DESTRUCTION

## 2022-08-26 PROCEDURE — ? COUNSELING

## 2022-08-26 PROCEDURE — 99212 OFFICE O/P EST SF 10 MIN: CPT | Mod: 25

## 2022-08-26 PROCEDURE — ? PRESCRIPTION MEDICATION MANAGEMENT

## 2022-08-26 RX ORDER — CEFDINIR 300 MG/1
CAPSULE ORAL
Qty: 14 | Refills: 0 | Status: ERX

## 2022-08-26 ASSESSMENT — LOCATION ZONE DERM: LOCATION ZONE: LEG

## 2022-08-26 ASSESSMENT — LOCATION DETAILED DESCRIPTION DERM: LOCATION DETAILED: LEFT DISTAL PRETIBIAL REGION

## 2022-08-26 ASSESSMENT — LOCATION SIMPLE DESCRIPTION DERM: LOCATION SIMPLE: LEFT PRETIBIAL REGION

## 2022-08-26 NOTE — PROCEDURE: PRESCRIPTION MEDICATION MANAGEMENT
Detail Level: Zone
Render In Strict Bullet Format?: No
Plan: Patient has Mupirocin at home. Instructed patient on use.

## 2022-08-26 NOTE — PROCEDURE: CURETTAGE AND DESTRUCTION
Post-Care Instructions: I reviewed with the patient in detail post-care instructions. Patient is to keep the area dry for 48 hours, and not to engage in any swimming until the area is healed. Should the patient develop any fevers, chills, bleeding, severe pain patient will contact the office immediately.
Size Of Lesion In Cm: 3.2
Hide Accession Number?: No
Anesthesia Type: 1% lidocaine with 1:100,000 epinephrine and a 1:10 solution of 8.4% sodium bicarbonate
What Was Performed First?: Curettage
Bill As A Line Item Or As Units: Line Item
Concentration (Mg/Ml Or Millions Of Plaque Forming Units/Cc): 0.01
Consent was obtained from the patient. The risks, benefits and alternatives to therapy were discussed in detail. Specifically, the risks of infection, scarring, bleeding, prolonged wound healing, nerve injury, incomplete removal, allergy to anesthesia and recurrence were addressed. Alternatives to ED&C, such as: surgical removal and XRT were also discussed.  Prior to the procedure, the treatment site was clearly identified and confirmed by the patient. All components of Universal Protocol/PAUSE Rule completed.
Anesthesia Volume In Cc: 3
Final Size Statement: The size of the lesion after curettage was
Cautery Type: electrodesiccation
Additional Information: (Optional): The wound was cleaned, and a pressure dressing was applied.  The patient received detailed post-op instructions.
Detail Level: Detailed
Total Volume (Ccs): 1

## 2022-08-26 NOTE — PROCEDURE: SUTURE REMOVAL (GLOBAL PERIOD)
Detail Level: Detailed
Add 00850 Cpt? (Important Note: In 2017 The Use Of 34527 Is Being Tracked By Cms To Determine Future Global Period Reimbursement For Global Periods): no

## 2022-10-18 ENCOUNTER — OFFICE VISIT (OUTPATIENT)
Dept: ORTHOPEDIC SURGERY | Age: 82
End: 2022-10-18
Payer: MEDICARE

## 2022-10-18 DIAGNOSIS — Z96.611 STATUS POST REVERSE ARTHROPLASTY OF RIGHT SHOULDER: Primary | ICD-10-CM

## 2022-10-18 DIAGNOSIS — Z09 SURGERY FOLLOW-UP: ICD-10-CM

## 2022-10-18 PROBLEM — R06.09 DOE (DYSPNEA ON EXERTION): Status: ACTIVE | Noted: 2021-05-03

## 2022-10-18 PROBLEM — N39.0 URINARY TRACT INFECTION WITHOUT HEMATURIA: Status: ACTIVE | Noted: 2022-03-02

## 2022-10-18 PROBLEM — N17.9 AKI (ACUTE KIDNEY INJURY) (HCC): Status: ACTIVE | Noted: 2020-02-05

## 2022-10-18 PROBLEM — I50.42 CHRONIC COMBINED SYSTOLIC AND DIASTOLIC CONGESTIVE HEART FAILURE (HCC): Status: ACTIVE | Noted: 2021-11-05

## 2022-10-18 PROBLEM — D64.9 ANEMIA: Status: ACTIVE | Noted: 2022-03-28

## 2022-10-18 PROBLEM — L98.9 LEG SKIN LESION, LEFT: Status: ACTIVE | Noted: 2022-06-09

## 2022-10-18 PROBLEM — R74.8 ELEVATED ALKALINE PHOSPHATASE LEVEL: Status: ACTIVE | Noted: 2022-03-28

## 2022-10-18 PROBLEM — R10.32 GROIN PAIN, LEFT: Status: ACTIVE | Noted: 2022-03-30

## 2022-10-18 PROBLEM — R60.0 LOWER EXTREMITY EDEMA: Status: ACTIVE | Noted: 2018-12-05

## 2022-10-18 PROBLEM — K29.00 ACUTE GASTRITIS: Status: ACTIVE | Noted: 2022-01-19

## 2022-10-18 PROBLEM — M79.642 LEFT HAND PAIN: Status: ACTIVE | Noted: 2022-04-28

## 2022-10-18 PROBLEM — I26.99 ACUTE PULMONARY EMBOLUS (HCC): Status: ACTIVE | Noted: 2018-09-18

## 2022-10-18 PROBLEM — I10 ESSENTIAL HYPERTENSION: Status: ACTIVE | Noted: 2018-12-05

## 2022-10-18 PROBLEM — I95.9 HYPOTENSION: Status: ACTIVE | Noted: 2022-03-02

## 2022-10-18 PROBLEM — D51.0 VITAMIN B12 DEFICIENCY ANEMIA DUE TO INTRINSIC FACTOR DEFICIENCY: Status: ACTIVE | Noted: 2022-01-20

## 2022-10-18 PROBLEM — N18.30 CKD (CHRONIC KIDNEY DISEASE) STAGE 3, GFR 30-59 ML/MIN (HCC): Status: ACTIVE | Noted: 2018-08-21

## 2022-10-18 PROBLEM — M65.332 TRIGGER FINGER, LEFT MIDDLE FINGER: Status: ACTIVE | Noted: 2022-03-30

## 2022-10-18 PROBLEM — R63.0 ANOREXIA: Status: ACTIVE | Noted: 2017-11-03

## 2022-10-18 PROBLEM — I42.8 NONISCHEMIC CARDIOMYOPATHY (HCC): Status: ACTIVE | Noted: 2021-08-18

## 2022-10-18 PROBLEM — R06.02 SHORTNESS OF BREATH: Status: ACTIVE | Noted: 2018-12-05

## 2022-10-18 PROBLEM — E78.5 DYSLIPIDEMIA: Status: ACTIVE | Noted: 2018-12-05

## 2022-10-18 PROCEDURE — 1123F ACP DISCUSS/DSCN MKR DOCD: CPT | Performed by: ORTHOPAEDIC SURGERY

## 2022-10-18 PROCEDURE — 99213 OFFICE O/P EST LOW 20 MIN: CPT | Performed by: ORTHOPAEDIC SURGERY

## 2022-10-18 PROCEDURE — G8417 CALC BMI ABV UP PARAM F/U: HCPCS | Performed by: ORTHOPAEDIC SURGERY

## 2022-10-18 PROCEDURE — 1036F TOBACCO NON-USER: CPT | Performed by: ORTHOPAEDIC SURGERY

## 2022-10-18 PROCEDURE — G8484 FLU IMMUNIZE NO ADMIN: HCPCS | Performed by: ORTHOPAEDIC SURGERY

## 2022-10-18 PROCEDURE — G8427 DOCREV CUR MEDS BY ELIG CLIN: HCPCS | Performed by: ORTHOPAEDIC SURGERY

## 2022-10-18 RX ORDER — SIMVASTATIN 40 MG
40 TABLET ORAL
COMMUNITY
Start: 2022-04-27

## 2022-10-18 RX ORDER — LISINOPRIL 5 MG/1
TABLET ORAL
COMMUNITY
Start: 2022-08-01

## 2022-10-18 RX ORDER — FEXOFENADINE HCL 180 MG/1
TABLET ORAL
COMMUNITY

## 2022-10-18 RX ORDER — MEGESTROL ACETATE 20 MG/1
1 TABLET ORAL DAILY
COMMUNITY
Start: 2022-06-09

## 2022-10-18 RX ORDER — CETIRIZINE HYDROCHLORIDE 10 MG/1
10 TABLET ORAL DAILY
COMMUNITY

## 2022-10-18 RX ORDER — SPIRONOLACTONE 25 MG/1
25 TABLET ORAL DAILY
COMMUNITY

## 2022-10-18 RX ORDER — CEFDINIR 300 MG/1
CAPSULE ORAL
COMMUNITY
Start: 2022-08-26

## 2022-10-18 RX ORDER — LEVOTHYROXINE SODIUM 0.03 MG/1
25 TABLET ORAL DAILY
COMMUNITY
Start: 2022-03-29

## 2022-10-18 RX ORDER — FLUTICASONE PROPIONATE 50 MCG
SPRAY, SUSPENSION (ML) NASAL
COMMUNITY
Start: 2022-08-02

## 2022-10-18 RX ORDER — FLUTICASONE PROPIONATE 50 MCG
2 SPRAY, SUSPENSION (ML) NASAL
COMMUNITY

## 2022-10-18 RX ORDER — AMOXICILLIN 250 MG
1 CAPSULE ORAL DAILY
COMMUNITY
Start: 2022-02-16

## 2022-10-18 RX ORDER — PANTOPRAZOLE SODIUM 40 MG/1
TABLET, DELAYED RELEASE ORAL
COMMUNITY
Start: 2022-03-23

## 2022-10-18 RX ORDER — SIMVASTATIN 40 MG
TABLET ORAL
COMMUNITY
Start: 2022-08-02

## 2022-10-18 RX ORDER — DOXYCYCLINE 100 MG/1
100 CAPSULE ORAL 2 TIMES DAILY
COMMUNITY
Start: 2022-03-03

## 2022-10-18 RX ORDER — TAMSULOSIN HYDROCHLORIDE 0.4 MG/1
0.4 CAPSULE ORAL DAILY
COMMUNITY
Start: 2022-02-28

## 2022-10-18 RX ORDER — MEGESTROL ACETATE 40 MG/ML
400 SUSPENSION ORAL DAILY
COMMUNITY

## 2022-10-18 RX ORDER — FAMOTIDINE 20 MG/1
20 TABLET, FILM COATED ORAL DAILY
COMMUNITY
Start: 2022-03-23

## 2022-10-18 RX ORDER — LANOLIN ALCOHOL/MO/W.PET/CERES
325 CREAM (GRAM) TOPICAL 2 TIMES DAILY
COMMUNITY
Start: 2022-03-09

## 2022-10-18 RX ORDER — LEVOTHYROXINE SODIUM 0.03 MG/1
TABLET ORAL
COMMUNITY
Start: 2022-09-26

## 2022-10-18 RX ORDER — CYANOCOBALAMIN 1000 UG/ML
1000 INJECTION INTRAMUSCULAR; SUBCUTANEOUS
COMMUNITY
Start: 2021-08-10

## 2022-10-18 RX ORDER — RIVAROXABAN 20 MG/1
TABLET, FILM COATED ORAL
COMMUNITY
Start: 2022-08-23

## 2022-10-18 RX ORDER — SUCRALFATE 1 G/1
TABLET ORAL
COMMUNITY
Start: 2022-03-30

## 2022-10-18 RX ORDER — MEGESTROL ACETATE 20 MG/1
TABLET ORAL
COMMUNITY
Start: 2022-09-28

## 2022-10-18 RX ORDER — ONDANSETRON 4 MG/1
8 TABLET, ORALLY DISINTEGRATING ORAL EVERY 6 HOURS PRN
COMMUNITY
Start: 2022-03-17

## 2022-10-18 RX ORDER — TORSEMIDE 10 MG/1
10 TABLET ORAL DAILY
COMMUNITY

## 2022-10-18 RX ORDER — ZOLPIDEM TARTRATE 10 MG/1
TABLET ORAL
COMMUNITY

## 2022-10-18 RX ORDER — PANTOPRAZOLE SODIUM 20 MG/1
20 TABLET, DELAYED RELEASE ORAL
COMMUNITY

## 2022-10-18 RX ORDER — PANTOPRAZOLE SODIUM 20 MG/1
TABLET, DELAYED RELEASE ORAL
COMMUNITY
Start: 2022-10-17

## 2022-10-18 RX ORDER — ZOLPIDEM TARTRATE 5 MG/1
5 TABLET ORAL
COMMUNITY

## 2022-10-18 RX ORDER — OXYCODONE AND ACETAMINOPHEN 7.5; 325 MG/1; MG/1
1 TABLET ORAL EVERY 4 HOURS PRN
COMMUNITY

## 2022-10-18 RX ORDER — AZELASTINE 1 MG/ML
1 SPRAY, METERED NASAL 2 TIMES DAILY
COMMUNITY

## 2022-10-18 RX ORDER — LISINOPRIL 5 MG/1
TABLET ORAL
COMMUNITY
Start: 2022-05-02

## 2022-10-18 RX ORDER — FERROUS SULFATE 325(65) MG
TABLET ORAL
COMMUNITY

## 2022-10-18 NOTE — PROGRESS NOTES
Name: Merceda Scheuermann  YOB: 1940  Gender: male  MRN: [id number]    CC:   Chief Complaint   Patient presents with    Follow-up     S/P right revision reverse TSA DOS 2-18-22   , The patient follows up 8 months status post revision reverse TSA. Right shoulder revision reverse TSA  DOS 2/18/22    HPI: The patient is doing well. They feel as if they have improved from the surgery and pre-surgery state. States his left shoulder is getting a little sore at times now. Nothing to do yet. Allergies   Allergen Reactions    Morphine Nausea And Vomiting     Other reaction(s): Nausea and/or vomiting-Intolerance  Other reaction(s): Nausea and/or vomiting-Intolerance      [facility] Skin Test Nausea And Vomiting     Other reaction(s): Nausea and/or vomiting-Intolerance    Other Nausea And Vomiting     Dust and pollen causes runny nose.   Mollusks/clams- N/V     Past Medical History:   Diagnosis Date    Adverse effect of anesthesia     once briefly \"woke up\" during EGD    Basal cell carcinoma     LLE    Chronic combined systolic and diastolic congestive heart failure ([address]     Followed by [facility]; Echo (6/9/21) EF45%, Grade II (moderate) left ventricular diastolic dysfunction present    Chronic kidney disease     pt denies on 2/17/22    Chronic pain     right shoulder    Current use of long term anticoagulation     Xarelto    LEMUS (dyspnea on exertion)     Pt has been referred to pulmonary     Dysphagia     chronic, for which intermittent esophageal dilation    Erectile dysfunction     Fatigue     Former smoker     GERD (gastroesophageal reflux disease)     controlled with medication    High cholesterol     History of pulmonary embolism 09/18/2018    History of stomach cancer 2010    Hypertension     4/7/22 states not currently on BP medication    Iron deficiency anemia 05/2015    Macular degeneration     Nausea & vomiting     post-op nausea  states from morphine Nonischemic cardiomyopathy [address] [address]     with mild systolic dysfunction, followed by [facility]--Cath (5/13/21) normal coronary arteriogram    Osteoarthritis     Thromboembolus ([facility] [address]     Xarelto    Thyroid disease     levothyroxine    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    Unspecified adverse effect of anesthesia     wife states slow to wake     Past Surgical History:   Procedure Laterality Date    APPENDECTOMY      CHOLECYSTECTOMY  2015    COLONOSCOPY      GI  2010    gastrectomy 2/8/10-has 30% stomach left    KNEE ARTHROSCOPY Right     OTHER SURGICAL HISTORY      attempted tracheal dilation- stopped due to inflammation    SHOULDER ARTHROPLASTY Right     second replacement    SHOULDER ARTHROSCOPY Right 2015    TOTAL HIP ARTHROPLASTY Bilateral 2004    TOTAL KNEE ARTHROPLASTY Right 2012    UPPER GASTROINTESTINAL ENDOSCOPY  4/8/2022          Family History   Problem Relation Age of Onset    Osteoarthritis Brother     Alcohol Abuse Brother     Dementia Father     Kidney Disease Mother      Social History     Socioeconomic History    Marital status:      Spouse name: Not on file    Number of children: Not on file    Years of education: Not on file    Highest education level: Not on file   Occupational History    Not on file   Tobacco Use    Smoking status: Former     Packs/day: 0.50     Types: Cigarettes    Smokeless tobacco: Never    Tobacco comments:     Quit smoking: quit age 20; continuing cessation   Substance and Sexual Activity    Alcohol use: Yes    Drug use: No    Sexual activity: Not on file   Other Topics Concern    Not on file   Social History Narrative    Not on file     Social Determinants of Health     Financial Resource Strain: Not on file   Food Insecurity: Not on file   Transportation Needs: Not on file   Physical Activity: Not on file   Stress: Not on file   Social Connections: Not on file   Intimate Partner Violence: Not on file   Housing Stability: Not on file        No flowsheet data found. ,h    Review of Systems  Noncontributory     PE right shoulder:  General: Alert and Oriented x3 and appears to be of stated age. Head and Face: Normocephalic, atraumatic. Neck: Supple, normal range of motion. Lungs: Normal Respiratory rate. No dyspnea. Skin: No rash or erythema. Skin is cool to the touch. Surgical incisions appear to be healing well and there is no sign of infection. Psychiatric: Normal mood and affect. Answers questions appropriately. Musculoskeletal: The patient's Range of Motion today was measured at: Forward Elevation of 145. Abduction of 105. External Rotation of 25. The patient's strength today is:  External Rotation: 5. Abduction: 5.  Belly Press Test: 5  The swelling is minimal. They are neurovascularly intact. X-rayChryl Isanti and axillary lateral views of the operative right shoulder were obtained and reviewed today in the office. There has been no change in the hardware's alignment and the glenohumeral position is appropriate on all the films. Scapular spine is somewhat shielded by the implant. A/P:     ICD-10-CM    1. Status post reverse arthroplasty of right shoulder  Z96.611 XR SHOULDER RIGHT (MIN 2 VIEWS)      2. Surgery follow-up  Z09 XR SHOULDER RIGHT (MIN 2 VIEWS)        The patient is doing well status post the above mentioned procedure. They need to continue with Physical Therapy if they have not transitioned to a HEP. If they have any questions or concerns the patient knows that they can call our office at any time. Return in about 5 months (around 3/18/2023) for TSA x-ray fu Grashey / Axillary.      Darien Mancuso MD  10/18/22

## 2022-11-16 ENCOUNTER — APPOINTMENT (RX ONLY)
Dept: URBAN - METROPOLITAN AREA CLINIC 330 | Facility: CLINIC | Age: 82
Setting detail: DERMATOLOGY
End: 2022-11-16

## 2022-11-16 DIAGNOSIS — D22 MELANOCYTIC NEVI: ICD-10-CM | Status: STABLE

## 2022-11-16 DIAGNOSIS — L82.0 INFLAMED SEBORRHEIC KERATOSIS: ICD-10-CM

## 2022-11-16 DIAGNOSIS — L57.0 ACTINIC KERATOSIS: ICD-10-CM

## 2022-11-16 DIAGNOSIS — L57.8 OTHER SKIN CHANGES DUE TO CHRONIC EXPOSURE TO NONIONIZING RADIATION: ICD-10-CM | Status: INADEQUATELY CONTROLLED

## 2022-11-16 DIAGNOSIS — L82.1 OTHER SEBORRHEIC KERATOSIS: ICD-10-CM

## 2022-11-16 DIAGNOSIS — D18.0 HEMANGIOMA: ICD-10-CM

## 2022-11-16 DIAGNOSIS — Z85.828 PERSONAL HISTORY OF OTHER MALIGNANT NEOPLASM OF SKIN: ICD-10-CM

## 2022-11-16 PROBLEM — D22.5 MELANOCYTIC NEVI OF TRUNK: Status: ACTIVE | Noted: 2022-11-16

## 2022-11-16 PROBLEM — D18.01 HEMANGIOMA OF SKIN AND SUBCUTANEOUS TISSUE: Status: ACTIVE | Noted: 2022-11-16

## 2022-11-16 PROCEDURE — ? MEDICATION COUNSELING

## 2022-11-16 PROCEDURE — ? LIQUID NITROGEN

## 2022-11-16 PROCEDURE — 17000 DESTRUCT PREMALG LESION: CPT | Mod: 59

## 2022-11-16 PROCEDURE — ? BODY PHOTOGRAPHY

## 2022-11-16 PROCEDURE — ? OTHER

## 2022-11-16 PROCEDURE — ? TREATMENT REGIMEN

## 2022-11-16 PROCEDURE — 17110 DESTRUCTION B9 LES UP TO 14: CPT

## 2022-11-16 PROCEDURE — ? COUNSELING

## 2022-11-16 PROCEDURE — ? FULL BODY SKIN EXAM

## 2022-11-16 PROCEDURE — ? ADDITIONAL NOTES

## 2022-11-16 PROCEDURE — 99214 OFFICE O/P EST MOD 30 MIN: CPT | Mod: 25

## 2022-11-16 PROCEDURE — 17003 DESTRUCT PREMALG LES 2-14: CPT | Mod: 59

## 2022-11-16 PROCEDURE — ? PRESCRIPTION

## 2022-11-16 RX ORDER — FLUOROURACIL 5 MG/G
CREAM TOPICAL
Qty: 40 | Refills: 0 | Status: ERX | COMMUNITY
Start: 2022-11-16

## 2022-11-16 RX ADMIN — FLUOROURACIL: 5 CREAM TOPICAL at 00:00

## 2022-11-16 ASSESSMENT — LOCATION SIMPLE DESCRIPTION DERM
LOCATION SIMPLE: RIGHT FOREARM
LOCATION SIMPLE: LEFT UPPER BACK
LOCATION SIMPLE: RIGHT HAND
LOCATION SIMPLE: LEFT PRETIBIAL REGION
LOCATION SIMPLE: LEFT OCCIPITAL SCALP
LOCATION SIMPLE: NOSE
LOCATION SIMPLE: SCALP
LOCATION SIMPLE: LEFT HAND
LOCATION SIMPLE: LEFT POSTERIOR THIGH
LOCATION SIMPLE: LOWER BACK
LOCATION SIMPLE: LEFT SCALP
LOCATION SIMPLE: RIGHT SCALP

## 2022-11-16 ASSESSMENT — LOCATION ZONE DERM
LOCATION ZONE: TRUNK
LOCATION ZONE: SCALP
LOCATION ZONE: LEG
LOCATION ZONE: NOSE
LOCATION ZONE: ARM
LOCATION ZONE: HAND

## 2022-11-16 ASSESSMENT — LOCATION DETAILED DESCRIPTION DERM
LOCATION DETAILED: LEFT INFERIOR UPPER BACK
LOCATION DETAILED: LEFT DISTAL PRETIBIAL REGION
LOCATION DETAILED: LEFT DISTAL POSTERIOR THIGH
LOCATION DETAILED: LEFT SUPERIOR PARIETAL SCALP
LOCATION DETAILED: SUPERIOR LUMBAR SPINE
LOCATION DETAILED: RIGHT LATERAL FRONTAL SCALP
LOCATION DETAILED: LEFT CENTRAL FRONTAL SCALP
LOCATION DETAILED: NASAL SUPRATIP
LOCATION DETAILED: LEFT SUPERIOR OCCIPITAL SCALP
LOCATION DETAILED: RIGHT PROXIMAL RADIAL DORSAL FOREARM
LOCATION DETAILED: RIGHT RADIAL DORSAL HAND
LOCATION DETAILED: LEFT ULNAR DORSAL HAND

## 2022-11-16 ASSESSMENT — PAIN INTENSITY VAS: HOW INTENSE IS YOUR PAIN 0 BEING NO PAIN, 10 BEING THE MOST SEVERE PAIN POSSIBLE?: 1/10 PAIN

## 2022-11-16 NOTE — PROCEDURE: MEDICATION COUNSELING
Physical Therapy  Visit Type: treatment  Precautions:  Medical precautions:  fall risk; standard precautions.    12/11/2020-> fall w/ R hip pain-> [facility] ED-L XR R hip-> R femoral neck fx; NWB R LE  12/12/2020-> OR for R hip hemiarthroplasty-> 7 tele.    Therapist wearing gloves, eye protection and surgical mask during session.    Patient was wearing mask throughout duration of therapy session. Pt's roommate unmasked while eating breakfast.   Lower Extremity:    Left:  weight bearing: as tolerated.    Right:  weight bearing: as tolerated.  Safety Measures: bed alarm and bed rails      SUBJECTIVE                                                                                                            Patient agreed to participate in therapy this date.  \"That was better I think\"   Patient / Family Goal: return to previous functional status    Pain     Location: R hip    At onset of session (out of 10): 8  RN informed on pain level      OBJECTIVE                                                                                                                Oriented to person, place, time and situation     Arousal alertness: appropriate responses to stimuli    Affect/Behavior: alert, appropriate and pleasant  Functional Communication/Cognition    Overall status:  Within functional limits  Bed Mobility:      Supine to sit: minimal assist (for RLE to EOB and minimal assist at trunk to upright with HOb elevated 30 degrees )  Training completed:      Education details: body mechanics, patient safety and patient requires additional training  Transfers:    Assistive devices: 2-wheeled walker    Sit to stand: minimal assist    Stand to sit: minimal assist and contact guard/touching/steadying assist  Training completed:    Tasks: sit to stand and stand to sit    Education details: body mechanics and patient safety    VC for safe hand placement initially with good carry over  Gait/Ambulation:     Assistance: minimal assist and  contact guard/touching/steadying assist   Assistive device: 2-wheeled walker    Distance (ft): 35; 35    Type: antalgic    Stance phase: Right: decreased heel strike and decreased push off    Swing phase: Left: decreased step length; Right: decreased hip flexion in swing    Gait description: Right: decreased stance time  Training Completed:    Tasks: gait training on level surfaces    Education details: body mechanics and patient safety    VC to maintain RW within DWAINE especially while turning, for upright posture, to promote R heel strike at initial contact and to equalize step lengths. No LOB noted but increased R knee buckling in stance phase with fatigue at end of ambulation distance. Gait tolerance limited by general fatigue and RLE weakness requiring brief standing rest breaks x5.   Stair Mobility:      Assistance: not attempted due to safety concerns        Interventions                                                                                                       Seated    Lower Extremity: Bilateral: knee extensions, heel raises, toe raises and knee flexion (AAROM for end range knee extension on the left ), AROM and AAROM, 15 reps, 1 sets  Training provided: activity tolerance, balance retraining, bed mobility training, gait training, breathing/relaxation, energy conservation, transfer training, safety training, HEP training and body mechanics  Educated pt on there-ex to be completed independently as part of HEP to promote strengthening, circulation and ROM.   Educated on PT POC and activity progression during acute stay. Encouraged pt to sit up in chair 3 x/day for at least 1 hour each. Ambulate to washroom with nursing staff assist.   Skilled input: Verbal instruction/cues and tactile instruction/cues  Verbal Consent: Writer verbally educated and received verbal consent for hand placement, positioning of patient, and techniques to be performed today from patient for hand placement and palpation for  techniques and therapist position for techniques as described above and how they are pertinent to the patient's plan of care.        ASSESSMENT                                                                                                                Impairments: strength, balance deficits, pain, activity tolerance and endurance  Functional Limitations: all functional mobility     Discharge Recommendations   Recommendation for Discharge: PT IL: Patient requires 24 hour nonskilled assistance to perform mobility and/or ADLs safely, Patient needs daily, skilled therapy for 1-3 hours a day by at least two disciplines        PT/OT Mobility Equipment for Discharge: RW            Therapy Diagnosis:  Other Abnormalities of Gait and Mobility    Skilled therapy is required to address these limitations in attempt to maximize the patient's independence.  Progress: improving as expected    Pain at end of session: RN informed on pain level 7/10, location: R hip    End of Session:   Location: in chair  Safety measures: alarm system in place/re-engaged, equipment intact, lines intact and call light within reach  Handoff to: nurse            PLAN                                                                                                                            Suggestions for next session as indicated: PT Frequency: 7 days/week  Frequency Comments: ORTHO QD ECF 12.14     A minimum of 8 minutes per session x 1 week.    Interventions: balance, bed mobility, body mechanics, energy conservation, gait training, strengthening, safety education, HEP train/position and functional transfer training  Agreement to plan and goals: patient agrees with goals and treatment plan        GOALS:  Review Date: 12/14/2020  Long Term Goals: (to be met by time of discharge from hospital)  Sit to supine: Patient will complete sit to supine supervision.  Status: progressing/ongoing  Supine to sit: Patient will complete supine to sit supervision.   Status: progressing/ongoing  Sit to stand: Patient will complete sit to stand transfer with 2-wheeled walker, supervision.   Status: progressing/ongoing  Stand to sit: Patient will complete stand to sit transfer with 2-wheeled walker, supervision.   Status: progressing/ongoing  Ambulation (even): Patient will ambulate on even surface for 75 feet with 2-wheeled walker, supervision.   Status: progressing/ongoing  Curb step: Patient will ambulate curb step with 2-wheeled walker, contact guard or touching/steadying.   Status: progressing/ongoing    Documented in the chart in the following areas: Assessment.         Ketoconazole Pregnancy And Lactation Text: This medication is Pregnancy Category C and it isn't know if it is safe during pregnancy. It is also excreted in breast milk and breast feeding isn't recommended.

## 2022-11-16 NOTE — PROCEDURE: MEDICATION COUNSELING
Xelwilfridoz Pregnancy And Lactation Text: This medication is Pregnancy Category D and is not considered safe during pregnancy.  The risk during breast feeding is also uncertain.

## 2022-11-16 NOTE — PROCEDURE: LIQUID NITROGEN
Show Applicator Variable?: Yes
Duration Of Freeze Thaw-Cycle (Seconds): 2
Consent: The patient's consent was obtained including but not limited to risks of crusting, scabbing, blistering, scarring, darker or lighter pigmentary change, recurrence, incomplete removal and infection.
Detail Level: Detailed
Render Post-Care Instructions In Note?: no
Number Of Freeze-Thaw Cycles: 3 freeze-thaw cycles
Post-Care Instructions: I reviewed with the patient in detail post-care instructions. Patient is to wear sunprotection, and avoid picking at any of the treated lesions. Pt may apply Vaseline to crusted or scabbing areas.
Medical Necessity Information: It is in your best interest to select a reason for this procedure from the list below. All of these items fulfill various CMS LCD requirements except the new and changing color options.
Spray Paint Text: The liquid nitrogen was applied to the skin utilizing a spray paint frosting technique.
Medical Necessity Clause: This procedure was medically necessary because the lesions that were treated were:

## 2022-11-16 NOTE — PROCEDURE: OTHER
Render Risk Assessment In Note?: yes
Note Text (......Xxx Chief Complaint.): This diagnosis correlates with the
Detail Level: Simple

## 2022-11-16 NOTE — PROCEDURE: BODY PHOTOGRAPHY
Was The Entire Body Photographed (Cannot Bill Unless Entire Body Photographed)?: Please Select Yes or No - If you select Yes you are confirming that you took full body photographs
Detail Level: Detailed
Number Of Photographs (Optional- Will Not Render If 0): 1
Reason For Photography: The patient is obtaining whole body photography to observe existing suspicious moles and or monitor for the appearance of any new lesions.
Consent was obtained for whole body photography. Patient understands that photograph costs may not be covered by insurance.
Whole Body Statement: The whole body was photographed today.

## 2023-07-24 ENCOUNTER — APPOINTMENT (RX ONLY)
Dept: URBAN - METROPOLITAN AREA CLINIC 330 | Facility: CLINIC | Age: 83
Setting detail: DERMATOLOGY
End: 2023-07-24

## 2023-07-24 DIAGNOSIS — Z85.828 PERSONAL HISTORY OF OTHER MALIGNANT NEOPLASM OF SKIN: ICD-10-CM

## 2023-07-24 DIAGNOSIS — D22 MELANOCYTIC NEVI: ICD-10-CM

## 2023-07-24 DIAGNOSIS — L57.8 OTHER SKIN CHANGES DUE TO CHRONIC EXPOSURE TO NONIONIZING RADIATION: ICD-10-CM

## 2023-07-24 DIAGNOSIS — L57.0 ACTINIC KERATOSIS: ICD-10-CM

## 2023-07-24 PROBLEM — D48.5 NEOPLASM OF UNCERTAIN BEHAVIOR OF SKIN: Status: ACTIVE | Noted: 2023-07-24

## 2023-07-24 PROBLEM — D22.5 MELANOCYTIC NEVI OF TRUNK: Status: ACTIVE | Noted: 2023-07-24

## 2023-07-24 PROCEDURE — 17000 DESTRUCT PREMALG LESION: CPT | Mod: 59

## 2023-07-24 PROCEDURE — ? MEDICAL PHOTOGRAPHY REVIEW

## 2023-07-24 PROCEDURE — ? BIOPSY BY SHAVE METHOD

## 2023-07-24 PROCEDURE — ? DEFER

## 2023-07-24 PROCEDURE — ? LIQUID NITROGEN

## 2023-07-24 PROCEDURE — 17003 DESTRUCT PREMALG LES 2-14: CPT

## 2023-07-24 PROCEDURE — ? ADDITIONAL NOTES

## 2023-07-24 PROCEDURE — ? COUNSELING

## 2023-07-24 PROCEDURE — 11102 TANGNTL BX SKIN SINGLE LES: CPT

## 2023-07-24 PROCEDURE — ? FULL BODY SKIN EXAM

## 2023-07-24 PROCEDURE — ? PHOTO-DOCUMENTATION

## 2023-07-24 PROCEDURE — 99213 OFFICE O/P EST LOW 20 MIN: CPT | Mod: 25

## 2023-07-24 ASSESSMENT — LOCATION SIMPLE DESCRIPTION DERM
LOCATION SIMPLE: RIGHT HAND
LOCATION SIMPLE: LEFT PRETIBIAL REGION
LOCATION SIMPLE: RIGHT FOREARM
LOCATION SIMPLE: LEFT SCALP
LOCATION SIMPLE: POSTERIOR SCALP
LOCATION SIMPLE: SCALP
LOCATION SIMPLE: LEFT HAND
LOCATION SIMPLE: LEFT UPPER BACK

## 2023-07-24 ASSESSMENT — LOCATION DETAILED DESCRIPTION DERM
LOCATION DETAILED: RIGHT RADIAL DORSAL HAND
LOCATION DETAILED: LEFT INFERIOR UPPER BACK
LOCATION DETAILED: RIGHT DISTAL DORSAL FOREARM
LOCATION DETAILED: LEFT ULNAR DORSAL HAND
LOCATION DETAILED: LEFT SUPERIOR PARIETAL SCALP
LOCATION DETAILED: LEFT CENTRAL FRONTAL SCALP
LOCATION DETAILED: RIGHT SUPERIOR OCCIPITAL SCALP
LOCATION DETAILED: LEFT DISTAL PRETIBIAL REGION
LOCATION DETAILED: RIGHT PROXIMAL RADIAL DORSAL FOREARM

## 2023-07-24 ASSESSMENT — LOCATION ZONE DERM
LOCATION ZONE: TRUNK
LOCATION ZONE: HAND
LOCATION ZONE: SCALP
LOCATION ZONE: ARM
LOCATION ZONE: LEG

## 2023-07-24 NOTE — PROCEDURE: LIQUID NITROGEN
Duration Of Freeze Thaw-Cycle (Seconds): 2
Post-Care Instructions: I reviewed with the patient in detail post-care instructions. Patient is to wear sunprotection, and avoid picking at any of the treated lesions. Pt may apply Vaseline to crusted or scabbing areas.
Render Post-Care Instructions In Note?: no
Detail Level: Detailed
Number Of Freeze-Thaw Cycles: 3 freeze-thaw cycles
Show Aperture Variable?: Yes
Consent: The patient's consent was obtained including but not limited to risks of crusting, scabbing, blistering, scarring, darker or lighter pigmentary change, recurrence, incomplete removal and infection.

## 2023-07-24 NOTE — HPI: SKIN LESIONS
How Severe Is Your Skin Lesion?: mild
Have Your Skin Lesions Been Treated?: not been treated
Is This A New Presentation, Or A Follow-Up?: Growth
Additional History: Pt was prescribed topical for itching. Pt states its been present for years until recently it became inflamed
Is This A New Presentation, Or A Follow-Up?: Skin Lesions

## 2023-07-24 NOTE — PROCEDURE: MIPS QUALITY
Quality 47: Advance Care Plan: Advance care planning not documented, reason not otherwise specified.
Quality 110: Preventive Care And Screening: Influenza Immunization: Influenza Immunization previously received during influenza season
Quality 130: Documentation Of Current Medications In The Medical Record: Current Medications Documented
Quality 226: Preventive Care And Screening: Tobacco Use: Screening And Cessation Intervention: Patient screened for tobacco use and is an ex/non-smoker
Detail Level: Detailed
Quality 431: Preventive Care And Screening: Unhealthy Alcohol Use - Screening: Patient not identified as an unhealthy alcohol user when screened for unhealthy alcohol use using a systematic screening method

## 2023-07-24 NOTE — PROCEDURE: BIOPSY BY SHAVE METHOD
Post-Care Instructions: I reviewed with the patient in detail post-care instructions. Patient is to keep the biopsy site dry overnight, and then apply bacitracin twice daily until healed. Patient may apply hydrogen peroxide soaks to remove any crusting.
Hide Anticipated Plan (Based On Presumed Biopsy Results)?: No
Dressing: bandage
Cryotherapy Text: The wound bed was treated with cryotherapy after the biopsy was performed.
Was A Bandage Applied: Yes
Additional Anesthesia Volume In Cc (Will Not Render If 0): 0
Notification Instructions: Patient will be notified of biopsy results. However, patient instructed to call the office if not contacted within 2 weeks.
Electrodesiccation Text: The wound bed was treated with electrodesiccation after the biopsy was performed.
Biopsy Type: H and E
Depth Of Biopsy: dermis
Electrodesiccation And Curettage Text: The wound bed was treated with electrodesiccation and curettage after the biopsy was performed.
Anesthesia Type: 1% lidocaine with 1:100,000 epinephrine and a 1:10 solution of 8.4% sodium bicarbonate
Silver Nitrate Text: The wound bed was treated with silver nitrate after the biopsy was performed.
Biopsy Method: Dermablade
Billing Type: Third-Party Bill
Hemostasis: Aluminum Chloride
Lab: 6
Type Of Destruction Used: Curettage
Consent: Written consent was obtained and risks were reviewed including but not limited to scarring, infection, bleeding, scabbing, incomplete removal, nerve damage and allergy to anesthesia.
Anesthesia Volume In Cc (Will Not Render If 0): 0.5
Accession #: Skin path
Information: Selecting Yes will display possible errors in your note based on the variables you have selected. This validation is only offered as a suggestion for you. PLEASE NOTE THAT THE VALIDATION TEXT WILL BE REMOVED WHEN YOU FINALIZE YOUR NOTE. IF YOU WANT TO FAX A PRELIMINARY NOTE YOU WILL NEED TO TOGGLE THIS TO 'NO' IF YOU DO NOT WANT IT IN YOUR FAXED NOTE.
Wound Care: Petrolatum
Curettage Text: The wound bed was treated with curettage after the biopsy was performed.
Detail Level: Detailed

## 2023-08-08 ENCOUNTER — APPOINTMENT (RX ONLY)
Dept: URBAN - METROPOLITAN AREA CLINIC 330 | Facility: CLINIC | Age: 83
Setting detail: DERMATOLOGY
End: 2023-08-08

## 2023-08-08 VITALS — HEART RATE: 76 BPM | SYSTOLIC BLOOD PRESSURE: 127 MMHG | DIASTOLIC BLOOD PRESSURE: 81 MMHG | OXYGEN SATURATION: 98 %

## 2023-08-08 PROBLEM — C44.42 SQUAMOUS CELL CARCINOMA OF SKIN OF SCALP AND NECK: Status: ACTIVE | Noted: 2023-08-08

## 2023-08-08 PROCEDURE — ? MOHS SURGERY

## 2023-08-08 PROCEDURE — 17311 MOHS 1 STAGE H/N/HF/G: CPT

## 2023-08-08 PROCEDURE — 12032 INTMD RPR S/A/T/EXT 2.6-7.5: CPT

## 2023-08-08 NOTE — PROCEDURE: MOHS SURGERY
no concerns Intermediate Repair Preamble Text (Leave Blank If You Do Not Want): Undermining was performed with blunt dissection.

## 2023-08-08 NOTE — HPI: PROCEDURE (MOHS)
[id number]: previous_biopsy_has_been_previously_biopsied Has The Growth Been Previously Biopsied?: has been previously biopsied

## 2023-08-17 ENCOUNTER — APPOINTMENT (RX ONLY)
Dept: URBAN - METROPOLITAN AREA CLINIC 330 | Facility: CLINIC | Age: 83
Setting detail: DERMATOLOGY
End: 2023-08-17

## 2023-08-17 DIAGNOSIS — Z48.02 ENCOUNTER FOR REMOVAL OF SUTURES: ICD-10-CM

## 2023-08-17 PROBLEM — D48.5 NEOPLASM OF UNCERTAIN BEHAVIOR OF SKIN: Status: ACTIVE | Noted: 2023-08-17

## 2023-08-17 PROCEDURE — ? SUTURE REMOVAL (GLOBAL PERIOD)

## 2023-08-17 PROCEDURE — ? COUNSELING

## 2023-08-17 PROCEDURE — ? BIOPSY BY SHAVE METHOD

## 2023-08-17 PROCEDURE — 11102 TANGNTL BX SKIN SINGLE LES: CPT | Mod: 79

## 2023-08-17 PROCEDURE — 99024 POSTOP FOLLOW-UP VISIT: CPT

## 2023-08-17 ASSESSMENT — LOCATION DETAILED DESCRIPTION DERM: LOCATION DETAILED: RIGHT INFERIOR POSTAURICULAR SKIN

## 2023-08-17 ASSESSMENT — LOCATION ZONE DERM: LOCATION ZONE: SCALP

## 2023-08-17 ASSESSMENT — LOCATION SIMPLE DESCRIPTION DERM: LOCATION SIMPLE: SCALP

## 2023-08-17 NOTE — PROCEDURE: SUTURE REMOVAL (GLOBAL PERIOD)
Detail Level: Detailed
Add 95505 Cpt? (Important Note: In 2017 The Use Of 41891 Is Being Tracked By Cms To Determine Future Global Period Reimbursement For Global Periods): yes

## 2023-10-10 ENCOUNTER — APPOINTMENT (RX ONLY)
Dept: URBAN - METROPOLITAN AREA CLINIC 330 | Facility: CLINIC | Age: 83
Setting detail: DERMATOLOGY
End: 2023-10-10

## 2023-10-10 PROBLEM — C44.311 BASAL CELL CARCINOMA OF SKIN OF NOSE: Status: ACTIVE | Noted: 2023-10-10

## 2023-10-10 PROCEDURE — 17282 DSTR MAL LS F/E/E/N/L/M1.1-2: CPT

## 2023-10-10 PROCEDURE — ? COUNSELING

## 2023-10-10 PROCEDURE — ? CURETTAGE AND DESTRUCTION

## 2023-10-10 NOTE — PROCEDURE: CURETTAGE AND DESTRUCTION

## 2023-10-30 ENCOUNTER — APPOINTMENT (RX ONLY)
Dept: URBAN - METROPOLITAN AREA CLINIC 330 | Facility: CLINIC | Age: 83
Setting detail: DERMATOLOGY
End: 2023-10-30

## 2023-10-30 DIAGNOSIS — L57.0 ACTINIC KERATOSIS: ICD-10-CM

## 2023-10-30 PROBLEM — D48.5 NEOPLASM OF UNCERTAIN BEHAVIOR OF SKIN: Status: ACTIVE | Noted: 2023-10-30

## 2023-10-30 PROCEDURE — ? LIQUID NITROGEN

## 2023-10-30 PROCEDURE — 17003 DESTRUCT PREMALG LES 2-14: CPT

## 2023-10-30 PROCEDURE — ? COUNSELING

## 2023-10-30 PROCEDURE — ? BIOPSY BY SHAVE METHOD

## 2023-10-30 PROCEDURE — 17000 DESTRUCT PREMALG LESION: CPT | Mod: 59

## 2023-10-30 PROCEDURE — 11102 TANGNTL BX SKIN SINGLE LES: CPT

## 2023-10-30 ASSESSMENT — LOCATION DETAILED DESCRIPTION DERM
LOCATION DETAILED: LEFT SUPERIOR FOREHEAD
LOCATION DETAILED: LEFT SUPERIOR PARIETAL SCALP
LOCATION DETAILED: LEFT CENTRAL FRONTAL SCALP
LOCATION DETAILED: POSTERIOR MID-PARIETAL SCALP
LOCATION DETAILED: LEFT CENTRAL PARIETAL SCALP
LOCATION DETAILED: RIGHT SUPERIOR PARIETAL SCALP
LOCATION DETAILED: LEFT SUPERIOR OCCIPITAL SCALP

## 2023-10-30 ASSESSMENT — LOCATION SIMPLE DESCRIPTION DERM
LOCATION SIMPLE: SCALP
LOCATION SIMPLE: LEFT SCALP
LOCATION SIMPLE: POSTERIOR SCALP
LOCATION SIMPLE: LEFT OCCIPITAL SCALP
LOCATION SIMPLE: LEFT FOREHEAD

## 2023-10-30 ASSESSMENT — LOCATION ZONE DERM
LOCATION ZONE: FACE
LOCATION ZONE: SCALP

## 2023-10-30 NOTE — PROCEDURE: LIQUID NITROGEN
Post-Care Instructions: I reviewed with the patient in detail post-care instructions. Patient is to wear sunprotection, and avoid picking at any of the treated lesions. Pt may apply Vaseline to crusted or scabbing areas.
Consent: The patient's consent was obtained including but not limited to risks of crusting, scabbing, blistering, scarring, darker or lighter pigmentary change, recurrence, incomplete removal and infection.
Render Note In Bullet Format When Appropriate: No
Detail Level: Detailed
Show Applicator Variable?: Yes
Number Of Freeze-Thaw Cycles: 3 freeze-thaw cycles
Duration Of Freeze Thaw-Cycle (Seconds): 2

## 2023-12-07 ENCOUNTER — APPOINTMENT (RX ONLY)
Dept: URBAN - METROPOLITAN AREA CLINIC 330 | Facility: CLINIC | Age: 83
Setting detail: DERMATOLOGY
End: 2023-12-07

## 2023-12-07 PROBLEM — C44.42 SQUAMOUS CELL CARCINOMA OF SKIN OF SCALP AND NECK: Status: ACTIVE | Noted: 2023-12-07

## 2023-12-07 PROCEDURE — 17273 DSTR MAL LES S/N/H/F/G 2.1-3: CPT

## 2023-12-07 PROCEDURE — ? CURETTAGE AND DESTRUCTION

## 2023-12-07 PROCEDURE — ? COUNSELING

## 2023-12-07 NOTE — PROCEDURE: CURETTAGE AND DESTRUCTION
Detail Level: Detailed
Biopsy Photograph Reviewed: Yes
Number Of Curettages: 3
Size Of Lesion In Cm: 2.7
Add Intralesional Injection: No
Concentration (Mg/Ml Or Millions Of Plaque Forming Units/Cc): 0.01
Total Volume (Ccs): 1
Anesthesia Type: 1% lidocaine with epinephrine and a 1:10 solution of 8.4% sodium bicarbonate
Cautery Type: electrodesiccation
What Was Performed First?: Curettage
Final Size Statement: The size of the lesion after curettage was
Additional Information: (Optional): The wound was cleaned, and a pressure dressing was applied.  The patient received detailed post-op instructions.
Consent was obtained from the patient. The risks, benefits and alternatives to therapy were discussed in detail. Specifically, the risks of infection, scarring, bleeding, prolonged wound healing, nerve injury, incomplete removal, allergy to anesthesia and recurrence were addressed. Alternatives to ED&C, such as: surgical removal and XRT were also discussed.  Prior to the procedure, the treatment site was clearly identified and confirmed by the patient. All components of Universal Protocol/PAUSE Rule completed.
Post-Care Instructions: I reviewed with the patient in detail post-care instructions. Patient is to keep the area dry for 48 hours, and not to engage in any swimming until the area is healed. Should the patient develop any fevers, chills, bleeding, severe pain patient will contact the office immediately.
Bill As A Line Item Or As Units: Line Item
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
## 2024-04-02 ENCOUNTER — HOSPITAL ENCOUNTER (OUTPATIENT)
Dept: PHYSICAL THERAPY | Age: 84
Setting detail: RECURRING SERIES
Discharge: HOME | End: 2024-04-05
Payer: MEDICARE

## 2024-04-02 DIAGNOSIS — M62.81 MUSCLE WEAKNESS (GENERALIZED): ICD-10-CM

## 2024-04-02 DIAGNOSIS — M25.612 STIFFNESS OF LEFT SHOULDER, NOT ELSEWHERE CLASSIFIED: Primary | ICD-10-CM

## 2024-04-02 PROCEDURE — 97162 PT EVAL MOD COMPLEX 30 MIN: CPT

## 2024-04-02 PROCEDURE — 97110 THERAPEUTIC EXERCISES: CPT

## 2024-04-02 ASSESSMENT — PAIN SCALES - GENERAL: PAINLEVEL_OUTOF10: 0

## 2024-04-02 NOTE — THERAPY EVALUATION
Elias Tolbertmoiseaiden  : 1940  Primary: Medicare Part A And B (Medicare)  Secondary: [employer] MEDICARE SUPP [facility] [place] [facility] [place]  [place] [address]  Phone: [phone]  Fax: [phone] Plan Frequency: 2x a week for 90 days    Plan of Care/Certification Expiration Date: 24        Plan of Care/Certification Expiration Date:  Plan of Care/Certification Expiration Date: 24    Frequency/Duration: Plan Frequency: 2x a week for 90 days      Time In/Out:   Time In: 1040  Time Out: 1125      PT Visit Info:    Progress Note Counter: 1      Visit Count:  1                OUTPATIENT PHYSICAL THERAPY:             Initial Assessment 2024               Episode (L humeral fracture)         Treatment Diagnosis:     Stiffness of left shoulder, not elsewhere classified  Muscle weakness (generalized)  Medical/Referring Diagnosis:    Other nondisplaced fracture of upper end of left humerus, subsequent encounter for fracture with routine healing [S42.295D]    Referring Physician:  Alvino Fontanez MD MD Orders:  PT Eval and Treat   Return MD Appt:  May 2024  Date of Onset:  Onset Date: 02/15/24     Allergies:  Morphine, Northern quahog clam (m. mercenaria) skin test, and Other  Restrictions/Precautions:    None      Medications Last Reviewed:  2024     SUBJECTIVE   History of Injury/Illness (Reason for Referral):  Pt notes he fell down the stairs 2/15/24 and sustained a humeral head fracture. Notes his wrist was also swollen and painful. Reports he cut his Lt LE open as well. Notes the wrist feels much better after injection and the shoulder only hurts when he moves it. Notes overall stiffness in shoulder and elbow from being in sling. Denies numbness and tingling. States most difficulty with getting dressed, household chores, opening lids, preparing things to eat.   Patient Stated Goal(s):  \"Improve left shoulder mobility\"  Initial Pain Level:      0/10

## 2024-04-03 NOTE — PROGRESS NOTES
Elias Collazo Candelaria  : 1940  Primary: Medicare Part A And B (Medicare)  Secondary: CIGNA MEDICARE SUPP [facility] [place]  [place] [address]  Phone: [phone]  Fax: [phone] Plan Frequency: 2x a week for 90 days    Plan of Care/Certification Expiration Date: 24        Plan of Care/Certification Expiration Date:  Plan of Care/Certification Expiration Date: 24    Frequency/Duration:   Plan Frequency: 2x a week for 90 days      Time In/Out:   Time In: 1040  Time Out: 1125      PT Visit Info:    Progress Note Counter: 1      Visit Count:  1    OUTPATIENT PHYSICAL THERAPY:   Treatment Note 2024       Episode  (L humeral fracture)               Treatment Diagnosis:    Stiffness of left shoulder, not elsewhere classified  Muscle weakness (generalized)  Medical/Referring Diagnosis:    Other nondisplaced fracture of upper end of left humerus, subsequent encounter for fracture with routine healing [S42.295D]    Referring Physician:  Alvino Fontanez MD MD Orders:  PT Eval and Treat   Return MD Appt:  May 2024  Date of Onset:  Onset Date: 02/15/24     Allergies:   Morphine, Northern quahog clam (m. mercenaria) skin test, and Other  Restrictions/Precautions:   None      Interventions Planned (Treatment may consist of any combination of the following):     See Assessment Note    Subjective Comments:   See eval  Initial Pain Level::     0/10  Post Session Pain Level:       2/10  Medications Last Reviewed:  2024  Updated Objective Findings:  See Evaluation Note from today  Treatment   THERAPEUTIC EXERCISE: (10 minutes):  Exercises per grid below to improve mobility and strength.  Required moderate visual, verbal, manual and tactile cues to promote proper body alignment, promote proper body posture and promote proper body mechanics.  Progressed resistance, range, repetitions and complexity of movement as indicated.      Date:  2024

## 2024-04-04 ENCOUNTER — HOSPITAL ENCOUNTER (OUTPATIENT)
Dept: PHYSICAL THERAPY | Age: 84
Setting detail: RECURRING SERIES
Discharge: HOME | End: 2024-04-07
Payer: MEDICARE

## 2024-04-04 PROCEDURE — 97140 MANUAL THERAPY 1/> REGIONS: CPT

## 2024-04-04 PROCEDURE — 97110 THERAPEUTIC EXERCISES: CPT

## 2024-04-04 ASSESSMENT — PAIN SCALES - GENERAL: PAINLEVEL_OUTOF10: 0

## 2024-04-04 NOTE — PROGRESS NOTES
with L shoulder abd and ER PROM. Cues to prevent mm guarding. Educated on sling weaning.   Communication/Consultation:  Therapy Evaluation sent to referring provider  Equipment provided today:  None  Recommendations/Intent for next treatment session: Next visit will focus on progressing strength and ROM.     Total Treatment Billable Duration:  55 minutes  Time In: 1030  Time Out: 1130    Sade Hernandez, PT           Charge Capture  [employer] Portal  Appt Desk     Future Appointments   Date Time Provider Department Center   4/9/2024 10:30 AM Sade Hernandez, PT [facility]   4/11/2024 10:30 AM Sade Hernandez, PT [facility]   4/16/2024 10:30 AM Sade Hernandez, PT [facility]   4/18/2024 10:30 AM Sade Hernandez, PT [facility]   4/22/2024 12:30 PM Sade Hernandez, PT [facility]

## 2024-04-09 ENCOUNTER — HOSPITAL ENCOUNTER (OUTPATIENT)
Dept: PHYSICAL THERAPY | Age: 84
Setting detail: RECURRING SERIES
Discharge: HOME | End: 2024-04-12
Payer: MEDICARE

## 2024-04-09 PROCEDURE — 97110 THERAPEUTIC EXERCISES: CPT

## 2024-04-09 PROCEDURE — 97140 MANUAL THERAPY 1/> REGIONS: CPT

## 2024-04-09 ASSESSMENT — PAIN SCALES - GENERAL: PAINLEVEL_OUTOF10: 2

## 2024-04-09 NOTE — PROGRESS NOTES
Elias Collazo Candelaria  : 1940  Primary: Medicare Part A And B (Medicare)  Secondary: CIGNA MEDICARE SUPP [facility] [place]  [place] [address]  Phone: [phone]  Fax: [phone] Plan Frequency: 2x a week for 90 days    Plan of Care/Certification Expiration Date: 24        Plan of Care/Certification Expiration Date:  Plan of Care/Certification Expiration Date: 24    Frequency/Duration:   Plan Frequency: 2x a week for 90 days      Time In/Out:   Time In: 1030  Time Out: 1130      PT Visit Info:    Progress Note Counter: 2      Visit Count:  3    OUTPATIENT PHYSICAL THERAPY:   Treatment Note 2024       Episode  (L humeral fracture)               Treatment Diagnosis:    Stiffness of left shoulder, not elsewhere classified  Muscle weakness (generalized)  Medical/Referring Diagnosis:    Other nondisplaced fracture of upper end of left humerus, subsequent encounter for fracture with routine healing [S42.295D]    Referring Physician:  Alvino Fontanez MD MD Orders:  PT Eval and Treat   Return MD Appt:  May 2024  Date of Onset:  Onset Date: 02/15/24     Allergies:   Morphine, Northern quahog clam (m. mercenaria) skin test, and Other  Restrictions/Precautions:   None      Interventions Planned (Treatment may consist of any combination of the following):     See Assessment Note    Subjective Comments:   Pt notes he was sore with the exercises from home but it was not very painful. Just sore.   Initial Pain Level::     2/10  Post Session Pain Level:       1/10  Medications Last Reviewed:  2024  Updated Objective Findings:  None Today  Treatment   THERAPEUTIC EXERCISE: (15 minutes):  Exercises per grid below to improve mobility and strength.  Required moderate visual, verbal, manual and tactile cues to promote proper body alignment, promote proper body posture and promote proper body mechanics.  Progressed resistance, range, repetitions and

## 2024-04-11 ENCOUNTER — HOSPITAL ENCOUNTER (OUTPATIENT)
Dept: PHYSICAL THERAPY | Age: 84
Setting detail: RECURRING SERIES
Discharge: HOME | End: 2024-04-14
Payer: MEDICARE

## 2024-04-11 PROCEDURE — 97140 MANUAL THERAPY 1/> REGIONS: CPT

## 2024-04-11 PROCEDURE — 97110 THERAPEUTIC EXERCISES: CPT

## 2024-04-11 ASSESSMENT — PAIN SCALES - GENERAL: PAINLEVEL_OUTOF10: 3

## 2024-04-11 NOTE — PROGRESS NOTES
Elias Collazo Candelaria  : 1940  Primary: Medicare Part A And B (Medicare)  Secondary: CIGNA MEDICARE SUPP [facility] [place]  [place] [address]  Phone: [phone]  Fax: [phone] Plan Frequency: 2x a week for 90 days    Plan of Care/Certification Expiration Date: 24        Plan of Care/Certification Expiration Date:  Plan of Care/Certification Expiration Date: 24    Frequency/Duration:   Plan Frequency: 2x a week for 90 days      Time In/Out:   Time In: 1030  Time Out: 1130      PT Visit Info:    Progress Note Counter: 4      Visit Count:  4    OUTPATIENT PHYSICAL THERAPY:   Treatment Note 2024       Episode  (L humeral fracture)               Treatment Diagnosis:    Stiffness of left shoulder, not elsewhere classified  Muscle weakness (generalized)  Medical/Referring Diagnosis:    Other nondisplaced fracture of upper end of left humerus, subsequent encounter for fracture with routine healing [S42.295D]    Referring Physician:  Alvino Fontanez MD MD Orders:  PT Eval and Treat   Return MD Appt:  May 2024  Date of Onset:  Onset Date: 02/15/24     Allergies:   Morphine, Northern quahog clam (m. mercenaria) skin test, and Other  Restrictions/Precautions:   None      Interventions Planned (Treatment may consist of any combination of the following):     See Assessment Note    Subjective Comments:   Pt notes arm was very painful after last visit and was unable to sleep. Notes he did not take anything for the pain. States it is better today but still pretty painful and sore.   Initial Pain Level::     3/10  Post Session Pain Level:       1/10  Medications Last Reviewed:  2024  Updated Objective Findings:  None Today  Treatment   THERAPEUTIC EXERCISE: (23 minutes):  Exercises per grid below to improve mobility and strength.  Required moderate visual, verbal, manual and tactile cues to promote proper body alignment, promote proper body

## 2024-04-16 ENCOUNTER — HOSPITAL ENCOUNTER (OUTPATIENT)
Dept: PHYSICAL THERAPY | Age: 84
Setting detail: RECURRING SERIES
Discharge: HOME | End: 2024-04-19
Payer: MEDICARE

## 2024-04-16 PROCEDURE — 97110 THERAPEUTIC EXERCISES: CPT

## 2024-04-16 PROCEDURE — 97140 MANUAL THERAPY 1/> REGIONS: CPT

## 2024-04-16 ASSESSMENT — PAIN SCALES - GENERAL: PAINLEVEL_OUTOF10: 3

## 2024-04-16 NOTE — PROGRESS NOTES
flexion to about 90 degrees with seated dowel shoulder flexion. Continued stiffness and pain at end range during passive ROM. Limited shoulder abd and add during  exercise.   Communication/Consultation:  None today  Equipment provided today:  None  Recommendations/Intent for next treatment session: Next visit will focus on progressing strength and ROM.     Total Treatment Billable Duration:  53 minutes  Time In: 1030  Time Out: 1130    Sade Hernandez, PT           Charge Capture  [employer] Portal  Appt Desk     Future Appointments   Date Time Provider Department Center   4/18/2024 10:30 AM Sade Hernandez, PT [facility]   4/22/2024 12:30 PM Sade Hernandez, PT [facility]   4/25/2024  7:00 PM Sade Hernandez, PT [facility]       
62

## 2024-04-18 ENCOUNTER — HOSPITAL ENCOUNTER (OUTPATIENT)
Dept: PHYSICAL THERAPY | Age: 84
Setting detail: RECURRING SERIES
Discharge: HOME | End: 2024-04-21
Payer: MEDICARE

## 2024-04-18 PROCEDURE — 97140 MANUAL THERAPY 1/> REGIONS: CPT

## 2024-04-18 PROCEDURE — 97110 THERAPEUTIC EXERCISES: CPT

## 2024-04-18 ASSESSMENT — PAIN SCALES - GENERAL: PAINLEVEL_OUTOF10: 3

## 2024-04-18 NOTE — PROGRESS NOTES
Elias Collazo Candelaria  : 1940  Primary: Medicare Part A And B (Medicare)  Secondary: CIGNA MEDICARE SUPP [facility] [place]  [place] [address]  Phone: [phone]  Fax: [phone] Plan Frequency: 2x a week for 90 days    Plan of Care/Certification Expiration Date: 24        Plan of Care/Certification Expiration Date:  Plan of Care/Certification Expiration Date: 24    Frequency/Duration:   Plan Frequency: 2x a week for 90 days      Time In/Out:   Time In: 1030  Time Out: 1130      PT Visit Info:    Progress Note Counter: 6      Visit Count:  6    OUTPATIENT PHYSICAL THERAPY:   Treatment Note 2024       Episode  (L humeral fracture)               Treatment Diagnosis:    Stiffness of left shoulder, not elsewhere classified  Muscle weakness (generalized)  Medical/Referring Diagnosis:    Other nondisplaced fracture of upper end of left humerus, subsequent encounter for fracture with routine healing [S42.295D]    Referring Physician:  Alvino Fontanez MD MD Orders:  PT Eval and Treat   Return MD Appt:  May 2024  Date of Onset:  Onset Date: 02/15/24     Allergies:   Morphine, Northern quahog clam (m. mercenaria) skin test, and Other  Restrictions/Precautions:   None      Interventions Planned (Treatment may consist of any combination of the following):     See Assessment Note    Subjective Comments:   Pt notes he was sore after last visit but the soreness subsided within a day.  Initial Pain Level::     3/10  Post Session Pain Level:       2/10  Medications Last Reviewed:  2024  Updated Objective Findings:  None Today  Treatment   THERAPEUTIC EXERCISE: (38 minutes):  Exercises per grid below to improve mobility and strength.  Required moderate visual, verbal, manual and tactile cues to promote proper body alignment, promote proper body posture and promote proper body mechanics.  Progressed resistance, range, repetitions and complexity of

## 2024-04-22 ENCOUNTER — HOSPITAL ENCOUNTER (OUTPATIENT)
Dept: PHYSICAL THERAPY | Age: 84
Setting detail: RECURRING SERIES
Discharge: HOME | End: 2024-04-25
Payer: MEDICARE

## 2024-04-22 PROCEDURE — 97140 MANUAL THERAPY 1/> REGIONS: CPT

## 2024-04-22 PROCEDURE — 97110 THERAPEUTIC EXERCISES: CPT

## 2024-04-22 ASSESSMENT — PAIN SCALES - GENERAL: PAINLEVEL_OUTOF10: 3

## 2024-04-22 NOTE — PROGRESS NOTES
Elias Collazo Candelaria  : 1940  Primary: Medicare Part A And B (Medicare)  Secondary: CIGNA MEDICARE SUPP [facility] [place]  [place] [address]  Phone: [phone]  Fax: [phone] Plan Frequency: 2x a week for 90 days    Plan of Care/Certification Expiration Date: 24        Plan of Care/Certification Expiration Date:  Plan of Care/Certification Expiration Date: 24    Frequency/Duration:   Plan Frequency: 2x a week for 90 days      Time In/Out:   Time In: 1230  Time Out: 1330      PT Visit Info:    Progress Note Counter: 7      Visit Count:  7    OUTPATIENT PHYSICAL THERAPY:   Treatment Note 2024       Episode  (L humeral fracture)               Treatment Diagnosis:    Stiffness of left shoulder, not elsewhere classified  Muscle weakness (generalized)  Medical/Referring Diagnosis:    Other nondisplaced fracture of upper end of left humerus, subsequent encounter for fracture with routine healing [S42.295D]    Referring Physician:  Alvino Fontanez MD MD Orders:  PT Eval and Treat   Return MD Appt:  May 2024  Date of Onset:  Onset Date: 02/15/24     Allergies:   Morphine, Northern quahog clam (m. mercenaria) skin test, and Other  Restrictions/Precautions:   None      Interventions Planned (Treatment may consist of any combination of the following):     See Assessment Note    Subjective Comments:   Pt notes his shoulder has been sore since last visit. States he has been doing exercises at home. Reports he takes tylenol for pain but always feels poorly the next day.   Initial Pain Level::     3/10  Post Session Pain Level:       2/10  Medications Last Reviewed:  2024  Updated Objective Findings:  None Today  Treatment   THERAPEUTIC EXERCISE: (38 minutes):  Exercises per grid below to improve mobility and strength.  Required moderate visual, verbal, manual and tactile cues to promote proper body alignment, promote proper body posture

## 2024-04-25 ENCOUNTER — APPOINTMENT (OUTPATIENT)
Dept: PHYSICAL THERAPY | Age: 84
End: 2024-04-25
Payer: MEDICARE

## 2024-04-30 ENCOUNTER — APPOINTMENT (RX ONLY)
Dept: URBAN - METROPOLITAN AREA CLINIC 330 | Facility: CLINIC | Age: 84
Setting detail: DERMATOLOGY
End: 2024-04-30

## 2024-04-30 ENCOUNTER — APPOINTMENT (OUTPATIENT)
Dept: PHYSICAL THERAPY | Age: 84
End: 2024-04-30
Payer: MEDICARE

## 2024-04-30 DIAGNOSIS — L57.8 OTHER SKIN CHANGES DUE TO CHRONIC EXPOSURE TO NONIONIZING RADIATION: ICD-10-CM

## 2024-04-30 DIAGNOSIS — Z85.828 PERSONAL HISTORY OF OTHER MALIGNANT NEOPLASM OF SKIN: ICD-10-CM

## 2024-04-30 DIAGNOSIS — D22 MELANOCYTIC NEVI: ICD-10-CM

## 2024-04-30 DIAGNOSIS — L57.0 ACTINIC KERATOSIS: ICD-10-CM

## 2024-04-30 PROBLEM — D48.5 NEOPLASM OF UNCERTAIN BEHAVIOR OF SKIN: Status: ACTIVE | Noted: 2024-04-30

## 2024-04-30 PROBLEM — D22.5 MELANOCYTIC NEVI OF TRUNK: Status: ACTIVE | Noted: 2024-04-30

## 2024-04-30 PROCEDURE — 99213 OFFICE O/P EST LOW 20 MIN: CPT | Mod: 25

## 2024-04-30 PROCEDURE — ? BIOPSY BY SHAVE METHOD

## 2024-04-30 PROCEDURE — ? LIQUID NITROGEN

## 2024-04-30 PROCEDURE — 17000 DESTRUCT PREMALG LESION: CPT | Mod: 59

## 2024-04-30 PROCEDURE — 11102 TANGNTL BX SKIN SINGLE LES: CPT

## 2024-04-30 PROCEDURE — ? FULL BODY SKIN EXAM

## 2024-04-30 PROCEDURE — ? TREATMENT REGIMEN

## 2024-04-30 PROCEDURE — ? ADDITIONAL NOTES

## 2024-04-30 PROCEDURE — 17003 DESTRUCT PREMALG LES 2-14: CPT

## 2024-04-30 PROCEDURE — ? COUNSELING

## 2024-04-30 PROCEDURE — ? MEDICAL PHOTOGRAPHY REVIEW

## 2024-04-30 ASSESSMENT — LOCATION DETAILED DESCRIPTION DERM
LOCATION DETAILED: LEFT RADIAL DORSAL HAND
LOCATION DETAILED: NASAL SUPRATIP
LOCATION DETAILED: LEFT SUPERIOR PARIETAL SCALP
LOCATION DETAILED: LEFT DISTAL PRETIBIAL REGION
LOCATION DETAILED: LEFT SUPERIOR OCCIPITAL SCALP
LOCATION DETAILED: LEFT INFERIOR UPPER BACK
LOCATION DETAILED: RIGHT INFERIOR POSTAURICULAR SKIN
LOCATION DETAILED: RIGHT PROXIMAL RADIAL DORSAL FOREARM

## 2024-04-30 ASSESSMENT — LOCATION ZONE DERM
LOCATION ZONE: SCALP
LOCATION ZONE: HAND
LOCATION ZONE: ARM
LOCATION ZONE: TRUNK
LOCATION ZONE: NOSE
LOCATION ZONE: LEG

## 2024-04-30 ASSESSMENT — LOCATION SIMPLE DESCRIPTION DERM
LOCATION SIMPLE: LEFT HAND
LOCATION SIMPLE: LEFT UPPER BACK
LOCATION SIMPLE: RIGHT FOREARM
LOCATION SIMPLE: LEFT PRETIBIAL REGION
LOCATION SIMPLE: SCALP
LOCATION SIMPLE: NOSE
LOCATION SIMPLE: LEFT OCCIPITAL SCALP

## 2024-04-30 NOTE — PROCEDURE: BIOPSY BY SHAVE METHOD
Detail Level: Detailed
Depth Of Biopsy: dermis
Was A Bandage Applied: Yes
Size Of Lesion In Cm: 0
Biopsy Type: H and E
Biopsy Method: Personna blade
Anesthesia Type: 1% lidocaine with epinephrine and a 1:10 solution of 8.4% sodium bicarbonate
Anesthesia Volume In Cc: 0.5
Hemostasis: Drysol
Wound Care: Petrolatum
Dressing: bandage
Destruction After The Procedure: No
Type Of Destruction Used: Curettage
Curettage Text: The wound bed was treated with curettage after the biopsy was performed.
Cryotherapy Text: The wound bed was treated with cryotherapy after the biopsy was performed.
Electrodesiccation Text: The wound bed was treated with electrodesiccation after the biopsy was performed.
Electrodesiccation And Curettage Text: The wound bed was treated with electrodesiccation and curettage after the biopsy was performed.
Silver Nitrate Text: The wound bed was treated with silver nitrate after the biopsy was performed.
Lab: 6
Lab Facility: [id number]
Consent: Written consent was obtained and risks were reviewed including but not limited to scarring, infection, bleeding, scabbing, incomplete removal, nerve damage and allergy to anesthesia.
Post-Care Instructions: I reviewed with the patient in detail post-care instructions. Patient is to keep the biopsy site dry overnight, and then apply bacitracin twice daily until healed. Patient may apply hydrogen peroxide soaks to remove any crusting.
Notification Instructions: Patient will be notified of biopsy results. However, patient instructed to call the office if not contacted within 2 weeks.
Billing Type: Third-Party Bill
Information: Selecting Yes will display possible errors in your note based on the variables you have selected. This validation is only offered as a suggestion for you. PLEASE NOTE THAT THE VALIDATION TEXT WILL BE REMOVED WHEN YOU FINALIZE YOUR NOTE. IF YOU WANT TO FAX A PRELIMINARY NOTE YOU WILL NEED TO TOGGLE THIS TO 'NO' IF YOU DO NOT WANT IT IN YOUR FAXED NOTE.

## 2024-04-30 NOTE — PROCEDURE: LIQUID NITROGEN
Duration Of Freeze Thaw-Cycle (Seconds): 2
Detail Level: Detailed
Number Of Freeze-Thaw Cycles: 3 freeze-thaw cycles
Consent: The patient's consent was obtained including but not limited to risks of crusting, scabbing, blistering, scarring, darker or lighter pigmentary change, recurrence, incomplete removal and infection.
Render Post-Care Instructions In Note?: no
Show Aperture Variable?: Yes
Post-Care Instructions: I reviewed with the patient in detail post-care instructions. Patient is to wear sunprotection, and avoid picking at any of the treated lesions. Pt may apply Vaseline to crusted or scabbing areas.

## 2024-05-02 ENCOUNTER — HOSPITAL ENCOUNTER (OUTPATIENT)
Dept: PHYSICAL THERAPY | Age: 84
Setting detail: RECURRING SERIES
Discharge: HOME | End: 2024-05-05
Payer: MEDICARE

## 2024-05-02 PROCEDURE — 97140 MANUAL THERAPY 1/> REGIONS: CPT

## 2024-05-02 PROCEDURE — 97110 THERAPEUTIC EXERCISES: CPT

## 2024-05-02 ASSESSMENT — PAIN SCALES - GENERAL: PAINLEVEL_OUTOF10: 5

## 2024-05-02 NOTE — PROGRESS NOTES
Elias Collazo Joaquínaiden  : 1940  Primary: Medicare Part A And B (Medicare)  Secondary: CIGNA MEDICARE SUPP [facility] [place]  [place] [address]  Phone: [phone]  Fax: [phone] Plan Frequency: 2x a week for 90 days    Plan of Care/Certification Expiration Date: 24        Plan of Care/Certification Expiration Date:  Plan of Care/Certification Expiration Date: 24    Frequency/Duration:   Plan Frequency: 2x a week for 90 days      Time In/Out:   Time In: 1030  Time Out: 1130      PT Visit Info:    Progress Note Counter: 8      Visit Count:  8    OUTPATIENT PHYSICAL THERAPY:   Treatment Note 2024       Episode  (L humeral fracture)               Treatment Diagnosis:    Stiffness of left shoulder, not elsewhere classified  Muscle weakness (generalized)  Medical/Referring Diagnosis:    Other nondisplaced fracture of upper end of left humerus, subsequent encounter for fracture with routine healing [S42.295D]    Referring Physician:  Alvino Fontanez MD MD Orders:  PT Eval and Treat   Return MD Appt:  May 2024  Date of Onset:  Onset Date: 02/15/24     Allergies:   Morphine, Northern quahog clam (m. mercenaria) skin test, and Other  Restrictions/Precautions:   None      Interventions Planned (Treatment may consist of any combination of the following):     See Assessment Note    Subjective Comments:   Pt reports he is doing \"terrible\" due to pain in shoulder. States he is not taking anything to manage pain before or after therapy.   Initial Pain Level::     5/10  Post Session Pain Level:       3/10  Medications Last Reviewed:  2024  Updated Objective Findings:  None Today  Treatment   THERAPEUTIC EXERCISE: (40 minutes):  Exercises per grid below to improve mobility and strength.  Required moderate visual, verbal, manual and tactile cues to promote proper body alignment, promote proper body posture and promote proper body mechanics.

## 2024-05-08 ENCOUNTER — HOSPITAL ENCOUNTER (OUTPATIENT)
Dept: PHYSICAL THERAPY | Age: 84
Setting detail: RECURRING SERIES
Discharge: HOME | End: 2024-05-11
Payer: MEDICARE

## 2024-05-08 PROCEDURE — 97110 THERAPEUTIC EXERCISES: CPT

## 2024-05-08 PROCEDURE — 97140 MANUAL THERAPY 1/> REGIONS: CPT

## 2024-05-08 NOTE — PROGRESS NOTES
Elias Collazo Candelaria  : 1940  Primary: Medicare Part A And B (Medicare)  Secondary: CIGNA MEDICARE SUPP [facility] [place] @ [place] [address] [place] [address]  Phone: [phone]  Fax: [phone] Plan Frequency: 2x a week for 90 days    Plan of Care/Certification Expiration Date: 24        Plan of Care/Certification Expiration Date:  Plan of Care/Certification Expiration Date: 24    Frequency/Duration:   Plan Frequency: 2x a week for 90 days      Time In/Out:   Time In: 1103  Time Out: 1158      PT Visit Info:    Progress Note Counter: 9      Visit Count:  9    OUTPATIENT PHYSICAL THERAPY:   Treatment Note 2024       Episode  (L humeral fracture)               Treatment Diagnosis:    Stiffness of left shoulder, not elsewhere classified  Muscle weakness (generalized)  Medical/Referring Diagnosis:    Other nondisplaced fracture of upper end of left humerus, subsequent encounter for fracture with routine healing [S42.295D]    Referring Physician:  Alvino Fontanez MD MD Orders:  PT Eval and Treat   Return MD Appt:  May 2024  Date of Onset:  Onset Date: 02/15/24     Allergies:   Morphine, Northern quahog clam (m. mercenaria) skin test, and Other  Restrictions/Precautions:   None      Interventions Planned (Treatment may consist of any combination of the following):     See Assessment Note    Subjective Comments:   Pt reports he did his exercises this week and today, as well as a mile walk with his wife so he is buggered out  Initial Pain Level::   5   /10  Post Session Pain Level:    6    /10  Medications Last Reviewed:  2024  Updated Objective Findings:  None Today  Treatment   THERAPEUTIC EXERCISE: (40 minutes):  Exercises per grid below to improve mobility and strength.  Required moderate visual, verbal, manual and tactile cues to promote proper body alignment, promote proper body posture and promote proper body mechanics.  Progressed resistance,

## 2024-05-10 ENCOUNTER — HOSPITAL ENCOUNTER (OUTPATIENT)
Dept: PHYSICAL THERAPY | Age: 84
Setting detail: RECURRING SERIES
Discharge: HOME | End: 2024-05-13
Payer: MEDICARE

## 2024-05-10 PROCEDURE — 97140 MANUAL THERAPY 1/> REGIONS: CPT

## 2024-05-10 PROCEDURE — 97110 THERAPEUTIC EXERCISES: CPT

## 2024-05-10 ASSESSMENT — PAIN SCALES - GENERAL: PAINLEVEL_OUTOF10: 6

## 2024-05-10 NOTE — PROGRESS NOTES
Elias Collazo Candelaria  : 1940  Primary: Medicare Part A And B (Medicare)  Secondary: CIGNA MEDICARE SUPP [facility] @ [place]  [place] [address]  Phone: [phone]  Fax: [phone] Plan Frequency: 2x a week for 90 days    Plan of Care/Certification Expiration Date: 24        Plan of Care/Certification Expiration Date:  Plan of Care/Certification Expiration Date: 24    Frequency/Duration:   Plan Frequency: 2x a week for 90 days      Time In/Out:   Time In: 1033  Time Out: 1128      PT Visit Info:    Progress Note Counter: 10      Visit Count:  10    OUTPATIENT PHYSICAL THERAPY:   Treatment Note 5/10/2024       Episode  (L humeral fracture)               Treatment Diagnosis:    Stiffness of left shoulder, not elsewhere classified  Muscle weakness (generalized)  Medical/Referring Diagnosis:    Other nondisplaced fracture of upper end of left humerus, subsequent encounter for fracture with routine healing [S42.295D]    Referring Physician:  Alvino Fontanez MD MD Orders:  PT Eval and Treat   Return MD Appt:  May 2024  Date of Onset:  Onset Date: 02/15/24     Allergies:   Morphine, Northern quahog clam (m. mercenaria) skin test, and Other  Restrictions/Precautions:   None      Interventions Planned (Treatment may consist of any combination of the following):     See Assessment Note    Subjective Comments:   See progress note:     Initial Pain Level::     6/10  Post Session Pain Level:      4/10  Medications Last Reviewed:  5/10/2024  Updated Objective Findings:  None Today  Treatment   THERAPEUTIC EXERCISE: (30 minutes):  Exercises per grid below to improve mobility and strength.  Required moderate visual, verbal, manual and tactile cues to promote proper body alignment, promote proper body posture and promote proper body mechanics.  Progressed resistance, range, repetitions and complexity of movement as indicated.      Date:  5/10/2024 
open as well. Notes the wrist feels much better after injection and the shoulder only hurts when he moves it. Notes overall stiffness in shoulder and elbow from being in sling. Denies numbness and tingling. States most difficulty with getting dressed, household chores, opening lids, preparing things to eat.   Patient Stated Goal(s):  \"Improve left shoulder mobility\"  Initial Pain Level:      6/10   Post Session Pain Level:     4/10  Past Medical History/Comorbidities:   Mr. Gaona  has a past medical history of Adverse effect of anesthesia, Basal cell carcinoma, Chronic combined systolic and diastolic congestive heart failure (HCC), Chronic kidney disease, Chronic pain, Current use of long term anticoagulation, LEMUS (dyspnea on exertion), Dysphagia, Erectile dysfunction, Fatigue, Former smoker, GERD (gastroesophageal reflux disease), High cholesterol, History of pulmonary embolism, History of stomach cancer, Hypertension, Iron deficiency anemia, Macular degeneration, Nausea & vomiting, Nonischemic cardiomyopathy (HCC), Osteoarthritis, Thromboembolus (HCC), Thyroid disease, Total knee replacement status, and Unspecified adverse effect of anesthesia.  Mr. Gaona  has a past surgical history that includes gi (2010); other surgical history; total hip arthroplasty (Bilateral, 2004); Knee arthroscopy (Right); total knee arthroplasty (Right, 2012); Total shoulder arthroplasty (Right); Upper gastrointestinal endoscopy (4/8/2022); Cholecystectomy (2015); Colonoscopy; Appendectomy; and Shoulder arthroscopy (Right, 2015).  Social History/Living Environment:   Patient lives with their spouse  Type of Home: House: Two Story    Prior Level of Function/Work/Activity:   Prior Level of Function: Independent   Current Level of Function: Mod Independent      Learning:   Does the patient/guardian have any barriers to learning?: No barriers  Will there be a co-learner?: No  What is the preferred language of the patient/guardian?:

## 2024-05-14 ENCOUNTER — HOSPITAL ENCOUNTER (OUTPATIENT)
Dept: PHYSICAL THERAPY | Age: 84
Setting detail: RECURRING SERIES
Discharge: HOME | End: 2024-05-17
Payer: MEDICARE

## 2024-05-14 PROCEDURE — 97140 MANUAL THERAPY 1/> REGIONS: CPT

## 2024-05-14 PROCEDURE — 97110 THERAPEUTIC EXERCISES: CPT

## 2024-05-14 NOTE — PROGRESS NOTES
Elias Collazo Candelaria  : 1940  Primary: Medicare Part A And B (Medicare)  Secondary: CIGNA MEDICARE SUPP [facility] @ [place] [address] [address] [place] [address]  Phone: [phone]  Fax: [phone] Plan Frequency: 2x a week for 90 days    Plan of Care/Certification Expiration Date: 24        Plan of Care/Certification Expiration Date:  Plan of Care/Certification Expiration Date: 24    Frequency/Duration:   Plan Frequency: 2x a week for 90 days      Time In/Out:   Time In: 1100  Time Out: 1155      PT Visit Info:    Progress Note Counter: 1      Visit Count:  11    OUTPATIENT PHYSICAL THERAPY:   Treatment Note 2024       Episode  (L humeral fracture)               Treatment Diagnosis:    Stiffness of left shoulder, not elsewhere classified  Muscle weakness (generalized)  Medical/Referring Diagnosis:    Other nondisplaced fracture of upper end of left humerus, subsequent encounter for fracture with routine healing [S42.295D]    Referring Physician:  Alvino Fontanez MD MD Orders:  PT Eval and Treat   Return MD Appt:  May 2024  Date of Onset:  Onset Date: 02/15/24     Allergies:   Morphine, Northern quahog clam (m. mercenaria) skin test, and Other  Restrictions/Precautions:   None      Interventions Planned (Treatment may consist of any combination of the following):     See Assessment Note    Subjective Comments:   Pt states continued pain/soreness with exercises, but does state that he is now able to turn lights on/off and open/shut his car door with his L UE!    Initial Pain Level::      /10-no number given just states continued soreness  Post Session Pain Level:       /10- same with anincrease in pain due to fatigue  Medications Last Reviewed:  2024  Updated Objective Findings:  None Today  Treatment   THERAPEUTIC EXERCISE: (30 minutes):  Exercises per grid below to improve mobility and strength.  Required moderate visual, verbal, manual and

## 2024-05-15 NOTE — PROGRESS NOTES
Elias Gaona  : 1940  Primary: Medicare Part A And B  Secondary: CIGNA MEDICARE SUPP [facility] @ [place] [address] [facility] [address]  Phone: [phone]  Fax: [phone]       2024      Patient cancelled today's appointment <24 hours in advance due to being sick and will plan to follow up at next scheduled visit.       Sade Hernandez, PT

## 2024-05-16 ENCOUNTER — HOSPITAL ENCOUNTER (OUTPATIENT)
Dept: PHYSICAL THERAPY | Age: 84
Setting detail: RECURRING SERIES
End: 2024-05-16
Payer: MEDICARE

## 2024-05-21 ENCOUNTER — HOSPITAL ENCOUNTER (OUTPATIENT)
Dept: PHYSICAL THERAPY | Age: 84
Setting detail: RECURRING SERIES
Discharge: HOME | End: 2024-05-24
Payer: MEDICARE

## 2024-05-21 PROCEDURE — 97140 MANUAL THERAPY 1/> REGIONS: CPT

## 2024-05-21 PROCEDURE — 97110 THERAPEUTIC EXERCISES: CPT

## 2024-05-21 ASSESSMENT — PAIN SCALES - GENERAL: PAINLEVEL_OUTOF10: 5

## 2024-05-21 NOTE — PROGRESS NOTES
Elias Collazo Joaquínaiden  : 1940  Primary: Medicare Part A And B (Medicare)  Secondary: CIGNA MEDICARE SUPP [facility] @ [place] [address] [address] [place] [address]  Phone: [phone]  Fax: [phone] Plan Frequency: 2x a week for 90 days    Plan of Care/Certification Expiration Date: 24        Plan of Care/Certification Expiration Date:  Plan of Care/Certification Expiration Date: 24    Frequency/Duration:   Plan Frequency: 2x a week for 90 days      Time In/Out:   Time In: 1130  Time Out: 1230      PT Visit Info:    Progress Note Counter: 2  Canceled Appointment: 1      Visit Count:  12    OUTPATIENT PHYSICAL THERAPY:   Treatment Note 2024       Episode  (L humeral fracture)               Treatment Diagnosis:    Stiffness of left shoulder, not elsewhere classified  Muscle weakness (generalized)  Medical/Referring Diagnosis:    Other nondisplaced fracture of upper end of left humerus, subsequent encounter for fracture with routine healing [S42.295D]    Referring Physician:  Alvino Fontanez MD MD Orders:  PT Eval and Treat   Return MD Appt:  May 2024  Date of Onset:  Onset Date: 02/15/24     Allergies:   Morphine, Northern quahog clam (m. mercenaria) skin test, and Other  Restrictions/Precautions:   None      Interventions Planned (Treatment may consist of any combination of the following):     See Assessment Note    Subjective Comments:   Pt notes small but noticeable improvements in L shoulder function.     Initial Pain Level::     5/10  Post Session Pain Level:      4/10  Medications Last Reviewed:  2024  Updated Objective Findings:   L shoulder flexion AAROM in supine to 115 degrees  Treatment   THERAPEUTIC EXERCISE: (30 minutes):  Exercises per grid below to improve mobility and strength.  Required moderate visual, verbal, manual and tactile cues to promote proper body alignment, promote proper body posture and promote proper body mechanics.

## 2024-05-23 ENCOUNTER — HOSPITAL ENCOUNTER (OUTPATIENT)
Dept: PHYSICAL THERAPY | Age: 84
Setting detail: RECURRING SERIES
Discharge: HOME | End: 2024-05-26
Payer: MEDICARE

## 2024-05-23 PROCEDURE — 97140 MANUAL THERAPY 1/> REGIONS: CPT

## 2024-05-23 PROCEDURE — 97110 THERAPEUTIC EXERCISES: CPT

## 2024-05-23 ASSESSMENT — PAIN SCALES - GENERAL: PAINLEVEL_OUTOF10: 4

## 2024-05-23 NOTE — PROGRESS NOTES
versa, HEP - Home exercise program, CPA/UPA - Central or unilateral posterior-anterior mobilization, SLS- single leg stance, SKTC - Single leg to chest, SNAGS/NAGS- (sustained) Natural apophyseal glides, TKE- Terminal knee extension, ER- External rotation, IR- Internal rotation, B - Bilateral, sec- seconds, Lb- pounds, min - minutes, HA- Headache, OP- Over pressure, tband- theraband, fwd/bwd- Forward/Backward, TA- Transversus Abdominus, dbl- Double      TREATMENT/SESSION SUMMARY:  Response to Treatment: Pt with improved tolerance for exercises today. Required decreased rest breaks.     Communication/Consultation:  None today  Equipment provided today:  None  Recommendations/Intent for next treatment session: Next visit will focus on progressing strength and ROM.     Total Treatment Billable Duration:  55 minutes  Time In: 1030  Time Out: 1130    Sade Hernandez, PT           [employer]  [facility]  Appt Desk     Future Appointments   Date Time Provider Department Center   5/28/2024 10:30 AM Sade Hernandez, PT [facility]   5/30/2024 10:30 AM Sade Hernandez, PT [facility]   6/4/2024  2:30 PM Sade Hernandez, PT [facility]   6/6/2024 10:30 AM Sade Hernandez, PT [facility]   6/11/2024  9:30 AM Sade Hernandez, PT [facility]   6/13/2024  9:30 AM Sade Hernandez, PT [facility]

## 2024-05-28 ENCOUNTER — HOSPITAL ENCOUNTER (OUTPATIENT)
Dept: PHYSICAL THERAPY | Age: 84
Setting detail: RECURRING SERIES
Discharge: HOME | End: 2024-05-31
Payer: MEDICARE

## 2024-05-28 PROCEDURE — 97140 MANUAL THERAPY 1/> REGIONS: CPT

## 2024-05-28 PROCEDURE — 97110 THERAPEUTIC EXERCISES: CPT

## 2024-05-28 ASSESSMENT — PAIN SCALES - GENERAL: PAINLEVEL_OUTOF10: 3

## 2024-05-28 NOTE — PROGRESS NOTES
Elias Collazo Joaquínaiden  : 1940  Primary: Medicare Part A And B (Medicare)  Secondary: CIGNA MEDICARE SUPP [facility] @ [place]  [place] [address]  Phone: [phone]  Fax: [phone] Plan Frequency: 2x a week for 90 days    Plan of Care/Certification Expiration Date: 24        Plan of Care/Certification Expiration Date:  Plan of Care/Certification Expiration Date: 24    Frequency/Duration:   Plan Frequency: 2x a week for 90 days      Time In/Out:   Time In: 1030  Time Out: 1130      PT Visit Info:    Progress Note Counter: 4  Canceled Appointment: 1      Visit Count:  14    OUTPATIENT PHYSICAL THERAPY:   Treatment Note 2024       Episode  (L humeral fracture)               Treatment Diagnosis:    Stiffness of left shoulder, not elsewhere classified  Muscle weakness (generalized)  Medical/Referring Diagnosis:    Other nondisplaced fracture of upper end of left humerus, subsequent encounter for fracture with routine healing [S42.295D]    Referring Physician:  Alvino Fontanez MD MD Orders:  PT Eval and Treat   Return MD Appt:  May 2024  Date of Onset:  Onset Date: 02/15/24     Allergies:   Morphine, Northern quahog clam (m. mercenaria) skin test, and Other  Restrictions/Precautions:   None      Interventions Planned (Treatment may consist of any combination of the following):     See Assessment Note    Subjective Comments:   Pt notes he has pain in L shoulder with wall slides for flexion exercise. Expresses frustration with not being able to advance in AROM.   Initial Pain Level::     3/10  Post Session Pain Level:      2/10  Medications Last Reviewed:  2024  Updated Objective Findings:   L shoulder flexion AAROM in supine to 115 degrees  Treatment   THERAPEUTIC EXERCISE: (40 minutes):  Exercises per grid below to improve mobility and strength.  Required moderate visual, verbal, manual and tactile cues to promote proper body alignment,

## 2024-05-30 ENCOUNTER — HOSPITAL ENCOUNTER (OUTPATIENT)
Dept: PHYSICAL THERAPY | Age: 84
Setting detail: RECURRING SERIES
End: 2024-05-30
Payer: MEDICARE

## 2024-05-30 NOTE — PROGRESS NOTES
Elias Gaona  : 1940  Primary: Medicare Part A And B  Secondary: CIGNA MEDICARE SUPP [facility] @ [place] [address] [address] [facility] [address]  Phone: [phone]  Fax: [phone]       2024      Patient cancelled today's appointment >24 hours and will plan to follow up at next scheduled visit.       Sade Hernandez, PT

## 2024-06-04 ENCOUNTER — HOSPITAL ENCOUNTER (OUTPATIENT)
Dept: PHYSICAL THERAPY | Age: 84
Setting detail: RECURRING SERIES
Discharge: HOME | End: 2024-06-07
Payer: MEDICARE

## 2024-06-04 PROCEDURE — 97140 MANUAL THERAPY 1/> REGIONS: CPT

## 2024-06-04 PROCEDURE — 97110 THERAPEUTIC EXERCISES: CPT

## 2024-06-04 ASSESSMENT — PAIN SCALES - GENERAL: PAINLEVEL_OUTOF10: 4

## 2024-06-04 NOTE — PROGRESS NOTES
Elias Collazo Joaquínaiden  : 1940  Primary: Medicare Part A And B (Medicare)  Secondary: CIGNA MEDICARE SUPP [facility] @ [place]  [place] [address]  Phone: [phone]  Fax: [phone] Plan Frequency: 2x a week for 90 days    Plan of Care/Certification Expiration Date: 24        Plan of Care/Certification Expiration Date:  Plan of Care/Certification Expiration Date: 24    Frequency/Duration:   Plan Frequency: 2x a week for 90 days      Time In/Out:   Time In: 1430  Time Out: 1530      PT Visit Info:    Progress Note Counter: 5  Canceled Appointment: 1      Visit Count:  15    OUTPATIENT PHYSICAL THERAPY:   Treatment Note 2024       Episode  (L humeral fracture)               Treatment Diagnosis:    Stiffness of left shoulder, not elsewhere classified  Muscle weakness (generalized)  Medical/Referring Diagnosis:    Other nondisplaced fracture of upper end of left humerus, subsequent encounter for fracture with routine healing [S42.295D]    Referring Physician:  Alvino Fontanez MD MD Orders:  PT Eval and Treat   Return MD Appt:  May 2024  Date of Onset:  Onset Date: 02/15/24     Allergies:   Morphine, Northern quahog clam (m. mercenaria) skin test, and Other  Restrictions/Precautions:   None      Interventions Planned (Treatment may consist of any combination of the following):     See Assessment Note    Subjective Comments:   Pt notes he has pain in L shoulder with wall slides for flexion exercise. Expresses frustration with not being able to advance in AROM.   Initial Pain Level::     4/10  Post Session Pain Level:      3/10  Medications Last Reviewed:  2024  Updated Objective Findings:   L shoulder flexion AAROM in supine to 115 degrees  Treatment   THERAPEUTIC EXERCISE: (40 minutes):  Exercises per grid below to improve mobility and strength.  Required moderate visual, verbal, manual and tactile cues to promote proper body alignment,

## 2024-06-06 ENCOUNTER — HOSPITAL ENCOUNTER (OUTPATIENT)
Dept: PHYSICAL THERAPY | Age: 84
Setting detail: RECURRING SERIES
Discharge: HOME | End: 2024-06-09
Payer: MEDICARE

## 2024-06-06 PROCEDURE — 97140 MANUAL THERAPY 1/> REGIONS: CPT

## 2024-06-06 PROCEDURE — 97110 THERAPEUTIC EXERCISES: CPT

## 2024-06-06 ASSESSMENT — PAIN SCALES - GENERAL: PAINLEVEL_OUTOF10: 4

## 2024-06-11 ENCOUNTER — HOSPITAL ENCOUNTER (OUTPATIENT)
Dept: PHYSICAL THERAPY | Age: 84
Setting detail: RECURRING SERIES
Discharge: HOME | End: 2024-06-14
Payer: MEDICARE

## 2024-06-11 PROCEDURE — 97140 MANUAL THERAPY 1/> REGIONS: CPT

## 2024-06-11 PROCEDURE — 97110 THERAPEUTIC EXERCISES: CPT

## 2024-06-11 ASSESSMENT — PAIN SCALES - GENERAL: PAINLEVEL_OUTOF10: 2

## 2024-06-11 NOTE — PROGRESS NOTES
Elias Tolbertmoiseaiden  : 1940  Primary: Medicare Part A And B (Medicare)  Secondary: CIGNA MEDICARE SUPP [facility] @ [place]  [place] [address]  Phone: [phone]  Fax: [phone] Plan Frequency: 2x a week for 90 days    Plan of Care/Certification Expiration Date: 24        Plan of Care/Certification Expiration Date:  Plan of Care/Certification Expiration Date: 24    Frequency/Duration:   Plan Frequency: 2x a week for 90 days      Time In/Out:   Time In: 0930  Time Out: 1030      PT Visit Info:    Progress Note Counter: 7  Canceled Appointment: 1      Visit Count:  17    OUTPATIENT PHYSICAL THERAPY:   Treatment Note 2024       Episode  (L humeral fracture)               Treatment Diagnosis:    Stiffness of left shoulder, not elsewhere classified  Muscle weakness (generalized)  Medical/Referring Diagnosis:    Other nondisplaced fracture of upper end of left humerus, subsequent encounter for fracture with routine healing [S42.295D]    Referring Physician:  Alvino Fontanez MD MD Orders:  PT Eval and Treat   Return MD Appt:  May 2024  Date of Onset:  Onset Date: 02/15/24     Allergies:   Morphine, Northern quahog clam (m. mercenaria) skin test, and Other  Restrictions/Precautions:   None      Interventions Planned (Treatment may consist of any combination of the following):     See Assessment Note    Subjective Comments:   Pt notes he was able to reach overhead microwave but it did cause increased pain in L shoulder.   Initial Pain Level::     2/10  Post Session Pain Level:      2/10  Medications Last Reviewed:  2024  Updated Objective Findings:   L shoulder flexion AAROM in supine to 115 degrees  Treatment   THERAPEUTIC EXERCISE: (40 minutes):  Exercises per grid below to improve mobility and strength.  Required moderate visual, verbal, manual and tactile cues to promote proper body alignment, promote proper body posture and promote

## 2024-06-13 ENCOUNTER — HOSPITAL ENCOUNTER (OUTPATIENT)
Dept: PHYSICAL THERAPY | Age: 84
Setting detail: RECURRING SERIES
Discharge: HOME | End: 2024-06-16
Payer: MEDICARE

## 2024-06-13 PROCEDURE — 97110 THERAPEUTIC EXERCISES: CPT

## 2024-06-13 PROCEDURE — 97140 MANUAL THERAPY 1/> REGIONS: CPT

## 2024-06-13 ASSESSMENT — PAIN SCALES - GENERAL: PAINLEVEL_OUTOF10: 3

## 2024-06-13 NOTE — PROGRESS NOTES
Elias Collazo Joaquínaiden  : 1940  Primary: Medicare Part A And B (Medicare)  Secondary: CIGNA MEDICARE SUPP [facility] @ [place]  [place] [address]  Phone: [phone]  Fax: [phone] Plan Frequency: 1x a week for 60 days    Plan of Care/Certification Expiration Date: 24        Plan of Care/Certification Expiration Date:  Plan of Care/Certification Expiration Date: 24    Frequency/Duration:   Plan Frequency: 1x a week for 60 days      Time In/Out:   Time In: 0930  Time Out: 1030      PT Visit Info:    Progress Note Counter: 8  Canceled Appointment: 1      Visit Count:  18    OUTPATIENT PHYSICAL THERAPY:   Treatment Note 2024       Episode  (L humeral fracture)               Treatment Diagnosis:    Stiffness of left shoulder, not elsewhere classified  Muscle weakness (generalized)  Medical/Referring Diagnosis:    Other nondisplaced fracture of upper end of left humerus, subsequent encounter for fracture with routine healing [S42.295D]    Referring Physician:  Alvino Fontanez MD MD Orders:  PT Eval and Treat   Return MD Appt:  May 2024  Date of Onset:  Onset Date: 02/15/24     Allergies:   Morphine, Northern quahog clam (m. mercenaria) skin test, and Other  Restrictions/Precautions:   None      Interventions Planned (Treatment may consist of any combination of the following):     See Assessment Note    Subjective Comments:   Pt notes he had more pain than usual this morning in L shoulder.   Initial Pain Level::     3/10  Post Session Pain Level:      2/10  Medications Last Reviewed:  2024  Updated Objective Findings:   L shoulder flexion AAROM in supine to 115 degrees  Treatment   THERAPEUTIC EXERCISE: (40 minutes):  Exercises per grid below to improve mobility and strength.  Required moderate visual, verbal, manual and tactile cues to promote proper body alignment, promote proper body posture and promote proper body mechanics.

## 2024-06-13 NOTE — THERAPY RECERTIFICATION
Elias Collazo Candelaria  : 1940  Primary: Medicare Part A And B (Medicare)  Secondary: [employer] MEDICARE SUPP [facility][note 3] @ [place]  [place] [address]  Phone: [phone]  Fax: [phone] Plan Frequency: 1x a week for 60 days    Plan of Care/Certification Expiration Date: 24        Plan of Care/Certification Expiration Date:  Plan of Care/Certification Expiration Date: 24    Frequency/Duration: Plan Frequency: 1x a week for 60 days      Time In/Out:   Time In: 0930  Time Out: 1030      PT Visit Info:    Progress Note Counter: 8  Canceled Appointment: 1      Visit Count:  18                OUTPATIENT PHYSICAL THERAPY:             Recertification 2024               Episode (L humeral fracture)         Treatment Diagnosis:     No data found  Medical/Referring Diagnosis:    Other nondisplaced fracture of upper end of left humerus, subsequent encounter for fracture with routine healing [S42.295D]    Referring Physician:  Alvino Fontanez MD MD Orders:  PT Eval and Treat   Return MD Appt:  May 2024  Date of Onset:  Onset Date: 02/15/24     Allergies:  Morphine, [place] clam (m. mercenaria) skin test, and Other  Restrictions/Precautions:    None      Medications Last Reviewed:  2024     Initial Pain Level:      3/10   Post Session Pain Level:     2/10   OBJECTIVE     ORTHOSTATIC/POSTURE OBSERVATION:   Date:   2024   SITTING RESTING POSTURE -Pt sits with forward head and rounded shoulders which indicate tight anterior chest musculature, upper trapezius, and levator scapula and weak posterior scapula musculature and deep cervical flexors.      STANDING RESTING POSTURE -Pt displays decreased core motor control indicating weak core and low back musculature.       ROM Date:  2024    RIGHT LEFT   UEROM   Flexion 110 degrees 90 degrees  Pain in shoulder, but reports mostly \"just stops\"    Extension 55 degrees 55 degrees  Pain at

## 2024-06-17 ENCOUNTER — APPOINTMENT (RX ONLY)
Dept: URBAN - NONMETROPOLITAN AREA CLINIC 1 | Facility: CLINIC | Age: 84
Setting detail: DERMATOLOGY
End: 2024-06-17

## 2024-06-17 VITALS — SYSTOLIC BLOOD PRESSURE: 127 MMHG | OXYGEN SATURATION: 98 % | HEART RATE: 80 BPM | DIASTOLIC BLOOD PRESSURE: 74 MMHG

## 2024-06-17 PROBLEM — C44.329 SQUAMOUS CELL CARCINOMA OF SKIN OF OTHER PARTS OF FACE: Status: ACTIVE | Noted: 2024-06-17

## 2024-06-17 PROCEDURE — ? MOHS SURGERY

## 2024-06-17 PROCEDURE — 17311 MOHS 1 STAGE H/N/HF/G: CPT

## 2024-06-17 PROCEDURE — 12053 INTMD RPR FACE/MM 5.1-7.5 CM: CPT

## 2024-06-17 NOTE — PROCEDURE: MOHS SURGERY
Mohs Case Number: [id number]
Date Of Previous Biopsy (Optional): 04/30/24
Previous Accession (Optional): [id number]
Biopsy Photograph Reviewed: Yes
Consent Type: Consent 1 (Standard)
Eye Shield Used: No
Initial Size Of Lesion: 2.4
X Size Of Lesion In Cm (Optional): 1.7
Number Of Stages: 1
Primary Defect Length In Cm (Final Defect Size - Required For Flaps/Grafts): 3.5
Primary Defect Width In Cm (Final Defect Size - Required For Flaps/Grafts): 2.5
Primary Defect Depth In Cm (Optional But Required For Some Insurers): 0
Repair Type: Intermediate Layered Repair
Which Instrument Did You Use For Dermabrasion?: Wire Brush
Which Eyelid Repair Cpt Are You Using?: 28248
Oculoplastic Surgeon Procedure Text (A): After obtaining clear surgical margins the patient was sent to oculoplastics for surgical repair.  The patient understands they will receive post-surgical care and follow-up from the referring physician's office.
Otolaryngologist Procedure Text (A): After obtaining clear surgical margins the patient was sent to otolaryngology for surgical repair.  The patient understands they will receive post-surgical care and follow-up from the referring physician's office.
Plastic Surgeon Procedure Text (A): After obtaining clear surgical margins the patient was sent to plastics for surgical repair.  The patient understands they will receive post-surgical care and follow-up from the referring physician's office.
Mid-Level Procedure Text (A): After obtaining clear surgical margins the patient was sent to a mid-level provider for surgical repair.  The patient understands they will receive post-surgical care and follow-up from the mid-level provider.
Provider Procedure Text (A): After obtaining clear surgical margins the defect was repaired by another provider.
Asc Procedure Text (A): After obtaining clear surgical margins the patient was sent to an ASC for surgical repair.  The patient understands they will receive post-surgical care and follow-up from the ASC physician.
Simple / Intermediate / Complex Repair - Final Wound Length In Cm: 6
Suturegard Retention Suture: 2-0 Nylon
Retention Suture Bite Size: 3 mm
Length To Time In Minutes Device Was In Place: 10
Undermining Type: Entire Wound
Debridement Text: The wound edges were debrided prior to proceeding with the closure to facilitate wound healing.
Helical Rim Text: The closure involved the helical rim.
Vermilion Border Text: The closure involved the vermilion border.
Nostril Rim Text: The closure involved the nostril rim.
Retention Suture Text: Retention sutures were placed to support the closure and prevent dehiscence.
Area H Indication Text: Tumors in this location are included in Area H (eyelids, eyebrows, nose, lips, chin, ear, pre-auricular, post-auricular, temple, genitalia, hands, feet, ankles and areola).  Tissue conservation is critical in these anatomic locations.
Area M Indication Text: Tumors in this location are included in Area M (cheek, forehead, scalp, neck, jawline and pretibial skin).  Mohs surgery is indicated for tumors in these anatomic locations.
Area L Indication Text: Tumors in this location are included in Area L (trunk and extremities).  Mohs surgery is indicated for larger tumors, or tumors with aggressive histologic features, in these anatomic locations.
Tumor Debulked?: dermablade
Depth Of Tumor Invasion (For Histology): tumor not visualized
Perineural Invasion (For Histology - Be Specific If Possible): absent
Special Stains Stage 1 - Results: Base On Clearance Noted Above
Stage 2: Additional Anesthesia Type: 1% lidocaine with epinephrine
Staging Info: By selecting yes to the question above you will include information on AJCC 8 tumor staging in your Mohs note. Information on tumor staging will be automatically added for SCCs on the head and neck. AJCC 8 includes tumor size, tumor depth, perineural involvement and bone invasion.
Tumor Depth: Less than 6mm from granular layer and no invasion beyond the subcutaneous fat
Was The Patient On Physician Recommended Anticoagulation Therapy?: Please Select the Appropriate Response
Medical Necessity Statement: Based on my medical judgement, Mohs surgery is the most appropriate treatment for this cancer compared to other treatments.
Alternatives Discussed Intro (Do Not Add Period): I discussed alternative treatments to Mohs surgery and specifically discussed the risks and benefits of
Consent 1/Introductory Paragraph: The rationale for Mohs was explained to the patient and consent was obtained. The risks, benefits and alternatives to therapy were discussed in detail. Specifically, the risks of infection, scarring, bleeding, prolonged wound healing, incomplete removal, allergy to anesthesia, nerve injury and recurrence were addressed. Prior to the procedure, the treatment site was clearly identified and confirmed by the patient. All components of Universal Protocol/PAUSE Rule completed.
Consent 2/Introductory Paragraph: Mohs surgery was explained to the patient and consent was obtained. The risks, benefits and alternatives to therapy were discussed in detail. Specifically, the risks of infection, scarring, bleeding, prolonged wound healing, incomplete removal, allergy to anesthesia, nerve injury and recurrence were addressed. Prior to the procedure, the treatment site was clearly identified and confirmed by the patient. All components of Universal Protocol/PAUSE Rule completed.
Consent 3/Introductory Paragraph: I gave the patient a chance to ask questions they had about the procedure.  Following this I explained the Mohs procedure and consent was obtained. The risks, benefits and alternatives to therapy were discussed in detail. Specifically, the risks of infection, scarring, bleeding, prolonged wound healing, incomplete removal, allergy to anesthesia, nerve injury and recurrence were addressed. Prior to the procedure, the treatment site was clearly identified and confirmed by the patient. All components of Universal Protocol/PAUSE Rule completed.
Consent (Temporal Branch)/Introductory Paragraph: The rationale for Mohs was explained to the patient and consent was obtained. The risks, benefits and alternatives to therapy were discussed in detail. Specifically, the risks of damage to the temporal branch of the facial nerve, infection, scarring, bleeding, prolonged wound healing, incomplete removal, allergy to anesthesia, and recurrence were addressed. Prior to the procedure, the treatment site was clearly identified and confirmed by the patient. All components of Universal Protocol/PAUSE Rule completed.
Consent (Marginal Mandibular)/Introductory Paragraph: The rationale for Mohs was explained to the patient and consent was obtained. The risks, benefits and alternatives to therapy were discussed in detail. Specifically, the risks of damage to the marginal mandibular branch of the facial nerve, infection, scarring, bleeding, prolonged wound healing, incomplete removal, allergy to anesthesia, and recurrence were addressed. Prior to the procedure, the treatment site was clearly identified and confirmed by the patient. All components of Universal Protocol/PAUSE Rule completed.
Consent (Spinal Accessory)/Introductory Paragraph: The rationale for Mohs was explained to the patient and consent was obtained. The risks, benefits and alternatives to therapy were discussed in detail. Specifically, the risks of damage to the spinal accessory nerve, infection, scarring, bleeding, prolonged wound healing, incomplete removal, allergy to anesthesia, and recurrence were addressed. Prior to the procedure, the treatment site was clearly identified and confirmed by the patient. All components of Universal Protocol/PAUSE Rule completed.
Consent (Near Eyelid Margin)/Introductory Paragraph: The rationale for Mohs was explained to the patient and consent was obtained. The risks, benefits and alternatives to therapy were discussed in detail. Specifically, the risks of ectropion or eyelid deformity, infection, scarring, bleeding, prolonged wound healing, incomplete removal, allergy to anesthesia, nerve injury and recurrence were addressed. Prior to the procedure, the treatment site was clearly identified and confirmed by the patient. All components of Universal Protocol/PAUSE Rule completed.
Consent (Ear)/Introductory Paragraph: The rationale for Mohs was explained to the patient and consent was obtained. The risks, benefits and alternatives to therapy were discussed in detail. Specifically, the risks of ear deformity, infection, scarring, bleeding, prolonged wound healing, incomplete removal, allergy to anesthesia, nerve injury and recurrence were addressed. Prior to the procedure, the treatment site was clearly identified and confirmed by the patient. All components of Universal Protocol/PAUSE Rule completed.
Consent (Nose)/Introductory Paragraph: The rationale for Mohs was explained to the patient and consent was obtained. The risks, benefits and alternatives to therapy were discussed in detail. Specifically, the risks of nasal deformity, changes in the flow of air through the nose, infection, scarring, bleeding, prolonged wound healing, incomplete removal, allergy to anesthesia, nerve injury and recurrence were addressed. Prior to the procedure, the treatment site was clearly identified and confirmed by the patient. All components of Universal Protocol/PAUSE Rule completed.
Consent (Lip)/Introductory Paragraph: The rationale for Mohs was explained to the patient and consent was obtained. The risks, benefits and alternatives to therapy were discussed in detail. Specifically, the risks of lip deformity, changes in the oral aperture, infection, scarring, bleeding, prolonged wound healing, incomplete removal, allergy to anesthesia, nerve injury and recurrence were addressed. Prior to the procedure, the treatment site was clearly identified and confirmed by the patient. All components of Universal Protocol/PAUSE Rule completed.
Consent (Scalp)/Introductory Paragraph: The rationale for Mohs was explained to the patient and consent was obtained. The risks, benefits and alternatives to therapy were discussed in detail. Specifically, the risks of changes in hair growth pattern secondary to repair, infection, scarring, bleeding, prolonged wound healing, incomplete removal, allergy to anesthesia, nerve injury and recurrence were addressed. Prior to the procedure, the treatment site was clearly identified and confirmed by the patient. All components of Universal Protocol/PAUSE Rule completed.
Detail Level: Detailed
Postop Diagnosis: same
Surgeon: Melissa Kimble MD
Hemostasis: Electrocautery
Estimated Blood Loss (Cc): minimal
Brow Lift Text: A midfrontal incision was made medially to the defect to allow access to the tissues just superior to the left eyebrow. Following careful dissection inferiorly in a supraperiosteal plane to the level of the left eyebrow, several 3-0 monocryl sutures were used to resuspend the eyebrow orbicularis oculi muscular unit to the superior frontal bone periosteum. This resulted in an appropriate reapproximation of static eyebrow symmetry and correction of the left brow ptosis.
Deep Sutures: 3-0 Monocryl
Epidermal Sutures: 3-0 Prolene
Epidermal Closure: running
Suturegard Intro: Intraoperative tissue expansion was performed, utilizing the SUTUREGARD device, in order to reduce wound tension.
Suturegard Body: The suture ends were repeatedly re-tightened and re-clamped to achieve the desired tissue expansion.
Hemigard Intro: Due to skin fragility and wound tension, it was decided to use HEMIGARD adhesive retention suture devices to permit a linear closure. The skin was cleaned and dried for a 6cm distance away from the wound. Excessive hair, if present, was removed to allow for adhesion.
Hemigard Postcare Instructions: The HEMIGARD strips are to remain completely dry for at least 5-7 days.
Donor Site Anesthesia Type: same as repair anesthesia
Epidermal Closure Graft Donor Site (Optional): simple interrupted
Graft Donor Site Bandage (Optional-Leave Blank If You Don't Want In Note): Steri-strips and a pressure bandage were applied to the donor site.
Closure 2 Information: This tab is for additional flaps and grafts, including complex repair and grafts and complex repair and flaps. You can also specify a different location for the additional defect, if the location is the same you do not need to select a new one. We will insert the automated text for the repair you select below just as we do for solitary flaps and grafts. Please note that at this time if you select a location with a different insurance zone you will need to override the ICD10 and CPT if appropriate.
Closure 3 Information: This tab is for additional flaps and grafts above and beyond our usual structured repairs.  Please note if you enter information here it will not currently bill and you will need to add the billing information manually.
Wound Care: Petrolatum
Dressing: pressure dressing
Dressing (No Sutures): dry sterile dressing
Suture Removal: 14 days
Unna Boot Text: An Unna boot was placed to help immobilize the limb and facilitate more rapid healing.
Home Suture Removal Text: Patient was provided instructions on removing sutures and will remove their sutures at home.  If they have any questions or difficulties they will call the office.
Post-Care Instructions: I reviewed with the patient in detail post-care instructions. Patient is not to engage in any heavy lifting, exercise, or swimming for the next 14 days. Should the patient develop any fevers, chills, bleeding, severe pain patient will contact the office immediately.
Pain Refusal Text: I offered to prescribe pain medication but the patient refused to take this medication.
Mauc Instructions: By selecting yes to the question below the MAUC number will be added into the note.  This will be calculated automatically based on the diagnosis chosen, the size entered, the body zone selected (H,M,L) and the specific indications you chose. You will also have the option to override the Mohs AUC if you disagree with the automatically calculated number and this option is found in the Case Summary tab.
Where Do You Want The Question To Include Opioid Counseling Located?: Case Summary Tab
Eye Protection Verbiage: Before proceeding with the stage, a plastic scleral shield was inserted. The globe was anesthetized with a few drops of proparacaine hydrochloride ophthalmic solution, USP 0.5%. Then, an appropriate sized scleral shield was chosen and coated with lacrilube ointment. The shield was gently inserted and left in place for the duration of each stage. After the stage was completed, the shield was gently removed.
Mohs Method Verbiage: An incision at a 90 degree angle following the standard Mohs approach was done and the specimen was harvested as a microscopic controlled layer.
Surgeon/Pathologist Verbiage (Will Incorporate Name Of Surgeon From Intro If Not Blank): operated in two distinct and integrated capacities as the surgeon and pathologist.
Mohs Histo Method Verbiage: Each section was then chromacoded and processed in the Mohs lab using the Mohs protocol and submitted for tissue processing
Subsequent Stages Histo Method Verbiage: Using a similar technique to that described above, a thin layer of tissue was removed from all areas where tumor was visible on the previous stage.  The tissue was again oriented, mapped, dyed, and processed as above.
Mohs Rapid Report Verbiage: The area of clinically evident tumor was marked with skin marking ink and appropriately hatched.  The initial incision was made following the Mohs approach through the skin.  The specimen was taken to the lab, divided into the necessary number of pieces, chromacoded and processed according to the Mohs protocol.  This was repeated in successive stages until a tumor free defect was achieved.
Complex Repair Preamble Text (Leave Blank If You Do Not Want): Extensive wide undermining was performed.
Intermediate Repair Preamble Text (Leave Blank If You Do Not Want): Undermining was performed with blunt dissection.
Non-Graft Cartilage Fenestration Text: The cartilage was fenestrated with a 2mm punch biopsy to help facilitate healing.
Graft Cartilage Fenestration Text: The cartilage was fenestrated with a 2mm punch biopsy to help facilitate graft survival and healing.
Secondary Intention Text (Leave Blank If You Do Not Want): The defect will heal with secondary intention.
No Repair - Repaired With Adjacent Surgical Defect Text (Leave Blank If You Do Not Want): After obtaining clear surgical margins the defect was repaired concurrently with another surgical defect which was in close approximation.
Unique Flap 1 Name:  Pedicled Propellar Flap
Unique Flap 1 Text: A decision was made to reconstruct the defect utilizing an  pedicled propellar flap.  The donor pedicle which included the  was injected with anesthesia.  The flap was excised through the skin and subcutaneous tissue down to the layer of the underlying musculature.  The flap was carefully excised within this deep plane to maintain its blood supply.  The edges of the donor site were undermined.   The donor site was closed in a primary fashion.  The flap was then rotated into position and sutured and the pedicle was tucked underneath the skin surface.  Once the flap was sutured into place, adequate blood supply was confirmed with blanching and refill.
Adjacent Tissue Transfer Text: The defect edges were debeveled with a #15 scalpel blade. Given the location of the defect and the proximity to free margins an adjacent tissue transfer was deemed most appropriate. Using a sterile surgical marker, an appropriate flap was drawn incorporating the defect and placing the expected incisions within the relaxed skin tension lines where possible. The area thus outlined was incised deep to adipose tissue with a #15 scalpel blade. The skin margins were undermined to an appropriate distance in all directions utilizing iris scissors.
Advancement Flap (Single) Text: The defect edges were debeveled with a #15 scalpel blade. Given the location of the defect and the proximity to free margins a single advancement flap was deemed most appropriate. Using a sterile surgical marker, an appropriate advancement flap was drawn incorporating the defect and placing the expected incisions within the relaxed skin tension lines where possible. The area thus outlined was incised deep to adipose tissue with a #15 scalpel blade. The skin margins were undermined to an appropriate distance in all directions utilizing iris scissors. Following this, the designed flap was advanced into the primary defect and sutured into place.
Advancement Flap (Double) Text: The defect edges were debeveled with a #15 scalpel blade. Given the location of the defect and the proximity to free margins a double advancement flap was deemed most appropriate. Using a sterile surgical marker, the appropriate advancement flaps were drawn incorporating the defect and placing the expected incisions within the relaxed skin tension lines where possible. The area thus outlined was incised deep to adipose tissue with a #15 scalpel blade. The skin margins were undermined to an appropriate distance in all directions utilizing iris scissors. Following this, the designed flaps were advanced into the primary defect and sutured into place.
Burow's Advancement Flap Text: The defect edges were debeveled with a #15 scalpel blade. Given the location of the defect and the proximity to free margins a Burow's advancement flap was deemed most appropriate. Using a sterile surgical marker, the appropriate advancement flap was drawn incorporating the defect and placing the expected incisions within the relaxed skin tension lines where possible. The area thus outlined was incised deep to adipose tissue with a #15 scalpel blade. The skin margins were undermined to an appropriate distance in all directions utilizing iris scissors. Following this, the designed flap was advanced into the primary defect and sutured into place.
Chonodrocutaneous Helical Advancement Flap Text: The defect edges were debeveled with a #15 scalpel blade. Given the location of the defect and the proximity to free margins a chondrocutaneous helical advancement flap was deemed most appropriate. Using a sterile surgical marker, the appropriate advancement flap was drawn incorporating the defect and placing the expected incisions within the relaxed skin tension lines where possible. The area thus outlined was incised deep to adipose tissue with a #15 scalpel blade. The skin margins were undermined to an appropriate distance in all directions utilizing iris scissors. Following this, the designed flap was advanced into the primary defect and sutured into place.
Crescentic Advancement Flap Text: The defect edges were debeveled with a #15 scalpel blade. Given the location of the defect and the proximity to free margins a crescentic advancement flap was deemed most appropriate. Using a sterile surgical marker, the appropriate advancement flap was drawn incorporating the defect and placing the expected incisions within the relaxed skin tension lines where possible. The area thus outlined was incised deep to adipose tissue with a #15 scalpel blade. The skin margins were undermined to an appropriate distance in all directions utilizing iris scissors. Following this, the designed flap was advanced into the primary defect and sutured into place.
A-T Advancement Flap Text: The defect edges were debeveled with a #15 scalpel blade. Given the location of the defect, shape of the defect and the proximity to free margins an A-T advancement flap was deemed most appropriate. Using a sterile surgical marker, an appropriate advancement flap was drawn incorporating the defect and placing the expected incisions within the relaxed skin tension lines where possible. The area thus outlined was incised deep to adipose tissue with a #15 scalpel blade. The skin margins were undermined to an appropriate distance in all directions utilizing iris scissors. Following this, the designed flap was advanced into the primary defect and sutured into place.
O-T Advancement Flap Text: The defect edges were debeveled with a #15 scalpel blade. Given the location of the defect, shape of the defect and the proximity to free margins an O-T advancement flap was deemed most appropriate. Using a sterile surgical marker, an appropriate advancement flap was drawn incorporating the defect and placing the expected incisions within the relaxed skin tension lines where possible. The area thus outlined was incised deep to adipose tissue with a #15 scalpel blade. The skin margins were undermined to an appropriate distance in all directions utilizing iris scissors. Following this, the designed flap was advanced into the primary defect and sutured into place.
O-L Flap Text: The defect edges were debeveled with a #15 scalpel blade. Given the location of the defect, shape of the defect and the proximity to free margins an O-L flap was deemed most appropriate. Using a sterile surgical marker, an appropriate advancement flap was drawn incorporating the defect and placing the expected incisions within the relaxed skin tension lines where possible. The area thus outlined was incised deep to adipose tissue with a #15 scalpel blade. The skin margins were undermined to an appropriate distance in all directions utilizing iris scissors. Following this, the designed flap was advanced into the primary defect and sutured into place.
O-Z Flap Text: The defect edges were debeveled with a #15 scalpel blade. Given the location of the defect, shape of the defect and the proximity to free margins an O-Z flap was deemed most appropriate. Using a sterile surgical marker, an appropriate transposition flap was drawn incorporating the defect and placing the expected incisions within the relaxed skin tension lines where possible. The area thus outlined was incised deep to adipose tissue with a #15 scalpel blade. The skin margins were undermined to an appropriate distance in all directions utilizing iris scissors. Following this, the designed flap was placed into the primary defect and sutured into place.
Double O-Z Flap Text: The defect edges were debeveled with a #15 scalpel blade. Given the location of the defect, shape of the defect and the proximity to free margins a Double O-Z flap was deemed most appropriate. Using a sterile surgical marker, an appropriate transposition flap was drawn incorporating the defect and placing the expected incisions within the relaxed skin tension lines where possible. The area thus outlined was incised deep to adipose tissue with a #15 scalpel blade. The skin margins were undermined to an appropriate distance in all directions utilizing iris scissors. Following this, the designed flap was placed into the primary defect and sutured into place.
V-Y Flap Text: The defect edges were debeveled with a #15 scalpel blade. Given the location of the defect, shape of the defect and the proximity to free margins a V-Y flap was deemed most appropriate. Using a sterile surgical marker, an appropriate advancement flap was drawn incorporating the defect and placing the expected incisions within the relaxed skin tension lines where possible. The area thus outlined was incised deep to adipose tissue with a #15 scalpel blade. The skin margins were undermined to an appropriate distance in all directions utilizing iris scissors. Following this, the designed flap was advanced into the primary defect and sutured into place.
Advancement-Rotation Flap Text: The defect edges were debeveled with a #15 scalpel blade. Given the location of the defect, shape of the defect and the proximity to free margins an advancement-rotation flap was deemed most appropriate. Using a sterile surgical marker, an appropriate flap was drawn incorporating the defect and placing the expected incisions within the relaxed skin tension lines where possible. The area thus outlined was incised deep to adipose tissue with a #15 scalpel blade. The skin margins were undermined to an appropriate distance in all directions utilizing iris scissors. Following this, the designed flap was placed into the primary defect and sutured into place.
Mercedes Flap Text: The defect edges were debeveled with a #15 scalpel blade. Given the location of the defect, shape of the defect and the proximity to free margins a Mercedes flap was deemed most appropriate. Using a sterile surgical marker, an appropriate advancement flap was drawn incorporating the defect and placing the expected incisions within the relaxed skin tension lines where possible. The area thus outlined was incised deep to adipose tissue with a #15 scalpel blade. The skin margins were undermined to an appropriate distance in all directions utilizing iris scissors. Following this, the designed flap was advanced into the primary defect and sutured into place.
Modified Advancement Flap Text: The defect edges were debeveled with a #15 scalpel blade. Given the location of the defect, shape of the defect and the proximity to free margins a modified advancement flap was deemed most appropriate. Using a sterile surgical marker, an appropriate advancement flap was drawn incorporating the defect and placing the expected incisions within the relaxed skin tension lines where possible. The area thus outlined was incised deep to adipose tissue with a #15 scalpel blade. The skin margins were undermined to an appropriate distance in all directions utilizing iris scissors. Following this, the designed flap was advanced into the primary defect and sutured into place.
Mucosal Advancement Flap Text: Given the location of the defect, shape of the defect and the proximity to free margins a mucosal advancement flap was deemed most appropriate. Incisions were made with a 15 blade scalpel in the appropriate fashion along the cutaneous vermilion border and the mucosal lip. The remaining actinically damaged mucosal tissue was excised.  The mucosal advancement flap was then elevated to the gingival sulcus with care taken to preserve the neurovascular structures and advanced into the primary defect. Care was taken to ensure that precise realignment of the vermilion border was achieved.
Peng Advancement Flap Text: The defect edges were debeveled with a #15 scalpel blade. Given the location of the defect, shape of the defect and the proximity to free margins a Peng advancement flap was deemed most appropriate. Using a sterile surgical marker, an appropriate advancement flap was drawn incorporating the defect and placing the expected incisions within the relaxed skin tension lines where possible. The area thus outlined was incised deep to adipose tissue with a #15 scalpel blade. The skin margins were undermined to an appropriate distance in all directions utilizing iris scissors. Following this, the designed flap was advanced into the primary defect and sutured into place.
Hatchet Flap Text: The defect edges were debeveled with a #15 scalpel blade. Given the location of the defect, shape of the defect and the proximity to free margins a hatchet flap was deemed most appropriate. Using a sterile surgical marker, an appropriate hatchet flap was drawn incorporating the defect and placing the expected incisions within the relaxed skin tension lines where possible. The area thus outlined was incised deep to adipose tissue with a #15 scalpel blade. The skin margins were undermined to an appropriate distance in all directions utilizing iris scissors. Following this, the designed flap was placed into the primary defect and sutured into place.
Rotation Flap Text: The defect edges were debeveled with a #15 scalpel blade. Given the location of the defect, shape of the defect and the proximity to free margins a rotation flap was deemed most appropriate. Using a sterile surgical marker, an appropriate rotation flap was drawn incorporating the defect and placing the expected incisions within the relaxed skin tension lines where possible. The area thus outlined was incised deep to adipose tissue with a #15 scalpel blade. The skin margins were undermined to an appropriate distance in all directions utilizing iris scissors. Following this, the designed flap was placed into the primary defect and sutured into place.
Bilateral Rotation Flap Text: The defect edges were debeveled with a #15 scalpel blade. Given the location of the defect, shape of the defect and the proximity to free margins a bilateral rotation flap was deemed most appropriate. Using a sterile surgical marker, an appropriate rotation flap was drawn incorporating the defect and placing the expected incisions within the relaxed skin tension lines where possible. The area thus outlined was incised deep to adipose tissue with a #15 scalpel blade. The skin margins were undermined to an appropriate distance in all directions utilizing iris scissors. Following this, the designed flap was carried over into the primary defect and sutured into place.
Spiral Flap Text: The defect edges were debeveled with a #15 scalpel blade. Given the location of the defect, shape of the defect and the proximity to free margins a spiral flap was deemed most appropriate. Using a sterile surgical marker, an appropriate rotation flap was drawn incorporating the defect and placing the expected incisions within the relaxed skin tension lines where possible. The area thus outlined was incised deep to adipose tissue with a #15 scalpel blade. The skin margins were undermined to an appropriate distance in all directions utilizing iris scissors. Following this, the designed flap was placed into the primary defect and sutured into place.
Staged Advancement Flap Text: The defect edges were debeveled with a #15 scalpel blade. Given the location of the defect, shape of the defect and the proximity to free margins a staged advancement flap was deemed most appropriate. Using a sterile surgical marker, an appropriate advancement flap was drawn incorporating the defect and placing the expected incisions within the relaxed skin tension lines where possible. The area thus outlined was incised deep to adipose tissue with a #15 scalpel blade. The skin margins were undermined to an appropriate distance in all directions utilizing iris scissors. Following this, the designed flap was placed into the primary defect and sutured into place.
Star Wedge Flap Text: The defect edges were debeveled with a #15 scalpel blade. Given the location of the defect, shape of the defect and the proximity to free margins a star wedge flap was deemed most appropriate. Using a sterile surgical marker, an appropriate rotation flap was drawn incorporating the defect and placing the expected incisions within the relaxed skin tension lines where possible. The area thus outlined was incised deep to adipose tissue with a #15 scalpel blade. The skin margins were undermined to an appropriate distance in all directions utilizing iris scissors. Following this, the designed flap was placed into the primary defect and sutured into place.
Transposition Flap Text: The defect edges were debeveled with a #15 scalpel blade. Given the location of the defect and the proximity to free margins a transposition flap was deemed most appropriate. Using a sterile surgical marker, an appropriate transposition flap was drawn incorporating the defect. The area thus outlined was incised deep to adipose tissue with a #15 scalpel blade. The skin margins were undermined to an appropriate distance in all directions utilizing iris scissors. Following this, the designed flap was placed into the primary defect and sutured into place.
Muscle Hinge Flap Text: The defect edges were debeveled with a #15 scalpel blade.  Given the size, depth and location of the defect and the proximity to free margins a muscle hinge flap was deemed most appropriate. Using a sterile surgical marker, an appropriate hinge flap was drawn incorporating the defect. The area thus outlined was incised with a #15 scalpel blade. The skin margins were undermined to an appropriate distance in all directions utilizing iris scissors. Following this, the designed flap was placed in the primary defect and sutured into place.
Mustarde Flap Text: The defect edges were debeveled with a #15 scalpel blade.  Given the size, depth and location of the defect and the proximity to free margins a Mustarde flap was deemed most appropriate. Using a sterile surgical marker, an appropriate flap was drawn incorporating the defect. The area thus outlined was incised with a #15 scalpel blade. The skin margins were undermined to an appropriate distance in all directions utilizing iris scissors. Following this, the designed flap was placed in the primary defect and sutured into place.
Nasal Turnover Hinge Flap Text: The defect edges were debeveled with a #15 scalpel blade.  Given the size, depth, location of the defect and the defect being full thickness a nasal turnover hinge flap was deemed most appropriate. Using a sterile surgical marker, an appropriate hinge flap was drawn incorporating the defect. The area thus outlined was incised with a #15 scalpel blade. The flap was designed to recreate the nasal mucosal lining and the alar rim. The skin margins were undermined to an appropriate distance in all directions utilizing iris scissors. Following this, the designed flap was placed into the primary defect and sutured into place
Nasalis-Muscle-Based Myocutaneous Island Pedicle Flap Text: Using a #15 blade, an incision was made around the donor flap to the level of the nasalis muscle. Wide lateral undermining was then performed in both the subcutaneous plane above the nasalis muscle, and in a submuscular plane just above periosteum. This allowed the formation of a free nasalis muscle axial pedicle (based on the angular artery) which was still attached to the actual cutaneous flap, increasing its mobility and vascular viability. Hemostasis was obtained with pinpoint electrocoagulation. The flap was mobilized into position and the pivotal anchor points positioned and stabilized with buried interrupted sutures. Subcutaneous and dermal tissues were closed in a multilayered fashion with sutures. Tissue redundancies were excised, and the epidermal edges were apposed without significant tension and sutured with sutures.
Nasalis Myocutaneous Flap Text: Using a #15 blade, an incision was made around the donor flap to the level of the nasalis muscle. Wide lateral undermining was then performed in both the subcutaneous plane above the nasalis muscle, and in a submuscular plane just above periosteum. This allowed the formation of a free nasalis muscle axial pedicle which was still attached to the actual cutaneous flap, increasing its mobility and vascular viability. Hemostasis was obtained with pinpoint electrocoagulation. The flap was mobilized into position and the pivotal anchor points positioned and stabilized with buried interrupted sutures. Subcutaneous and dermal tissues were closed in a multilayered fashion with sutures. Tissue redundancies were excised, and the epidermal edges were apposed without significant tension and sutured with sutures.
Nasolabial Transposition Flap Text: The defect edges were debeveled with a #15 scalpel blade.  Given the size, depth and location of the defect and the proximity to free margins a nasolabial transposition flap was deemed most appropriate. Using a sterile surgical marker, an appropriate flap was drawn incorporating the defect. The area thus outlined was incised with a #15 scalpel blade. The skin margins were undermined to an appropriate distance in all directions utilizing iris scissors. Following this, the designed flap was carried into the primary defect and sutured into place.
Orbicularis Oris Muscle Flap Text: The defect edges were debeveled with a #15 scalpel blade.  Given that the defect affected the competency of the oral sphincter an orbicularis oris muscle flap was deemed most appropriate to restore this competency and normal muscle function.  Using a sterile surgical marker, an appropriate flap was drawn incorporating the defect. The area thus outlined was incised with a #15 scalpel blade. Following this, the designed flap was placed into the primary defect and sutured into place.
Melolabial Transposition Flap Text: The defect edges were debeveled with a #15 scalpel blade. Given the location of the defect and the proximity to free margins a melolabial flap was deemed most appropriate. Using a sterile surgical marker, an appropriate melolabial transposition flap was drawn incorporating the defect. The area thus outlined was incised deep to adipose tissue with a #15 scalpel blade. The skin margins were undermined to an appropriate distance in all directions utilizing iris scissors. Following this, the designed flap was placed into the primary defect and sutured into place.
Rectangular Flap Text: The defect edges were debeveled with a #15 scalpel blade. Given the location of the defect and the proximity to free margins a rectangular flap was deemed most appropriate. Using a sterile surgical marker, an appropriate rectangular flap was drawn incorporating the defect. The area thus outlined was incised deep to adipose tissue with a #15 scalpel blade. The skin margins were undermined to an appropriate distance in all directions utilizing iris scissors. Following this, the designed flap was carried over into the primary defect and sutured into place.
Rhombic Flap Text: The defect edges were debeveled with a #15 scalpel blade. Given the location of the defect and the proximity to free margins a rhombic flap was deemed most appropriate. Using a sterile surgical marker, an appropriate rhombic flap was drawn incorporating the defect. The area thus outlined was incised deep to adipose tissue with a #15 scalpel blade. The skin margins were undermined to an appropriate distance in all directions utilizing iris scissors. Following this, the designed flap was placed into the primary defect and sutured into place.
Rhomboid Transposition Flap Text: The defect edges were debeveled with a #15 scalpel blade. Given the location of the defect and the proximity to free margins a rhomboid transposition flap was deemed most appropriate. Using a sterile surgical marker, an appropriate rhomboid flap was drawn incorporating the defect. The area thus outlined was incised deep to adipose tissue with a #15 scalpel blade. The skin margins were undermined to an appropriate distance in all directions utilizing iris scissors. Following this, the designed flap was placed into the primary defect and sutured into place.
Bi-Rhombic Flap Text: The defect edges were debeveled with a #15 scalpel blade. Given the location of the defect and the proximity to free margins a bi-rhombic flap was deemed most appropriate. Using a sterile surgical marker, an appropriate rhombic flap was drawn incorporating the defect. The area thus outlined was incised deep to adipose tissue with a #15 scalpel blade. The skin margins were undermined to an appropriate distance in all directions utilizing iris scissors. Following this, the designed flap was placed into the primary defect and sutured into place.
Helical Rim Advancement Flap Text: The defect edges were debeveled with a #15 blade scalpel.  Given the location of the defect and the proximity to free margins (helical rim) a double helical rim advancement flap was deemed most appropriate. Using a sterile surgical marker, the appropriate advancement flaps were drawn incorporating the defect and placing the expected incisions between the helical rim and antihelix where possible.  The area thus outlined was incised through and through with a #15 scalpel blade.  With a skin hook and iris scissors, the flaps were gently and sharply undermined and freed up. Folllowing this, the designed flaps were placed into the primary defect and sutured into place.
Bilateral Helical Rim Advancement Flap Text: The defect edges were debeveled with a #15 blade scalpel.  Given the location of the defect and the proximity to free margins (helical rim) a bilateral helical rim advancement flap was deemed most appropriate. Using a sterile surgical marker, the appropriate advancement flaps were drawn incorporating the defect and placing the expected incisions between the helical rim and antihelix where possible.  The area thus outlined was incised through and through with a #15 scalpel blade.  With a skin hook and iris scissors, the flaps were gently and sharply undermined and freed up. Following this, the designed flaps were placed into the primary defect and sutured into place.
Ear Star Wedge Flap Text: The defect edges were debeveled with a #15 blade scalpel.  Given the location of the defect and the proximity to free margins (helical rim) an ear star wedge flap was deemed most appropriate. Using a sterile surgical marker, the appropriate flap was drawn incorporating the defect and placing the expected incisions between the helical rim and antihelix where possible.  The area thus outlined was incised through and through with a #15 scalpel blade. Following this, the designed flap was placed in the primary defect and sutured into place.
Banner Transposition Flap Text: The defect edges were debeveled with a #15 scalpel blade. Given the location of the defect and the proximity to free margins a Banner transposition flap was deemed most appropriate. Using a sterile surgical marker, an appropriate flap was drawn around the defect. The area thus outlined was incised deep to adipose tissue with a #15 scalpel blade. The skin margins were undermined to an appropriate distance in all directions utilizing iris scissors. Following this, the designed flap was placed in the primary defect and sutured into place.
Bilobed Flap Text: The defect edges were debeveled with a #15 scalpel blade. Given the location of the defect and the proximity to free margins a bilobe flap was deemed most appropriate. Using a sterile surgical marker, an appropriate bilobe flap drawn around the defect. The area thus outlined was incised deep to adipose tissue with a #15 scalpel blade. The skin margins were undermined to an appropriate distance in all directions utilizing iris scissors.  Following this, the designed flap was placed into the primary defect and sutured into place.
Bilobed Transposition Flap Text: The defect edges were debeveled with a #15 scalpel blade. Given the location of the defect and the proximity to free margins a bilobed transposition flap was deemed most appropriate. Using a sterile surgical marker, an appropriate bilobe flap drawn around the defect. The area thus outlined was incised deep to adipose tissue with a #15 scalpel blade. The skin margins were undermined to an appropriate distance in all directions utilizing iris scissors.  Following this, the designed flap was placed into the primary defect and sutured into place.
Trilobed Flap Text: The defect edges were debeveled with a #15 scalpel blade. Given the location of the defect and the proximity to free margins a trilobed flap was deemed most appropriate. Using a sterile surgical marker, an appropriate trilobed flap was drawn around the defect. The area thus outlined was incised deep to adipose tissue with a #15 scalpel blade. The skin margins were undermined to an appropriate distance in all directions utilizing iris scissors. Following this, the designed flap was placed in the primary defect and sutured into place.
Dorsal Nasal Flap Text: The defect edges were debeveled with a #15 scalpel blade. Given the location of the defect and the proximity to free margins a dorsal nasal flap was deemed most appropriate. Using a sterile surgical marker, an appropriate dorsal nasal flap was drawn around the defect. The area thus outlined was incised deep to adipose tissue with a #15 scalpel blade. The skin margins were undermined to an appropriate distance in all directions utilizing iris scissors. Following this, the designed flap was placed in the primary defect and sutured into place.
Island Pedicle Flap Text: The defect edges were debeveled with a #15 scalpel blade. Given the location of the defect, shape of the defect and the proximity to free margins an island pedicle advancement flap was deemed most appropriate. Using a sterile surgical marker, an appropriate advancement flap was drawn incorporating the defect, outlining the appropriate donor tissue and placing the expected incisions within the relaxed skin tension lines where possible. The area thus outlined was incised deep to adipose tissue with a #15 scalpel blade. The skin margins were undermined to an appropriate distance in all directions around the primary defect and laterally outward around the island pedicle utilizing iris scissors.  There was minimal undermining beneath the pedicle flap. Following this, the flap was placed into the primary defect and sutured into place.
Island Pedicle Flap With Canthal Suspension Text: The defect edges were debeveled with a #15 scalpel blade. Given the location of the defect, shape of the defect and the proximity to free margins an island pedicle advancement flap was deemed most appropriate. Using a sterile surgical marker, an appropriate advancement flap was drawn incorporating the defect, outlining the appropriate donor tissue and placing the expected incisions within the relaxed skin tension lines where possible. The area thus outlined was incised deep to adipose tissue with a #15 scalpel blade. The skin margins were undermined to an appropriate distance in all directions around the primary defect and laterally outward around the island pedicle utilizing iris scissors.  There was minimal undermining beneath the pedicle flap. A suspension suture was placed in the canthal tendon to prevent tension and prevent ectropion. Following this, the designed flap was placed into the primary defect and sutured into place.
Alar Island Pedicle Flap Text: The defect edges were debeveled with a #15 scalpel blade. Given the location of the defect, shape of the defect and the proximity to the alar rim an island pedicle advancement flap was deemed most appropriate. Using a sterile surgical marker, an appropriate advancement flap was drawn incorporating the defect, outlining the appropriate donor tissue and placing the expected incisions within the nasal ala running parallel to the alar rim. The area thus outlined was incised with a #15 scalpel blade. The skin margins were undermined minimally to an appropriate distance in all directions around the primary defect and laterally outward around the island pedicle utilizing iris scissors.  There was minimal undermining beneath the pedicle flap. Following this, the designed flap was placed in the primary defect and sutured into place.
Double Island Pedicle Flap Text: The defect edges were debeveled with a #15 scalpel blade. Given the location of the defect, shape of the defect and the proximity to free margins a double island pedicle advancement flap was deemed most appropriate. Using a sterile surgical marker, an appropriate advancement flap was drawn incorporating the defect, outlining the appropriate donor tissue and placing the expected incisions within the relaxed skin tension lines where possible. The area thus outlined was incised deep to adipose tissue with a #15 scalpel blade. The skin margins were undermined to an appropriate distance in all directions around the primary defect and laterally outward around the island pedicle utilizing iris scissors.  There was minimal undermining beneath the pedicle flap. Following this, the flap was placed into the primary defect and sutured into place.
Island Pedicle Flap-Requiring Vessel Identification Text: The defect edges were debeveled with a #15 scalpel blade. Given the location of the defect, shape of the defect and the proximity to free margins an island pedicle advancement flap was deemed most appropriate. Using a sterile surgical marker, an appropriate advancement flap was drawn, based on the axial vessel mentioned above, incorporating the defect, outlining the appropriate donor tissue and placing the expected incisions within the relaxed skin tension lines where possible. The area thus outlined was incised deep to adipose tissue with a #15 scalpel blade. The skin margins were undermined to an appropriate distance in all directions around the primary defect and laterally outward around the island pedicle utilizing iris scissors.  There was minimal undermining beneath the pedicle flap. Following this, the designed flap was placed in the primary defect and sutured into place.
Keystone Flap Text: The defect edges were debeveled with a #15 scalpel blade. Given the location of the defect, shape of the defect a keystone flap was deemed most appropriate. Using a sterile surgical marker, an appropriate keystone flap was drawn incorporating the defect, outlining the appropriate donor tissue and placing the expected incisions within the relaxed skin tension lines where possible. The area thus outlined was incised deep to adipose tissue with a #15 scalpel blade. The skin margins were undermined to an appropriate distance in all directions around the primary defect and laterally outward around the flap utilizing iris scissors. Following this, the designed flap was placed in the primary defect and sutured into place.
O-T Plasty Text: The defect edges were debeveled with a #15 scalpel blade. Given the location of the defect, shape of the defect and the proximity to free margins an O-T plasty was deemed most appropriate. Using a sterile surgical marker, an appropriate O-T plasty was drawn incorporating the defect and placing the expected incisions within the relaxed skin tension lines where possible. The area thus outlined was incised deep to adipose tissue with a #15 scalpel blade. The skin margins were undermined to an appropriate distance in all directions utilizing iris scissors. Following this, the designed flap was placed into the primary defect and sutured into place.
O-Z Plasty Text: The defect edges were debeveled with a #15 scalpel blade. Given the location of the defect, shape of the defect and the proximity to free margins an O-Z plasty (double transposition flap) was deemed most appropriate. Using a sterile surgical marker, the appropriate transposition flaps were drawn incorporating the defect and placing the expected incisions within the relaxed skin tension lines where possible. The area thus outlined was incised deep to adipose tissue with a #15 scalpel blade. The skin margins were undermined to an appropriate distance in all directions utilizing iris scissors.  Hemostasis was achieved with electrocautery.  The flaps were then transposed into place, one clockwise and the other counterclockwise, and anchored with interrupted buried subcutaneous sutures.
Double O-Z Plasty Text: The defect edges were debeveled with a #15 scalpel blade. Given the location of the defect, shape of the defect and the proximity to free margins a Double O-Z plasty (double transposition flap) was deemed most appropriate. Using a sterile surgical marker, the appropriate transposition flaps were drawn incorporating the defect and placing the expected incisions within the relaxed skin tension lines where possible. The area thus outlined was incised deep to adipose tissue with a #15 scalpel blade. The skin margins were undermined to an appropriate distance in all directions utilizing iris scissors.  Hemostasis was achieved with electrocautery.  The flaps were then transposed into place, one clockwise and the other counterclockwise, and anchored with interrupted buried subcutaneous sutures.
V-Y Plasty Text: The defect edges were debeveled with a #15 scalpel blade. Given the location of the defect, shape of the defect and the proximity to free margins an V-Y advancement flap was deemed most appropriate. Using a sterile surgical marker, an appropriate advancement flap was drawn incorporating the defect and placing the expected incisions within the relaxed skin tension lines where possible. The area thus outlined was incised deep to adipose tissue with a #15 scalpel blade. The skin margins were undermined to an appropriate distance in all directions utilizing iris scissors. Following this, the designed flap was advanced into the primary defect and sutured into place.
H Plasty Text: Given the location of the defect, shape of the defect and the proximity to free margins a H-plasty was deemed most appropriate for repair. Using a sterile surgical marker, the appropriate advancement arms of the H-plasty were drawn incorporating the defect and placing the expected incisions within the relaxed skin tension lines where possible. The area thus outlined was incised deep to adipose tissue with a #15 scalpel blade. The skin margins were undermined to an appropriate distance in all directions utilizing iris scissors.  The opposing advancement arms were then advanced into place in opposite direction and anchored with interrupted buried subcutaneous sutures.
W Plasty Text: The lesion was extirpated to the level of the fat with a #15 scalpel blade. Given the location of the defect, shape of the defect and the proximity to free margins a W-plasty was deemed most appropriate for repair. Using a sterile surgical marker, the appropriate transposition arms of the W-plasty were drawn incorporating the defect and placing the expected incisions within the relaxed skin tension lines where possible. The area thus outlined was incised deep to adipose tissue with a #15 scalpel blade. The skin margins were undermined to an appropriate distance in all directions utilizing iris scissors.  The opposing transposition arms were then transposed into place in opposite direction and anchored with interrupted buried subcutaneous sutures.
Z Plasty Text: The lesion was extirpated to the level of the fat with a #15 scalpel blade. Given the location of the defect, shape of the defect and the proximity to free margins a Z-plasty was deemed most appropriate for repair. Using a sterile surgical marker, the appropriate transposition arms of the Z-plasty were drawn incorporating the defect and placing the expected incisions within the relaxed skin tension lines where possible. The area thus outlined was incised deep to adipose tissue with a #15 scalpel blade. The skin margins were undermined to an appropriate distance in all directions utilizing iris scissors.  The opposing transposition arms were then transposed into place in opposite direction and anchored with interrupted buried subcutaneous sutures.
Double Z Plasty Text: The lesion was extirpated to the level of the fat with a #15 scalpel blade. Given the location of the defect, shape of the defect and the proximity to free margins a double Z-plasty was deemed most appropriate for repair. Using a sterile surgical marker, the appropriate transposition arms of the double Z-plasty were drawn incorporating the defect and placing the expected incisions within the relaxed skin tension lines where possible. The area thus outlined was incised deep to adipose tissue with a #15 scalpel blade. The skin margins were undermined to an appropriate distance in all directions utilizing iris scissors. The opposing transposition arms were then transposed and carried over into place in opposite direction and anchored with interrupted buried subcutaneous sutures.
Zygomaticofacial Flap Text: Given the location of the defect, shape of the defect and the proximity to free margins a zygomaticofacial flap was deemed most appropriate for repair. Using a sterile surgical marker, the appropriate flap was drawn incorporating the defect and placing the expected incisions within the relaxed skin tension lines where possible. The area thus outlined was incised deep to adipose tissue with a #15 scalpel blade with preservation of a vascular pedicle.  The skin margins were undermined to an appropriate distance in all directions utilizing iris scissors.  The flap was then placed into the defect and anchored with interrupted buried subcutaneous sutures.
Cheek Interpolation Flap Text: A decision was made to reconstruct the defect utilizing an interpolation axial flap and a staged reconstruction.  A telfa template was made of the defect.  This telfa template was then used to outline the Cheek Interpolation flap.  The donor area for the pedicle flap was then injected with anesthesia.  The flap was excised through the skin and subcutaneous tissue down to the layer of the underlying musculature.  The interpolation flap was carefully excised within this deep plane to maintain its blood supply.  The edges of the donor site were undermined.   The donor site was closed in a primary fashion.  The pedicle was then rotated into position and sutured.  Once the tube was sutured into place, adequate blood supply was confirmed with blanching and refill.  The pedicle was then wrapped with xeroform gauze and dressed appropriately with a telfa and gauze bandage to ensure continued blood supply and protect the attached pedicle.
Cheek-To-Nose Interpolation Flap Text: A decision was made to reconstruct the defect utilizing an interpolation axial flap and a staged reconstruction.  A telfa template was made of the defect.  This telfa template was then used to outline the Cheek-To-Nose Interpolation flap.  The donor area for the pedicle flap was then injected with anesthesia.  The flap was excised through the skin and subcutaneous tissue down to the layer of the underlying musculature.  The interpolation flap was carefully excised within this deep plane to maintain its blood supply.  The edges of the donor site were undermined.   The donor site was closed in a primary fashion.  The pedicle was then rotated into position and sutured.  Once the tube was sutured into place, adequate blood supply was confirmed with blanching and refill.  The pedicle was then wrapped with xeroform gauze and dressed appropriately with a telfa and gauze bandage to ensure continued blood supply and protect the attached pedicle.
Interpolation Flap Text: A decision was made to reconstruct the defect utilizing an interpolation axial flap and a staged reconstruction.  A telfa template was made of the defect.  This telfa template was then used to outline the interpolation flap.  The donor area for the pedicle flap was then injected with anesthesia.  The flap was excised through the skin and subcutaneous tissue down to the layer of the underlying musculature.  The interpolation flap was carefully excised within this deep plane to maintain its blood supply.  The edges of the donor site were undermined.   The donor site was closed in a primary fashion.  The pedicle was then rotated into position and sutured.  Once the tube was sutured into place, adequate blood supply was confirmed with blanching and refill.  The pedicle was then wrapped with xeroform gauze and dressed appropriately with a telfa and gauze bandage to ensure continued blood supply and protect the attached pedicle.
Melolabial Interpolation Flap Text: A decision was made to reconstruct the defect utilizing an interpolation axial flap and a staged reconstruction.  A telfa template was made of the defect.  This telfa template was then used to outline the melolabial interpolation flap.  The donor area for the pedicle flap was then injected with anesthesia.  The flap was excised through the skin and subcutaneous tissue down to the layer of the underlying musculature.  The pedicle flap was carefully excised within this deep plane to maintain its blood supply.  The edges of the donor site were undermined.   The donor site was closed in a primary fashion.  The pedicle was then rotated into position and sutured.  Once the tube was sutured into place, adequate blood supply was confirmed with blanching and refill.  The pedicle was then wrapped with xeroform gauze and dressed appropriately with a telfa and gauze bandage to ensure continued blood supply and protect the attached pedicle.
Mastoid Interpolation Flap Text: A decision was made to reconstruct the defect utilizing an interpolation axial flap and a staged reconstruction.  A telfa template was made of the defect.  This telfa template was then used to outline the mastoid interpolation flap.  The donor area for the pedicle flap was then injected with anesthesia.  The flap was excised through the skin and subcutaneous tissue down to the layer of the underlying musculature.  The pedicle flap was carefully excised within this deep plane to maintain its blood supply.  The edges of the donor site were undermined.   The donor site was closed in a primary fashion.  The pedicle was then rotated into position and sutured.  Once the tube was sutured into place, adequate blood supply was confirmed with blanching and refill.  The pedicle was then wrapped with xeroform gauze and dressed appropriately with a telfa and gauze bandage to ensure continued blood supply and protect the attached pedicle.
Posterior Auricular Interpolation Flap Text: A decision was made to reconstruct the defect utilizing an interpolation axial flap and a staged reconstruction.  A telfa template was made of the defect.  This telfa template was then used to outline the posterior auricular interpolation flap.  The donor area for the pedicle flap was then injected with anesthesia.  The flap was excised through the skin and subcutaneous tissue down to the layer of the underlying musculature.  The pedicle flap was carefully excised within this deep plane to maintain its blood supply.  The edges of the donor site were undermined.   The donor site was closed in a primary fashion.  The pedicle was then rotated into position and sutured.  Once the tube was sutured into place, adequate blood supply was confirmed with blanching and refill.  The pedicle was then wrapped with xeroform gauze and dressed appropriately with a telfa and gauze bandage to ensure continued blood supply and protect the attached pedicle.
Paramedian Forehead Flap Text: A decision was made to reconstruct the defect utilizing an interpolation axial flap and a staged reconstruction.  A telfa template was made of the defect.  This telfa template was then used to outline the paramedian forehead pedicle flap.  The donor area for the pedicle flap was then injected with anesthesia.  The flap was excised through the skin and subcutaneous tissue down to the layer of the underlying musculature.  The pedicle flap was carefully excised within this deep plane to maintain its blood supply.  The edges of the donor site were undermined.   The donor site was closed in a primary fashion.  The pedicle was then rotated into position and sutured.  Once the tube was sutured into place, adequate blood supply was confirmed with blanching and refill.  The pedicle was then wrapped with xeroform gauze and dressed appropriately with a telfa and gauze bandage to ensure continued blood supply and protect the attached pedicle.
Abbe Flap (Upper To Lower Lip) Text: The defect of the lower lip was assessed and measured.  Given the location and size of the defect, an Abbe flap was deemed most appropriate. Using a sterile surgical marker, an appropriate Abbe flap was measured and drawn on the upper lip. Local anesthesia was then infiltrated.  A scalpel was then used to incise the upper lip through and through the skin, vermilion, muscle and mucosa, leaving the flap pedicled on the opposite side.  The flap was then rotated and transferred to the lower lip defect.  The flap was then sutured into place with a three layer technique, closing the orbicularis oris muscle layer with subcutaneous buried sutures, followed by a mucosal layer and an epidermal layer.
Abbe Flap (Lower To Upper Lip) Text: The defect of the upper lip was assessed and measured.  Given the location and size of the defect, an Abbe flap was deemed most appropriate. Using a sterile surgical marker, an appropriate Abbe flap was measured and drawn on the lower lip. Local anesthesia was then infiltrated. A scalpel was then used to incise the upper lip through and through the skin, vermilion, muscle and mucosa, leaving the flap pedicled on the opposite side.  The flap was then rotated and transferred to the lower lip defect.  The flap was then sutured into place with a three layer technique, closing the orbicularis oris muscle layer with subcutaneous buried sutures, followed by a mucosal layer and an epidermal layer.
Estlander Flap (Upper To Lower Lip) Text: The defect of the lower lip was assessed and measured.  Given the location and size of the defect, an Estlander flap was deemed most appropriate. Using a sterile surgical marker, an appropriate Estlander flap was measured and drawn on the upper lip. Local anesthesia was then infiltrated. A scalpel was then used to incise the lateral aspect of the flap, through skin, muscle and mucosa, leaving the flap pedicled medially.  The flap was then rotated and positioned to fill the lower lip defect.  The flap was then sutured into place with a three layer technique, closing the orbicularis oris muscle layer with subcutaneous buried sutures, followed by a mucosal layer and an epidermal layer.
Cheiloplasty (Less Than 50%) Text: A decision was made to reconstruct the defect with a  cheiloplasty.  The defect was undermined extensively.  Additional orbicularis oris muscle was excised with a 15 blade scalpel.  The defect was converted into a full thickness wedge, of less than 50% of the vertical height of the lip, to facilite a better cosmetic result.  Small vessels were then tied off with 5-0 monocyrl. The orbicularis oris, superficial fascia, adipose and dermis were then reapproximated.  After the deeper layers were approximated the epidermis was reapproximated with particular care given to realign the vermilion border.
Cheiloplasty (Complex) Text: A decision was made to reconstruct the defect with a  cheiloplasty.  The defect was undermined extensively.  Additional orbicularis oris muscle was excised with a 15 blade scalpel.  The defect was converted into a full thickness wedge to facilite a better cosmetic result.  Small vessels were then tied off with 5-0 monocyrl. The orbicularis oris, superficial fascia, adipose and dermis were then reapproximated.  After the deeper layers were approximated the epidermis was reapproximated with particular care given to realign the vermilion border.
Ear Wedge Repair Text: A wedge excision was completed by carrying down an excision through the full thickness of the ear and cartilage with an inward facing Burow's triangle. The wound was then closed in a layered fashion.
Full Thickness Lip Wedge Repair (Flap) Text: Given the location of the defect and the proximity to free margins a full thickness wedge repair was deemed most appropriate. Using a sterile surgical marker, the appropriate repair was drawn incorporating the defect and placing the expected incisions perpendicular to the vermilion border.  The vermilion border was also meticulously outlined to ensure appropriate reapproximation during the repair.  The area thus outlined was incised through and through with a #15 scalpel blade.  The muscularis and dermis were reaproximated with deep sutures following hemostasis. Care was taken to realign the vermilion border before proceeding with the superficial closure.  Once the vermilion was realigned the superfical and mucosal closure was finished.
Ftsg Text: The defect edges were debeveled with a #15 scalpel blade. Given the location of the defect, shape of the defect and the proximity to free margins a full thickness skin graft was deemed most appropriate. Using a sterile surgical marker, the primary defect shape was transferred to the donor site. The area thus outlined was incised deep to adipose tissue with a #15 scalpel blade.  The harvested graft was then trimmed of adipose tissue until only dermis and epidermis was left.  The skin margins of the secondary defect were undermined to an appropriate distance in all directions utilizing iris scissors.  The secondary defect was closed with interrupted buried subcutaneous sutures.  The skin edges were then re-apposed with running  sutures.  The skin graft was then placed in the primary defect and oriented appropriately.
Split-Thickness Skin Graft Text: The defect edges were debeveled with a #15 scalpel blade. Given the location of the defect, shape of the defect and the proximity to free margins a split thickness skin graft was deemed most appropriate. Using a sterile surgical marker, the primary defect shape was transferred to the donor site. The split thickness graft was then harvested.  The skin graft was then placed in the primary defect and oriented appropriately.
Pinch Graft Text: The defect edges were debeveled with a #15 scalpel blade. Given the location of the defect, shape of the defect and the proximity to free margins a pinch graft was deemed most appropriate. Using a sterile surgical marker, the primary defect shape was transferred to the donor site. The area thus outlined was incised deep to adipose tissue with a #15 scalpel blade.  The harvested graft was then trimmed of adipose tissue until only dermis and epidermis was left. The skin margins of the secondary defect were undermined to an appropriate distance in all directions utilizing iris scissors.  The secondary defect was closed with interrupted buried subcutaneous sutures.  The skin edges were then re-apposed with running  sutures.  The skin graft was then placed in the primary defect and oriented appropriately.
Burow's Graft Text: The defect edges were debeveled with a #15 scalpel blade. Given the location of the defect, shape of the defect, the proximity to free margins and the presence of a standing cone deformity a Burow's skin graft was deemed most appropriate. The standing cone was removed and this tissue was then trimmed to the shape of the primary defect. The adipose tissue was also removed until only dermis and epidermis were left.  The skin margins of the secondary defect were undermined to an appropriate distance in all directions utilizing iris scissors.  The secondary defect was closed with interrupted buried subcutaneous sutures.  The skin edges were then re-apposed with running  sutures.  The skin graft was then placed in the primary defect and oriented appropriately.
Cartilage Graft Text: The defect edges were debeveled with a #15 scalpel blade. Given the location of the defect, shape of the defect, the fact the defect involved a full thickness cartilage defect a cartilage graft was deemed most appropriate.  An appropriate donor site was identified, cleansed, and anesthetized. The cartilage graft was then harvested and transferred to the recipient site, oriented appropriately and then sutured into place.  The secondary defect was then repaired using a primary closure.
Composite Graft Text: The defect edges were debeveled with a #15 scalpel blade. Given the location of the defect, shape of the defect, the proximity to free margins and the fact the defect was full thickness a composite graft was deemed most appropriate.  The defect was outline and then transferred to the donor site.  A full thickness graft was then excised from the donor site. The graft was then placed in the primary defect, oriented appropriately and then sutured into place.  The secondary defect was then repaired using a primary closure.
Epidermal Autograft Text: The defect edges were debeveled with a #15 scalpel blade. Given the location of the defect, shape of the defect and the proximity to free margins an epidermal autograft was deemed most appropriate. Using a sterile surgical marker, the primary defect shape was transferred to the donor site. The epidermal graft was then harvested.  The skin graft was then placed in the primary defect and oriented appropriately.
Dermal Autograft Text: The defect edges were debeveled with a #15 scalpel blade. Given the location of the defect, shape of the defect and the proximity to free margins a dermal autograft was deemed most appropriate. Using a sterile surgical marker, the primary defect shape was transferred to the donor site. The area thus outlined was incised deep to adipose tissue with a #15 scalpel blade.  The harvested graft was then trimmed of adipose and epidermal tissue until only dermis was left.  The skin graft was then placed in the primary defect and oriented appropriately.
Skin Substitute Text: The defect edges were debeveled with a #15 scalpel blade. Given the location of the defect, shape of the defect and the proximity to free margins a skin substitute graft was deemed most appropriate.  The graft material was trimmed to fit the size of the defect. The graft was then placed in the primary defect and oriented appropriately.
Tissue Cultured Epidermal Autograft Text: The defect edges were debeveled with a #15 scalpel blade. Given the location of the defect, shape of the defect and the proximity to free margins a tissue cultured epidermal autograft was deemed most appropriate.  The graft was then trimmed to fit the size of the defect.  The graft was then placed in the primary defect and oriented appropriately.
Xenograft Text: The defect edges were debeveled with a #15 scalpel blade. Given the location of the defect, shape of the defect and the proximity to free margins a xenograft was deemed most appropriate.  The graft was then trimmed to fit the size of the defect.  The graft was then placed in the primary defect and oriented appropriately.
Purse String (Simple) Text: Given the location of the defect and the characteristics of the surrounding skin a purse string closure was deemed most appropriate.  Undermining was performed circumferentially around the surgical defect.  A purse string suture was then placed and tightened.
Purse String (Intermediate) Text: Given the location of the defect and the characteristics of the surrounding skin a purse string intermediate closure was deemed most appropriate.  Undermining was performed circumferentially around the surgical defect.  A purse string suture was then placed and tightened.
Partial Purse String (Simple) Text: Given the location of the defect and the characteristics of the surrounding skin a simple purse string closure was deemed most appropriate.  Undermining was performed circumferentially around the surgical defect.  A purse string suture was then placed and tightened. Wound tension only allowed a partial closure of the circular defect.
Partial Purse String (Intermediate) Text: Given the location of the defect and the characteristics of the surrounding skin an intermediate purse string closure was deemed most appropriate.  Undermining was performed circumferentially around the surgical defect.  A purse string suture was then placed and tightened. Wound tension only allowed a partial closure of the circular defect.
Localized Dermabrasion With Wire Brush Text: The patient was draped in routine manner.  Localized dermabrasion using 3 x 17 mm wire brush was performed in routine manner to papillary dermis. This spot dermabrasion is being performed to complete skin cancer reconstruction. It also will eliminate the other sun damaged precancerous cells that are known to be part of the regional effect of a lifetime's worth of sun exposure. This localized dermabrasion is therapeutic and should not be considered cosmetic in any regard.
Localized Dermabrasion With Sand Papertext: The patient was draped in routine manner.  Localized dermabrasion using sterile sand paper was performed in routine manner to papillary dermis. This spot dermabrasion is being performed to complete skin cancer reconstruction. It also will eliminate the other sun damaged precancerous cells that are known to be part of the regional effect of a lifetime's worth of sun exposure. This localized dermabrasion is therapeutic and should not be considered cosmetic in any regard.
Localized Dermabrasion With 15 Blade Text: The patient was draped in routine manner.  Localized dermabrasion using a 15 blade was performed in routine manner to papillary dermis. This spot dermabrasion is being performed to complete skin cancer reconstruction. It also will eliminate the other sun damaged precancerous cells that are known to be part of the regional effect of a lifetime's worth of sun exposure. This localized dermabrasion is therapeutic and should not be considered cosmetic in any regard.
Tarsorrhaphy Text: A tarsorrhaphy was performed using Frost sutures.
Intermediate Repair And Flap Additional Text (Will Appearing After The Standard Complex Repair Text): The intermediate repair was not sufficient to completely close the primary defect. The remaining additional defect was repaired with the flap mentioned below.
Intermediate Repair And Graft Additional Text (Will Appearing After The Standard Complex Repair Text): The intermediate repair was not sufficient to completely close the primary defect. The remaining additional defect was repaired with the graft mentioned below.
Complex Repair And Flap Additional Text (Will Appearing After The Standard Complex Repair Text): The complex repair was not sufficient to completely close the primary defect. The remaining additional defect was repaired with the flap mentioned below.
Complex Repair And Graft Additional Text (Will Appearing After The Standard Complex Repair Text): The complex repair was not sufficient to completely close the primary defect. The remaining additional defect was repaired with the graft mentioned below.
Eyelid Full Thickness Repair - 89733: The eyelid defect was full thickness which required a wedge repair of the eyelid. Special care was taken to ensure that the eyelid margin was realligned when placing sutures.
Eyelid Partial Thickness Repair - 75885: The eyelid defect was partial thickness which required a wedge repair of the eyelid. Special care was taken to ensure that the eyelid margin was realligned when placing sutures.
Manual Repair Warning Statement: We plan on removing the manually selected variable below in favor of our much easier automatic structured text blocks found in the previous tab. We decided to do this to help make the flow better and give you the full power of structured data. Manual selection is never going to be ideal in our platform and I would encourage you to avoid using manual selection from this point on, especially since I will be sunsetting this feature. It is important that you do one of two things with the customized text below. First, you can save all of the text in a word file so you can have it for future reference. Second, transfer the text to the appropriate area in the Library tab. Lastly, if there is a flap or graft type which we do not have you need to let us know right away so I can add it in before the variable is hidden. No need to panic, we plan to give you roughly 6 months to make the change.
Same Histology In Subsequent Stages Text: The pattern and morphology of the tumor is as described in the first stage.
No Residual Tumor Seen Histology Text: There were no malignant cells seen in the sections examined.
Inflammation Suggestive Of Cancer Camouflage Histology Text: There was a dense lymphocytic infiltrate which prevented adequate histologic evaluation of adjacent structures.
Bcc Histology Text: There were numerous aggregates of basaloid cells.
Bcc Infiltrative Histology Text: There were numerous aggregates of basaloid cells demonstrating an infiltrative pattern.
Mart-1 - Positive Histology Text: MART-1 staining demonstrates areas of higher density and clustering of melanocytes with Pagetoid spread upwards within the epidermis. The surgical margins are positive for tumor cells.
Mart-1 - Negative Histology Text: MART-1 staining demonstrates a normal density and pattern of melanocytes along the dermal-epidermal junction. The surgical margins are negative for tumor cells.
Information: Selecting Yes will display possible errors in your note based on the variables you have selected. This validation is only offered as a suggestion for you. PLEASE NOTE THAT THE VALIDATION TEXT WILL BE REMOVED WHEN YOU FINALIZE YOUR NOTE. IF YOU WANT TO FAX A PRELIMINARY NOTE YOU WILL NEED TO TOGGLE THIS TO 'NO' IF YOU DO NOT WANT IT IN YOUR FAXED NOTE.
Bill 59 Modifier?: No - Continue to Bill 79 Modifier

## 2024-06-18 ENCOUNTER — RX ONLY (OUTPATIENT)
Age: 84
Setting detail: RX ONLY
End: 2024-06-18

## 2024-06-18 RX ORDER — TRAMADOL HYDROCHLORIDE 50 MG/1
TABLET, FILM COATED ORAL
Qty: 6 | Refills: 0 | Status: ERX | COMMUNITY
Start: 2024-06-18

## 2024-06-20 ENCOUNTER — RX ONLY (OUTPATIENT)
Age: 84
Setting detail: RX ONLY
End: 2024-06-20

## 2024-06-20 RX ORDER — TRAMADOL HYDROCHLORIDE 50 MG/1
TABLET, FILM COATED ORAL
Qty: 6 | Refills: 0 | Status: ERX

## 2024-06-24 ENCOUNTER — RX ONLY (OUTPATIENT)
Age: 84
Setting detail: RX ONLY
End: 2024-06-24

## 2024-06-24 RX ORDER — TRAMADOL HYDROCHLORIDE 50 MG/1
TABLET, FILM COATED ORAL
Qty: 6 | Refills: 0 | Status: ERX | COMMUNITY
Start: 2024-06-24

## 2024-07-01 ENCOUNTER — APPOINTMENT (RX ONLY)
Dept: URBAN - METROPOLITAN AREA CLINIC 330 | Facility: CLINIC | Age: 84
Setting detail: DERMATOLOGY
End: 2024-07-01

## 2024-07-01 DIAGNOSIS — Z48.02 ENCOUNTER FOR REMOVAL OF SUTURES: ICD-10-CM

## 2024-07-01 PROCEDURE — ? SUTURE REMOVAL

## 2024-07-01 ASSESSMENT — LOCATION SIMPLE DESCRIPTION DERM: LOCATION SIMPLE: LEFT FOREHEAD

## 2024-07-01 ASSESSMENT — LOCATION ZONE DERM: LOCATION ZONE: FACE

## 2024-07-01 ASSESSMENT — LOCATION DETAILED DESCRIPTION DERM: LOCATION DETAILED: LEFT SUPERIOR FOREHEAD

## 2024-07-02 ENCOUNTER — HOSPITAL ENCOUNTER (OUTPATIENT)
Dept: PHYSICAL THERAPY | Age: 84
Setting detail: RECURRING SERIES
Discharge: HOME | End: 2024-07-05
Payer: MEDICARE

## 2024-07-02 PROCEDURE — 97110 THERAPEUTIC EXERCISES: CPT

## 2024-07-02 PROCEDURE — 97140 MANUAL THERAPY 1/> REGIONS: CPT

## 2024-07-02 ASSESSMENT — PAIN SCALES - GENERAL: PAINLEVEL_OUTOF10: 2

## 2024-07-02 NOTE — PROGRESS NOTES
Elias Tolbertmoiseaiden  : 1940  Primary: Medicare Part A And B (Medicare)  Secondary: [employer] MEDICARE SUPP [facility] [place]  [place] [address]  Phone: [phone]  Fax: [phone] Plan Frequency: 1x a week for 60 days    Plan of Care/Certification Expiration Date: 24        Plan of Care/Certification Expiration Date:  Plan of Care/Certification Expiration Date: 24    Frequency/Duration:   Plan Frequency: 1x a week for 60 days      Time In/Out:   Time In: 1330  Time Out: 1430      PT Visit Info:    Progress Note Counter: 2  Canceled Appointment: 1      Visit Count:  19    OUTPATIENT PHYSICAL THERAPY:   Treatment Note 2024       Episode  (L humeral fracture)               Treatment Diagnosis:    Stiffness of left shoulder, not elsewhere classified  Muscle weakness (generalized)  Medical/Referring Diagnosis:    Other nondisplaced fracture of upper end of left humerus, subsequent encounter for fracture with routine healing [S42.295D]    Referring Physician:  Alvino Fontanez MD MD Orders:  PT Eval and Treat   Return MD Appt:  May 2024  Date of Onset:  Onset Date: 02/15/24     Allergies:   Morphine, [place] clam (m. 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 Notes he just had stitches removed but is still having higher pain levels. Notes his shoulder has been doing well with minimal discomfort and has been able to use it a little more.   Initial Pain Level::     2/10  Post Session Pain Level:      2/10  Medications Last Reviewed:  2024  Updated Objective Findings:   L shoulder flexion AAROM in supine to 115 degrees  Treatment   THERAPEUTIC EXERCISE: (38 minutes):  Exercises per grid

## 2024-07-08 ENCOUNTER — HOSPITAL ENCOUNTER (OUTPATIENT)
Dept: PHYSICAL THERAPY | Age: 84
Setting detail: RECURRING SERIES
Discharge: HOME | End: 2024-07-11
Payer: MEDICARE

## 2024-07-08 PROCEDURE — 97110 THERAPEUTIC EXERCISES: CPT

## 2024-07-08 PROCEDURE — 97140 MANUAL THERAPY 1/> REGIONS: CPT

## 2024-07-08 ASSESSMENT — PAIN SCALES - GENERAL: PAINLEVEL_OUTOF10: 2

## 2024-07-08 NOTE — PROGRESS NOTES
circumduction to relax joint and surrounding soft tissue  -Supine L upper trap stretch  -scapular upward and retraction mjm grade 3/4 - pt reports great pain relief      Commonly used abbreviations that may be included in this note:  STM- Soft tissue mobilization, R>L or L>R- right greater than left or vice versa, HEP - Home exercise program, CPA/UPA - Central or unilateral posterior-anterior mobilization, SLS- single leg stance, SKTC - Single leg to chest, SNAGS/NAGS- (sustained) Natural apophyseal glides, TKE- Terminal knee extension, ER- External rotation, IR- Internal rotation, B - Bilateral, sec- seconds, Lb- pounds, min - minutes, HA- Headache, OP- Over pressure, tband- theraband, fwd/bwd- Forward/Backward, TA- Transversus Abdominus, dbl- Double      TREATMENT/SESSION SUMMARY:  Response to Treatment: Pt with improvements in sidelying shoulder ER strength as well as shoulder abd. Able to maintain shoulder strength throughout resisted abd flexion. Fatigue mostly with ER isometric holds using cable.     Communication/Consultation:  None today  Equipment provided today:  None  Recommendations/Intent for next treatment session: Next visit will focus on progressing strength and ROM.     Total Treatment Billable Duration:  53 minutes  Time In: 0930  Time Out: 1030    Sade Hernandez PT           Charge [employer]ure  [email or web address] Portal  Appt Desk     Future Appointments   Date Time Provider Department Center   7/15/2024 10:30 AM Sade Hernandez, PT [facility]

## 2024-07-12 ENCOUNTER — APPOINTMENT (RX ONLY)
Dept: URBAN - METROPOLITAN AREA CLINIC 330 | Facility: CLINIC | Age: 84
Setting detail: DERMATOLOGY
End: 2024-07-12

## 2024-07-12 DIAGNOSIS — L08.9 LOCAL INFECTION OF THE SKIN AND SUBCUTANEOUS TISSUE, UNSPECIFIED: ICD-10-CM

## 2024-07-12 DIAGNOSIS — T81.89 OTHER COMPLICATIONS OF PROCEDURES, NOT ELSEWHERE CLASSIFIED: ICD-10-CM

## 2024-07-12 PROBLEM — T81.89XA OTHER COMPLICATIONS OF PROCEDURES, NOT ELSEWHERE CLASSIFIED, INITIAL ENCOUNTER: Status: ACTIVE | Noted: 2024-07-12

## 2024-07-12 PROCEDURE — 99213 OFFICE O/P EST LOW 20 MIN: CPT

## 2024-07-12 PROCEDURE — ? MEDICATION COUNSELING

## 2024-07-12 PROCEDURE — ? COUNSELING

## 2024-07-12 PROCEDURE — ? ORDER TESTS

## 2024-07-12 PROCEDURE — ? OTHER

## 2024-07-12 PROCEDURE — ? PRESCRIPTION

## 2024-07-12 PROCEDURE — ? PRESCRIPTION MEDICATION MANAGEMENT

## 2024-07-12 RX ORDER — CEFDINIR 300 MG/1
CAPSULE ORAL
Qty: 20 | Refills: 0 | Status: ERX

## 2024-07-12 RX ORDER — MUPIROCIN 20 MG/G
OINTMENT TOPICAL
Qty: 22 | Refills: 1 | Status: ERX

## 2024-07-12 ASSESSMENT — LOCATION ZONE DERM: LOCATION ZONE: FACE

## 2024-07-12 ASSESSMENT — LOCATION SIMPLE DESCRIPTION DERM: LOCATION SIMPLE: LEFT FOREHEAD

## 2024-07-12 ASSESSMENT — LOCATION DETAILED DESCRIPTION DERM: LOCATION DETAILED: LEFT SUPERIOR FOREHEAD

## 2024-07-12 NOTE — PROCEDURE: MEDICATION COUNSELING
Soolantra Pregnancy And Lactation Text: This medication is Pregnancy Category C. This medication is considered safe during breast feeding.
Griseofulvin Counseling:  I discussed with the patient the risks of griseofulvin including but not limited to photosensitivity, cytopenia, liver damage, nausea/vomiting and severe allergy.  The patient understands that this medication is best absorbed when taken with a fatty meal (e.g., ice cream or french fries).
Protopic Counseling: Patient may experience a mild burning sensation during topical application. Protopic is not approved in children less than 2 years of age. There have been case reports of hematologic and skin malignancies in patients using topical calcineurin inhibitors although causality is questionable.
Rituxan Pregnancy And Lactation Text: This medication is Pregnancy Category C and it isn't know if it is safe during pregnancy. It is unknown if this medication is excreted in breast milk but similar antibodies are known to be excreted.
Propranolol Pregnancy And Lactation Text: This medication is Pregnancy Category C and it isn't known if it is safe during pregnancy. It is excreted in breast milk.
Topical Retinoid Pregnancy And Lactation Text: This medication is Pregnancy Category C. It is unknown if this medication is excreted in breast milk.
Libtayo Pregnancy And Lactation Text: This medication is contraindicated in pregnancy and when breast feeding.
Cyclophosphamide Counseling:  I discussed with the patient the risks of cyclophosphamide including but not limited to hair loss, hormonal abnormalities, decreased fertility, abdominal pain, diarrhea, nausea and vomiting, bone marrow suppression and infection. The patient understands that monitoring is required while taking this medication.
Doxycycline Counseling:  Patient counseled regarding possible photosensitivity and increased risk for sunburn.  Patient instructed to avoid sunlight, if possible.  When exposed to sunlight, patients should wear protective clothing, sunglasses, and sunscreen.  The patient was instructed to call the office immediately if the following severe adverse effects occur:  hearing changes, easy bruising/bleeding, severe headache, or vision changes.  The patient verbalized understanding of the proper use and possible adverse effects of doxycycline.  All of the patient's questions and concerns were addressed.
Isotretinoin Counseling: Patient should get monthly blood tests, not donate blood, not drive at night if vision affected, not share medication, and not undergo elective surgery for 6 months after tx completed. Side effects reviewed, pt to contact office should one occur.
Rifampin Counseling: I discussed with the patient the risks of rifampin including but not limited to liver damage, kidney damage, red-orange body fluids, nausea/vomiting and severe allergy.
Clofazimine Counseling:  I discussed with the patient the risks of clofazimine including but not limited to skin and eye pigmentation, liver damage, nausea/vomiting, gastrointestinal bleeding and allergy.
Sotyktu Counseling:  I discussed the most common side effects of Sotyktu including: common cold, sore throat, sinus infections, cold sores, canker sores, folliculitis, and acne.? I also discussed more serious side effects of Sotyktu including but not limited to: serious allergic reactions; increased risk for infections such as TB; cancers such as lymphomas; rhabdomyolysis and elevated CPK; and elevated triglycerides and liver enzymes.?
Enbrel Counseling:  I discussed with the patient the risks of etanercept including but not limited to myelosuppression, immunosuppression, autoimmune hepatitis, demyelinating diseases, lymphoma, and infections.  The patient understands that monitoring is required including a PPD at baseline and must alert us or the primary physician if symptoms of infection or other concerning signs are noted.
Cimetidine Pregnancy And Lactation Text: This medication is Pregnancy Category B and is considered safe during pregnancy. It is also excreted in breast milk and breast feeding isn't recommended.
Adbry Pregnancy And Lactation Text: It is unknown if this medication will adversely affect pregnancy or breast feeding.
Glycopyrrolate Pregnancy And Lactation Text: This medication is Pregnancy Category B and is considered safe during pregnancy. It is unknown if it is excreted breast milk.
Doxycycline Pregnancy And Lactation Text: This medication is Pregnancy Category D and not consider safe during pregnancy. It is also excreted in breast milk but is considered safe for shorter treatment courses.
Taltz Pregnancy And Lactation Text: The risk during pregnancy and breastfeeding is uncertain with this medication.
Elidel Counseling: Patient may experience a mild burning sensation during topical application. Elidel is not approved in children less than 2 years of age. There have been case reports of hematologic and skin malignancies in patients using topical calcineurin inhibitors although causality is questionable.
Tazorac Counseling:  Patient advised that medication is irritating and drying.  Patient may need to apply sparingly and wash off after an hour before eventually leaving it on overnight.  The patient verbalized understanding of the proper use and possible adverse effects of tazorac.  All of the patient's questions and concerns were addressed.
Siliq Counseling:  I discussed with the patient the risks of Siliq including but not limited to new or worsening depression, suicidal thoughts and behavior, immunosuppression, malignancy, posterior leukoencephalopathy syndrome, and serious infections.  The patient understands that monitoring is required including a PPD at baseline and must alert us or the primary physician if symptoms of infection or other concerning signs are noted. There is also a special program designed to monitor depression which is required with Siliq.
Olanzapine Counseling- I discussed with the patient the common side effects of olanzapine including but are not limited to: lack of energy, dry mouth, increased appetite, sleepiness, tremor, constipation, dizziness, changes in behavior, or restlessness.  Explained that teenagers are more likely to experience headaches, abdominal pain, pain in the arms or legs, tiredness, and sleepiness.  Serious side effects include but are not limited: increased risk of death in elderly patients who are confused, have memory loss, or dementia-related psychosis; hyperglycemia; increased cholesterol and triglycerides; and weight gain.
Ivermectin Pregnancy And Lactation Text: This medication is Pregnancy Category C and it isn't known if it is safe during pregnancy. It is also excreted in breast milk.
Klisyri Pregnancy And Lactation Text: It is unknown if this medication can harm a developing fetus or if it is excreted in breast milk.
Zoryve Counseling:  I discussed with the patient that Zoryve is not for use in the eyes, mouth or vagina. The most commonly reported side effects include diarrhea, headache, insomnia, application site pain, upper respiratory tract infections, and urinary tract infections.  All of the patient's questions and concerns were addressed.
Birth Control Pills Counseling: Birth Control Pill Counseling: I discussed with the patient the potential side effects of OCPs including but not limited to increased risk of stroke, heart attack, thrombophlebitis, deep venous thrombosis, hepatic adenomas, breast changes, GI upset, headaches, and depression.  The patient verbalized understanding of the proper use and possible adverse effects of OCPs. All of the patient's questions and concerns were addressed.
Topical Steroids Applications Pregnancy And Lactation Text: Most topical steroids are considered safe to use during pregnancy and lactation.  Any topical steroid applied to the breast or nipple should be washed off before breastfeeding.
Benzoyl Peroxide Pregnancy And Lactation Text: This medication is Pregnancy Category C. It is unknown if benzoyl peroxide is excreted in breast milk.
Odomzo Counseling- I discussed with the patient the risks of Odomzo including but not limited to nausea, vomiting, diarrhea, constipation, weight loss, changes in the sense of taste, decreased appetite, muscle spasms, and hair loss.  The patient verbalized understanding of the proper use and possible adverse effects of Odomzo.  All of the patient's questions and concerns were addressed.
Isotretinoin Pregnancy And Lactation Text: This medication is Pregnancy Category X and is considered extremely dangerous during pregnancy. It is unknown if it is excreted in breast milk.
Griseofulvin Pregnancy And Lactation Text: This medication is Pregnancy Category X and is known to cause serious birth defects. It is unknown if this medication is excreted in breast milk but breast feeding should be avoided.
Protopic Pregnancy And Lactation Text: This medication is Pregnancy Category C. It is unknown if this medication is excreted in breast milk when applied topically.
Hydroxychloroquine Counseling:  I discussed with the patient that a baseline ophthalmologic exam is needed at the start of therapy and every year thereafter while on therapy. A CBC may also be warranted for monitoring.  The side effects of this medication were discussed with the patient, including but not limited to agranulocytosis, aplastic anemia, seizures, rashes, retinopathy, and liver toxicity. Patient instructed to call the office should any adverse effect occur.  The patient verbalized understanding of the proper use and possible adverse effects of Plaquenil.  All the patient's questions and concerns were addressed.
Olanzapine Pregnancy And Lactation Text: This medication is pregnancy category C.   There are no adequate and well controlled trials with olanzapine in pregnant females.  Olanzapine should be used during pregnancy only if the potential benefit justifies the potential risk to the fetus.   In a study in lactating healthy women, olanzapine was excreted in breast milk.  It is recommended that women taking olanzapine should not breast feed.
Minoxidil Counseling: Minoxidil is a topical medication which can increase blood flow where it is applied. It is uncertain how this medication increases hair growth. Side effects are uncommon and include stinging and allergic reactions.
Valtrex Pregnancy And Lactation Text: this medication is Pregnancy Category B and is considered safe during pregnancy. This medication is not directly found in breast milk but it's metabolite acyclovir is present.
Enbrel Pregnancy And Lactation Text: This medication is Pregnancy Category B and is considered safe during pregnancy. It is unknown if this medication is excreted in breast milk.
Azithromycin Counseling:  I discussed with the patient the risks of azithromycin including but not limited to GI upset, allergic reaction, drug rash, diarrhea, and yeast infections.
Cyclophosphamide Pregnancy And Lactation Text: This medication is Pregnancy Category D and it isn't considered safe during pregnancy. This medication is excreted in breast milk.
SSKI Counseling:  I discussed with the patient the risks of SSKI including but not limited to thyroid abnormalities, metallic taste, GI upset, fever, headache, acne, arthralgias, paraesthesias, lymphadenopathy, easy bleeding, arrhythmias, and allergic reaction.
Cibinqo Counseling: I discussed with the patient the risks of Cibinqo therapy including but not limited to common cold, nausea, headache, cold sores, increased blood CPK levels, dizziness, UTIs, fatigue, acne, and vomitting. Live vaccines should be avoided.  This medication has been linked to serious infections; higher rate of mortality; malignancy and lymphoproliferative disorders; major adverse cardiovascular events; thrombosis; thrombocytopenia and lymphopenia; lipid elevations; and retinal detachment.
Adbry Counseling: I discussed with the patient the risks of tralokinumab including but not limited to eye infection and irritation, cold sores, injection site reactions, worsening of asthma, allergic reactions and increased risk of parasitic infection.  Live vaccines should be avoided while taking tralokinumab. The patient understands that monitoring is required and they must alert us or the primary physician if symptoms of infection or other concerning signs are noted.
Sotyktu Pregnancy And Lactation Text: There is insufficient data to evaluate whether or not Sotyktu is safe to use during pregnancy.? ?It is not known if Sotyktu passes into breast milk and whether or not it is safe to use when breastfeeding.??
Zoryve Pregnancy And Lactation Text: It is unknown if this medication can cause problems during pregnancy and breastfeeding.
Rifampin Pregnancy And Lactation Text: This medication is Pregnancy Category C and it isn't know if it is safe during pregnancy. It is also excreted in breast milk and should not be used if you are breast feeding.
Bimzelx Counseling:  I discussed with the patient the risks of Bimzelx including but not limited to depression, immunosuppression, allergic reactions and infections.  The patient understands that monitoring is required including a PPD at baseline and must alert us or the primary physician if symptoms of infection or other concerning signs are noted.
Azithromycin Pregnancy And Lactation Text: This medication is considered safe during pregnancy and is also secreted in breast milk.
Erythromycin Counseling:  I discussed with the patient the risks of erythromycin including but not limited to GI upset, allergic reaction, drug rash, diarrhea, increase in liver enzymes, and yeast infections.
Cyclosporine Counseling:  I discussed with the patient the risks of cyclosporine including but not limited to hypertension, gingival hyperplasia,myelosuppression, immunosuppression, liver damage, kidney damage, neurotoxicity, lymphoma, and serious infections. The patient understands that monitoring is required including baseline blood pressure, CBC, CMP, lipid panel and uric acid, and then 1-2 times monthly CMP and blood pressure.
Clofazimine Pregnancy And Lactation Text: This medication is Pregnancy Category C and isn't considered safe during pregnancy. It is excreted in breast milk.
Birth Control Pills Pregnancy And Lactation Text: This medication should be avoided if pregnant and for the first 30 days post-partum.
Carac Counseling:  I discussed with the patient the risks of Carac including but not limited to erythema, scaling, itching, weeping, crusting, and pain.
Topical Sulfur Applications Counseling: Topical Sulfur Counseling: Patient counseled that this medication may cause skin irritation or allergic reactions.  In the event of skin irritation, the patient was advised to reduce the amount of the drug applied or use it less frequently.   The patient verbalized understanding of the proper use and possible adverse effects of topical sulfur application.  All of the patient's questions and concerns were addressed.
Tremfya Counseling: I discussed with the patient the risks of guselkumab including but not limited to immunosuppression, serious infections, and drug reactions.  The patient understands that monitoring is required including a PPD at baseline and must alert us or the primary physician if symptoms of infection or other concerning signs are noted.
Doxepin Counseling:  Patient advised that the medication is sedating and not to drive a car after taking this medication. Patient informed of potential adverse effects including but not limited to dry mouth, urinary retention, and blurry vision.  The patient verbalized understanding of the proper use and possible adverse effects of doxepin.  All of the patient's questions and concerns were addressed.
Tazorac Pregnancy And Lactation Text: This medication is not safe during pregnancy. It is unknown if this medication is excreted in breast milk.
Sski Pregnancy And Lactation Text: This medication is Pregnancy Category D and isn't considered safe during pregnancy. It is excreted in breast milk.
Carac Pregnancy And Lactation Text: This medication is Pregnancy Category X and contraindicated in pregnancy and in women who may become pregnant. It is unknown if this medication is excreted in breast milk.
Hyrimoz Counseling:  I discussed with the patient the risks of adalimumab including but not limited to myelosuppression, immunosuppression, autoimmune hepatitis, demyelinating diseases, lymphoma, and serious infections.  The patient understands that monitoring is required including a PPD at baseline and must alert us or the primary physician if symptoms of infection or other concerning signs are noted.
Hydroxychloroquine Pregnancy And Lactation Text: This medication has been shown to cause fetal harm but it isn't assigned a Pregnancy Risk Category. There are small amounts excreted in breast milk.
Eucrisa Counseling: Patient may experience a mild burning sensation during topical application. Eucrisa is not approved in children less than 3 months of age.
Odomzo Pregnancy And Lactation Text: This medication is Pregnancy Category X and is absolutely contraindicated during pregnancy. It is unknown if it is excreted in breast milk.
Zyclara Counseling:  I discussed with the patient the risks of imiquimod including but not limited to erythema, scaling, itching, weeping, crusting, and pain.  Patient understands that the inflammatory response to imiquimod is variable from person to person and was educated regarded proper titration schedule.  If flu-like symptoms develop, patient knows to discontinue the medication and contact us.
High Dose Vitamin A Counseling: Side effects reviewed, pt to contact office should one occur.
Bimzelx Pregnancy And Lactation Text: This medication crosses the placenta and the safety is uncertain during pregnancy. It is unknown if this medication is present in breast milk.
Itraconazole Counseling:  I discussed with the patient the risks of itraconazole including but not limited to liver damage, nausea/vomiting, neuropathy, and severe allergy.  The patient understands that this medication is best absorbed when taken with acidic beverages such as non-diet cola or ginger ale.  The patient understands that monitoring is required including baseline LFTs and repeat LFTs at intervals.  The patient understands that they are to contact us or the primary physician if concerning signs are noted.
Qbrexza Counseling:  I discussed with the patient the risks of Qbrexza including but not limited to headache, mydriasis, blurred vision, dry eyes, nasal dryness, dry mouth, dry throat, dry skin, urinary hesitation, and constipation.  Local skin reactions including erythema, burning, stinging, and itching can also occur.
Colchicine Counseling:  Patient counseled regarding adverse effects including but not limited to stomach upset (nausea, vomiting, stomach pain, or diarrhea).  Patient instructed to limit alcohol consumption while taking this medication.  Colchicine may reduce blood counts especially with prolonged use.  The patient understands that monitoring of kidney function and blood counts may be required, especially at baseline. The patient verbalized understanding of the proper use and possible adverse effects of colchicine.  All of the patient's questions and concerns were addressed.
Minoxidil Pregnancy And Lactation Text: This medication has not been assigned a Pregnancy Risk Category but animal studies failed to show danger with the topical medication. It is unknown if the medication is excreted in breast milk.
Oral Minoxidil Counseling- I discussed with the patient the risks of oral minoxidil including but not limited to shortness of breath, swelling of the feet or ankles, dizziness, lightheadedness, unwanted hair growth and allergic reaction.  The patient verbalized understanding of the proper use and possible adverse effects of oral minoxidil.  All of the patient's questions and concerns were addressed.
Humira Counseling:  I discussed with the patient the risks of adalimumab including but not limited to myelosuppression, immunosuppression, autoimmune hepatitis, demyelinating diseases, lymphoma, and serious infections.  The patient understands that monitoring is required including a PPD at baseline and must alert us or the primary physician if symptoms of infection or other concerning signs are noted.
Cyclosporine Pregnancy And Lactation Text: This medication is Pregnancy Category C and it isn't know if it is safe during pregnancy. This medication is excreted in breast milk.
Erythromycin Pregnancy And Lactation Text: This medication is Pregnancy Category B and is considered safe during pregnancy. It is also excreted in breast milk.
Topical Sulfur Applications Pregnancy And Lactation Text: This medication is considered safe during pregnancy and breast feeding secondary to limited systemic absorption.
High Dose Vitamin A Pregnancy And Lactation Text: High dose vitamin A therapy is contraindicated during pregnancy and breast feeding.
Bactrim Counseling:  I discussed with the patient the risks of sulfa antibiotics including but not limited to GI upset, allergic reaction, drug rash, diarrhea, dizziness, photosensitivity, and yeast infections.  Rarely, more serious reactions can occur including but not limited to aplastic anemia, agranulocytosis, methemoglobinemia, blood dyscrasias, liver or kidney failure, lung infiltrates or desquamative/blistering drug rashes.
Doxepin Pregnancy And Lactation Text: This medication is Pregnancy Category C and it isn't known if it is safe during pregnancy. It is also excreted in breast milk and breast feeding isn't recommended.
Spironolactone Counseling: Patient advised regarding risks of diarrhea, abdominal pain, hyperkalemia, birth defects (for female patients), liver toxicity and renal toxicity. The patient may need blood work to monitor liver and kidney function and potassium levels while on therapy. The patient verbalized understanding of the proper use and possible adverse effects of spironolactone.  All of the patient's questions and concerns were addressed.
Use Enhanced Medication Counseling?: No
Sarecycline Counseling: Patient advised regarding possible photosensitivity and discoloration of the teeth, skin, lips, tongue and gums.  Patient instructed to avoid sunlight, if possible.  When exposed to sunlight, patients should wear protective clothing, sunglasses, and sunscreen.  The patient was instructed to call the office immediately if the following severe adverse effects occur:  hearing changes, easy bruising/bleeding, severe headache, or vision changes.  The patient verbalized understanding of the proper use and possible adverse effects of sarecycline.  All of the patient's questions and concerns were addressed.
Cibinqo Pregnancy And Lactation Text: It is unknown if this medication will adversely affect pregnancy or breast feeding.  You should not take this medication if you are currently pregnant or planning a pregnancy or while breastfeeding.
Xeljanz Counseling: I discussed with the patient the risks of Xeljanz therapy including increased risk of infection, liver issues, headache, diarrhea, or cold symptoms. Live vaccines should be avoided. They were instructed to call if they have any problems.
Simponi Counseling:  I discussed with the patient the risks of golimumab including but not limited to myelosuppression, immunosuppression, autoimmune hepatitis, demyelinating diseases, lymphoma, and serious infections.  The patient understands that monitoring is required including a PPD at baseline and must alert us or the primary physician if symptoms of infection or other concerning signs are noted.
Topical Clindamycin Counseling: Patient counseled that this medication may cause skin irritation or allergic reactions.  In the event of skin irritation, the patient was advised to reduce the amount of the drug applied or use it less frequently.   The patient verbalized understanding of the proper use and possible adverse effects of clindamycin.  All of the patient's questions and concerns were addressed.
Dutasteride Male Counseling: Dustasteride Counseling:  I discussed with the patient the risks of use of dutasteride including but not limited to decreased libido, decreased ejaculate volume, and gynecomastia. Women who can become pregnant should not handle medication.  All of the patient's questions and concerns were addressed.
Spironolactone Pregnancy And Lactation Text: This medication can cause feminization of the male fetus and should be avoided during pregnancy. The active metabolite is also found in breast milk.
Sarecycline Pregnancy And Lactation Text: This medication is Pregnancy Category D and not consider safe during pregnancy. It is also excreted in breast milk.
Xelwilfridoz Pregnancy And Lactation Text: This medication is Pregnancy Category D and is not considered safe during pregnancy.  The risk during breast feeding is also uncertain.
Wartpeel Counseling:  I discussed with the patient the risks of Wartpeel including but not limited to erythema, scaling, itching, weeping, crusting, and pain.
Xolair Counseling:  Patient informed of potential adverse effects including but not limited to fever, muscle aches, rash and allergic reactions.  The patient verbalized understanding of the proper use and possible adverse effects of Xolair.  All of the patient's questions and concerns were addressed.
Oral Minoxidil Pregnancy And Lactation Text: This medication should only be used when clearly needed if you are pregnant, attempting to become pregnant or breast feeding.
Itraconazole Pregnancy And Lactation Text: This medication is Pregnancy Category C and it isn't know if it is safe during pregnancy. It is also excreted in breast milk.
Qbrexza Pregnancy And Lactation Text: There is no available data on Qbrexza use in pregnant women.  There is no available data on Qbrexza use in lactation.
Thalidomide Counseling: I discussed with the patient the risks of thalidomide including but not limited to birth defects, anxiety, weakness, chest pain, dizziness, cough and severe allergy.
Mirvaso Counseling: Mirvaso is a topical medication which can decrease superficial blood flow where applied. Side effects are uncommon and include stinging, redness and allergic reactions.
Calcipotriene Counseling:  I discussed with the patient the risks of calcipotriene including but not limited to erythema, scaling, itching, and irritation.
Low Dose Naltrexone Counseling- I discussed with the patient the potential risks and side effects of low dose naltrexone including but not limited to: more vivid dreams, headaches, nausea, vomiting, abdominal pain, fatigue, dizziness, and anxiety.
Cimzia Counseling:  I discussed with the patient the risks of Cimzia including but not limited to immunosuppression, allergic reactions and infections.  The patient understands that monitoring is required including a PPD at baseline and must alert us or the primary physician if symptoms of infection or other concerning signs are noted.
Calcipotriene Pregnancy And Lactation Text: The use of this medication during pregnancy or lactation is not recommended as there is insufficient data.
Bactrim Pregnancy And Lactation Text: This medication is Pregnancy Category D and is known to cause fetal risk.  It is also excreted in breast milk.
Methotrexate Counseling:  Patient counseled regarding adverse effects of methotrexate including but not limited to nausea, vomiting, abnormalities in liver function tests. Patients may develop mouth sores, rash, diarrhea, and abnormalities in blood counts. The patient understands that monitoring is required including LFT's and blood counts.  There is a rare possibility of scarring of the liver and lung problems that can occur when taking methotrexate. Persistent nausea, loss of appetite, pale stools, dark urine, cough, and shortness of breath should be reported immediately. Patient advised to discontinue methotrexate treatment at least three months before attempting to become pregnant.  I discussed the need for folate supplements while taking methotrexate.  These supplements can decrease side effects during methotrexate treatment. The patient verbalized understanding of the proper use and possible adverse effects of methotrexate.  All of the patient's questions and concerns were addressed.
Topical Clindamycin Pregnancy And Lactation Text: This medication is Pregnancy Category B and is considered safe during pregnancy. It is unknown if it is excreted in breast milk.
Rhofade Counseling: Rhofade is a topical medication which can decrease superficial blood flow where applied. Side effects are uncommon and include stinging, redness and allergic reactions.
Ketoconazole Counseling:   Patient counseled regarding improving absorption with orange juice.  Adverse effects include but are not limited to breast enlargement, headache, diarrhea, nausea, upset stomach, liver function test abnormalities, taste disturbance, and stomach pain.  There is a rare possibility of liver failure that can occur when taking ketoconazole. The patient understands that monitoring of LFTs may be required, especially at baseline. The patient verbalized understanding of the proper use and possible adverse effects of ketoconazole.  All of the patient's questions and concerns were addressed.
Hydroxyzine Counseling: Patient advised that the medication is sedating and not to drive a car after taking this medication.  Patient informed of potential adverse effects including but not limited to dry mouth, urinary retention, and blurry vision.  The patient verbalized understanding of the proper use and possible adverse effects of hydroxyzine.  All of the patient's questions and concerns were addressed.
Litfulo Counseling: I discussed with the patient the risks of Litfulo therapy including but not limited to upper respiratory tract infections, shingles, cold sores, and nausea. Live vaccines should be avoided.  This medication has been linked to serious infections; higher rate of mortality; malignancy and lymphoproliferative disorders; major adverse cardiovascular events; thrombosis; gastrointestinal perforations; neutropenia; lymphopenia; anemia; liver enzyme elevations; and lipid elevations.
Metronidazole Counseling:  I discussed with the patient the risks of metronidazole including but not limited to seizures, nausea/vomiting, a metallic taste in the mouth, nausea/vomiting and severe allergy.
Ilumya Counseling: I discussed with the patient the risks of tildrakizumab including but not limited to immunosuppression, malignancy, posterior leukoencephalopathy syndrome, and serious infections.  The patient understands that monitoring is required including a PPD at baseline and must alert us or the primary physician if symptoms of infection or other concerning signs are noted.
Dapsone Counseling: I discussed with the patient the risks of dapsone including but not limited to hemolytic anemia, agranulocytosis, rashes, methemoglobinemia, kidney failure, peripheral neuropathy, headaches, GI upset, and liver toxicity.  Patients who start dapsone require monitoring including baseline LFTs and weekly CBCs for the first month, then every month thereafter.  The patient verbalized understanding of the proper use and possible adverse effects of dapsone.  All of the patient's questions and concerns were addressed.
Dutasteride Female Counseling: Dutasteride Counseling:  I discussed with the patient the risks of use of dutasteride including but not limited to decreased libido and sexual dysfunction. Explained the teratogenic nature of the medication and stressed the importance of not getting pregnant during treatment. All of the patient's questions and concerns were addressed.
Tetracycline Counseling: Patient counseled regarding possible photosensitivity and increased risk for sunburn.  Patient instructed to avoid sunlight, if possible.  When exposed to sunlight, patients should wear protective clothing, sunglasses, and sunscreen.  The patient was instructed to call the office immediately if the following severe adverse effects occur:  hearing changes, easy bruising/bleeding, severe headache, or vision changes.  The patient verbalized understanding of the proper use and possible adverse effects of tetracycline.  All of the patient's questions and concerns were addressed. Patient understands to avoid pregnancy while on therapy due to potential birth defects.
Hydroxyzine Pregnancy And Lactation Text: This medication is not safe during pregnancy and should not be taken. It is also excreted in breast milk and breast feeding isn't recommended.
Metronidazole Pregnancy And Lactation Text: This medication is Pregnancy Category B and considered safe during pregnancy.  It is also excreted in breast milk.
Litfulo Pregnancy And Lactation Text: Based on animal studies, Lifulo may cause embryo-fetal harm when administered to pregnant women.  The medication should not be used in pregnancy.  Breastfeeding is not recommended during treatment.
Hydroquinone Counseling:  Patient advised that medication may result in skin irritation, lightening (hypopigmentation), dryness, and burning.  In the event of skin irritation, the patient was advised to reduce the amount of the drug applied or use it less frequently.  Rarely, spots that are treated with hydroquinone can become darker (pseudoochronosis).  Should this occur, patient instructed to stop medication and call the office. The patient verbalized understanding of the proper use and possible adverse effects of hydroquinone.  All of the patient's questions and concerns were addressed.
Low Dose Naltrexone Pregnancy And Lactation Text: Naltrexone is pregnancy category C.  There have been no adequate and well-controlled studies in pregnant women.  It should be used in pregnancy only if the potential benefit justifies the potential risk to the fetus.   Limited data indicates that naltrexone is minimally excreted into breastmilk.
Skyrizi Counseling: I discussed with the patient the risks of risankizumab-rzaa including but not limited to immunosuppression, and serious infections.  The patient understands that monitoring is required including a PPD at baseline and must alert us or the primary physician if symptoms of infection or other concerning signs are noted.
Topical Ketoconazole Counseling: Patient counseled that this medication may cause skin irritation or allergic reactions.  In the event of skin irritation, the patient was advised to reduce the amount of the drug applied or use it less frequently.   The patient verbalized understanding of the proper use and possible adverse effects of ketoconazole.  All of the patient's questions and concerns were addressed.
Otezla Counseling: The side effects of Otezla were discussed with the patient, including but not limited to worsening or new depression, weight loss, diarrhea, nausea, upper respiratory tract infection, and headache. Patient instructed to call the office should any adverse effect occur.  The patient verbalized understanding of the proper use and possible adverse effects of Otezla.  All the patient's questions and concerns were addressed.
Mirvaso Pregnancy And Lactation Text: This medication has not been assigned a Pregnancy Risk Category. It is unknown if the medication is excreted in breast milk.
Aklief counseling:  Patient advised to apply a pea-sized amount only at bedtime and wait 30 minutes after washing their face before applying.  If too drying, patient may add a non-comedogenic moisturizer.  The most commonly reported side effects including irritation, redness, scaling, dryness, stinging, burning, itching, and increased risk of sunburn.  The patient verbalized understanding of the proper use and possible adverse effects of retinoids.  All of the patient's questions and concerns were addressed.
Cimzia Pregnancy And Lactation Text: This medication crosses the placenta but can be considered safe in certain situations. Cimzia may be excreted in breast milk.
Xolair Pregnancy And Lactation Text: This medication is Pregnancy Category B and is considered safe during pregnancy. This medication is excreted in breast milk.
Cantharidin Counseling:  I discussed with the patient the risks of Cantharidin including but not limited to pain, redness, burning, itching, and blistering.
Ketoconazole Pregnancy And Lactation Text: This medication is Pregnancy Category C and it isn't know if it is safe during pregnancy. It is also excreted in breast milk and breast feeding isn't recommended.
Niacinamide Counseling: I recommended taking niacin or niacinamide, also know as vitamin B3, twice daily. Recent evidence suggests that taking vitamin B3 (500 mg twice daily) can reduce the risk of actinic keratoses and non-melanoma skin cancers. Side effects of vitamin B3 include flushing and headache.
Opzelura Counseling:  I discussed with the patient the risks of Opzelura including but not limited to nasopharngitis, bronchitis, ear infection, eosinophila, hives, diarrhea, folliculitis, tonsillitis, and rhinorrhea.  Taken orally, this medication has been linked to serious infections; higher rate of mortality; malignancy and lymphoproliferative disorders; major adverse cardiovascular events; thrombosis; thrombocytopenia, anemia, and neutropenia; and lipid elevations.
Otezla Pregnancy And Lactation Text: This medication is Pregnancy Category C and it isn't known if it is safe during pregnancy. It is unknown if it is excreted in breast milk.
Tranexamic Acid Counseling:  Patient advised of the small risk of bleeding problems with tranexamic acid. They were also instructed to call if they developed any nausea, vomiting or diarrhea. All of the patient's questions and concerns were addressed.
Cantharidin Pregnancy And Lactation Text: This medication has not been proven safe during pregnancy. It is unknown if this medication is excreted in breast milk.
Azathioprine Counseling:  I discussed with the patient the risks of azathioprine including but not limited to myelosuppression, immunosuppression, hepatotoxicity, lymphoma, and infections.  The patient understands that monitoring is required including baseline LFTs, Creatinine, possible TPMP genotyping and weekly CBCs for the first month and then every 2 weeks thereafter.  The patient verbalized understanding of the proper use and possible adverse effects of azathioprine.  All of the patient's questions and concerns were addressed.
Methotrexate Pregnancy And Lactation Text: This medication is Pregnancy Category X and is known to cause fetal harm. This medication is excreted in breast milk.
Cephalexin Counseling: I counseled the patient regarding use of cephalexin as an antibiotic for prophylactic and/or therapeutic purposes. Cephalexin (commonly prescribed under brand name Keflex) is a cephalosporin antibiotic which is active against numerous classes of bacteria, including most skin bacteria. Side effects may include nausea, diarrhea, gastrointestinal upset, rash, hives, yeast infections, and in rare cases, hepatitis, kidney disease, seizures, fever, confusion, neurologic symptoms, and others. Patients with severe allergies to penicillin medications are cautioned that there is about a 10% incidence of cross-reactivity with cephalosporins. When possible, patients with penicillin allergies should use alternatives to cephalosporins for antibiotic therapy.
Acitretin Counseling:  I discussed with the patient the risks of acitretin including but not limited to hair loss, dry lips/skin/eyes, liver damage, hyperlipidemia, depression/suicidal ideation, photosensitivity.  Serious rare side effects can include but are not limited to pancreatitis, pseudotumor cerebri, bony changes, clot formation/stroke/heart attack.  Patient understands that alcohol is contraindicated since it can result in liver toxicity and significantly prolong the elimination of the drug by many years.
Opioid Counseling: I discussed with the patient the potential side effects of opioids including but not limited to addiction, altered mental status, and depression. I stressed avoiding alcohol, benzodiazepines, muscle relaxants and sleep aids unless specifically okayed by a physician. The patient verbalized understanding of the proper use and possible adverse effects of opioids. All of the patient's questions and concerns were addressed. They were instructed to flush the remaining pills down the toilet if they did not need them for pain.
Minocycline Counseling: Patient advised regarding possible photosensitivity and discoloration of the teeth, skin, lips, tongue and gums.  Patient instructed to avoid sunlight, if possible.  When exposed to sunlight, patients should wear protective clothing, sunglasses, and sunscreen.  The patient was instructed to call the office immediately if the following severe adverse effects occur:  hearing changes, easy bruising/bleeding, severe headache, or vision changes.  The patient verbalized understanding of the proper use and possible adverse effects of minocycline.  All of the patient's questions and concerns were addressed.
Olumiant Counseling: I discussed with the patient the risks of Olumiant therapy including but not limited to upper respiratory tract infections, shingles, cold sores, and nausea. Live vaccines should be avoided.  This medication has been linked to serious infections; higher rate of mortality; malignancy and lymphoproliferative disorders; major adverse cardiovascular events; thrombosis; gastrointestinal perforations; neutropenia; lymphopenia; anemia; liver enzyme elevations; and lipid elevations.
Cosentyx Counseling:  I discussed with the patient the risks of Cosentyx including but not limited to worsening of Crohn's disease, immunosuppression, allergic reactions and infections.  The patient understands that monitoring is required including a PPD at baseline and must alert us or the primary physician if symptoms of infection or other concerning signs are noted.
Azathioprine Pregnancy And Lactation Text: This medication is Pregnancy Category D and isn't considered safe during pregnancy. It is unknown if this medication is excreted in breast milk.
Prednisone Counseling:  I discussed with the patient the risks of prolonged use of prednisone including but not limited to weight gain, insomnia, osteoporosis, mood changes, diabetes, susceptibility to infection, glaucoma and high blood pressure.  In cases where prednisone use is prolonged, patients should be monitored with blood pressure checks, serum glucose levels and an eye exam.  Additionally, the patient may need to be placed on GI prophylaxis, PCP prophylaxis, and calcium and vitamin D supplementation and/or a bisphosphonate.  The patient verbalized understanding of the proper use and the possible adverse effects of prednisone.  All of the patient's questions and concerns were addressed.
Cephalexin Pregnancy And Lactation Text: This medication is Pregnancy Category B and considered safe during pregnancy.  It is also excreted in breast milk but can be used safely for shorter doses.
Dapsone Pregnancy And Lactation Text: This medication is Pregnancy Category C and is not considered safe during pregnancy or breast feeding.
5-Fu Counseling: 5-Fluorouracil Counseling:  I discussed with the patient the risks of 5-fluorouracil including but not limited to erythema, scaling, itching, weeping, crusting, and pain.
Detail Level: Simple
Winlevi Counseling:  I discussed with the patient the risks of topical clascoterone including but not limited to erythema, scaling, itching, and stinging. Patient voiced their understanding.
Dutasteride Pregnancy And Lactation Text: This medication is absolutely contraindicated in women, especially during pregnancy and breast feeding. Feminization of male fetuses is possible if taking while pregnant.
Aklief Pregnancy And Lactation Text: It is unknown if this medication is safe to use during pregnancy.  It is unknown if this medication is excreted in breast milk.  Breastfeeding women should use the topical cream on the smallest area of the skin for the shortest time needed while breastfeeding.  Do not apply to nipple and areola.
Erivedge Counseling- I discussed with the patient the risks of Erivedge including but not limited to nausea, vomiting, diarrhea, constipation, weight loss, changes in the sense of taste, decreased appetite, muscle spasms, and hair loss.  The patient verbalized understanding of the proper use and possible adverse effects of Erivedge.  All of the patient's questions and concerns were addressed.
Acitretin Pregnancy And Lactation Text: This medication is Pregnancy Category X and should not be given to women who are pregnant or may become pregnant in the future. This medication is excreted in breast milk.
Tranexamic Acid Pregnancy And Lactation Text: It is unknown if this medication is safe during pregnancy or breast feeding.
Infliximab Counseling:  I discussed with the patient the risks of infliximab including but not limited to myelosuppression, immunosuppression, autoimmune hepatitis, demyelinating diseases, lymphoma, and serious infections.  The patient understands that monitoring is required including a PPD at baseline and must alert us or the primary physician if symptoms of infection or other concerning signs are noted.
Solaraze Counseling:  I discussed with the patient the risks of Solaraze including but not limited to erythema, scaling, itching, weeping, crusting, and pain.
Albendazole Counseling:  I discussed with the patient the risks of albendazole including but not limited to cytopenia, kidney damage, nausea/vomiting and severe allergy.  The patient understands that this medication is being used in an off-label manner.
Niacinamide Pregnancy And Lactation Text: These medications are considered safe during pregnancy.
Opioid Pregnancy And Lactation Text: These medications can lead to premature delivery and should be avoided during pregnancy. These medications are also present in breast milk in small amounts.
Imiquimod Counseling:  I discussed with the patient the risks of imiquimod including but not limited to erythema, scaling, itching, weeping, crusting, and pain.  Patient understands that the inflammatory response to imiquimod is variable from person to person and was educated regarded proper titration schedule.  If flu-like symptoms develop, patient knows to discontinue the medication and contact us.
Finasteride Male Counseling: Finasteride Counseling:  I discussed with the patient the risks of use of finasteride including but not limited to decreased libido, decreased ejaculate volume, gynecomastia, and depression. Women should not handle medication.  All of the patient's questions and concerns were addressed.
Terbinafine Counseling: Patient counseling regarding adverse effects of terbinafine including but not limited to headache, diarrhea, rash, upset stomach, liver function test abnormalities, itching, taste/smell disturbance, nausea, abdominal pain, and flatulence.  There is a rare possibility of liver failure that can occur when taking terbinafine.  The patient understands that a baseline LFT and kidney function test may be required. The patient verbalized understanding of the proper use and possible adverse effects of terbinafine.  All of the patient's questions and concerns were addressed.
Gabapentin Counseling: I discussed with the patient the risks of gabapentin including but not limited to dizziness, somnolence, fatigue and ataxia.
Opzelura Pregnancy And Lactation Text: There is insufficient data to evaluate drug-associated risk for major birth defects, miscarriage, or other adverse maternal or fetal outcomes.  There is a pregnancy registry that monitors pregnancy outcomes in pregnant persons exposed to the medication during pregnancy.  It is unknown if this medication is excreted in breast milk.  Do not breastfeed during treatment and for about 4 weeks after the last dose.
Oxybutynin Counseling:  I discussed with the patient the risks of oxybutynin including but not limited to skin rash, drowsiness, dry mouth, difficulty urinating, and blurred vision.
Olumiant Pregnancy And Lactation Text: Based on animal studies, Olumiant may cause embryo-fetal harm when administered to pregnant women.  The medication should not be used in pregnancy.  Breastfeeding is not recommended during treatment.
Winlevi Pregnancy And Lactation Text: This medication is considered safe during pregnancy and breastfeeding.
Topical Metronidazole Counseling: Metronidazole is a topical antibiotic medication. You may experience burning, stinging, redness, or allergic reactions.  Please call our office if you develop any problems from using this medication.
Cellcept Counseling:  I discussed with the patient the risks of mycophenolate mofetil including but not limited to infection/immunosuppression, GI upset, hypokalemia, hypercholesterolemia, bone marrow suppression, lymphoproliferative disorders, malignancy, GI ulceration/bleed/perforation, colitis, interstitial lung disease, kidney failure, progressive multifocal leukoencephalopathy, and birth defects.  The patient understands that monitoring is required including a baseline creatinine and regular CBC testing. In addition, patient must alert us immediately if symptoms of infection or other concerning signs are noted.
Clindamycin Counseling: I counseled the patient regarding use of clindamycin as an antibiotic for prophylactic and/or therapeutic purposes. Clindamycin is active against numerous classes of bacteria, including skin bacteria. Side effects may include nausea, diarrhea, gastrointestinal upset, rash, hives, yeast infections, and in rare cases, colitis.
Azelaic Acid Counseling: Patient counseled that medicine may cause skin irritation and to avoid applying near the eyes.  In the event of skin irritation, the patient was advised to reduce the amount of the drug applied or use it less frequently.   The patient verbalized understanding of the proper use and possible adverse effects of azelaic acid.  All of the patient's questions and concerns were addressed.
Stelara Counseling:  I discussed with the patient the risks of ustekinumab including but not limited to immunosuppression, malignancy, posterior leukoencephalopathy syndrome, and serious infections.  The patient understands that monitoring is required including a PPD at baseline and must alert us or the primary physician if symptoms of infection or other concerning signs are noted.
Azelaic Acid Pregnancy And Lactation Text: This medication is considered safe during pregnancy and breast feeding.
Finasteride Female Counseling: Finasteride Counseling:  I discussed with the patient the risks of use of finasteride including but not limited to decreased libido and sexual dysfunction. Explained the teratogenic nature of the medication and stressed the importance of not getting pregnant during treatment. All of the patient's questions and concerns were addressed.
Drysol Counseling:  I discussed with the patient the risks of drysol/aluminum chloride including but not limited to skin rash, itching, irritation, burning.
VTAMA Counseling: I discussed with the patient that VTAMA is not for use in the eyes, mouth or mouth. They should call the office if they develop any signs of allergic reactions to VTAMA. The patient verbalized understanding of the proper use and possible adverse effects of VTAMA.  All of the patient's questions and concerns were addressed.
Solaraze Pregnancy And Lactation Text: This medication is Pregnancy Category B and is considered safe. There is some data to suggest avoiding during the third trimester. It is unknown if this medication is excreted in breast milk.
Bexarotene Counseling:  I discussed with the patient the risks of bexarotene including but not limited to hair loss, dry lips/skin/eyes, liver abnormalities, hyperlipidemia, pancreatitis, depression/suicidal ideation, photosensitivity, drug rash/allergic reactions, hypothyroidism, anemia, leukopenia, infection, cataracts, and teratogenicity.  Patient understands that they will need regular blood tests to check lipid profile, liver function tests, white blood cell count, thyroid function tests and pregnancy test if applicable.
Valtrex Counseling: I discussed with the patient the risks of valacyclovir including but not limited to kidney damage, nausea, vomiting and severe allergy.  The patient understands that if the infection seems to be worsening or is not improving, they are to call.
Fluconazole Counseling:  Patient counseled regarding adverse effects of fluconazole including but not limited to headache, diarrhea, nausea, upset stomach, liver function test abnormalities, taste disturbance, and stomach pain.  There is a rare possibility of liver failure that can occur when taking fluconazole.  The patient understands that monitoring of LFTs and kidney function test may be required, especially at baseline. The patient verbalized understanding of the proper use and possible adverse effects of fluconazole.  All of the patient's questions and concerns were addressed.
Picato Counseling:  I discussed with the patient the risks of Picato including but not limited to erythema, scaling, itching, weeping, crusting, and pain.
Nsaids Counseling: NSAID Counseling: I discussed with the patient that NSAIDs should be taken with food. Prolonged use of NSAIDs can result in the development of stomach ulcers.  Patient advised to stop taking NSAIDs if abdominal pain occurs.  The patient verbalized understanding of the proper use and possible adverse effects of NSAIDs.  All of the patient's questions and concerns were addressed.
Dupixent Counseling: I discussed with the patient the risks of dupilumab including but not limited to eye infection and irritation, cold sores, injection site reactions, worsening of asthma, allergic reactions and increased risk of parasitic infection.  Live vaccines should be avoided while taking dupilumab. Dupilumab will also interact with certain medications such as warfarin and cyclosporine. The patient understands that monitoring is required and they must alert us or the primary physician if symptoms of infection or other concerning signs are noted.
Clindamycin Pregnancy And Lactation Text: This medication can be used in pregnancy if certain situations. Clindamycin is also present in breast milk.
Topical Metronidazole Pregnancy And Lactation Text: This medication is Pregnancy Category B and considered safe during pregnancy.  It is also considered safe to use while breastfeeding.
Soolantra Counseling: I discussed with the patients the risks of topial Soolantra. This is a medicine which decreases the number of mites and inflammation in the skin. You experience burning, stinging, eye irritation or allergic reactions.  Please call our office if you develop any problems from using this medication.
Topical Retinoid counseling:  Patient advised to apply a pea-sized amount only at bedtime and wait 30 minutes after washing their face before applying.  If too drying, patient may add a non-comedogenic moisturizer. The patient verbalized understanding of the proper use and possible adverse effects of retinoids.  All of the patient's questions and concerns were addressed.
Ivermectin Counseling:  Patient instructed to take medication on an empty stomach with a full glass of water.  Patient informed of potential adverse effects including but not limited to nausea, diarrhea, dizziness, itching, and swelling of the extremities or lymph nodes.  The patient verbalized understanding of the proper use and possible adverse effects of ivermectin.  All of the patient's questions and concerns were addressed.
Bexarotene Pregnancy And Lactation Text: This medication is Pregnancy Category X and should not be given to women who are pregnant or may become pregnant. This medication should not be used if you are breast feeding.
Arava Counseling:  Patient counseled regarding adverse effects of Arava including but not limited to nausea, vomiting, abnormalities in liver function tests. Patients may develop mouth sores, rash, diarrhea, and abnormalities in blood counts. The patient understands that monitoring is required including LFTs and blood counts.  There is a rare possibility of scarring of the liver and lung problems that can occur when taking methotrexate. Persistent nausea, loss of appetite, pale stools, dark urine, cough, and shortness of breath should be reported immediately. Patient advised to discontinue Arava treatment and consult with a physician prior to attempting conception. The patient will have to undergo a treatment to eliminate Arava from the body prior to conception.
Libtayo Counseling- I discussed with the patient the risks of Libtayo including but not limited to nausea, vomiting, diarrhea, and bone or muscle pain.  The patient verbalized understanding of the proper use and possible adverse effects of Libtayo.  All of the patient's questions and concerns were addressed.
Quinolones Counseling:  I discussed with the patient the risks of fluoroquinolones including but not limited to GI upset, allergic reaction, drug rash, diarrhea, dizziness, photosensitivity, yeast infections, liver function test abnormalities, tendonitis/tendon rupture.
Rinvoq Counseling: I discussed with the patient the risks of Rinvoq therapy including but not limited to upper respiratory tract infections, shingles, cold sores, bronchitis, nausea, cough, fever, acne, and headache. Live vaccines should be avoided.  This medication has been linked to serious infections; higher rate of mortality; malignancy and lymphoproliferative disorders; major adverse cardiovascular events; thrombosis; thrombocytopenia, anemia, and neutropenia; lipid elevations; liver enzyme elevations; and gastrointestinal perforations.
Rituxan Counseling:  I discussed with the patient the risks of Rituxan infusions. Side effects can include infusion reactions, severe drug rashes including mucocutaneous reactions, reactivation of latent hepatitis and other infections and rarely progressive multifocal leukoencephalopathy.  All of the patient's questions and concerns were addressed.
Glycopyrrolate Counseling:  I discussed with the patient the risks of glycopyrrolate including but not limited to skin rash, drowsiness, dry mouth, difficulty urinating, and blurred vision.
Cimetidine Counseling:  I discussed with the patient the risks of Cimetidine including but not limited to gynecomastia, headache, diarrhea, nausea, drowsiness, arrhythmias, pancreatitis, skin rashes, psychosis, bone marrow suppression and kidney toxicity.
Benzoyl Peroxide Counseling: Patient counseled that medicine may cause skin irritation and bleach clothing.  In the event of skin irritation, the patient was advised to reduce the amount of the drug applied or use it less frequently.   The patient verbalized understanding of the proper use and possible adverse effects of benzoyl peroxide.  All of the patient's questions and concerns were addressed.
Finasteride Pregnancy And Lactation Text: This medication is absolutely contraindicated during pregnancy. It is unknown if it is excreted in breast milk.
Dupixent Pregnancy And Lactation Text: This medication likely crosses the placenta but the risk for the fetus is uncertain. This medication is excreted in breast milk.
Rinvoq Pregnancy And Lactation Text: Based on animal studies, Rinvoq may cause embryo-fetal harm when administered to pregnant women.  The medication should not be used in pregnancy.  Breastfeeding is not recommended during treatment and for 6 days after the last dose.
Klisyri Counseling:  I discussed with the patient the risks of Klisyri including but not limited to erythema, scaling, itching, weeping, crusting, and pain.
Nsaids Pregnancy And Lactation Text: These medications are considered safe up to 30 weeks gestation. It is excreted in breast milk.
Taltz Counseling: I discussed with the patient the risks of ixekizumab including but not limited to immunosuppression, serious infections, worsening of inflammatory bowel disease and drug reactions.  The patient understands that monitoring is required including a PPD at baseline and must alert us or the primary physician if symptoms of infection or other concerning signs are noted.
Topical Steroids Counseling: I discussed with the patient that prolonged use of topical steroids can result in the increased appearance of superficial blood vessels (telangiectasias), lightening (hypopigmentation) and thinning of the skin (atrophy).  Patient understands to avoid using high potency steroids in skin folds, the groin or the face.  The patient verbalized understanding of the proper use and possible adverse effects of topical steroids.  All of the patient's questions and concerns were addressed.
Propranolol Counseling:  I discussed with the patient the risks of propranolol including but not limited to low heart rate, low blood pressure, low blood sugar, restlessness and increased cold sensitivity. They should call the office if they experience any of these side effects.

## 2024-07-12 NOTE — PROCEDURE: OTHER
Render Risk Assessment In Note?: no
Note Text (......Xxx Chief Complaint.): This diagnosis correlates with the
Detail Level: Zone
Other (Free Text): Measurements 1.1 cm x 0.8 cm

## 2024-07-12 NOTE — PROCEDURE: ORDER TESTS
Expected Date Of Service: 07/12/2024
Performing Laboratory: [id number]
Billing Type: Third-Party Bill
Bill For Surgical Tray: no
Lab Facility: [id number]

## 2024-07-12 NOTE — PROCEDURE: PRESCRIPTION MEDICATION MANAGEMENT
Detail Level: Simple
Initiate Treatment: mupirocin 2 % topical ointment \\nQuantity: 22.0 g  Days Supply: 30\\nSig: Apply to affected area on face bid for 2 weeks\\n\\ncefdinir 300 mg capsule \\nQuantity: 20.0 Capsule\\nSi tablet po bid x10 days
Render In Strict Bullet Format?: No

## 2024-07-15 ENCOUNTER — HOSPITAL ENCOUNTER (OUTPATIENT)
Dept: PHYSICAL THERAPY | Age: 84
Setting detail: RECURRING SERIES
Discharge: HOME | End: 2024-07-18
Payer: MEDICARE

## 2024-07-15 PROCEDURE — 97140 MANUAL THERAPY 1/> REGIONS: CPT

## 2024-07-15 PROCEDURE — 97110 THERAPEUTIC EXERCISES: CPT

## 2024-07-15 ASSESSMENT — PAIN SCALES - GENERAL: PAINLEVEL_OUTOF10: 2

## 2024-07-15 NOTE — THERAPY DISCHARGE
gabino     Elias Gaona  : 1940  Primary: Medicare Part A And B (Medicare)  Secondary: [employer] MEDICARE SUPP [facility] [place]  [place] [address]  Phone: [phone]  Fax: [phone] Plan Frequency: 1x a week for 60 days    Plan of Care/Certification Expiration Date: 24        Plan of Care/Certification Expiration Date:  Plan of Care/Certification Expiration Date: 24    Frequency/Duration: Plan Frequency: 1x a week for 60 days      Time In/Out:          PT Visit Info:    Progress Note Counter: 4  Canceled Appointment: 1      Visit Count:  21                OUTPATIENT PHYSICAL THERAPY:             Discharge Summary 7/15/2024               Episode (L humeral fracture)         Treatment Diagnosis:     Stiffness of left shoulder, not elsewhere classified  Muscle weakness (generalized)  Medical/Referring Diagnosis:    Other nondisplaced fracture of upper end of left humerus, subsequent encounter for fracture with routine healing [S40.171X]    Referring Physician:  Alvino Fontanez MD MD Orders:  PT Eval and Treat   Return MD Appt:  May 2024  Date of Onset:  Onset Date: 02/15/24     Allergies:  Morphine, Northern quahog clam (m. mercenaria) skin test, and Other  Restrictions/Precautions:    None      Medications Last Reviewed:  7/15/2024     Initial Pain Level:      2/10   Post Session Pain Level:     2/10   OBJECTIVE     ORTHOSTATIC/POSTURE OBSERVATION:   Date:   7/15/2024   SITTING RESTING POSTURE -Pt sits with forward head and rounded shoulders which indicate tight anterior chest musculature, upper trapezius, and levator scapula and weak posterior scapula musculature and deep cervical flexors.      STANDING RESTING POSTURE -Pt displays decreased core motor control indicating weak core and low back musculature.       ROM Date:  7/15/2024    RIGHT LEFT   UEROM   Flexion 110 degrees 90 degrees  Pain in shoulder, but reports mostly \"just stops\"

## 2024-07-15 NOTE — PROGRESS NOTES
Elias Gaona  : 1940  Primary: Medicare Part A And B (Medicare)  Secondary: [employer] MEDICARE SUPP [facility] [place] [address] [address] [address]  Phone: [phone]  Fax: [phone] Plan Frequency: 1x a week for 60 days    Plan of Care/Certification Expiration Date: 24        Plan of Care/Certification Expiration Date:  Plan of Care/Certification Expiration Date: 24    Frequency/Duration:   Plan Frequency: 1x a week for 60 days      Time In/Out:   Time In: 1035  Time Out: 1115      PT Visit Info:    Progress Note Counter: 4  Canceled Appointment: 1      Visit Count:  21    OUTPATIENT PHYSICAL THERAPY:   Treatment Note 7/15/2024       Episode  (L humeral fracture)               Treatment Diagnosis:    Stiffness of left shoulder, not elsewhere classified  Muscle weakness (generalized)  Medical/Referring Diagnosis:    Other nondisplaced fracture of upper end of left humerus, subsequent encounter for fracture with routine healing [S42.295D]    Referring Physician:  Alvino Fontanez MD MD Orders:  PT Eval and Treat   Return MD Appt:  May 2024  Date of Onset:  Onset Date: 02/15/24     Allergies:   Morphine, Northern quahog clam (m. mercenaria) skin test, and Other  Restrictions/Precautions:   None      Interventions Planned (Treatment may consist of any combination of the following):     See Assessment Note    Subjective Comments:   Pt notes he fell in the parking lot of [employer] yesterday which left him with scrapes and cuts throughout L UE and LE. He notes L UE is somewhat sore but is still able to move it like he normally doesn.   Initial Pain Level::     2/10  Post Session Pain Level:      2/10  Medications Last Reviewed:  7/15/2024  Updated Objective Findings:   L shoulder flexion AAROM in supine to 115 degrees  Treatment   THERAPEUTIC EXERCISE: (23 minutes):  Exercises per grid below to improve mobility and strength.  Required moderate visual,

## 2024-07-26 ENCOUNTER — APPOINTMENT (RX ONLY)
Dept: URBAN - METROPOLITAN AREA CLINIC 330 | Facility: CLINIC | Age: 84
Setting detail: DERMATOLOGY
End: 2024-07-26

## 2024-07-26 DIAGNOSIS — L259 CONTACT DERMATITIS AND OTHER ECZEMA, UNSPECIFIED CAUSE: ICD-10-CM | Status: INADEQUATELY CONTROLLED

## 2024-07-26 DIAGNOSIS — T81.89 OTHER COMPLICATIONS OF PROCEDURES, NOT ELSEWHERE CLASSIFIED: ICD-10-CM

## 2024-07-26 PROBLEM — T81.89XA OTHER COMPLICATIONS OF PROCEDURES, NOT ELSEWHERE CLASSIFIED, INITIAL ENCOUNTER: Status: ACTIVE | Noted: 2024-07-26

## 2024-07-26 PROBLEM — L30.9 DERMATITIS, UNSPECIFIED: Status: ACTIVE | Noted: 2024-07-26

## 2024-07-26 PROCEDURE — ? PRESCRIPTION MEDICATION MANAGEMENT

## 2024-07-26 PROCEDURE — 99214 OFFICE O/P EST MOD 30 MIN: CPT

## 2024-07-26 PROCEDURE — ? COUNSELING

## 2024-07-26 PROCEDURE — ? PRESCRIPTION

## 2024-07-26 RX ORDER — TRIAMCINOLONE ACETONIDE 1 MG/G
CREAM TOPICAL BID
Qty: 160 | Refills: 1 | Status: ERX | COMMUNITY
Start: 2024-07-26

## 2024-07-26 RX ORDER — MUPIROCIN 20 MG/G
OINTMENT TOPICAL
Qty: 22 | Refills: 0 | Status: ERX

## 2024-07-26 RX ADMIN — TRIAMCINOLONE ACETONIDE: 1 CREAM TOPICAL at 00:00

## 2024-07-26 ASSESSMENT — LOCATION ZONE DERM
LOCATION ZONE: FACE
LOCATION ZONE: ARM

## 2024-07-26 ASSESSMENT — LOCATION DETAILED DESCRIPTION DERM
LOCATION DETAILED: LEFT ANTERIOR SHOULDER
LOCATION DETAILED: LEFT SUPERIOR FOREHEAD

## 2024-07-26 ASSESSMENT — LOCATION SIMPLE DESCRIPTION DERM
LOCATION SIMPLE: LEFT FOREHEAD
LOCATION SIMPLE: LEFT SHOULDER

## 2024-07-26 NOTE — PROCEDURE: PRESCRIPTION MEDICATION MANAGEMENT
Detail Level: Simple
Plan: Pt states wound is painful but not as painful as last visit 2 weeks ago.\\nPt c/o feeling as if stitches are still in. \\nPt completed course of abx, continues using mupirocin.\\nMeasurement today same as last OV 1.1cm x 0.8cm\\nOffered wound check appt with Dr. Kimble. Pt declines. \\nRecommended continuing Mupirocin ointment, fup wound check x2 weeks. Pt agrees with plan.
Render In Strict Bullet Format?: No
Continue Regimen: Mupirocin ointment
Initiate Treatment: triamcinolone acetonide 0.1 % topical cream BID
Plan: Pt states lesion is itchy. Hasn’t tried anything. Trial of topical steroid x 2 weeks and recheck at next visit with possible bx if not resolved.

## 2024-08-09 ENCOUNTER — APPOINTMENT (RX ONLY)
Dept: URBAN - METROPOLITAN AREA CLINIC 330 | Facility: CLINIC | Age: 84
Setting detail: DERMATOLOGY
End: 2024-08-09

## 2024-08-09 DIAGNOSIS — T81.89 OTHER COMPLICATIONS OF PROCEDURES, NOT ELSEWHERE CLASSIFIED: ICD-10-CM | Status: IMPROVED

## 2024-08-09 DIAGNOSIS — L259 CONTACT DERMATITIS AND OTHER ECZEMA, UNSPECIFIED CAUSE: ICD-10-CM | Status: RESOLVED

## 2024-08-09 PROBLEM — L30.9 DERMATITIS, UNSPECIFIED: Status: ACTIVE | Noted: 2024-08-09

## 2024-08-09 PROBLEM — T81.89XA OTHER COMPLICATIONS OF PROCEDURES, NOT ELSEWHERE CLASSIFIED, INITIAL ENCOUNTER: Status: ACTIVE | Noted: 2024-08-09

## 2024-08-09 PROCEDURE — ? COUNSELING

## 2024-08-09 PROCEDURE — ? PRESCRIPTION MEDICATION MANAGEMENT

## 2024-08-09 PROCEDURE — 99213 OFFICE O/P EST LOW 20 MIN: CPT

## 2024-08-09 ASSESSMENT — LOCATION ZONE DERM
LOCATION ZONE: FACE
LOCATION ZONE: ARM

## 2024-08-09 ASSESSMENT — LOCATION SIMPLE DESCRIPTION DERM
LOCATION SIMPLE: LEFT SHOULDER
LOCATION SIMPLE: LEFT FOREHEAD

## 2024-08-09 ASSESSMENT — LOCATION DETAILED DESCRIPTION DERM
LOCATION DETAILED: LEFT ANTERIOR SHOULDER
LOCATION DETAILED: LEFT SUPERIOR FOREHEAD

## 2024-08-09 NOTE — PROCEDURE: PRESCRIPTION MEDICATION MANAGEMENT
Detail Level: Simple
Plan: Pt states wound is painful at times will come on suddenly, infection and open wound with improvement.\\nDiscussed nerve irritation and tightness can take up to a year after surgery to settle down.\\nRecommended continuing Mupirocin ointment, using Tylenol for pain.
Render In Strict Bullet Format?: No
Continue Regimen: Mupirocin ointment
Plan: Pt states lesion has resolved after using steroid cream.

## 2024-08-23 ENCOUNTER — APPOINTMENT (RX ONLY)
Dept: URBAN - METROPOLITAN AREA CLINIC 330 | Facility: CLINIC | Age: 84
Setting detail: DERMATOLOGY
End: 2024-08-23

## 2024-08-23 DIAGNOSIS — T81.89 OTHER COMPLICATIONS OF PROCEDURES, NOT ELSEWHERE CLASSIFIED: ICD-10-CM | Status: RESOLVED

## 2024-08-23 PROBLEM — T81.89XD OTHER COMPLICATIONS OF PROCEDURES, NOT ELSEWHERE CLASSIFIED, SUBSEQUENT ENCOUNTER: Status: ACTIVE | Noted: 2024-08-23

## 2024-08-23 PROCEDURE — ? PRESCRIPTION MEDICATION MANAGEMENT

## 2024-08-23 PROCEDURE — ? COUNSELING

## 2024-08-23 PROCEDURE — 99213 OFFICE O/P EST LOW 20 MIN: CPT

## 2024-08-23 ASSESSMENT — LOCATION ZONE DERM: LOCATION ZONE: FACE

## 2024-08-23 ASSESSMENT — LOCATION SIMPLE DESCRIPTION DERM: LOCATION SIMPLE: LEFT FOREHEAD

## 2024-08-23 ASSESSMENT — LOCATION DETAILED DESCRIPTION DERM: LOCATION DETAILED: LEFT SUPERIOR FOREHEAD

## 2024-08-23 NOTE — PROCEDURE: PRESCRIPTION MEDICATION MANAGEMENT
Detail Level: Simple
Plan: Pt states wound is painful at times. Wound has closed. \\nDiscussed nerve irritation and tightness can take up to a year after surgery to settle down.\\nRecommended continuing Mupirocin ointment for one additional week, using Tylenol for pain.
Render In Strict Bullet Format?: No
Continue Regimen: Mupirocin ointment

## 2024-11-05 ENCOUNTER — APPOINTMENT (RX ONLY)
Dept: URBAN - METROPOLITAN AREA CLINIC 330 | Facility: CLINIC | Age: 84
Setting detail: DERMATOLOGY
End: 2024-11-05

## 2024-11-05 DIAGNOSIS — L57.8 OTHER SKIN CHANGES DUE TO CHRONIC EXPOSURE TO NONIONIZING RADIATION: ICD-10-CM | Status: STABLE

## 2024-11-05 DIAGNOSIS — Z85.828 PERSONAL HISTORY OF OTHER MALIGNANT NEOPLASM OF SKIN: ICD-10-CM

## 2024-11-05 DIAGNOSIS — L57.0 ACTINIC KERATOSIS: ICD-10-CM

## 2024-11-05 DIAGNOSIS — D22 MELANOCYTIC NEVI: ICD-10-CM | Status: STABLE

## 2024-11-05 PROBLEM — D48.5 NEOPLASM OF UNCERTAIN BEHAVIOR OF SKIN: Status: ACTIVE | Noted: 2024-11-05

## 2024-11-05 PROBLEM — D22.5 MELANOCYTIC NEVI OF TRUNK: Status: ACTIVE | Noted: 2024-11-05

## 2024-11-05 PROCEDURE — ? BIOPSY BY SHAVE METHOD

## 2024-11-05 PROCEDURE — ? COUNSELING

## 2024-11-05 PROCEDURE — 17003 DESTRUCT PREMALG LES 2-14: CPT

## 2024-11-05 PROCEDURE — 99213 OFFICE O/P EST LOW 20 MIN: CPT | Mod: 25

## 2024-11-05 PROCEDURE — ? ADDITIONAL NOTES

## 2024-11-05 PROCEDURE — ? MEDICAL PHOTOGRAPHY REVIEW

## 2024-11-05 PROCEDURE — ? FULL BODY SKIN EXAM

## 2024-11-05 PROCEDURE — ? TREATMENT REGIMEN

## 2024-11-05 PROCEDURE — 17000 DESTRUCT PREMALG LESION: CPT | Mod: 59

## 2024-11-05 PROCEDURE — ? LIQUID NITROGEN

## 2024-11-05 PROCEDURE — 11102 TANGNTL BX SKIN SINGLE LES: CPT

## 2024-11-05 ASSESSMENT — LOCATION SIMPLE DESCRIPTION DERM
LOCATION SIMPLE: SCALP
LOCATION SIMPLE: LEFT FOREARM
LOCATION SIMPLE: RIGHT FOREARM
LOCATION SIMPLE: LEFT FOREHEAD
LOCATION SIMPLE: LEFT HAND
LOCATION SIMPLE: LEFT PRETIBIAL REGION
LOCATION SIMPLE: LEFT UPPER BACK
LOCATION SIMPLE: NOSE
LOCATION SIMPLE: RIGHT UPPER ARM
LOCATION SIMPLE: RIGHT ELBOW

## 2024-11-05 ASSESSMENT — LOCATION DETAILED DESCRIPTION DERM
LOCATION DETAILED: LEFT SUPERIOR FOREHEAD
LOCATION DETAILED: LEFT RADIAL DORSAL HAND
LOCATION DETAILED: LEFT DISTAL PRETIBIAL REGION
LOCATION DETAILED: RIGHT PROXIMAL RADIAL DORSAL FOREARM
LOCATION DETAILED: RIGHT ELBOW
LOCATION DETAILED: LEFT PROXIMAL RADIAL DORSAL FOREARM
LOCATION DETAILED: RIGHT INFERIOR POSTAURICULAR SKIN
LOCATION DETAILED: LEFT SUPERIOR PARIETAL SCALP
LOCATION DETAILED: NASAL SUPRATIP
LOCATION DETAILED: RIGHT DISTAL POSTERIOR UPPER ARM
LOCATION DETAILED: LEFT VENTRAL LATERAL PROXIMAL FOREARM
LOCATION DETAILED: LEFT INFERIOR UPPER BACK

## 2024-11-05 ASSESSMENT — LOCATION ZONE DERM
LOCATION ZONE: FACE
LOCATION ZONE: SCALP
LOCATION ZONE: HAND
LOCATION ZONE: TRUNK
LOCATION ZONE: LEG
LOCATION ZONE: ARM
LOCATION ZONE: NOSE

## 2024-11-05 NOTE — PROCEDURE: BIOPSY BY SHAVE METHOD

## 2024-11-05 NOTE — PROCEDURE: LIQUID NITROGEN
Post-Care Instructions: I reviewed with the patient in detail post-care instructions. Patient is to wear sunprotection, and avoid picking at any of the treated lesions. Pt may apply Vaseline to crusted or scabbing areas.
Render Post-Care Instructions In Note?: no
Duration Of Freeze Thaw-Cycle (Seconds): 2
Show Aperture Variable?: Yes
Consent: The patient's consent was obtained including but not limited to risks of crusting, scabbing, blistering, scarring, darker or lighter pigmentary change, recurrence, incomplete removal and infection.
Detail Level: Detailed
Number Of Freeze-Thaw Cycles: 3 freeze-thaw cycles

## 2024-11-20 ENCOUNTER — APPOINTMENT (RX ONLY)
Dept: URBAN - METROPOLITAN AREA CLINIC 330 | Facility: CLINIC | Age: 84
Setting detail: DERMATOLOGY
End: 2024-11-20

## 2024-11-20 PROBLEM — C44.91 BASAL CELL CARCINOMA OF SKIN, UNSPECIFIED: Status: ACTIVE | Noted: 2024-11-20

## 2024-11-20 PROCEDURE — ? REFERRAL

## 2024-11-26 ENCOUNTER — APPOINTMENT (RX ONLY)
Dept: URBAN - METROPOLITAN AREA CLINIC 330 | Facility: CLINIC | Age: 84
Setting detail: DERMATOLOGY
End: 2024-11-26

## 2024-11-26 VITALS — HEART RATE: 89 BPM | SYSTOLIC BLOOD PRESSURE: 146 MMHG | DIASTOLIC BLOOD PRESSURE: 86 MMHG | OXYGEN SATURATION: 100 %

## 2024-11-26 PROBLEM — C44.41 BASAL CELL CARCINOMA OF SKIN OF SCALP AND NECK: Status: ACTIVE | Noted: 2024-11-26

## 2024-11-26 PROCEDURE — 17311 MOHS 1 STAGE H/N/HF/G: CPT

## 2024-11-26 PROCEDURE — 12032 INTMD RPR S/A/T/EXT 2.6-7.5: CPT

## 2024-11-26 PROCEDURE — ? MOHS SURGERY

## 2024-11-27 ENCOUNTER — RX ONLY (OUTPATIENT)
Age: 84
Setting detail: RX ONLY
End: 2024-11-27

## 2024-11-27 RX ORDER — TRAMADOL HYDROCHLORIDE 50 MG/1
TABLET, FILM COATED ORAL
Qty: 6 | Refills: 0 | Status: ERX

## 2024-12-04 ENCOUNTER — RX ONLY (RX ONLY)
Age: 84
End: 2024-12-04

## 2024-12-04 RX ORDER — TRAMADOL HYDROCHLORIDE 50 MG/1
TABLET, FILM COATED ORAL
Qty: 6 | Refills: 0 | Status: ERX

## 2024-12-10 ENCOUNTER — APPOINTMENT (OUTPATIENT)
Dept: URBAN - METROPOLITAN AREA CLINIC 330 | Facility: CLINIC | Age: 84
Setting detail: DERMATOLOGY
End: 2024-12-10

## 2024-12-10 DIAGNOSIS — Z48.02 ENCOUNTER FOR REMOVAL OF SUTURES: ICD-10-CM

## 2024-12-10 PROCEDURE — ? SUTURE REMOVAL

## 2024-12-10 ASSESSMENT — LOCATION DETAILED DESCRIPTION DERM: LOCATION DETAILED: RIGHT MEDIAL FRONTAL SCALP

## 2024-12-10 ASSESSMENT — LOCATION ZONE DERM: LOCATION ZONE: SCALP

## 2024-12-10 ASSESSMENT — LOCATION SIMPLE DESCRIPTION DERM: LOCATION SIMPLE: RIGHT SCALP

## 2025-08-11 ENCOUNTER — HOSPITAL ENCOUNTER (OUTPATIENT)
Dept: PHYSICAL THERAPY | Age: 85
Setting detail: RECURRING SERIES
End: 2025-08-11
Payer: MEDICARE

## 2025-08-13 ENCOUNTER — HOSPITAL ENCOUNTER (OUTPATIENT)
Dept: PHYSICAL THERAPY | Age: 85
Setting detail: RECURRING SERIES
End: 2025-08-13
Payer: MEDICARE

## 2025-08-18 ENCOUNTER — HOSPITAL ENCOUNTER (OUTPATIENT)
Dept: PHYSICAL THERAPY | Age: 85
Setting detail: RECURRING SERIES
Discharge: HOME | End: 2025-08-21
Payer: MEDICARE

## 2025-08-18 DIAGNOSIS — M62.81 MUSCLE WEAKNESS (GENERALIZED): ICD-10-CM

## 2025-08-18 DIAGNOSIS — M54.51 VERTEBROGENIC LOW BACK PAIN: ICD-10-CM

## 2025-08-18 DIAGNOSIS — R26.2 DIFFICULTY IN WALKING, NOT ELSEWHERE CLASSIFIED: Primary | ICD-10-CM

## 2025-08-18 PROCEDURE — 97162 PT EVAL MOD COMPLEX 30 MIN: CPT

## 2025-08-18 PROCEDURE — 97110 THERAPEUTIC EXERCISES: CPT

## 2025-08-18 PROCEDURE — 97140 MANUAL THERAPY 1/> REGIONS: CPT

## 2025-08-18 ASSESSMENT — PAIN SCALES - GENERAL: PAINLEVEL_OUTOF10: 1

## 2025-08-21 ENCOUNTER — HOSPITAL ENCOUNTER (OUTPATIENT)
Dept: PHYSICAL THERAPY | Age: 85
Setting detail: RECURRING SERIES
Discharge: HOME | End: 2025-08-24
Payer: MEDICARE

## 2025-08-21 PROCEDURE — 97140 MANUAL THERAPY 1/> REGIONS: CPT

## 2025-08-21 PROCEDURE — 97110 THERAPEUTIC EXERCISES: CPT

## 2025-08-21 ASSESSMENT — PAIN SCALES - GENERAL: PAINLEVEL_OUTOF10: 1

## 2025-08-25 ENCOUNTER — HOSPITAL ENCOUNTER (OUTPATIENT)
Dept: PHYSICAL THERAPY | Age: 85
Setting detail: RECURRING SERIES
Discharge: HOME | End: 2025-08-28
Payer: MEDICARE

## 2025-08-25 PROCEDURE — 97110 THERAPEUTIC EXERCISES: CPT

## 2025-08-25 PROCEDURE — 97140 MANUAL THERAPY 1/> REGIONS: CPT

## 2025-08-25 ASSESSMENT — PAIN SCALES - GENERAL: PAINLEVEL_OUTOF10: 2

## 2025-08-28 ENCOUNTER — HOSPITAL ENCOUNTER (OUTPATIENT)
Dept: PHYSICAL THERAPY | Age: 85
Setting detail: RECURRING SERIES
Discharge: HOME | End: 2025-08-31
Payer: MEDICARE

## 2025-08-28 PROCEDURE — 97140 MANUAL THERAPY 1/> REGIONS: CPT

## 2025-08-28 PROCEDURE — 97110 THERAPEUTIC EXERCISES: CPT

## 2025-08-28 ASSESSMENT — PAIN SCALES - GENERAL: PAINLEVEL_OUTOF10: 2

## 2025-09-02 ENCOUNTER — HOSPITAL ENCOUNTER (OUTPATIENT)
Dept: PHYSICAL THERAPY | Age: 85
Setting detail: RECURRING SERIES
Discharge: HOME | End: 2025-09-05
Payer: MEDICARE

## 2025-09-02 PROCEDURE — 97110 THERAPEUTIC EXERCISES: CPT

## 2025-09-02 PROCEDURE — 97140 MANUAL THERAPY 1/> REGIONS: CPT

## 2025-09-02 ASSESSMENT — PAIN SCALES - GENERAL: PAINLEVEL_OUTOF10: 2

## (undated) DEVICE — BLOCK BITE 60FR W/ DENT RIM SCRIP ONLY

## (undated) DEVICE — DRAPE,U/SHT,SPLIT,FILM,60X84,STERILE: Brand: MEDLINE

## (undated) DEVICE — SUTURE ETHBND EXCEL SZ 5 L30IN NONABSORBABLE GRN L40MM V-37 MB66G

## (undated) DEVICE — SYSTEM SKIN CLSR 22CM DERMBND PRINEO

## (undated) DEVICE — 2000CC GUARDIAN II: Brand: GUARDIAN

## (undated) DEVICE — T4 HOOD

## (undated) DEVICE — TIP CLEANR ELECSURG 1.75X1.75 --

## (undated) DEVICE — MEDI-VAC YANKAUER SUCTION HANDLE W/BULBOUS TIP: Brand: CARDINAL HEALTH

## (undated) DEVICE — SOLUTION SCRB 1% POVIDONE IOD BRN ANTIMIC SKIN CLN

## (undated) DEVICE — KENDALL RADIOLUCENT FOAM MONITORING ELECTRODE RECTANGULAR SHAPE: Brand: KENDALL

## (undated) DEVICE — SOLUTION IRRIG 3000ML 0.9% SOD CHL FLX CONT 0797208] ICU MEDICAL INC]

## (undated) DEVICE — MARKER UTIL W/RULER AND LBL -- STRL

## (undated) DEVICE — SYR 50ML LR LCK 1ML GRAD NSAF --

## (undated) DEVICE — MICRODISSECTION NEEDLE STRAIGHT SLEEVE: Brand: COLORADO

## (undated) DEVICE — TRAY CATH 16F DRN BG LTX -- CONVERT TO ITEM 363158

## (undated) DEVICE — VALVE SUCTION AIR H2O SET ORCA POD + DISP

## (undated) DEVICE — AIR/WATER CLEANING ADAPTER FOR OLYMPUS® GI ENDOSCOPE: Brand: BULLDOG®

## (undated) DEVICE — HEWSON SUTURE RETRIEVER: Brand: HEWSON SUTURE RETRIEVER

## (undated) DEVICE — JELLY LUBRICATING 10GM PREFIL SYR LUBE

## (undated) DEVICE — TOTAL SHOULDER DR KOCH: Brand: MEDLINE INDUSTRIES, INC.

## (undated) DEVICE — (D)PREP SKN CHLRAPRP APPL 26ML -- CONVERT TO ITEM 371833

## (undated) DEVICE — SHOULDER STABILIZATION KIT,                                    DISPOSABLE 12 PER BOX

## (undated) DEVICE — SOLUTION IV 1000ML 0.9% SOD CHL

## (undated) DEVICE — BUTTON SWITCH PENCIL BLADE ELECTRODE, HOLSTER: Brand: EDGE

## (undated) DEVICE — COVER,TABLE,HEAVY DUTY,79"X110",STRL: Brand: MEDLINE

## (undated) DEVICE — T-MAX DISPOSABLE FACE MASK 8 PER BOX

## (undated) DEVICE — 2108 SERIES SAGITTAL BLADE (18.6 X 0.64 X 61.1MM)

## (undated) DEVICE — NEEDLE SPNL 22GA L3.5IN BLK HUB S STL REG WALL FIT STYL W/

## (undated) DEVICE — SPONGE LAP 18X18IN STRL -- 5/PK

## (undated) DEVICE — FAN SPRAY KIT: Brand: PULSAVAC®

## (undated) DEVICE — DERMATOME BLADES: Brand: DERMATOME

## (undated) DEVICE — STERILE POLYISOPRENE POWDER-FREE SURGICAL GLOVES WITH EMOLLIENT COATING: Brand: PROTEXIS

## (undated) DEVICE — Device: Brand: STABLECUT®

## (undated) DEVICE — SURGICAL PROCEDURE PACK BASIC ST FRANCIS

## (undated) DEVICE — STOCKINETTE TUBE 6X48 -- MEDICHOICE

## (undated) DEVICE — SYRINGE, LUER LOCK, 30ML: Brand: MEDLINE

## (undated) DEVICE — TRAY PREP DRY W/ PREM GLV 2 APPL 6 SPNG 2 UNDPD 1 OVERWRAP

## (undated) DEVICE — SUT PROL 4-0 18IN P3 BLU --

## (undated) DEVICE — REM POLYHESIVE ADULT PATIENT RETURN ELECTRODE: Brand: VALLEYLAB

## (undated) DEVICE — SUT SLK 4-0 18IN FS2 BLK --

## (undated) DEVICE — CONTAINER PREFIL FRMLN 40ML --

## (undated) DEVICE — ELECTRODE NDL 2.8IN COAT VALLEYLAB

## (undated) DEVICE — 3M™ STERI-DRAPE™ U-DRAPE 1015: Brand: STERI-DRAPE™

## (undated) DEVICE — SINGLE-USE BIOPSY FORCEPS: Brand: RADIAL JAW 4

## (undated) DEVICE — HANDPIECE SET WITH COAXIAL HIGH FLOW TIP AND SUCTION TUBE: Brand: INTERPULSE

## (undated) DEVICE — SUTURE PDS II SZ 1 L54IN ABSRB VLT L65MM TP-1 1/2 CIR Z879G

## (undated) DEVICE — 3M™ STERI-DRAPE™ INCISE DRAPE, XL 1051: Brand: STERI-DRAPE™

## (undated) DEVICE — Device

## (undated) DEVICE — AMD ANTIMICROBIAL NON-ADHERENT PAD,0.2% POLYHEXAMETHYLENE BIGUANIDE HCI (PHMB): Brand: TELFA

## (undated) DEVICE — GAUZE,SPONGE,4"X4",12PLY,WOVEN,NS,LF: Brand: MEDLINE

## (undated) DEVICE — CANNULA NSL AD CO2 SAMP FOR DASH 3000 4000 MON LO FLO

## (undated) DEVICE — STERILE POLYISOPRENE POWDER-FREE SURGICAL GLOVES: Brand: PROTEXIS

## (undated) DEVICE — DRAPE,HIP,W/POUCHES,STERILE: Brand: MEDLINE

## (undated) DEVICE — SUTURE VCRL SZ 3-0 L18IN ABSRB UD PS-2 L19MM 1/2 CIR J497G

## (undated) DEVICE — Device: Brand: JELCO

## (undated) DEVICE — BNDG,ELSTC,MATRIX,STRL,4"X5YD,LF,HOOK&LP: Brand: MEDLINE

## (undated) DEVICE — 3000CC GUARDIAN II: Brand: GUARDIAN

## (undated) DEVICE — BANDAGE COBAN 4 IN COMPR W4INXL5YD FOAM COHESIVE QUIK STK SELF ADH SFT

## (undated) DEVICE — TOTAL 1-LAYER TRAY, LATEX FOLEY, 16FR 10: Brand: MEDLINE

## (undated) DEVICE — SUTURE MCRYL SZ 2-0 L27IN ABSRB UD CP-1 1 L36MM 1/2 CIR REV Y266H

## (undated) DEVICE — GOWN,AURORA,FABRIC-REINFORCED,2XL: Brand: MEDLINE

## (undated) DEVICE — POWDER HEMOSTAT GEL 3.0GR -- SURGICEL

## (undated) DEVICE — 3M™ IOBAN™ 2 ANTIMICROBIAL INCISE DRAPE 6650EZ: Brand: IOBAN™ 2

## (undated) DEVICE — SUTURE STRATAFIX SPRL MCRYL + SZ 3-0 L18IN ABSRB UD PS-2 SXMP1B107

## (undated) DEVICE — TUBING, SUCTION, 3/16" X 6', STRAIGHT: Brand: MEDLINE

## (undated) DEVICE — BANDAGE,GAUZE,BULKEE II,4.5"X4.1YD,STRL: Brand: MEDLINE

## (undated) DEVICE — SURGICAL PROCEDURE PACK TOT HIP CDS

## (undated) DEVICE — BW-412T DISP COMBO CLEANING BRUSH: Brand: SINGLE USE COMBINATION CLEANING BRUSH

## (undated) DEVICE — SUTURE PDS II SZ 1 L36IN ABSRB VLT L48MM CTX 1/2 CIR Z371T

## (undated) DEVICE — SYR LR LCK 1ML GRAD NSAF 30ML --

## (undated) DEVICE — KIT NEG PRSS FOR NONLIN OR UPTO 90CM LIN INCIS PREVENA +

## (undated) DEVICE — SHEET, DRAPE, SPLIT, STERILE: Brand: MEDLINE

## (undated) DEVICE — DRAPE TWL SURG 16X26IN BLU ORB04] ALLCARE INC]

## (undated) DEVICE — SYR 10ML LUER LOK 1/5ML GRAD --

## (undated) DEVICE — DRESSING,GAUZE,XEROFORM,CURAD,1"X8",ST: Brand: CURAD